# Patient Record
Sex: FEMALE | Race: WHITE | NOT HISPANIC OR LATINO | Employment: OTHER | URBAN - METROPOLITAN AREA
[De-identification: names, ages, dates, MRNs, and addresses within clinical notes are randomized per-mention and may not be internally consistent; named-entity substitution may affect disease eponyms.]

---

## 2017-07-25 ENCOUNTER — HOSPITAL ENCOUNTER (OUTPATIENT)
Dept: RADIOLOGY | Facility: MEDICAL CENTER | Age: 67
End: 2017-07-25
Attending: FAMILY MEDICINE
Payer: COMMERCIAL

## 2017-07-25 DIAGNOSIS — Z12.31 VISIT FOR SCREENING MAMMOGRAM: ICD-10-CM

## 2017-07-25 PROCEDURE — 77063 BREAST TOMOSYNTHESIS BI: CPT

## 2017-07-26 ENCOUNTER — HOSPITAL ENCOUNTER (OUTPATIENT)
Dept: RADIOLOGY | Facility: MEDICAL CENTER | Age: 67
End: 2017-07-26

## 2017-11-27 ENCOUNTER — TELEPHONE (OUTPATIENT)
Dept: RADIOLOGY | Facility: MEDICAL CENTER | Age: 67
End: 2017-11-27

## 2017-11-28 ENCOUNTER — HOSPITAL ENCOUNTER (OUTPATIENT)
Dept: RADIOLOGY | Facility: MEDICAL CENTER | Age: 67
End: 2017-11-28
Attending: PHYSICIAN ASSISTANT
Payer: COMMERCIAL

## 2017-11-28 ENCOUNTER — HOSPITAL ENCOUNTER (OUTPATIENT)
Dept: RADIOLOGY | Facility: MEDICAL CENTER | Age: 67
End: 2017-11-28

## 2017-11-28 DIAGNOSIS — N63.0 LUMP OR MASS IN BREAST: ICD-10-CM

## 2017-11-28 PROCEDURE — G0206 DX MAMMO INCL CAD UNI: HCPCS | Mod: LT

## 2017-11-28 PROCEDURE — 76642 ULTRASOUND BREAST LIMITED: CPT | Mod: LT

## 2018-02-08 ENCOUNTER — HOSPITAL ENCOUNTER (OUTPATIENT)
Dept: RADIOLOGY | Facility: MEDICAL CENTER | Age: 68
End: 2018-02-08
Attending: FAMILY MEDICINE
Payer: COMMERCIAL

## 2018-02-08 DIAGNOSIS — R05.9 COUGH: ICD-10-CM

## 2018-02-08 DIAGNOSIS — D50.9 NORMOCYTIC HYPOCHROMIC ANEMIA: ICD-10-CM

## 2018-02-08 PROCEDURE — 71046 X-RAY EXAM CHEST 2 VIEWS: CPT

## 2018-09-28 ENCOUNTER — HOSPITAL ENCOUNTER (OUTPATIENT)
Dept: RADIOLOGY | Facility: MEDICAL CENTER | Age: 68
End: 2018-09-28
Attending: PHYSICIAN ASSISTANT
Payer: COMMERCIAL

## 2018-09-28 DIAGNOSIS — Z12.31 VISIT FOR SCREENING MAMMOGRAM: ICD-10-CM

## 2018-09-28 PROCEDURE — 77067 SCR MAMMO BI INCL CAD: CPT

## 2019-01-18 ENCOUNTER — OFFICE VISIT (OUTPATIENT)
Dept: URGENT CARE | Facility: PHYSICIAN GROUP | Age: 69
End: 2019-01-18
Payer: MEDICARE

## 2019-01-18 VITALS
SYSTOLIC BLOOD PRESSURE: 124 MMHG | RESPIRATION RATE: 14 BRPM | BODY MASS INDEX: 30.82 KG/M2 | TEMPERATURE: 98.6 F | WEIGHT: 185 LBS | HEIGHT: 65 IN | OXYGEN SATURATION: 95 % | DIASTOLIC BLOOD PRESSURE: 80 MMHG | HEART RATE: 91 BPM

## 2019-01-18 DIAGNOSIS — H00.014 HORDEOLUM EXTERNUM OF LEFT UPPER EYELID: Primary | ICD-10-CM

## 2019-01-18 DIAGNOSIS — R59.0 PREAURICULAR LYMPHADENOPATHY: ICD-10-CM

## 2019-01-18 PROCEDURE — 99204 OFFICE O/P NEW MOD 45 MIN: CPT | Performed by: PHYSICIAN ASSISTANT

## 2019-01-18 RX ORDER — OMEPRAZOLE 20 MG/1
20 CAPSULE, DELAYED RELEASE ORAL DAILY
COMMUNITY
End: 2021-04-21

## 2019-01-18 RX ORDER — AMOXICILLIN 500 MG/1
500 CAPSULE ORAL 2 TIMES DAILY
Qty: 14 CAP | Refills: 0 | Status: SHIPPED | OUTPATIENT
Start: 2019-01-18 | End: 2019-01-25

## 2019-01-18 RX ORDER — LEVOTHYROXINE SODIUM 88 UG/1
88 TABLET ORAL
COMMUNITY
End: 2020-03-10

## 2019-01-18 RX ORDER — MOXIFLOXACIN 5 MG/ML
1 SOLUTION/ DROPS OPHTHALMIC 3 TIMES DAILY
Qty: 1 BOTTLE | Refills: 0 | Status: SHIPPED | OUTPATIENT
Start: 2019-01-18 | End: 2019-01-23

## 2019-01-18 ASSESSMENT — ENCOUNTER SYMPTOMS
SHORTNESS OF BREATH: 0
DIARRHEA: 0
COUGH: 0
PHOTOPHOBIA: 0
DOUBLE VISION: 0
EYE REDNESS: 1
VOMITING: 0
CONSTIPATION: 0
EYE DISCHARGE: 1
CHILLS: 0
FEVER: 0
NAUSEA: 0
EYE ITCHING: 0
FOREIGN BODY SENSATION: 0
ABDOMINAL PAIN: 0
EYE PAIN: 0
BLURRED VISION: 0

## 2019-01-18 NOTE — PROGRESS NOTES
Subjective:   Prudence Chayito Gregory is a 68 y.o. female who presents for Eye Drainage (poss stye on eye swelling on left cheek by ear )        Eye Problem    The left eye is affected. This is a new problem. Episode onset: 2 days. The problem occurs constantly. The problem has been gradually worsening. There is no known exposure to pink eye. Associated symptoms include an eye discharge and eye redness. Pertinent negatives include no blurred vision, double vision, fever, foreign body sensation, itching, nausea, photophobia, recent URI or vomiting. Treatments tried: ibuprofen and warm compress. The treatment provided mild relief.      She has noticed some tenderness and swelling on L side preauricular area. No otalgia, discharge or dental pain. No sore throat, fever or chills.     Review of Systems   Constitutional: Negative for chills, fever and malaise/fatigue.   Eyes: Positive for discharge and redness. Negative for blurred vision, double vision, photophobia, pain and itching.   Respiratory: Negative for cough and shortness of breath.    Gastrointestinal: Negative for abdominal pain, constipation, diarrhea, nausea and vomiting.   All other systems reviewed and are negative.      PMH:  has no past medical history on file.  MEDS:   Current Outpatient Prescriptions:   •  omeprazole (PRILOSEC) 20 MG delayed-release capsule, Take 20 mg by mouth every day., Disp: , Rfl:   •  levothyroxine (SYNTHROID) 88 MCG Tab, Take 88 mcg by mouth Every morning on an empty stomach., Disp: , Rfl:   •  Mometasone Furo-Formoterol Fum (DULERA) 100-5 MCG/ACT Aerosol, Inhale  by mouth., Disp: , Rfl:   •  moxifloxacin (VIGAMOX) 0.5 % Solution, Place 1 Drop in left eye 3 times a day for 5 days., Disp: 1 Bottle, Rfl: 0  •  amoxicillin (AMOXIL) 500 MG Cap, Take 1 Cap by mouth 2 times a day for 7 days., Disp: 14 Cap, Rfl: 0  •  nystatin/triamcinolone (MYCOLOG) 245660-2.1 UNIT/GM-% Cream, APPLY TO AFFECTED AREA 2 TIMES DAILY FOR 10 DAYS, Disp: ,  "Rfl: 0  ALLERGIES:   Allergies   Allergen Reactions   • Codeine      SURGHX: No past surgical history on file.  SOCHX:  reports that she has never smoked. She has never used smokeless tobacco. She reports that she drinks alcohol. She reports that she does not use drugs.  No family history on file.     Objective:   /80   Pulse 91   Temp 37 °C (98.6 °F) (Temporal)   Resp 14   Ht 1.651 m (5' 5\")   Wt 83.9 kg (185 lb)   SpO2 95%   BMI 30.79 kg/m²     Physical Exam   Constitutional: She is oriented to person, place, and time. She appears well-developed and well-nourished. No distress.   HENT:   Head: Normocephalic and atraumatic.       Right Ear: Hearing, tympanic membrane and ear canal normal.   Left Ear: Hearing, tympanic membrane and ear canal normal.   Nose: No mucosal edema or rhinorrhea.   Mouth/Throat: Uvula is midline. No oral lesions. No dental abscesses. No oropharyngeal exudate, posterior oropharyngeal edema or posterior oropharyngeal erythema.   Eyes: Pupils are equal, round, and reactive to light. Conjunctivae and EOM are normal.       Neck: Normal range of motion. Neck supple.   Cardiovascular: Normal rate and regular rhythm.    Pulmonary/Chest: Effort normal and breath sounds normal.   Lymphadenopathy:     She has no cervical adenopathy.   Neurological: She is alert and oriented to person, place, and time.   Skin: Skin is warm and dry.   Psychiatric: She has a normal mood and affect. Her behavior is normal.   Vitals reviewed.        Assessment/Plan:     1. Hordeolum externum of left upper eyelid  moxifloxacin (VIGAMOX) 0.5 % Solution   2. Preauricular lymphadenopathy  amoxicillin (AMOXIL) 500 MG Cap     Patient directed to start topical abx. If sx are not significantly improved by 24- 48 hours  patient directed to return to clinic for reevaluation. Continue warm/cold compress. Stye educational handout given to patient. Pt directed to monitor small area of swelling near the L preauricular area, " possibly due to LAD from large stye. Patient was given a contingent antibiotic prescription to fill and use as directed if swelling persist/progressed. If swelling is persistent after abx usage return to clinic for US of area.     Follow-up with primary care provider within 1 week. Red flags and emergency room precautions discussed.  Patient verbalizes understanding of information.

## 2019-02-04 ENCOUNTER — HOSPITAL ENCOUNTER (OUTPATIENT)
Dept: LAB | Facility: MEDICAL CENTER | Age: 69
End: 2019-02-04
Attending: FAMILY MEDICINE
Payer: MEDICARE

## 2019-02-04 LAB — TSH SERPL DL<=0.005 MIU/L-ACNC: 1.58 UIU/ML (ref 0.38–5.33)

## 2019-02-04 PROCEDURE — 84443 ASSAY THYROID STIM HORMONE: CPT

## 2019-02-04 PROCEDURE — 36415 COLL VENOUS BLD VENIPUNCTURE: CPT

## 2019-08-05 ENCOUNTER — HOSPITAL ENCOUNTER (OUTPATIENT)
Dept: LAB | Facility: MEDICAL CENTER | Age: 69
End: 2019-08-05
Attending: FAMILY MEDICINE
Payer: MEDICARE

## 2019-08-05 LAB — TSH SERPL DL<=0.005 MIU/L-ACNC: 0.5 UIU/ML (ref 0.38–5.33)

## 2019-08-05 PROCEDURE — 36415 COLL VENOUS BLD VENIPUNCTURE: CPT

## 2019-08-05 PROCEDURE — 84443 ASSAY THYROID STIM HORMONE: CPT

## 2019-11-06 ENCOUNTER — HOSPITAL ENCOUNTER (OUTPATIENT)
Dept: RADIOLOGY | Facility: MEDICAL CENTER | Age: 69
End: 2019-11-06
Attending: FAMILY MEDICINE
Payer: MEDICARE

## 2019-11-06 DIAGNOSIS — Z12.31 VISIT FOR SCREENING MAMMOGRAM: ICD-10-CM

## 2019-11-06 PROCEDURE — 77063 BREAST TOMOSYNTHESIS BI: CPT

## 2019-11-07 ENCOUNTER — IMMUNIZATION (OUTPATIENT)
Dept: SOCIAL WORK | Facility: CLINIC | Age: 69
End: 2019-11-07
Payer: MEDICARE

## 2019-11-07 DIAGNOSIS — Z23 NEED FOR VACCINATION: ICD-10-CM

## 2019-11-07 PROCEDURE — 90662 IIV NO PRSV INCREASED AG IM: CPT | Performed by: REGISTERED NURSE

## 2019-11-07 PROCEDURE — G0008 ADMIN INFLUENZA VIRUS VAC: HCPCS | Performed by: REGISTERED NURSE

## 2020-02-19 ENCOUNTER — TELEPHONE (OUTPATIENT)
Dept: SCHEDULING | Facility: IMAGING CENTER | Age: 70
End: 2020-02-19

## 2020-03-05 ENCOUNTER — OFFICE VISIT (OUTPATIENT)
Dept: MEDICAL GROUP | Facility: MEDICAL CENTER | Age: 70
End: 2020-03-05
Payer: MEDICARE

## 2020-03-05 VITALS
SYSTOLIC BLOOD PRESSURE: 128 MMHG | DIASTOLIC BLOOD PRESSURE: 62 MMHG | OXYGEN SATURATION: 94 % | RESPIRATION RATE: 16 BRPM | HEART RATE: 73 BPM | WEIGHT: 166.67 LBS | HEIGHT: 65 IN | TEMPERATURE: 97.9 F | BODY MASS INDEX: 27.77 KG/M2

## 2020-03-05 DIAGNOSIS — Z13.6 ENCOUNTER FOR LIPID SCREENING FOR CARDIOVASCULAR DISEASE: ICD-10-CM

## 2020-03-05 DIAGNOSIS — Z13.220 ENCOUNTER FOR LIPID SCREENING FOR CARDIOVASCULAR DISEASE: ICD-10-CM

## 2020-03-05 DIAGNOSIS — Z78.0 MENOPAUSE: ICD-10-CM

## 2020-03-05 DIAGNOSIS — L98.9 SKIN LESION: ICD-10-CM

## 2020-03-05 DIAGNOSIS — Z23 NEED FOR 23-POLYVALENT PNEUMOCOCCAL POLYSACCHARIDE VACCINE: ICD-10-CM

## 2020-03-05 DIAGNOSIS — E55.9 VITAMIN D DEFICIENCY: ICD-10-CM

## 2020-03-05 DIAGNOSIS — Z11.59 ENCOUNTER FOR HEPATITIS C SCREENING TEST FOR LOW RISK PATIENT: ICD-10-CM

## 2020-03-05 DIAGNOSIS — E03.4 HYPOTHYROIDISM DUE TO ACQUIRED ATROPHY OF THYROID: ICD-10-CM

## 2020-03-05 DIAGNOSIS — J45.40 MODERATE PERSISTENT ASTHMA WITHOUT COMPLICATION: ICD-10-CM

## 2020-03-05 PROCEDURE — G0009 ADMIN PNEUMOCOCCAL VACCINE: HCPCS | Performed by: INTERNAL MEDICINE

## 2020-03-05 PROCEDURE — 99204 OFFICE O/P NEW MOD 45 MIN: CPT | Mod: 25 | Performed by: INTERNAL MEDICINE

## 2020-03-05 PROCEDURE — 90732 PPSV23 VACC 2 YRS+ SUBQ/IM: CPT | Performed by: INTERNAL MEDICINE

## 2020-03-05 RX ORDER — ALBUTEROL SULFATE 90 UG/1
2 AEROSOL, METERED RESPIRATORY (INHALATION) EVERY 4 HOURS PRN
Qty: 1 INHALER | Refills: 3 | Status: SHIPPED | OUTPATIENT
Start: 2020-03-05 | End: 2021-03-31

## 2020-03-05 RX ORDER — IBUPROFEN 800 MG/1
TABLET ORAL
COMMUNITY
Start: 2020-01-22 | End: 2020-04-20

## 2020-03-05 RX ORDER — CLOBETASOL PROPIONATE 0.5 MG/G
OINTMENT TOPICAL
COMMUNITY
Start: 2020-02-28 | End: 2021-09-15

## 2020-03-05 RX ORDER — AMOXICILLIN 500 MG/1
CAPSULE ORAL
COMMUNITY
Start: 2020-01-22 | End: 2020-03-05

## 2020-03-05 RX ORDER — CHLORHEXIDINE GLUCONATE ORAL RINSE 1.2 MG/ML
SOLUTION DENTAL
COMMUNITY
Start: 2020-01-23 | End: 2020-03-05

## 2020-03-05 ASSESSMENT — PATIENT HEALTH QUESTIONNAIRE - PHQ9: CLINICAL INTERPRETATION OF PHQ2 SCORE: 0

## 2020-03-05 NOTE — LETTER
[facility]  Shawna Recio D.O.  [address]  [place] [address]  Fax: [phone]   Authorization for Release/Disclosure of   Protected Health Information   Name: AMIRAHLISSYNCMARCO MARIO ALBERTO DALE : 1950 SSN: [id number]   Address: [address] [place] [address]   [place] [address] Phone:    [phone] (home)    I authorize the entity listed below to release/disclose the PHI below to:   [facility]/Shawna Recio D.O. and Shawna Recio D.O.   Provider or Entity Name:  Michelle Vaughan    Address   City, State, Zip   Phone:      Fax:     Reason for request: continuity of care   Information to be released:    [  ] LAST COLONOSCOPY,  including any PATH REPORT and follow-up  [  ] LAST FIT/COLOGUARD RESULT [  ] LAST DEXA  [  ] LAST MAMMOGRAM  [  ] LAST PAP  [  ] LAST LABS [  ] RETINA EXAM REPORT  [  ] IMMUNIZATION RECORDS  [x] Release all info      [x] Check here and initial the line next to each item to release ALL health information INCLUDING  _____ Care and treatment for drug and / or alcohol abuse  _____ HIV testing, infection status, or AIDS  _____ Genetic Testing    DATES OF SERVICE OR TIME PERIOD TO BE DISCLOSED: _____________  I understand and acknowledge that:  * This Authorization may be revoked at any time by you in writing, except if your health information has already been used or disclosed.  * Your health information that will be used or disclosed as a result of you signing this authorization could be re-disclosed by the recipient. If this occurs, your re-disclosed health information may no longer be protected by State or Federal laws.  * You may refuse to sign this Authorization. Your refusal will not affect your ability to obtain treatment.  * This Authorization becomes effective upon signing and will  on (date) __________.      If no date is indicated, this Authorization will  one (1) year from the signature date.    Name: Nancy Mario Alberto Dale    Signature:   Date:          3/5/2020       PLEASE FAX REQUESTED RECORDS BACK TO: [phone]

## 2020-03-05 NOTE — LETTER
[facility]  Shawna Recio D.O.  [address]  [place] [address]  Fax: [phone]   Authorization for Release/Disclosure of   Protected Health Information   Name: NANCY DALE : 1950 SSN: [id number]   Address: [address] [place] [address]   [place] [address] Phone:    [phone] (home)    I authorize the entity listed below to release/disclose the PHI below to:   [facility]/Shawna Recio D.O. and Shawna Recio D.O.   Provider or Entity Name:  [facility]   Address   City, State, [facility]   Phone:      Fax:     Reason for request: continuity of care   Information to be released:    [x] LAST COLONOSCOPY,  including any PATH REPORT and follow-up  [  ] LAST FIT/COLOGUARD RESULT [  ] LAST DEXA  [  ] LAST MAMMOGRAM  [  ] LAST PAP  [  ] LAST LABS [  ] RETINA EXAM REPORT  [  ] IMMUNIZATION RECORDS  [x] Release all info      [x] Check here and initial the line next to each item to release ALL health information INCLUDING  _____ Care and treatment for drug and / or alcohol abuse  _____ HIV testing, infection status, or AIDS  _____ Genetic Testing    DATES OF SERVICE OR TIME PERIOD TO BE DISCLOSED: _____________  I understand and acknowledge that:  * This Authorization may be revoked at any time by you in writing, except if your health information has already been used or disclosed.  * Your health information that will be used or disclosed as a result of you signing this authorization could be re-disclosed by the recipient. If this occurs, your re-disclosed health information may no longer be protected by State or Federal laws.  * You may refuse to sign this Authorization. Your refusal will not affect your ability to obtain treatment.  * This Authorization becomes effective upon signing and will  on (date) __________.      If no date is indicated, this Authorization will  one (1) year from the signature date.    Name: Nancy Stratton  [place]    Signature:   Date:     3/5/2020       PLEASE FAX REQUESTED RECORDS BACK TO: [phone]

## 2020-03-05 NOTE — PROGRESS NOTES
Subjective:     CC:  Diagnoses of Menopause, Need for 23-polyvalent pneumococcal polysaccharide vaccine, Encounter for hepatitis C screening test for low risk patient, Moderate persistent asthma without complication, Vitamin D deficiency, Encounter for lipid screening for cardiovascular disease, Hypothyroidism due to acquired atrophy of thyroid, and Skin lesion were pertinent to this visit.    HISTORY OF THE PRESENT ILLNESS: Patient is a 69 y.o. female. This pleasant patient is here today to establish care and discuss the below stated chronic medical conditions.  She is unaccompanied for today's visit.    Problem   Moderate Persistent Asthma Without Complication    She was diagnosed with asthma in 1994.  She reports previous hospitalizations for her asthma but no prior intubation/ventilation episodes.  She has been on several medications over the years.  She started on Flovent but this did not work for her.  She was then transition onto Advair with Singulair, she stopped the Singulair but then noted that the Advair really was not working anymore.  Most recently she has been put on Dulera with as needed albuterol.  Dulera as prescribed as 2 puffs twice daily but she tries to maintain on 1 puff twice daily.  She uses her Ventolin occasionally can usually make one inhaler last 8 to 9 months.  She saw Dr. Choudhary with pulmonary medicine in 2012 and at that time a PFT testing she was noted to be moderate stage II obstruction with FEV1 64% and FEV1/FVC ratio 55%.  She denies any nocturnal symptoms at this time.     Vitamin D Deficiency    She has prior history of vitamin D deficiency.  No recent lab values for me to review.  Unknown if she has osteopenia or osteoporosis.  No known history of chronic kidney disease or hyperparathyroidism.     Hypothyroidism Due to Acquired Atrophy of Thyroid       Ref. Range 2/4/2019 13:48 8/5/2019 15:23   TSH Latest Ref Range: 0.380 - 5.330 uIU/mL 1.580 0.500       Reports history of  hypothyroidism diagnosed by abnormal labs.  She denies any signs or symptoms of overtreatment or under treatment at this time.  He has remained asymptomatic to her thyroid disease.     Skin Lesion    She reports a concerning lesion on the inner thigh. She has never met with a dermatologist and would like a full body skin check due to sun exposure in the past without wearing sun screen.     Menopause    She is s/p surgical menopause at age 44 after KVNG-BSO. She recalls prior bone density which she thinks was normal. No prior fractures.          Allergies: Codeine    Current Outpatient Medications Ordered in Epic   Medication Sig Dispense Refill   • ibuprofen (MOTRIN) 800 MG Tab      • clobetasol (TEMOVATE) 0.05 % Ointment TOPICALLY APPLY SPARINGLY TO AFFECTED AREA 3X\/WEEK     • Mometasone Furo-Formoterol Fum 100-5 MCG/ACT Aerosol Inhale 2 Puffs by mouth 2 Times a Day.     • albuterol 108 (90 Base) MCG/ACT Aero Soln inhalation aerosol Inhale 2 Puffs by mouth every four hours as needed for Shortness of Breath. 1 Inhaler 3   • omeprazole (PRILOSEC) 20 MG delayed-release capsule Take 20 mg by mouth every day.     • levothyroxine (SYNTHROID) 88 MCG Tab Take 88 mcg by mouth Every morning on an empty stomach.       No current [facility]-ordered facility-administered medications on file.        Past Medical History:   Diagnosis Date   • Thyroid disease        Past Surgical History:   Procedure Laterality Date   • ABDOMINAL HYSTERECTOMY TOTAL     • CHOLECYSTECTOMY         Social History     Tobacco Use   • Smoking status: Never Smoker   • Smokeless tobacco: Never Used   • Tobacco comment: continued abstinence   Substance Use Topics   • Alcohol use: Yes     Comment: occ   • Drug use: No       Social History     Social History Narrative    She worked at [employer] at the [employer], recently retired. She is  to Ryan for the past 20 years, they met at a [employer]. She has a son ([place] [address], [place]) and 2  "grandchildren.        Family History   Problem Relation Age of Onset   • Cancer Father         lung       Health Maintenance: Completed    ROS:   As above in the HPI All Others Reviewed and Negative  Objective:     Exam: /62 (BP Location: Left arm, Patient Position: Sitting, BP Cuff Size: Adult)   Pulse 73   Temp 36.6 °C (97.9 °F) (Temporal)   Resp 16   Ht 1.651 m (5' 5\")   Wt 75.6 kg (166 lb 10.7 oz)   SpO2 94%  Body mass index is 27.73 kg/m².    General: Normal appearing. No distress. Appears stated age.  EENT: Eyes conjunctiva clear lids without ptosis, extraocular movements intact, ears normal shape and contour, canals are clear bilaterally, tympanic membranes are benign, oropharynx is without erythema, edema or exudates. Fair dentition.   Neck: Supple without JVD. Thyroid is not enlarged. No carotid bruits.  Pulmonary: Clear to ausculation.  Normal effort. No rales, ronchi, or wheezing. No cough.  Cardiovascular: Regular rate and rhythm without murmur. No lower extremity edema bilaterally.  Abdomen: Soft, nontender, nondistended. Normal bowel sounds.   Neurologic: No resting tremor, no increased tone or rigidity.  Lymph: No cervical or supraclavicular lymph nodes are palpable  Skin: Warm and dry.  No obvious lesions. She reports an irritating lesion on the inner thigh.  Musculoskeletal: Normal gait. No extremity cyanosis, clubbing, or edema. Patient ambulates independently and without a gait aid.  Psych: Normal mood and affect. Alert and oriented x3. Judgment and insight is normal.    Assessment & Plan:   69 y.o. female with the following -    Problem List Items Addressed This Visit     Moderate persistent asthma without complication     Previous problem that requires follow-up.  We will update her pulmonary function testing to see if we can adjust her corticosteroids/LABA therapy.  Appropriate to continue KASSANDRA at this time.         Relevant Medications    Mometasone Furo-Formoterol Fum 100-5 " MCG/ACT Aerosol    albuterol 108 (90 Base) MCG/ACT Aero Soln inhalation aerosol    Other Relevant Orders    PULMONARY FUNCTION TESTS -Test requested: Complete Pulmonary Function Test; Include MIPS/MEPS? No    Comp Metabolic Panel    Vitamin D deficiency     Previous problem that requires follow-up.  Will obtain vitamin D level to ascertain whether she requires additional replacement.         Relevant Orders    Comp Metabolic Panel    VITAMIN D,25 HYDROXY    Hypothyroidism due to acquired atrophy of thyroid     Chronic and stable problem.  We will recheck thyroid studies to ensure ongoing stability and adjust medication as indicated.         Relevant Orders    CBC WITH DIFFERENTIAL    Comp Metabolic Panel    Skin lesion     She reports a lesion on the inner thigh that she would like evaluated by dermatology in addition to a full skin check to assess for worrisome lesions.         Relevant Orders    REFERRAL TO DERMATOLOGY    Menopause     Previous problem that requires follow up. Will obtain bone density to screen for osteoporosis due to age.         Relevant Orders    DS-BONE DENSITY STUDY (DEXA)      Other Visit Diagnoses     Need for 23-polyvalent pneumococcal polysaccharide vaccine        Relevant Orders    PneumoVax PPV23 =>1yo (Completed)    Encounter for hepatitis C screening test for low risk patient        Relevant Orders    HEP C VIRUS ANTIBODY    Encounter for lipid screening for cardiovascular disease        Relevant Orders    Comp Metabolic Panel    Lipid Profile           Return in about 1 year (around 3/5/2021).    Please note that this dictation was created using voice recognition software. I have made every reasonable attempt to correct obvious errors, but I expect that there are errors of grammar and possibly content that I did not discover before finalizing the note.

## 2020-03-06 NOTE — ASSESSMENT & PLAN NOTE
Chronic and stable problem.  We will recheck thyroid studies to ensure ongoing stability and adjust medication as indicated.

## 2020-03-06 NOTE — ASSESSMENT & PLAN NOTE
Previous problem that requires follow-up.  We will update her pulmonary function testing to see if we can adjust her corticosteroids/LABA therapy.  Appropriate to continue KASSANDRA at this time.

## 2020-03-06 NOTE — ASSESSMENT & PLAN NOTE
Previous problem that requires follow-up.  Will obtain vitamin D level to ascertain whether she requires additional replacement.

## 2020-03-06 NOTE — ASSESSMENT & PLAN NOTE
Previous problem that requires follow up. Will obtain bone density to screen for osteoporosis due to age.

## 2020-03-06 NOTE — ASSESSMENT & PLAN NOTE
She reports a lesion on the inner thigh that she would like evaluated by dermatology in addition to a full skin check to assess for worrisome lesions.

## 2020-03-11 ENCOUNTER — HOSPITAL ENCOUNTER (OUTPATIENT)
Dept: LAB | Facility: MEDICAL CENTER | Age: 70
End: 2020-03-11
Attending: INTERNAL MEDICINE
Payer: MEDICARE

## 2020-03-11 DIAGNOSIS — J45.40 MODERATE PERSISTENT ASTHMA WITHOUT COMPLICATION: ICD-10-CM

## 2020-03-11 DIAGNOSIS — E55.9 VITAMIN D DEFICIENCY: ICD-10-CM

## 2020-03-11 DIAGNOSIS — E03.4 HYPOTHYROIDISM DUE TO ACQUIRED ATROPHY OF THYROID: ICD-10-CM

## 2020-03-11 DIAGNOSIS — Z13.6 ENCOUNTER FOR LIPID SCREENING FOR CARDIOVASCULAR DISEASE: ICD-10-CM

## 2020-03-11 DIAGNOSIS — Z13.220 ENCOUNTER FOR LIPID SCREENING FOR CARDIOVASCULAR DISEASE: ICD-10-CM

## 2020-03-11 DIAGNOSIS — Z11.59 ENCOUNTER FOR HEPATITIS C SCREENING TEST FOR LOW RISK PATIENT: ICD-10-CM

## 2020-03-11 LAB
BASOPHILS # BLD AUTO: 0.8 % (ref 0–1.8)
BASOPHILS # BLD: 0.05 K/UL (ref 0–0.12)
EOSINOPHIL # BLD AUTO: 0.17 K/UL (ref 0–0.51)
EOSINOPHIL NFR BLD: 2.8 % (ref 0–6.9)
ERYTHROCYTE [DISTWIDTH] IN BLOOD BY AUTOMATED COUNT: 42.6 FL (ref 35.9–50)
HCT VFR BLD AUTO: 44.2 % (ref 37–47)
HGB BLD-MCNC: 14.3 G/DL (ref 12–16)
IMM GRANULOCYTES # BLD AUTO: 0.01 K/UL (ref 0–0.11)
IMM GRANULOCYTES NFR BLD AUTO: 0.2 % (ref 0–0.9)
LYMPHOCYTES # BLD AUTO: 2.27 K/UL (ref 1–4.8)
LYMPHOCYTES NFR BLD: 37.4 % (ref 22–41)
MCH RBC QN AUTO: 30.5 PG (ref 27–33)
MCHC RBC AUTO-ENTMCNC: 32.4 G/DL (ref 33.6–35)
MCV RBC AUTO: 94.2 FL (ref 81.4–97.8)
MONOCYTES # BLD AUTO: 0.45 K/UL (ref 0–0.85)
MONOCYTES NFR BLD AUTO: 7.4 % (ref 0–13.4)
NEUTROPHILS # BLD AUTO: 3.12 K/UL (ref 2–7.15)
NEUTROPHILS NFR BLD: 51.4 % (ref 44–72)
NRBC # BLD AUTO: 0 K/UL
NRBC BLD-RTO: 0 /100 WBC
PLATELET # BLD AUTO: 272 K/UL (ref 164–446)
PMV BLD AUTO: 10.9 FL (ref 9–12.9)
RBC # BLD AUTO: 4.69 M/UL (ref 4.2–5.4)
WBC # BLD AUTO: 6.1 K/UL (ref 4.8–10.8)

## 2020-03-11 PROCEDURE — 80053 COMPREHEN METABOLIC PANEL: CPT

## 2020-03-11 PROCEDURE — 85025 COMPLETE CBC W/AUTO DIFF WBC: CPT

## 2020-03-11 PROCEDURE — 80061 LIPID PANEL: CPT

## 2020-03-11 PROCEDURE — 82306 VITAMIN D 25 HYDROXY: CPT

## 2020-03-11 PROCEDURE — 86803 HEPATITIS C AB TEST: CPT

## 2020-03-11 PROCEDURE — 36415 COLL VENOUS BLD VENIPUNCTURE: CPT

## 2020-03-12 LAB
25(OH)D3 SERPL-MCNC: 30 NG/ML (ref 30–100)
ALBUMIN SERPL BCP-MCNC: 4.2 G/DL (ref 3.2–4.9)
ALBUMIN/GLOB SERPL: 1.4 G/DL
ALP SERPL-CCNC: 52 U/L (ref 30–99)
ALT SERPL-CCNC: 13 U/L (ref 2–50)
ANION GAP SERPL CALC-SCNC: 9 MMOL/L (ref 7–16)
AST SERPL-CCNC: 17 U/L (ref 12–45)
BILIRUB SERPL-MCNC: 0.6 MG/DL (ref 0.1–1.5)
BUN SERPL-MCNC: 17 MG/DL (ref 8–22)
CALCIUM SERPL-MCNC: 9.8 MG/DL (ref 8.5–10.5)
CHLORIDE SERPL-SCNC: 102 MMOL/L (ref 96–112)
CHOLEST SERPL-MCNC: 214 MG/DL (ref 100–199)
CO2 SERPL-SCNC: 28 MMOL/L (ref 20–33)
CREAT SERPL-MCNC: 0.73 MG/DL (ref 0.5–1.4)
FASTING STATUS PATIENT QL REPORTED: NORMAL
GLOBULIN SER CALC-MCNC: 2.9 G/DL (ref 1.9–3.5)
GLUCOSE SERPL-MCNC: 87 MG/DL (ref 65–99)
HCV AB SER QL: NORMAL
HDLC SERPL-MCNC: 76 MG/DL
LDLC SERPL CALC-MCNC: 127 MG/DL
POTASSIUM SERPL-SCNC: 4.3 MMOL/L (ref 3.6–5.5)
PROT SERPL-MCNC: 7.1 G/DL (ref 6–8.2)
SODIUM SERPL-SCNC: 139 MMOL/L (ref 135–145)
TRIGL SERPL-MCNC: 55 MG/DL (ref 0–149)

## 2020-03-26 PROBLEM — E78.2 MIXED HYPERLIPIDEMIA: Status: ACTIVE | Noted: 2020-03-26

## 2020-03-26 NOTE — RESULT ENCOUNTER NOTE
"Hi Pru,    Labs look good! Normal electrolytes, kidney function, and liver function. Vitamin D is normal. Hepatitis C is nonreactive. Blood counts are stable. Cholesterol is mildly elevated but your \"good\" HDL cholesterol is also high which is very protective against heart blockages. You technically qualify for a medicine like Lipitor, but just barely. I think we are okay to try lifestyle changes through diet and exercise before starting a medicine. Let me know what you think.    Thanks,  Dr. Recio    "

## 2020-04-07 NOTE — TELEPHONE ENCOUNTER
FINAL PRIOR AUTHORIZATION STATUS:    1.  Name of Medication & Dose: Dulera 100-5mcg     2. Prior Auth Status: Approved through 4/6/2021     3. Action Taken: Pharmacy Notified: yes Patient Notified: no

## 2020-04-19 ENCOUNTER — TELEMEDICINE (OUTPATIENT)
Dept: TELEHEALTH | Facility: TELEMEDICINE | Age: 70
End: 2020-04-19
Payer: MEDICARE

## 2020-04-19 VITALS — HEIGHT: 65 IN | BODY MASS INDEX: 26.66 KG/M2 | WEIGHT: 160 LBS

## 2020-04-19 DIAGNOSIS — N30.00 ACUTE CYSTITIS WITHOUT HEMATURIA: ICD-10-CM

## 2020-04-19 PROCEDURE — 99213 OFFICE O/P EST LOW 20 MIN: CPT | Mod: 95,CR | Performed by: NURSE PRACTITIONER

## 2020-04-19 RX ORDER — CEFDINIR 300 MG/1
300 CAPSULE ORAL EVERY 12 HOURS
Qty: 10 CAP | Refills: 0 | Status: SHIPPED | OUTPATIENT
Start: 2020-04-19 | End: 2020-04-24

## 2020-04-19 RX ORDER — PHENAZOPYRIDINE HYDROCHLORIDE 200 MG/1
200 TABLET, FILM COATED ORAL 3 TIMES DAILY
Qty: 6 TAB | Refills: 0 | Status: SHIPPED | OUTPATIENT
Start: 2020-04-19 | End: 2020-04-21

## 2020-04-19 ASSESSMENT — FIBROSIS 4 INDEX: FIB4 SCORE: 1.196072298110650294

## 2020-04-19 ASSESSMENT — ENCOUNTER SYMPTOMS
DIZZINESS: 0
MYALGIAS: 0
ABDOMINAL PAIN: 1
FLANK PAIN: 1
BACK PAIN: 0
DIARRHEA: 0
VOMITING: 0
WEAKNESS: 0
FEVER: 0
CHILLS: 0
NAUSEA: 0
CONSTIPATION: 0
HEADACHES: 0

## 2020-04-19 NOTE — PROGRESS NOTES
"Subjective:      Nina Gregory is a 69 y.o. female who presents with No chief complaint on file.            HPI  C/o dysuria. C/o low pelvic pain, lower back pain, no fever or n/v. Denies hematuria. H/o \"Lichen sclerosis\" and has been treated by her Gyne with clobetasol. States gets frequent UTIs.       Review of Systems   Constitutional: Negative for chills, fever and malaise/fatigue.   Gastrointestinal: Positive for abdominal pain. Negative for constipation, diarrhea, nausea and vomiting.   Genitourinary: Positive for dysuria and flank pain. Negative for frequency, hematuria and urgency.   Musculoskeletal: Negative for back pain and myalgias.   Skin: Negative for itching and rash.   Neurological: Negative for dizziness, weakness and headaches.   All other systems reviewed and are negative.         Objective:     There were no vitals taken for this visit.     Physical Exam  Constitutional:       General: She is not in acute distress.     Appearance: Normal appearance. She is not ill-appearing, toxic-appearing or diaphoretic.   Neurological:      Mental Status: She is alert.   Psychiatric:         Behavior: Behavior is cooperative.          Telemedicine Visit: New Patient         Subjective:     Emilienclynn Gregory is a 69 y.o. female presenting to establish care and to discuss the evaluation and management of            Current medicines (including changes today)  Current Outpatient Medications   Medication Sig Dispense Refill   • cefdinir (OMNICEF) 300 MG Cap Take 1 Cap by mouth every 12 hours for 5 days. 10 Cap 0   • phenazopyridine (PYRIDIUM) 200 MG Tab Take 1 Tab by mouth 3 times a day for 2 days. 6 Tab 0   • Mometasone Furo-Formoterol Fum 100-5 MCG/ACT Aerosol Inhale  by mouth.     • Mometasone Furo-Formoterol Fum 100-5 MCG/ACT Aerosol Inhale 2 Puffs by mouth 2 Times a Day. 3 Inhaler 3   • levothyroxine (SYNTHROID) 88 MCG Tab TAKE 1 TABLET BY MOUTH EVERY DAY 90 Tab 3   • ibuprofen (MOTRIN) 800 MG Tab      • " clobetasol (TEMOVATE) 0.05 % Ointment TOPICALLY APPLY SPARINGLY TO AFFECTED AREA 3X\/WEEK     • albuterol 108 (90 Base) MCG/ACT Aero Soln inhalation aerosol Inhale 2 Puffs by mouth every four hours as needed for Shortness of Breath. 1 Inhaler 3   • omeprazole (PRILOSEC) 20 MG delayed-release capsule Take 20 mg by mouth every day.       No current facility-administered medications for this visit.        She  has a past medical history of Thyroid disease. She also has no past medical history of Encounter for long-term (current) use of other medications.  She  has a past surgical history that includes abdominal hysterectomy total and cholecystectomy.      Family History   Problem Relation Age of Onset   • Cancer Father         lung     Family Status   Relation Name Status   • Fa         Patient Active Problem List    Diagnosis Date Noted   • Mixed hyperlipidemia 2020   • Moderate persistent asthma without complication 2020   • Vitamin D deficiency 2020   • Hypothyroidism due to acquired atrophy of thyroid 2020   • Skin lesion 2020   • Menopause 2020          Objective:   Vitals obtained by patient:  This encounter was conducted via Zoom .   Verbal consent was obtained. Patient's identity was verified.    Assessment and Plan:   The following treatment plan was discussed:     1. Acute cystitis without hematuria  - cefdinir (OMNICEF) 300 MG Cap; Take 1 Cap by mouth every 12 hours for 5 days.  Dispense: 10 Cap; Refill: 0  - phenazopyridine (PYRIDIUM) 200 MG Tab; Take 1 Tab by mouth 3 times a day for 2 days.  Dispense: 6 Tab; Refill: 0    Increase water intake  Urinate more frequently and empty bladder completely  Practice good toileting hygiene after bowel movements and sexual intercourse, refrain from sexual intercourse until infection cleared  Monitor for back/flank pain, difficulty urinating, blood in urine- need re-evaluation  If no improvement, must be seen in clinic for  urinalysis, patient agrees to this      Follow-up: No follow-ups on file.                   Assessment/Plan:       There are no diagnoses linked to this encounter.

## 2020-04-20 ENCOUNTER — HOSPITAL ENCOUNTER (OUTPATIENT)
Facility: MEDICAL CENTER | Age: 70
End: 2020-04-20
Attending: INTERNAL MEDICINE
Payer: MEDICARE

## 2020-04-20 ENCOUNTER — OFFICE VISIT (OUTPATIENT)
Dept: MEDICAL GROUP | Facility: MEDICAL CENTER | Age: 70
End: 2020-04-20
Payer: MEDICARE

## 2020-04-20 VITALS
OXYGEN SATURATION: 93 % | BODY MASS INDEX: 27.25 KG/M2 | HEART RATE: 84 BPM | TEMPERATURE: 97.7 F | SYSTOLIC BLOOD PRESSURE: 102 MMHG | HEIGHT: 65 IN | WEIGHT: 163.58 LBS | DIASTOLIC BLOOD PRESSURE: 60 MMHG

## 2020-04-20 DIAGNOSIS — E78.2 MIXED HYPERLIPIDEMIA: ICD-10-CM

## 2020-04-20 DIAGNOSIS — N30.01 ACUTE CYSTITIS WITH HEMATURIA: ICD-10-CM

## 2020-04-20 DIAGNOSIS — K06.9 GUM DISEASE: ICD-10-CM

## 2020-04-20 PROCEDURE — 81001 URINALYSIS AUTO W/SCOPE: CPT

## 2020-04-20 PROCEDURE — 99214 OFFICE O/P EST MOD 30 MIN: CPT | Performed by: INTERNAL MEDICINE

## 2020-04-20 PROCEDURE — 87086 URINE CULTURE/COLONY COUNT: CPT

## 2020-04-20 ASSESSMENT — FIBROSIS 4 INDEX: FIB4 SCORE: 1.196072298110650294

## 2020-04-20 NOTE — ASSESSMENT & PLAN NOTE
Recently decompensated problem.  Will obtain urinalysis with culture as well as white count and kidney function to assess for pyelonephritis.  Okay to continue cefdinir 300 mg twice daily in the meantime.  We will follow-up with her once I have these results to adjust treatment if indicated.

## 2020-04-20 NOTE — ASSESSMENT & PLAN NOTE
Chronic and stable problem.  Okay to observe at this time due to borderline ASCVD risk score.  Encouraged lifestyle changes and we could consider red yeast rice extract in the future if needed.  She is agreeable.

## 2020-04-20 NOTE — ASSESSMENT & PLAN NOTE
New and decompensated problem.  I have advised the patient that she should notify the periodontal dentist who performed the procedure to make sure that it is not a reaction and that she would not recommend a specific therapy.  I also advised her to try to eat soft foods in the meantime and treat for potential salivary gland blockage with hard candy, warm compress, and massage. She will send me an update later this week. Mouth does not appear acutely infected but appears more inflammed. May consider addition of anti-inflammatory if no better in a few days.

## 2020-04-20 NOTE — PROGRESS NOTES
"Subjective:   Chief Complaint/History of Present Illness:  Prudence Chayito Gregory is a 69 y.o. female established patient who presents today to discuss medical problems as listed below. She is unaccompanied for today's visit.    Problem   Gum Disease    She reports prior lanap (laser treatment for peridontal disease) procedure in late January 2020.  This was done on her right upper gums.  2 weeks following the procedure she had a full liquid diet/soft food diet and this was advanced on Uma[place]'s Day dinner, February 14, 2020.  On the following Monday, February 17, 2020 she notes development of \"bloodshot \"gums causing concern and when she called the person who performed the laser treatment she was advised to go to her regular dentist.  She followed up with her dentist in February who performed pictures and gave her an over-the-counter mouth rinse, unclear what the active ingredient was.  She followed up 2 weeks later and felt only mild improvement.  Her dentist suspected she irritated her gums with sharp food on the night of Umaña's Day or possibly some kind of allergic reaction.  She was due to follow-up again on March 27 however that appointment with her dentist was canceled due to COVID-19.  She describes the symptoms as erythema on her gums and feels that the gums themselves are thick with a hive-like sensation they are also sensitive.  Then starting on Wednesday, April 15, 2020 she noted a lump on the left side submandibular that is nontender.  On recollection she thinks she may have had a salivary gland blockage in the past that she treated by sucking on hard candy.  On an unrelated note, she was started on antibiotics yesterday through a telemedicine visit for suspected urinary tract infection.     Acute Cystitis With Hematuria    Urine dipstick performed in clinic demonstrated trace glucose, 1+ bili, 1+ ketones, specific gravity 1.015, trace intact blood, pH 5.5, 2+ protein, 2.0 urobilinogen, positive " nitrite, 3+ leukocyte esterase.  She performed a telemedicine visit yesterday and was placed on cephalosporin for presumptive urinary tract infection.  She was also given Pyridium due to discomfort with dysuria.  She thinks she had subjective fevers as well as burning pain which prompted the appointment.  Following her appointment she develops flank pain which was remniscent of prior pyelonephritis and made her concerned of infection more than a simple UTI. She has been started on Omnicef 300 mg twice daily for 5 days, this will likely need to be extended.     Mixed Hyperlipidemia       Ref. Range 3/11/2020 10:51   Cholesterol,Tot Latest Ref Range: 100 - 199 mg/dL 214 (H)   Triglycerides Latest Ref Range: 0 - 149 mg/dL 55   HDL Latest Ref Range: >=40 mg/dL 76   LDL Latest Ref Range: <100 mg/dL 127 (H)       Her 10-year ASCVD score is 8%, this reduces to 6% and 5% with standard and high potency statin, respectively.  We discussed the protective nature of her high HDL and that we can try lifestyle modifications before initiating medication.          Additional History:   Allergies:    Codeine     Current Medications:     Current Outpatient Medications   Medication Sig Dispense Refill   • cefdinir (OMNICEF) 300 MG Cap Take 1 Cap by mouth every 12 hours for 5 days. 10 Cap 0   • phenazopyridine (PYRIDIUM) 200 MG Tab Take 1 Tab by mouth 3 times a day for 2 days. 6 Tab 0   • Mometasone Furo-Formoterol Fum 100-5 MCG/ACT Aerosol Inhale 2 Puffs by mouth 2 Times a Day. 3 Inhaler 3   • levothyroxine (SYNTHROID) 88 MCG Tab TAKE 1 TABLET BY MOUTH EVERY DAY 90 Tab 3   • clobetasol (TEMOVATE) 0.05 % Ointment TOPICALLY APPLY SPARINGLY TO AFFECTED AREA 3X\/WEEK     • albuterol 108 (90 Base) MCG/ACT Aero Soln inhalation aerosol Inhale 2 Puffs by mouth every four hours as needed for Shortness of Breath. 1 Inhaler 3   • omeprazole (PRILOSEC) 20 MG delayed-release capsule Take 20 mg by mouth every day.       No current  "facility-administered medications for this visit.         Social History:     Social History     Tobacco Use   • Smoking status: Never Smoker   • Smokeless tobacco: Never Used   • Tobacco comment: continued abstinence   Substance Use Topics   • Alcohol use: Yes     Comment: occ   • Drug use: No       ROS: As above in the HPI      Objective:   Physical Exam:    Vitals: /60 (BP Location: Left arm, Patient Position: Sitting, BP Cuff Size: Adult)   Pulse 84   Temp 36.5 °C (97.7 °F) (Temporal)   Ht 1.651 m (5' 5\")   Wt 74.2 kg (163 lb 9.3 oz)   SpO2 93%    BMI: Body mass index is 27.22 kg/m².   General/Constitutional: Vitals as above, Well nourished, well developed female in no acute distress   Head/Eyes: Head is grossly normal & atraumatic, bilateral conjunctivae clear and not injected, bilateral EOMI, bilateral PERRLA.   ENT: Bilateral external ears grossly normal in appearance, Hearing grossly intact, External nares normal in appearance and without discharge/bleeding. Moist oral mucosa. Teeth indentation on lateral aspect of tongue, mild erythema to upper and lower gums, no ulcers or thrush seen. Small nontender, mobile nodule in left submandibular region.    Respiratory: No respiratory distress, bilateral lungs are clear to ausculation in all lung fields, no wheezing/rhonchi/rales   Cardiovascular: Regular rate and rhythm without murmur/rubs, distal pulses are intact and equal bilaterally (radial)   MSK: Gait grossly normal & not antalgic   Integumentary: No apparent rashes on skin exposed areas   Psych: Judgment grossly appropriate, no apparent depression/anxiety    Health Maintenance:     - Completed    Assessment and Plan:     Problem List Items Addressed This Visit     Mixed hyperlipidemia     Chronic and stable problem.  Okay to observe at this time due to borderline ASCVD risk score.  Encouraged lifestyle changes and we could consider red yeast rice extract in the future if needed.  She is " agreeable.         Gum disease     New and decompensated problem.  I have advised the patient that she should notify the periodontal dentist who performed the procedure to make sure that it is not a reaction and that she would not recommend a specific therapy.  I also advised her to try to eat soft foods in the meantime and treat for potential salivary gland blockage with hard candy, warm compress, and massage. She will send me an update later this week. Mouth does not appear acutely infected but appears more inflammed. May consider addition of anti-inflammatory if no better in a few days.         Acute cystitis with hematuria     Recently decompensated problem.  Will obtain urinalysis with culture as well as white count and kidney function to assess for pyelonephritis.  Okay to continue cefdinir 300 mg twice daily in the meantime.  We will follow-up with her once I have these results to adjust treatment if indicated.         Relevant Orders    URINALYSIS,CULTURE IF INDICATED    CBC WITH DIFFERENTIAL    Comp Metabolic Panel         RTC: as needed.    PLEASE NOTE: This dictation was created using voice recognition software. I have made every reasonable attempt to correct obvious errors, but I expect that there are errors of grammar and possibly content that I did not discover before finalizing the note.

## 2020-04-21 ENCOUNTER — HOSPITAL ENCOUNTER (OUTPATIENT)
Dept: LAB | Facility: MEDICAL CENTER | Age: 70
End: 2020-04-21
Attending: INTERNAL MEDICINE
Payer: MEDICARE

## 2020-04-21 DIAGNOSIS — N30.01 ACUTE CYSTITIS WITH HEMATURIA: ICD-10-CM

## 2020-04-21 LAB
ALBUMIN SERPL BCP-MCNC: 4.4 G/DL (ref 3.2–4.9)
ALBUMIN/GLOB SERPL: 1.6 G/DL
ALP SERPL-CCNC: 69 U/L (ref 30–99)
ALT SERPL-CCNC: 13 U/L (ref 2–50)
ANION GAP SERPL CALC-SCNC: 11 MMOL/L (ref 7–16)
APPEARANCE UR: CLEAR
AST SERPL-CCNC: 16 U/L (ref 12–45)
BACTERIA #/AREA URNS HPF: NEGATIVE /HPF
BASOPHILS # BLD AUTO: 0.7 % (ref 0–1.8)
BASOPHILS # BLD: 0.07 K/UL (ref 0–0.12)
BILIRUB SERPL-MCNC: 0.2 MG/DL (ref 0.1–1.5)
BILIRUB UR QL STRIP.AUTO: ABNORMAL
BUN SERPL-MCNC: 19 MG/DL (ref 8–22)
CALCIUM SERPL-MCNC: 9.4 MG/DL (ref 8.5–10.5)
CHLORIDE SERPL-SCNC: 102 MMOL/L (ref 96–112)
CO2 SERPL-SCNC: 27 MMOL/L (ref 20–33)
COLOR UR: ABNORMAL
CREAT SERPL-MCNC: 0.69 MG/DL (ref 0.5–1.4)
EOSINOPHIL # BLD AUTO: 0.13 K/UL (ref 0–0.51)
EOSINOPHIL NFR BLD: 1.3 % (ref 0–6.9)
EPI CELLS #/AREA URNS HPF: NEGATIVE /HPF
ERYTHROCYTE [DISTWIDTH] IN BLOOD BY AUTOMATED COUNT: 44.3 FL (ref 35.9–50)
GLOBULIN SER CALC-MCNC: 2.7 G/DL (ref 1.9–3.5)
GLUCOSE SERPL-MCNC: 90 MG/DL (ref 65–99)
GLUCOSE UR STRIP.AUTO-MCNC: NEGATIVE MG/DL
HCT VFR BLD AUTO: 43.3 % (ref 37–47)
HGB BLD-MCNC: 13.6 G/DL (ref 12–16)
HYALINE CASTS #/AREA URNS LPF: ABNORMAL /LPF
IMM GRANULOCYTES # BLD AUTO: 0.02 K/UL (ref 0–0.11)
IMM GRANULOCYTES NFR BLD AUTO: 0.2 % (ref 0–0.9)
KETONES UR STRIP.AUTO-MCNC: NEGATIVE MG/DL
LEUKOCYTE ESTERASE UR QL STRIP.AUTO: ABNORMAL
LYMPHOCYTES # BLD AUTO: 2.92 K/UL (ref 1–4.8)
LYMPHOCYTES NFR BLD: 29.6 % (ref 22–41)
MCH RBC QN AUTO: 30.4 PG (ref 27–33)
MCHC RBC AUTO-ENTMCNC: 31.4 G/DL (ref 33.6–35)
MCV RBC AUTO: 96.9 FL (ref 81.4–97.8)
MICRO URNS: ABNORMAL
MONOCYTES # BLD AUTO: 0.8 K/UL (ref 0–0.85)
MONOCYTES NFR BLD AUTO: 8.1 % (ref 0–13.4)
MUCOUS THREADS #/AREA URNS HPF: ABNORMAL /HPF
NEUTROPHILS # BLD AUTO: 5.94 K/UL (ref 2–7.15)
NEUTROPHILS NFR BLD: 60.1 % (ref 44–72)
NITRITE UR QL STRIP.AUTO: POSITIVE
NRBC # BLD AUTO: 0 K/UL
NRBC BLD-RTO: 0 /100 WBC
PH UR STRIP.AUTO: 6 [PH] (ref 5–8)
PLATELET # BLD AUTO: 250 K/UL (ref 164–446)
PMV BLD AUTO: 10.7 FL (ref 9–12.9)
POTASSIUM SERPL-SCNC: 3.6 MMOL/L (ref 3.6–5.5)
PROT SERPL-MCNC: 7.1 G/DL (ref 6–8.2)
PROT UR QL STRIP: NEGATIVE MG/DL
RBC # BLD AUTO: 4.47 M/UL (ref 4.2–5.4)
RBC # URNS HPF: ABNORMAL /HPF
RBC UR QL AUTO: ABNORMAL
SODIUM SERPL-SCNC: 140 MMOL/L (ref 135–145)
SP GR UR STRIP.AUTO: 1.02
UROBILINOGEN UR STRIP.AUTO-MCNC: 1 MG/DL
WBC # BLD AUTO: 9.9 K/UL (ref 4.8–10.8)
WBC #/AREA URNS HPF: ABNORMAL /HPF
YEAST BUDDING URNS QL: PRESENT /HPF

## 2020-04-21 PROCEDURE — 36415 COLL VENOUS BLD VENIPUNCTURE: CPT

## 2020-04-21 PROCEDURE — 80053 COMPREHEN METABOLIC PANEL: CPT

## 2020-04-21 PROCEDURE — 85025 COMPLETE CBC W/AUTO DIFF WBC: CPT

## 2020-04-24 LAB
BACTERIA UR CULT: NORMAL
SIGNIFICANT IND 70042: NORMAL
SITE SITE: NORMAL
SOURCE SOURCE: NORMAL

## 2020-06-24 ENCOUNTER — PATIENT MESSAGE (OUTPATIENT)
Dept: HEALTH INFORMATION MANAGEMENT | Facility: OTHER | Age: 70
End: 2020-06-24

## 2020-06-24 ENCOUNTER — PATIENT OUTREACH (OUTPATIENT)
Dept: HEALTH INFORMATION MANAGEMENT | Facility: OTHER | Age: 70
End: 2020-06-24

## 2020-06-24 NOTE — PROGRESS NOTES
Called patient to introduce myself as their  SCP  and welcome them to [facility]. Patient had no questions at this time, direct phone number given for future contact.

## 2020-07-22 ENCOUNTER — NURSE TRIAGE (OUTPATIENT)
Dept: HEALTH INFORMATION MANAGEMENT | Facility: OTHER | Age: 70
End: 2020-07-22

## 2020-07-22 NOTE — TELEPHONE ENCOUNTER
Regarding: ABDOMINAL/PELVIC PAIN  ----- Message from Brit Meneses sent at 7/22/2020 11:07 AM PDT -----  PATIENT HAS LOWER ABDOMINAL PAIN/PELVIC PAIN/4 DAYS/TRANSFERRED TO NURSE ADVISE TEAM

## 2020-07-22 NOTE — TELEPHONE ENCOUNTER
"  Reason for Disposition  • Abdominal pain is a chronic symptom (recurrent or ongoing AND lasting > 4 weeks)    Additional Information  • Negative: Passed out (i.e., fainted, collapsed and was not responding)  • Negative: Shock suspected (e.g., cold/pale/clammy skin, too weak to stand, low BP, rapid pulse)  • Negative: Sounds like a life-threatening emergency to the triager  • Negative: SEVERE abdominal pain (e.g., excruciating)  • Negative: Vomiting red blood or black (coffee ground) material  • Negative: Bloody, black, or tarry bowel movements  • Negative: Constant abdominal pain lasting > 2 hours  • Negative: Vomiting bile (green color)  • Negative: Patient sounds very sick or weak to the triager  • Negative: Vomiting and abdomen looks much more swollen than usual  • Negative: White of the eyes have turned yellow (i.e., jaundice)  • Negative: Blood in urine (red, pink, or tea-colored)  • Negative: Fever > 103 F (39.4 C)  • Negative: Fever > 101 F (38.3 C) and over 60 years of age  • Negative: Fever > 100.0 F (37.8 C) and has diabetes mellitus or a weak immune system (e.g., HIV positive, cancer chemotherapy, organ transplant, splenectomy, chronic steroids)  • Negative: Fever > 100.0 F (37.8 C) and bedridden (e.g., nursing home patient, stroke, chronic illness, recovering from surgery)  • Negative: Pregnant or could be pregnant (i.e., missed last menstrual period)    Answer Assessment - Initial Assessment Questions  1. LOCATION: \"Where does it hurt?\"       LLQ  2. RADIATION: \"Does the pain shoot anywhere else?\" (e.g., chest, back)      no  3. ONSET: \"When did the pain begin?\" (e.g., minutes, hours or days ago)       4 days  4. SUDDEN: \"Gradual or sudden onset?\"      sudden  5. PATTERN \"Does the pain come and go, or is it constant?\"     - If constant: \"Is it getting better, staying the same, or worsening?\"       (Note: Constant means the pain never goes away completely; most serious pain is constant and it " "progresses)      - If intermittent: \"How long does it last?\" \"Do you have pain now?\"      (Note: Intermittent means the pain goes away completely between bouts)      Comes and goes  6. SEVERITY: \"How bad is the pain?\"  (e.g., Scale 1-10; mild, moderate, or severe)    - MILD (1-3): doesn't interfere with normal activities, abdomen soft and not tender to touch     - MODERATE (4-7): interferes with normal activities or awakens from sleep, tender to touch     - SEVERE (8-10): excruciating pain, doubled over, unable to do any normal activities       mild  7. RECURRENT SYMPTOM: \"Have you ever had this type of abdominal pain before?\" If so, ask: \"When was the last time?\" and \"What happened that time?\"       cramping  8. CAUSE: \"What do you think is causing the abdominal pain?\"      Eating, possibly  9. RELIEVING/AGGRAVATING FACTORS: \"What makes it better or worse?\" (e.g., movement, antacids, bowel movement)      motrin  10. OTHER SYMPTOMS: \"Has there been any vomiting, diarrhea, constipation, or urine problems?\"        Low back pain  11. PREGNANCY: \"Is there any chance you are pregnant?\" \"When was your last menstrual period?\"        n/a    Protocols used: ABDOMINAL PAIN - FEMALE-A-OH    "

## 2020-07-23 ENCOUNTER — OFFICE VISIT (OUTPATIENT)
Dept: MEDICAL GROUP | Facility: MEDICAL CENTER | Age: 70
End: 2020-07-23
Payer: MEDICARE

## 2020-07-23 VITALS
WEIGHT: 168.43 LBS | BODY MASS INDEX: 28.06 KG/M2 | SYSTOLIC BLOOD PRESSURE: 102 MMHG | TEMPERATURE: 97.7 F | HEIGHT: 65 IN | OXYGEN SATURATION: 97 % | HEART RATE: 78 BPM | DIASTOLIC BLOOD PRESSURE: 70 MMHG | RESPIRATION RATE: 16 BRPM

## 2020-07-23 DIAGNOSIS — L90.0 LICHEN SCLEROSUS: ICD-10-CM

## 2020-07-23 DIAGNOSIS — R10.30 LOWER ABDOMINAL PAIN: ICD-10-CM

## 2020-07-23 DIAGNOSIS — K57.92 DIVERTICULITIS: ICD-10-CM

## 2020-07-23 PROBLEM — N30.01 ACUTE CYSTITIS WITH HEMATURIA: Status: RESOLVED | Noted: 2020-04-20 | Resolved: 2020-07-23

## 2020-07-23 LAB
APPEARANCE UR: CLEAR
BILIRUB UR STRIP-MCNC: NORMAL MG/DL
COLOR UR AUTO: YELLOW
GLUCOSE UR STRIP.AUTO-MCNC: NORMAL MG/DL
KETONES UR STRIP.AUTO-MCNC: NORMAL MG/DL
LEUKOCYTE ESTERASE UR QL STRIP.AUTO: NORMAL
NITRITE UR QL STRIP.AUTO: NORMAL
PH UR STRIP.AUTO: 7 [PH] (ref 5–8)
PROT UR QL STRIP: NORMAL MG/DL
RBC UR QL AUTO: NORMAL
SP GR UR STRIP.AUTO: 1.01
UROBILINOGEN UR STRIP-MCNC: 0.2 MG/DL

## 2020-07-23 PROCEDURE — 81002 URINALYSIS NONAUTO W/O SCOPE: CPT | Performed by: INTERNAL MEDICINE

## 2020-07-23 PROCEDURE — 99214 OFFICE O/P EST MOD 30 MIN: CPT | Performed by: INTERNAL MEDICINE

## 2020-07-23 RX ORDER — METRONIDAZOLE 500 MG/1
500 TABLET ORAL 3 TIMES DAILY
Qty: 21 TAB | Refills: 0 | Status: SHIPPED | OUTPATIENT
Start: 2020-07-23 | End: 2020-10-12

## 2020-07-23 RX ORDER — CIPROFLOXACIN 500 MG/1
500 TABLET, FILM COATED ORAL 2 TIMES DAILY
Qty: 14 TAB | Refills: 0 | Status: SHIPPED | OUTPATIENT
Start: 2020-07-23 | End: 2020-10-12

## 2020-07-23 ASSESSMENT — FIBROSIS 4 INDEX: FIB4 SCORE: 1.22

## 2020-07-23 NOTE — ASSESSMENT & PLAN NOTE
Chronic problem that requires follow-up.  I encouraged her to go back [facility] [place] to complete the biopsy of her previously diagnosed lichen sclerosis.  I discussed with her the potential that this can evolve into a type of skin cancer and thus we like to keep a very close eye on it.  There also could be some risk with ongoing high potency steroid use if she continues to use it daily moving forward.  She agreed and will call that clinic to make a follow-up appointment.

## 2020-07-23 NOTE — ASSESSMENT & PLAN NOTE
New problem.  Will prescribe ciprofloxacin 500 mg twice daily and metronidazole 500 mg 3 times daily for her to have on hand in case her abdominal pain would return to treat her for presumptive diverticulitis.  I gave her direct instructions to present to the ER if she would have severe recurrence of pain or any other signs of infection such as fevers, anorexia, vomiting, or diarrhea.  She voiced understanding will send me an update next week on whether she had UC antibiotics.  Also discussed that she cannot use any alcohol while on metronidazole and she agrees.

## 2020-07-23 NOTE — PROGRESS NOTES
Subjective:   Chief Complaint/History of Present Illness:  Prudence Chayito Gregory is a 69 y.o. female established patient who presents today to discuss medical problems as listed below. She is unaccompanied for today's visit.    Problem   Lower Abdominal Pain    Lab Results   Component Value Date/Time    POCCOLOR YELLOW 07/23/2020 07:48 AM    POCAPPEAR CLEAR 07/23/2020 07:48 AM    POCLEUKEST NEG 07/23/2020 07:48 AM    POCNITRITE NEG 07/23/2020 07:48 AM    POCUROBILIGE 0.2 07/23/2020 07:48 AM    POCPROTEIN NEG 07/23/2020 07:48 AM    POCURPH 7.0 07/23/2020 07:48 AM    POCBLOOD NEG 07/23/2020 07:48 AM    POCSPGRV 1.015 07/23/2020 07:48 AM    POCKETONES NEG 07/23/2020 07:48 AM    POCBILIRUBIN NEG 07/23/2020 07:48 AM    POCGLUCUA NEG 07/23/2020 07:48 AM        She describes lower abdominal pain starting on Sunday, July 19, 2020.  The pain was gradual in onset and located in the lower abdomen/pelvis region.  There was no radiation of the pain.  She had associated dull/achy lower back pain but this is chronic and she did not feel like they were directly related.  She describes it as a tenderness initially which then developed into cramping from Sunday night through Wednesday.  She started taking ibuprofen which was helpful for the pain.  She did not have any overt urinary symptoms such as dysuria, frequency, urgency.  She did notice some increase in the soreness of the lower abdomen when she would have bowel movements.  She had one prior occurrence which was also quite severe causing her to have to cancel a trip but she could hardly walk.  Not have any imaging but it sounds like she was given a presumptive diagnosis of diverticulitis and recalls being told that she had diverticulosis on a screening colonoscopy in the past.  She has had a total hysterectomy, appendectomy, and cholecystectomy.  No prior issues with pain related to abdominal adhesions from her surgeries.  She has not had any systemic symptoms of infection such  as fever, chills, nausea, vomiting, or anorexia.  He has a past history of urinary tract infections as well as one episode of pyelonephritis.    This morning on presentation she reports that the pain is 90% improved.  She tolerates the abdominal exam without issue and her vital signs are all in the normal range.     Lichen Sclerosus    She reports a recent diagnosis of lichen sclerosis approximately 1 year ago in the late summer 2019.  She establish with a nurse practitioner at the [facility].  They trialed her on several topical steroids which were ineffective however around October November they placed her on clobetasol which she is continued to use daily and has been helpful to keep the lesions at bay.  She notes that it initially started on the labia but then also she notes some bleeding/friability more in the perianal region more so when she would exercise.  She was recommended to come back in for a biopsy however she canceled the appointment due to COVID-19 pandemic and has not yet rescheduled.  She had some concerns about postprocedure pain and how well she would heal up.     Diverticulitis- mild, July 2020    I am giving her a presumptive diagnosis of diverticulitis based on her clinical history and exam.  Please see problem label lower abdominal pain for additional information.  Fortunately, by the time she came into clinic she was 90% improved.       Acute Cystitis With Hematuria (Resolved)    Urine dipstick performed in clinic demonstrated trace glucose, 1+ bili, 1+ ketones, specific gravity 1.015, trace intact blood, pH 5.5, 2+ protein, 2.0 urobilinogen, positive nitrite, 3+ leukocyte esterase.  She performed a telemedicine visit yesterday and was placed on cephalosporin for presumptive urinary tract infection.  She was also given Pyridium due to discomfort with dysuria.  She thinks she had subjective fevers as well as burning pain which prompted the appointment.  Following her appointment she  "develops flank pain which was remniscent of prior pyelonephritis and made her concerned of infection more than a simple UTI. She has been started on Omnicef 300 mg twice daily for 5 days, this will likely need to be extended.          Additional History:   Allergies:    Codeine     Current Medications:     Current Outpatient Medications   Medication Sig Dispense Refill   • ciprofloxacin (CIPRO) 500 MG Tab Take 1 Tab by mouth 2 times a day. 14 Tab 0   • metroNIDAZOLE (FLAGYL) 500 MG Tab Take 1 Tab by mouth 3 times a day. Do not take with alcohol. 21 Tab 0   • Mometasone Furo-Formoterol Fum 100-5 MCG/ACT Aerosol Inhale 2 Puffs by mouth 2 Times a Day. 3 Inhaler 3   • levothyroxine (SYNTHROID) 88 MCG Tab TAKE 1 TABLET BY MOUTH EVERY DAY 90 Tab 3   • clobetasol (TEMOVATE) 0.05 % Ointment TOPICALLY APPLY SPARINGLY TO AFFECTED AREA 3X\/WEEK     • albuterol 108 (90 Base) MCG/ACT Aero Soln inhalation aerosol Inhale 2 Puffs by mouth every four hours as needed for Shortness of Breath. 1 Inhaler 3   • omeprazole (PRILOSEC) 20 MG delayed-release capsule Take 20 mg by mouth every day.       No current facility-administered medications for this visit.         Social History:     Social History     Tobacco Use   • Smoking status: Never Smoker   • Smokeless tobacco: Never Used   • Tobacco comment: continued abstinence   Substance Use Topics   • Alcohol use: Yes     Comment: occ   • Drug use: No       ROS: As above in the HPI      Objective:   Physical Exam:    Vitals: /70 (BP Location: Left arm, Patient Position: Sitting, BP Cuff Size: Adult)   Pulse 78   Temp 36.5 °C (97.7 °F) (Temporal)   Resp 16   Ht 1.651 m (5' 5\")   Wt 76.4 kg (168 lb 6.9 oz)   SpO2 97%    BMI: Body mass index is 28.03 kg/m².   General/Constitutional: Vitals as above, Well nourished, well developed female in no acute distress   Head/Eyes: Head is grossly normal & atraumatic, bilateral conjunctivae clear and not injected, bilateral EOMI   ENT: " Bilateral external ears grossly normal in appearance, Hearing grossly intact, External nares normal in appearance and without discharge/bleeding   Respiratory: No respiratory distress, no cough, no wheezing/rhonchi/rales   Cardiovascular: Regular rate and rhythm without murmur/rubs, no bilateral lower extremity edema   Abdomen: Soft, nontender, nondistended, no guarding/rebound/rigidity.  Several well-healed scars overlying the abdomen from prior surgeries.   MSK: Gait grossly normal & not antalgic   Integumentary: No apparent rashes on skin exposed areas   Psych: Judgment grossly appropriate, no apparent depression/anxiety    Health Maintenance:     - Completed      Assessment and Plan:     Problem List Items Addressed This Visit     Lower abdominal pain     New problem, improving.  Fortunately, she has had some spontaneous resolution before our clinic evaluation.  Her description is most fitting with either diverticulitis that is self resolving or acute cystitis.  We completed a urinalysis in clinic which demonstrated bland urine and was negative for infection.  Will make sure she has ciprofloxacin 500 mg twice daily and metronidazole 500 mg 3 times daily for 7 days on hand as we are approaching the weekend in case the symptoms would return to treat her as presumptive diverticulitis.  Also discussed symptoms that would require urgent evaluation such as fever, chills, nausea/vomiting, diarrhea, or recurrence of severe abdominal pain.         Relevant Medications    ciprofloxacin (CIPRO) 500 MG Tab    metroNIDAZOLE (FLAGYL) 500 MG Tab    Other Relevant Orders    POCT Urinalysis (Completed)    Lichen sclerosus     Chronic problem that requires follow-up.  I encouraged her to go back to the [facility] [place] to complete the biopsy of her previously diagnosed lichen sclerosis.  I discussed with her the potential that this can evolve into a type of skin cancer and thus we like to keep a very close eye on  it.  There also could be some risk with ongoing high potency steroid use if she continues to use it daily moving forward.  She agreed and will call that clinic to make a follow-up appointment.         Diverticulitis- mild, July 2020     New problem.  Will prescribe ciprofloxacin 500 mg twice daily and metronidazole 500 mg 3 times daily for her to have on hand in case her abdominal pain would return to treat her for presumptive diverticulitis.  I gave her direct instructions to present to the ER if she would have severe recurrence of pain or any other signs of infection such as fevers, anorexia, vomiting, or diarrhea.  She voiced understanding will send me an update next week on whether she had UC antibiotics.  Also discussed that she cannot use any alcohol while on metronidazole and she agrees.         Relevant Medications    ciprofloxacin (CIPRO) 500 MG Tab    metroNIDAZOLE (FLAGYL) 500 MG Tab             RTC: as needed.    PLEASE NOTE: This dictation was created using voice recognition software. I have made every reasonable attempt to correct obvious errors, but I expect that there are errors of grammar and possibly content that I did not discover before finalizing the note.

## 2020-07-23 NOTE — ASSESSMENT & PLAN NOTE
New problem, improving.  Fortunately, she has had some spontaneous resolution before our clinic evaluation.  Her description is most fitting with either diverticulitis that is self resolving or acute cystitis.  We completed a urinalysis in clinic which demonstrated bland urine and was negative for infection.  Will make sure she has ciprofloxacin 500 mg twice daily and metronidazole 500 mg 3 times daily for 7 days on hand as we are approaching the weekend in case the symptoms would return to treat her as presumptive diverticulitis.  Also discussed symptoms that would require urgent evaluation such as fever, chills, nausea/vomiting, diarrhea, or recurrence of severe abdominal pain.

## 2020-08-07 ENCOUNTER — OFFICE VISIT (OUTPATIENT)
Dept: DERMATOLOGY | Facility: IMAGING CENTER | Age: 70
End: 2020-08-07
Payer: MEDICARE

## 2020-08-07 VITALS — BODY MASS INDEX: 27.32 KG/M2 | TEMPERATURE: 98.2 F | WEIGHT: 164 LBS | HEIGHT: 65 IN

## 2020-08-07 DIAGNOSIS — L53.8 PERIANAL ERYTHEMA: ICD-10-CM

## 2020-08-07 DIAGNOSIS — D22.9 NEVUS: ICD-10-CM

## 2020-08-07 DIAGNOSIS — Z12.83 SKIN CANCER SCREENING: ICD-10-CM

## 2020-08-07 DIAGNOSIS — D23.9 BLUE NEVUS: ICD-10-CM

## 2020-08-07 DIAGNOSIS — L91.8 SKIN TAG: ICD-10-CM

## 2020-08-07 DIAGNOSIS — L90.0 LICHEN SCLEROSUS: ICD-10-CM

## 2020-08-07 DIAGNOSIS — L90.8 SKIN AGING: ICD-10-CM

## 2020-08-07 DIAGNOSIS — L82.1 SEBORRHEIC KERATOSIS: ICD-10-CM

## 2020-08-07 DIAGNOSIS — L81.4 LENTIGO: ICD-10-CM

## 2020-08-07 PROCEDURE — 99203 OFFICE O/P NEW LOW 30 MIN: CPT | Performed by: DERMATOLOGY

## 2020-08-07 ASSESSMENT — FIBROSIS 4 INDEX: FIB4 SCORE: 1.22
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
## 2020-10-12 ENCOUNTER — OFFICE VISIT (OUTPATIENT)
Dept: MEDICAL GROUP | Facility: MEDICAL CENTER | Age: 70
End: 2020-10-12
Payer: MEDICARE

## 2020-10-12 VITALS
TEMPERATURE: 97.5 F | WEIGHT: 170.64 LBS | HEART RATE: 70 BPM | HEIGHT: 65 IN | BODY MASS INDEX: 28.43 KG/M2 | DIASTOLIC BLOOD PRESSURE: 60 MMHG | OXYGEN SATURATION: 95 % | SYSTOLIC BLOOD PRESSURE: 120 MMHG

## 2020-10-12 DIAGNOSIS — R53.83 FATIGUE, UNSPECIFIED TYPE: ICD-10-CM

## 2020-10-12 DIAGNOSIS — E03.4 HYPOTHYROIDISM DUE TO ACQUIRED ATROPHY OF THYROID: ICD-10-CM

## 2020-10-12 DIAGNOSIS — L98.9 SKIN LESION: ICD-10-CM

## 2020-10-12 DIAGNOSIS — Z23 NEED FOR INFLUENZA VACCINATION: ICD-10-CM

## 2020-10-12 DIAGNOSIS — F51.01 PRIMARY INSOMNIA: ICD-10-CM

## 2020-10-12 PROCEDURE — 99214 OFFICE O/P EST MOD 30 MIN: CPT | Mod: 25 | Performed by: INTERNAL MEDICINE

## 2020-10-12 PROCEDURE — 90662 IIV NO PRSV INCREASED AG IM: CPT | Performed by: INTERNAL MEDICINE

## 2020-10-12 PROCEDURE — G0008 ADMIN INFLUENZA VIRUS VAC: HCPCS | Performed by: INTERNAL MEDICINE

## 2020-10-12 ASSESSMENT — FIBROSIS 4 INDEX: FIB4 SCORE: 1.22

## 2020-10-12 NOTE — ASSESSMENT & PLAN NOTE
New and decompensated problem.  We will start with overnight sleep oximetry.  We will also have her start on melatonin 3 mg with up titration up to 9 mg over the next few weeks.  She will update me over my chart.  We will make sure her thyroid levels are normal and also screen for B12 deficiency.  Discussed that she may benefit from meeting with Mayela Curtis who is a PhD that helps with behavioral therapy for sleep disturbance.  May also need a formal sleep study.

## 2020-10-12 NOTE — ASSESSMENT & PLAN NOTE
Previous problem that requires follow-up.  We will update thyroid levels and adjust her levothyroxine as indicated.

## 2020-10-12 NOTE — PROGRESS NOTES
Subjective:   Chief Complaint/History of Present Illness:  Prudence Chayito Gregory is a 69 y.o. female established patient who presents today to discuss medical problems as listed below.  She is unaccompanied for today's visit.    Problem   Primary Insomnia    She has trouble both falling asleep and staying asleep. The problem started gradually around Fall 2019. Some nights she will not sleep at all. Other times she will wake up a few times per night with some anxiety.  Her  wakes up to go to work around 4:30 in the morning so she does not want to be bothersome to him and will often go to a different room to try to fall asleep.  No REM sleep disturbance noted by patient or her .  No prior evaluations of sleep.  She feels like she does not get restorative sleep.  Denies nightmares or night terrors.  No prior use of sleep aids other than one time trying Benadryl which caused the opposite effect.     Fatigue    She reports significant fatigue developing over the past several weeks to months.  She has not been sleeping well for the better part of the year.  She is never had an overnight sleep study.  No recent evaluation in thyroid, she had her dose decreased a few years ago she appeared overtreated and wonders if now it has been decreased too much.  No prior evaluations on vitamin B12.  She denies overt depression or anxiety but wonders if there could be anxiety related COVID-19 pandemic with her self quarantine at home and significant change in lifestyle.     Hypothyroidism Due to Acquired Atrophy of Thyroid       Ref. Range 2/4/2019 13:48 8/5/2019 15:23   TSH Latest Ref Range: 0.380 - 5.330 uIU/mL 1.580 0.500       Reports history of hypothyroidism diagnosed by abnormal labs.  She denies any signs or symptoms of overtreatment or under treatment at this time.  She has remained asymptomatic to her thyroid disease except for fatigue. Previously on a higher dose in the mid 100's that was reduced around  "2510-7089.    She reports worsening fatigue in October 2020, similar to prior symptoms of underactive thyroid.     Skin Lesion    She reports a concerning lesion on the inner thigh. She has never met with a dermatologist and would like a full body skin check due to sun exposure in the past without wearing sun screen.    She saw Dr. Baker in August 2020 but would like a second opinion as she would like to have a full skin exam completed.          Additional History:   Allergies:    Codeine     Current Medications:     Current Outpatient Medications   Medication Sig Dispense Refill   • Mometasone Furo-Formoterol Fum 100-5 MCG/ACT Aerosol Inhale 2 Puffs by mouth 2 Times a Day. 3 Inhaler 3   • levothyroxine (SYNTHROID) 88 MCG Tab TAKE 1 TABLET BY MOUTH EVERY DAY 90 Tab 3   • clobetasol (TEMOVATE) 0.05 % Ointment TOPICALLY APPLY SPARINGLY TO AFFECTED AREA 3X\/WEEK     • albuterol 108 (90 Base) MCG/ACT Aero Soln inhalation aerosol Inhale 2 Puffs by mouth every four hours as needed for Shortness of Breath. 1 Inhaler 3   • omeprazole (PRILOSEC) 20 MG delayed-release capsule Take 20 mg by mouth every day.       No current facility-administered medications for this visit.         Social History:     Social History     Tobacco Use   • Smoking status: Never Smoker   • Smokeless tobacco: Never Used   • Tobacco comment: continued abstinence   Substance Use Topics   • Alcohol use: Yes     Comment: occ   • Drug use: No       ROS: As above in the HPI      Objective:   Physical Exam:    Vitals: /60 (BP Location: Left arm, Patient Position: Sitting, BP Cuff Size: Adult)   Pulse 70   Temp 36.4 °C (97.5 °F) (Temporal)   Ht 1.651 m (5' 5\")   Wt 77.4 kg (170 lb 10.2 oz)   SpO2 95%    BMI: Body mass index is 28.4 kg/m².   General/Constitutional: Vitals as above, Well nourished, well developed female in no acute distress   Head/Eyes: Head is grossly normal & atraumatic, bilateral conjunctivae clear and not injected, bilateral " EOMI, bilateral PERRLA   ENT: Bilateral external ears grossly normal in appearance, Hearing grossly intact, External nares normal in appearance and without discharge/bleeding, very small nontender mobile nodule felt inferior to mandible beneath chin, likely lymph node or salivary gland.   Respiratory: No respiratory distress, bilateral lungs are clear to ausculation in all lung fields, no wheezing/rhonchi/rales   Cardiovascular: Regular rate and rhythm without murmur/rubs, distal pulses are intact and equal bilaterally (radial), no bilateral lower extremity edema   MSK: Gait grossly normal & not antalgic   Integumentary: No apparent rashes. Scattered moles.   Psych: Judgment grossly appropriate, no apparent depression/anxiety    Health Maintenance:     - Completed      Assessment and Plan:     Problem List Items Addressed This Visit     Hypothyroidism due to acquired atrophy of thyroid     Previous problem that requires follow-up.  We will update thyroid levels and adjust her levothyroxine as indicated.         Relevant Orders    TSH WITH REFLEX TO FT4    Skin lesion     Previous problem, requires follow up. Will refer to dermatology [facility] on [address] as she did not feel satisfied with her initial evaluation by [facility].          Relevant Orders    REFERRAL TO DERMATOLOGY    Primary insomnia     New and decompensated problem.  We will start with overnight sleep oximetry.  We will also have her start on melatonin 3 mg with up titration up to 9 mg over the next few weeks.  She will update me over my chart.  We will make sure her thyroid levels are normal and also screen for B12 deficiency.  Discussed that she may benefit from meeting with Mayela Curtis who is a PhD that helps with behavioral therapy for sleep disturbance.  May also need a formal sleep study.         Fatigue     New and decompensated problem.  We will start with overnight pulse oximetry.  Will check vitamin B12 level and thyroid level and  adjust supplementation as indicated.  May also need to perform formal sleep study and discuss referral to sleep psychology.         Relevant Orders    VITAMIN B12      Other Visit Diagnoses     Need for influenza vaccination        Relevant Orders    INFLUENZA VACCINE, HIGH DOSE (65+ ONLY) (Completed)             RTC: 6 months.    PLEASE NOTE: This dictation was created using voice recognition software. I have made every reasonable attempt to correct obvious errors, but I expect that there are errors of grammar and possibly content that I did not discover before finalizing the note.

## 2020-10-12 NOTE — ASSESSMENT & PLAN NOTE
New and decompensated problem.  We will start with overnight pulse oximetry.  Will check vitamin B12 level and thyroid level and adjust supplementation as indicated.  May also need to perform formal sleep study and discuss referral to sleep psychology.

## 2020-10-12 NOTE — ASSESSMENT & PLAN NOTE
Previous problem, requires follow up. Will refer to dermatology [facility] [address] as she did not feel satisfied with her initial evaluation by [facility].

## 2020-10-12 NOTE — PATIENT INSTRUCTIONS
Okay to try melatonin over the counter, can start at 3 mg nightly and can increase by 3 mg every 3-5 days with a maximum around 10 mg daily.    Mayela Curtis is a PhD who helps with sleep disorders if we need to try having you meet with her in the future.    Will order an overnight pulse oximetry test to evaluate for low oxygen levels at night    Will also screen B12 and thyroid labs to ensure these are still in the normal range.    Send me an update in the next couple of weeks with how the sleep is going.

## 2020-10-12 NOTE — LETTER
[facility]  Shawna Recio D.O.  [address]  [place] [address]  Fax: [phone]   Authorization for Release/Disclosure of   Protected Health Information   Name: NANCY DALE : 1950 SSN: [id number]   Address: [address] [place] [address]   [place] [address] Phone:    [phone] (home)    I authorize the entity listed below to release/disclose the PHI below to:   [facility]/Shawna Recio D.O. and Shawna Recio D.O.   Provider or Entity Name:  [facility]   Address   City, State, [facility]   Phone:      Fax:     Reason for request: continuity of care   Information to be released:    [ x ] LAST COLONOSCOPY,  including any PATH REPORT and follow-up  [  ] LAST FIT/COLOGUARD RESULT [  ] LAST DEXA  [  ] LAST MAMMOGRAM  [  ] LAST PAP  [  ] LAST LABS [  ] RETINA EXAM REPORT  [  ] IMMUNIZATION RECORDS  [ x ] Release all info      [ x ] Check here and initial the line next to each item to release ALL health information INCLUDING  _____ Care and treatment for drug and / or alcohol abuse  _____ HIV testing, infection status, or AIDS  _____ Genetic Testing    DATES OF SERVICE OR TIME PERIOD TO BE DISCLOSED: __-___________  I understand and acknowledge that:  * This Authorization may be revoked at any time by you in writing, except if your health information has already been used or disclosed.  * Your health information that will be used or disclosed as a result of you signing this authorization could be re-disclosed by the recipient. If this occurs, your re-disclosed health information may no longer be protected by State or Federal laws.  * You may refuse to sign this Authorization. Your refusal will not affect your ability to obtain treatment.  * This Authorization becomes effective upon signing and will  on (date) __________.      If no date is indicated, this Authorization will  one (1) year from the signature date.    Name: Nancy Stratton  [place]    Signature:   Date:     10/12/2020       PLEASE FAX REQUESTED RECORDS BACK TO: [phone]

## 2020-10-21 ENCOUNTER — HOSPITAL ENCOUNTER (OUTPATIENT)
Dept: LAB | Facility: MEDICAL CENTER | Age: 70
End: 2020-10-21
Attending: INTERNAL MEDICINE
Payer: MEDICARE

## 2020-10-21 DIAGNOSIS — E03.4 HYPOTHYROIDISM DUE TO ACQUIRED ATROPHY OF THYROID: ICD-10-CM

## 2020-10-21 DIAGNOSIS — R53.83 FATIGUE, UNSPECIFIED TYPE: ICD-10-CM

## 2020-10-21 PROBLEM — K63.5 HYPERPLASTIC POLYP OF SIGMOID COLON: Status: ACTIVE | Noted: 2020-10-21

## 2020-10-21 LAB
TSH SERPL DL<=0.005 MIU/L-ACNC: 1.9 UIU/ML (ref 0.38–5.33)
VIT B12 SERPL-MCNC: 398 PG/ML (ref 211–911)

## 2020-10-21 PROCEDURE — 84443 ASSAY THYROID STIM HORMONE: CPT

## 2020-10-21 PROCEDURE — 82607 VITAMIN B-12: CPT

## 2020-10-21 PROCEDURE — 36415 COLL VENOUS BLD VENIPUNCTURE: CPT

## 2020-10-27 NOTE — RESULT ENCOUNTER NOTE
Hi Pru,    Thyroid studies are normal.  B12 is also normal but at the low end, so if you are not taking vitamin B12 I would add 500-1000 mcg daily in a pill form over the counter.    Have you receive the overnight oxygen test to complete yet? Any luck with using melatonin for sleep?    Thanks,  Dr. Recio

## 2020-12-09 ENCOUNTER — HOSPITAL ENCOUNTER (OUTPATIENT)
Dept: RADIOLOGY | Facility: MEDICAL CENTER | Age: 70
End: 2020-12-09
Attending: INTERNAL MEDICINE
Payer: MEDICARE

## 2020-12-09 DIAGNOSIS — Z12.31 VISIT FOR SCREENING MAMMOGRAM: ICD-10-CM

## 2020-12-09 PROCEDURE — 77067 SCR MAMMO BI INCL CAD: CPT

## 2020-12-11 NOTE — RESULT ENCOUNTER NOTE
Hi Pru,    You mammogram results appear stable and the radiologist recommends checking again in 1 year or sooner if you develop any new or concerning breast symptoms. Let me know if you have any follow up questions for me.    Best,  Dr. Recio

## 2020-12-18 ENCOUNTER — TELEPHONE (OUTPATIENT)
Dept: MEDICAL GROUP | Facility: MEDICAL CENTER | Age: 70
End: 2020-12-18

## 2020-12-18 DIAGNOSIS — G47.34 NOCTURNAL HYPOXIA: ICD-10-CM

## 2020-12-18 DIAGNOSIS — Z91.89 AT RISK FOR SLEEP APNEA: ICD-10-CM

## 2020-12-21 ENCOUNTER — OFFICE VISIT (OUTPATIENT)
Dept: MEDICAL GROUP | Facility: MEDICAL CENTER | Age: 70
End: 2020-12-21
Payer: MEDICARE

## 2020-12-21 VITALS
OXYGEN SATURATION: 92 % | TEMPERATURE: 98.6 F | WEIGHT: 175.71 LBS | SYSTOLIC BLOOD PRESSURE: 122 MMHG | RESPIRATION RATE: 18 BRPM | BODY MASS INDEX: 29.27 KG/M2 | HEART RATE: 83 BPM | HEIGHT: 65 IN | DIASTOLIC BLOOD PRESSURE: 78 MMHG

## 2020-12-21 DIAGNOSIS — F51.01 PRIMARY INSOMNIA: ICD-10-CM

## 2020-12-21 DIAGNOSIS — G47.34 NOCTURNAL HYPOXIA: ICD-10-CM

## 2020-12-21 PROCEDURE — 99214 OFFICE O/P EST MOD 30 MIN: CPT | Performed by: INTERNAL MEDICINE

## 2020-12-21 ASSESSMENT — FIBROSIS 4 INDEX: FIB4 SCORE: 1.22

## 2020-12-21 NOTE — ASSESSMENT & PLAN NOTE
>>ASSESSMENT AND PLAN FOR NOCTURNAL HYPOXIA WRITTEN ON 12/21/2020  3:57 PM BY SAWYER BONILLA M.D.    Daytime fatigue, snore   Body mass index is 29.24 kg/m².    12/2/2020 Overnight Pulse oximetry testing  Time less than and equal to 88% is 78.3 min  Time consecutive less than and equal to 88% is 17.2 min  Low SPO2 77%  High SPO2 97%  She qualifies for nocturnal Oxygen per Medicare guidelines; please inquire with respiratory company for coverage guidelines for [employer].    She will also schedule an appointment with sleep medicine for sleep study

## 2020-12-21 NOTE — PROGRESS NOTES
Established Patient    Normalynn Gregory is a 69 y.o. female who presents today with the following:    CC:   Chief Complaint   Patient presents with   • Follow-Up     overnight pulse ox       HPI:     Nocturnal hypoxia  Daytime fatigue, snore   Body mass index is 29.24 kg/m².  Neck circum 34.5 cm    12/2/2020 Overnight Pulse oximetry testing  Time less than and equal to 88% is 78.3 min  Time consecutive less than and equal to 88% is 17.2 min  Low SPO2 77%  High SPO2 97%  She qualifies for nocturnal Oxygen per Medicare guidelines; please inquire with respiratory company for coverage guidelines for Group I.    She will also schedule an appointment with sleep medicine for sleep study        Primary insomnia  She has trouble both falling asleep and staying asleep. The problem started gradually around Fall 2019. Some nights she will not sleep at all. Other times she will wake up a few times per night with some anxiety.  Her  wakes up to go to work around 4:30 in the morning so she does not want to be bothersome to him and will often go to a different room to try to fall asleep.  No REM sleep disturbance noted by patient or her .  No prior evaluations of sleep.  She feels like she does not get restorative sleep.  Denies nightmares or night terrors.  No prior use of sleep aids other than one time trying Benadryl which caused the opposite effect.    Discussed about sleep hygiene  Melatonin prn  Cognitive behavioral therapy      Current Outpatient Medications   Medication Sig Dispense Refill   • Mometasone Furo-Formoterol Fum 100-5 MCG/ACT Aerosol Inhale 2 Puffs by mouth 2 Times a Day. 3 Inhaler 3   • levothyroxine (SYNTHROID) 88 MCG Tab TAKE 1 TABLET BY MOUTH EVERY DAY 90 Tab 3   • clobetasol (TEMOVATE) 0.05 % Ointment TOPICALLY APPLY SPARINGLY TO AFFECTED AREA 3X\/WEEK     • albuterol 108 (90 Base) MCG/ACT Aero Soln inhalation aerosol Inhale 2 Puffs by mouth every four hours as needed for Shortness of  "Breath. 1 Inhaler 3   • omeprazole (PRILOSEC) 20 MG delayed-release capsule Take 20 mg by mouth every day.       No current facility-administered medications for this visit.        Allergies, past medical history, past surgical history, medications, family history, social history reviewed and updated.    ROS   Constitutional: Denies fevers or chills  Eyes: Denies changes in vision  Ears/Nose/Throat/Mouth: Denies nasal congestion or sore throat   Cardiovascular: Denies chest pain or palpitations   Respiratory: Denies shortness of breath , Denies cough  Gastrointestinal/Hepatic: Denies abd pain, nausea, vomiting   Genitourinary: Denies dysuria or frequency  Musculoskeletal/Rheum: Denies joint pain and swelling   Neurological: Denies headache  Psychiatric: Denies mood disorder   Endocrine: hypothyroidism Denies hx of diabetes  Heme/Oncology/Lymph Nodes: Denies weight changes or enlarged LNs.    Physical Exam  Vitals: /78 (BP Location: Left arm, Patient Position: Sitting, BP Cuff Size: Adult)   Pulse 83   Temp 37 °C (98.6 °F) (Temporal)   Resp 18   Ht 1.651 m (5' 5\")   Wt 79.7 kg (175 lb 11.3 oz)   SpO2 92%   BMI 29.24 kg/m²   General: Alert, pleasant, NAD  HEENT: Normocephalic.  EOMI, no icterus or pallor.  Conjunctivae and lids normal. External ears normal. Oropharynx non-erythematous, mucous membranes moist.  Neck supple.  No thyromegaly or masses palpated.   Lymph: No cervical or supraclavicular lymphadenopathy.  Cardiovascular: Regular rate and rhythm.  S1 and S2 normal.  No murmurs appreciated.  Respiratory: Normal respiratory effort.  Clear to auscultation bilaterally.  Abdomen: Non-distended, soft  Skin: Warm, dry, no rashes.  Musculoskeletal: Gait is normal.  Moves all extremities well.  Extremities: No leg edema.   Psych:  Affect/mood is normal, judgement is good, memory is at baseline, grooming is appropriate.        Assessment and Plan    1. Nocturnal hypoxia  12/2/2020 Overnight Pulse oximetry " "testing  Time less than and equal to 88% is 78.3 min  Time consecutive less than and equal to 88% is 17.2 min  Low SPO2 77%  High SPO2 97%  She qualifies for nocturnal Oxygen per Medicare guidelines; please inquire with respiratory company for coverage guidelines for Group I.    She will also schedule an appointment with sleep medicine for sleep study  2L NC nocturnal oxygen    2. Primary insomnia  - REFERRAL TO PSYCHOLOGY for cognitive behavioral therapy for insomnia  Melatonin prn    Good sleep hygiene:  --Keep a regular sleep schedule. Going to bed at same time and waking up at same time (even on weekends).   --Go to bed only when sleepy. Sleep only as much as you need to feel rested and then get out of bed  --Bed for sleep and sex only.  Do not read or watch TV in bed.  --Avoid TV, computer/tablet screens for 2 hours prior to bedtime or wear blue light blocking sunglasses. Avoid prolonged use of light-emitting screens before bedtime.  --Dark, cool, quiet bedroom. \"White noise\" (ie from a fan) may be helpful.  --No caffeinated beverages after lunch; do not use alcohol for sleep. Avoid alcohol near bedtime.  --Do not go to bed hungry  -- Do not take a nap during the day.  --Exercise regularly, preferably at least 4 to 5 hours before bedtime  --Make the bedroom environment conducive to sleep  --melatonin, which are both over the counter.  -- Avoid smoking, especially in the evening  --Deal with your worries before bedtime  -- Get out of bed if unable to fall asleep within 20 minutes and go to another room. Return to bed only when sleepy. Repeat this step as many times as necessary throughout the night.  Stimulus control therapy rule    Relaxation -- Relaxation therapy involves progressively relaxing your muscles from your head down to your feet. Here is a sample of a relaxation program: Beginning with the muscles in your face, squeeze (contract) your muscles gently for one to two seconds and then relax. Repeat " several times. Use the same technique for other muscle groups, usually in the following sequence: jaw and neck, shoulders, upper arms, lower arms, fingers, chest, abdomen, buttocks, thighs, calves, and feet. Repeat this cycle for 45 minutes, if necessary. This relaxation program can promote restfulness and sleep.        Follow-up:Return if symptoms worsen or fail to improve.    This note was created using voice recognition software. There may be unintended errors in spelling, grammar or content.

## 2020-12-21 NOTE — ASSESSMENT & PLAN NOTE
She has trouble both falling asleep and staying asleep. The problem started gradually around Fall 2019. Some nights she will not sleep at all. Other times she will wake up a few times per night with some anxiety.  Her  wakes up to go to work around 4:30 in the morning so she does not want to be bothersome to him and will often go to a different room to try to fall asleep.  No REM sleep disturbance noted by patient or her .  No prior evaluations of sleep.  She feels like she does not get restorative sleep.  Denies nightmares or night terrors.  No prior use of sleep aids other than one time trying Benadryl which caused the opposite effect.    Discussed about sleep hygiene  Melatonin prn  Cognitive behavioral therapy

## 2020-12-21 NOTE — PATIENT INSTRUCTIONS
"    Good sleep hygiene:  --Keep a regular sleep schedule. Going to bed at same time and waking up at same time (even on weekends).   --Go to bed only when sleepy. Sleep only as much as you need to feel rested and then get out of bed  --Bed for sleep and sex only.  Do not read or watch TV in bed.  --Avoid TV, computer/tablet screens for 2 hours prior to bedtime or wear blue light blocking sunglasses. Avoid prolonged use of light-emitting screens before bedtime.  --Dark, cool, quiet bedroom. \"White noise\" (ie from a fan) may be helpful.  --No caffeinated beverages after lunch; do not use alcohol for sleep. Avoid alcohol near bedtime.  --Do not go to bed hungry  -- Do not take a nap during the day.  --Exercise regularly, preferably at least 4 to 5 hours before bedtime  --Make the bedroom environment conducive to sleep  --melatonin, which are both over the counter.  -- Avoid smoking, especially in the evening  --Deal with your worries before bedtime  -- Get out of bed if unable to fall asleep within 20 minutes and go to another room. Return to bed only when sleepy. Repeat this step as many times as necessary throughout the night.  Stimulus control therapy rule    Relaxation -- Relaxation therapy involves progressively relaxing your muscles from your head down to your feet. Here is a sample of a relaxation program: Beginning with the muscles in your face, squeeze (contract) your muscles gently for one to two seconds and then relax. Repeat several times. Use the same technique for other muscle groups, usually in the following sequence: jaw and neck, shoulders, upper arms, lower arms, fingers, chest, abdomen, buttocks, thighs, calves, and feet. Repeat this cycle for 45 minutes, if necessary. This relaxation program can promote restfulness and sleep.      "

## 2020-12-21 NOTE — ASSESSMENT & PLAN NOTE
Daytime fatigue, snore   Body mass index is 29.24 kg/m².    12/2/2020 Overnight Pulse oximetry testing  Time less than and equal to 88% is 78.3 min  Time consecutive less than and equal to 88% is 17.2 min  Low SPO2 77%  High SPO2 97%  She qualifies for nocturnal Oxygen per Medicare guidelines; please inquire with respiratory company for coverage guidelines for Group I.    She will also schedule an appointment with sleep medicine for sleep study

## 2020-12-30 RX ORDER — MOMETASONE FUROATE AND FORMOTEROL FUMARATE DIHYDRATE 100; 5 UG/1; UG/1
AEROSOL RESPIRATORY (INHALATION)
Qty: 1 EACH | Refills: 0 | Status: SHIPPED | OUTPATIENT
Start: 2020-12-30 | End: 2021-04-21

## 2020-12-30 NOTE — TELEPHONE ENCOUNTER
Received request via: Pharmacy    Was the patient seen in the last year in this department? Yes    Does the patient have an active prescription (recently filled or refills available) for medication(s) requested? No     Last OV 12/21/20.

## 2021-01-07 ENCOUNTER — SLEEP CENTER VISIT (OUTPATIENT)
Dept: SLEEP MEDICINE | Facility: MEDICAL CENTER | Age: 71
End: 2021-01-07
Payer: MEDICARE

## 2021-01-07 VITALS
DIASTOLIC BLOOD PRESSURE: 68 MMHG | HEIGHT: 65 IN | HEART RATE: 74 BPM | OXYGEN SATURATION: 95 % | BODY MASS INDEX: 28.32 KG/M2 | WEIGHT: 170 LBS | RESPIRATION RATE: 16 BRPM | SYSTOLIC BLOOD PRESSURE: 118 MMHG

## 2021-01-07 DIAGNOSIS — G47.34 NOCTURNAL HYPOXIA: ICD-10-CM

## 2021-01-07 DIAGNOSIS — Z92.29 UP TO DATE WITH INFLUENZA VACCINATION: ICD-10-CM

## 2021-01-07 DIAGNOSIS — E78.2 MIXED HYPERLIPIDEMIA: ICD-10-CM

## 2021-01-07 DIAGNOSIS — J45.40 MODERATE PERSISTENT ASTHMA WITHOUT COMPLICATION: ICD-10-CM

## 2021-01-07 DIAGNOSIS — Z87.891 FORMER SMOKER: ICD-10-CM

## 2021-01-07 DIAGNOSIS — F51.01 PRIMARY INSOMNIA: ICD-10-CM

## 2021-01-07 DIAGNOSIS — G47.33 OSA (OBSTRUCTIVE SLEEP APNEA): ICD-10-CM

## 2021-01-07 PROCEDURE — 99204 OFFICE O/P NEW MOD 45 MIN: CPT | Performed by: INTERNAL MEDICINE

## 2021-01-07 RX ORDER — ZOLPIDEM TARTRATE 5 MG/1
5 TABLET ORAL NIGHTLY PRN
Qty: 2 TAB | Refills: 0 | Status: SHIPPED | OUTPATIENT
Start: 2021-01-07 | End: 2021-02-07

## 2021-01-07 ASSESSMENT — FIBROSIS 4 INDEX: FIB4 SCORE: 1.24

## 2021-01-07 NOTE — PROGRESS NOTES
CC: evaluate for ward    HPI:  Prudence Chayito Gregory is a 70 y.o. female kindly referred by Dr. Brionna Scott M.D. for evaluation of nocturnal hypoxemia.  A December 8, 2020 overnight oximetry demonstrated saturations less than 90% for 145.2 minutes with a kevin saturation of 77%.  Patient experienced 116 oxygen desaturation events yielding an oxygen desaturation index of 15.  The oximetric pattern was clustered and sawtoothed consistent with WARD.    She was prescribed oxygen but just got yesterday [facility].    The patient states that nonrestorative sleep and insomnia are her reasons for the visit today.  Her symptoms began about 2017 and she rates the severity as 7 out of 10.  She has tried using Benadryl but had alerting rather than sedating.  She generally goes to bed at 10 PM and wakes up between 5-8 AM.  She does have a regular bed partner.  She estimates that her sleep latency may be 30 to 40 minutes.  She may watch TV just prior to turning out the lights and attempting to go to sleep.  She reports 3-4 nocturnal awakenings and one episode of nocturia.    Symptoms include waking up too early in the morning and being unable to return to sleep, sometimes difficulty awakening and getting out of bed after sleeping, napping or returning to bed after arising, occasional sleepiness during the day, and too little sleep at night.    She complains of restless legs approximately once a week.  She denies legs or arms jerking or twitching during sleep.  She snores once in a while but her snoring is soft.  She wakes feeling anxious proximately 3-4 times a week.  Her  uses CPAP and also takes Ritalin during the day to stay awake.    Significant comorbidities and modifying factors include up-to-date with influenza vaccination, hypothyroidism due to acquired atrophy of thyroid, mixed hyperlipidemia, moderate persistent asthma, insomnia, and former smoker.      Patient Active Problem List     Diagnosis Date Noted   • Former smoker 01/07/2021   • Up to date with influenza vaccination 01/07/2021   • Nocturnal hypoxia 12/21/2020   • Hyperplastic polyp of sigmoid colon 10/21/2020   • Primary insomnia 10/12/2020   • Fatigue 10/12/2020   • Lower abdominal pain 07/23/2020   • Lichen sclerosus 07/23/2020   • Diverticulitis- mild, July 2020 07/23/2020   • Gum disease 04/20/2020   • Mixed hyperlipidemia 03/26/2020   • Moderate persistent asthma without complication 03/05/2020   • Vitamin D deficiency 03/05/2020   • Hypothyroidism due to acquired atrophy of thyroid 03/05/2020   • Skin lesion 03/05/2020   • Menopause 03/05/2020       Past Medical History:   Diagnosis Date   • Acute cystitis with hematuria 4/20/2020    Urine dipstick performed in clinic demonstrated trace glucose, 1+ bili, 1+ ketones, specific gravity 1.015, trace intact blood, pH 5.5, 2+ protein, 2.0 urobilinogen, positive nitrite, 3+ leukocyte esterase.  She performed a telemedicine visit yesterday and was placed on cephalosporin for presumptive urinary tract infection.  She was also given Pyridium due to discomfort with dysuria.  She thinks s   • Asthma    • Back pain    • Chickenpox    • GERD (gastroesophageal reflux disease)    • Heartburn    • Hypothyroidism    • Insomnia     Trouble going to and staying asleep   • Mumps    • Painful joint    • Thyroid disease    • Tonsillitis    • Wears glasses         Past Surgical History:   Procedure Laterality Date   • ABDOMINAL HYSTERECTOMY TOTAL     • CHOLECYSTECTOMY     • HYSTERECTOMY LAPAROSCOPY     • TONSILLECTOMY         Family History   Problem Relation Age of Onset   • Cancer Father         lung   • Breast Cancer Maternal Aunt    • Hypertension Maternal Grandmother    • Hypertension Maternal Grandfather    • Kidney Disease Mother        Social History     Socioeconomic History   • Marital status:      Spouse name: Not on file   • Number of children: Not on file   • Years of education: Not on  file   • Highest education level: Not on file   Occupational History   • Not on file   Social Needs   • Financial resource strain: Not on file   • Food insecurity     Worry: Not on file     Inability: Not on file   • Transportation needs     Medical: Not on file     Non-medical: Not on file   Tobacco Use   • Smoking status: Former Smoker     Packs/day: 1.00     Years: 15.00     Pack years: 15.00     Types: Cigarettes     Quit date: 1993     Years since quittin.7   • Smokeless tobacco: Never Used   • Tobacco comment: continued abstinence   Substance and Sexual Activity   • Alcohol use: Yes     Comment: occ   • Drug use: No   • Sexual activity: Yes     Partners: Male     Birth control/protection: Post-Menopausal   Lifestyle   • Physical activity     Days per week: Not on file     Minutes per session: Not on file   • Stress: Not on file   Relationships   • Social connections     Talks on phone: Not on file     Gets together: Not on file     Attends [employer] service: Not on file     Active member of club or organization: Not on file     Attends meetings of clubs or organizations: Not on file     Relationship status: Not on file   • Intimate partner violence     Fear of current or ex partner: Not on file     Emotionally abused: Not on file     Physically abused: Not on file     Forced sexual activity: Not on file   Other Topics Concern   • Not on file   Social History Narrative    She worked at [employer] at the [employer], recently retired. She is  to Ryan for the past 20 years, they met at a [facility]. She has a son ([address]) and 2 grandchildren.        Current Outpatient Medications   Medication Sig Dispense Refill   • Misc. Devices Misc Please provide nocturnal oxgen 2 LPM Nasal Cannula, concentrator 99 Each 99   • DULERA 100-5 MCG/ACT Aerosol INHALE 2 PUFFS BY MOUTH TWICE A DAY 1 Each 0   • levothyroxine (SYNTHROID) 88 MCG Tab TAKE 1 TABLET BY MOUTH EVERY DAY 90 Tab 3   •  "albuterol 108 (90 Base) MCG/ACT Aero Soln inhalation aerosol Inhale 2 Puffs by mouth every four hours as needed for Shortness of Breath. 1 Inhaler 3   • omeprazole (PRILOSEC) 20 MG delayed-release capsule Take 20 mg by mouth every day.     • clobetasol (TEMOVATE) 0.05 % Ointment TOPICALLY APPLY SPARINGLY TO AFFECTED AREA 3X\/WEEK       No current facility-administered medications for this visit.     \"CURRENT RX\"    ALLERGIES: Codeine    ROS    Constitutional: Denies fever, chills, sweats,  weight loss, fatigue.  Eyes: Denies vision loss, pain, drainage, double vision, glasses.  Ears/Nose/Mouth/Throat: Denies earache, difficulty hearing, rhinitis/nasal congestion, injury, recurrent sore throat, persistent hoarseness, decayed teeth/toothaches, ringing or buzzing in the ears.  Cardiovascular: Denies chest pain, tightness, palpitations, swelling in legs/feet, fainting, difficulty breathing when lying down but gets better when sitting up.   Respiratory: Denies shortness of breath, cough, sputum, wheezing, painful breathing, coughing up blood.   Sleep: per HPI  Gastrointestinal: Denies  difficulty swallowing, nausea, abdominal pain, diarrhea, constipation, heartburn.  Genitourinary: Denies  blood in urine, discharge, frequent urination.   Musculoskeletal: Denies painful joints, sore muscles, back pain.   Integumentary: Denies rashes, lumps, color changes.   Neurological: Denies frequent headaches,weakness, dizziness.    PHYSICAL EXAM  Overweight    /68 (BP Location: Left arm, Patient Position: Sitting, BP Cuff Size: Adult)   Pulse 74   Resp 16   Ht 1.651 m (5' 5\")   Wt 77.1 kg (170 lb)   SpO2 95%   BMI 28.29 kg/m²   Appearance: Well-nourished, well-developed, no acute distress  Eyes:  PERRLA, EOMI; eyes  ENMT: masked  Neck: Supple, trachea midline, no masses  Respiratory effort:  No intercostal retractions or use of accessory muscles  Lung auscultation:  No wheezes rhonchi rubs or rales  Cardiac: No murmurs, " rubs, or gallops; regular rhythm, normal rate; no edema  Abdomen:  No tenderness, no organomegaly  Musculoskeletal:  Grossly normal; gait and station normal; digits and nails normal  Skin:  No rashes, petechiae, cyanosis  Neurologic: without focal signs; oriented to person, time, place, and purpose; judgement intact  Psychiatric:  No depression, anxiety, agitation        Medical Decision Making:  The medical record was reviewed in its entirety including the referral notes, records from primary care, consultants notes, hospital records, labs, imaging, microbiology, immunology, and immunizations.  Care gaps identified and reviewed with the patient.    Diagnostic and titration nocturnal polysomnograms, home sleep apnea tests, continuous nocturnal oximetry results, multiple sleep latency tests, and compliance reports reviewed.        1. WARD (obstructive sleep apnea)    2. Nocturnal hypoxia    3. Primary insomnia    4. Moderate persistent asthma without complication    5. Mixed hyperlipidemia    6. Former smoker    7. Up to date with influenza vaccination      PLAN:   The patient has signs and symptoms consistent with obstructive sleep apnea hypopnea syndrome. Will schedule a nocturnal polysomnogram or a home sleep apnea test depending on signs and symptoms as well as insurance restrictions.    The risks of untreated sleep apnea were discussed with the patient at length. Patients with WARD are at increased risk of cardiovascular disease including coronary artery disease, systemic arterial hypertension, pulmonary arterial hypertension, cardiac arrythmias, and stroke. WARD patients have an increased risk of motor vehicle accidents, type 2 diabetes, chronic kidney disease, and non-alcoholic liver disease. The patient was advised to avoid driving a motor vehicle when drowsy.    Positive airway pressure will favorably impact many of the adverse conditions and effects provoked by WARD.    Have advised the patient to follow up  with the appropriate healthcare practitioners for all other medical problems and issues.    Mask wearing, handwashing, and social distancing protocols reviewed and encouraged.    Return for after sleep study.        Answers for HPI/ROS submitted by the patient on 1/7/2021   Year of your last physical exam: 2019  Results of exam: Medicare yearly exam  Occupation : Retired  Height: 5’5”  Current weight: 170  6 months ago: 162  At age 20: 130  What is the reason for your visit today?: Nonrestorative sleep, insomnia  Name of person referring you to the Sleep Center: Dr. Shawna Recio  Have you ever been hospitalized?: Yes  Reason, year, and hospital in which you were hospitalized:: 1995 asthma, [facility].  Have you ever had problems with anesthesia?: No  Have you experienced post-operative delirium?: No  Any complications with surgery?: No  What year did you receive your last Flu shot?: 2020  What year did you receive you last Pneumonia shot?: 2018  Have you had a TB skin test? If so, please list the year and result:: Yes 2018  Have you had Allergy skin testing? If so, please list the year and result:: No  Please briefly describe your sleep problem and how old you were when it began.: Began anout 2017, insomnia, nonrestorative sleep  How does this affect your daily life and activities? Please also rate how serious of a problem this is (1 = Not at all, 10 = Very Serious).: 7  Have you had any previous evaluations, examinations, or treatment for this sleep problem or any other problems with your sleep? If so, please describe the evaluation, treatment, and results.: Tried benadryl with opposite effect.  Have you used any medications (prescribed or otherwise) to help your sleep problem? If yes, include name, amount, frequency, and the prescribing physician.: No  If employed, what time do you usually start and end work?: Retired  Do you ever change work shifts? If yes, describe how often  (never, infrequently, regularly).: No  What time do you usually go to bed and wake up on: Weekdays? Weekends?: Bedtime 10:00 pm, riding varies between 5:30 and 8:30 am  Do you have a regular bed partner?: Yes  How many minutes does it usually take to fall asleep at night after turning off the lights?: Varies, sometimes after 30-40 minutes i get up  What do you ordinarily do just prior to turning out the lights and attempting to go to sleep (e.g., reading, TV, baths, etc.)?: TV  On average, how many times do you wake up during the night?: 3-4  On average, how many times do you wake up to use the bathroom?: 1  Do you often wake up too early in the morning and are unable to return to sleep?: Yes  On average, how many hours of sleep do you get per night?: 4-6  How do you usually awaken?  Alarm, spontaneously, or other?: My  gets up at 4:00 to ho to work  Is it difficult for you to awaken and get out of bed after sleeping? (Not at all, Sometimes, Very): Sometimes  Do you nap or return to bed after arising?: Sometimes go back to bed  Are you bothered by sleepiness during the day?: Sometimes  Do you feel that you get too much sleep at night?: No  Do you feel that you get too little sleep at night?: Yes  Do you usually feel tired during the day? If so, what do you attribute this to?: No  Do you find yourself falling asleep when you don't mean to? : No  Do you feel rested or refreshed after the sleep episode?: No  Have you ever suddenly fallen?: No  Have you ever experienced sudden body weakness?: No  Have you ever experienced weakness or paralysis upon going to sleep?: No  Have you ever experienced weakness or paralysis upon awakening from sleep?: No  Have you ever experienced seeing things or hearing voices/noises: That weren't real? On going to sleep? During the night? On awakening from sleep? During the day?: No  Do you have difficulty breathing at night? If yes, briefly describe.: No  Have you been told you snore  "while asleep? If so, does it disturb a bed partner (or someone in the same room), or someone in the next room?: Once in a while but only softly  Have you ever experienced doing something without being aware of the action? If yes, please describe.: No  Have you ever experienced upon lying in bed before sleep or on awakening from sleep: Restlessness of legs, \"nervous legs\", \"creeping crawling\" sensation of legs, or twitching of legs?: Yes  How many times per week does this occur, and how many minutes does the sensation last?: Once  Does anything relieve the sensations (e.g., getting out of bed, medication, massage)?: Yes  At what age did this first occur, and how many years has this occurred?: Around age 67  Have you ever been told that your arms or legs jerk or twitch while you are asleep? If yes, how many times per night does this occur?: No  Do you know, or have you ever been told that you do any of the following while sleeping: talk, walk, grit teeth, wet the bed, wake up screaming or seemingly afraid, have disturbing dreams, have unusual movements, wake up with headaches, (males) have erections? If yes to any of these, please indicate how many times per week, age started, last occurrence, treatment received.: Awake feeling anxious probably 3-4 times a week  Has anyone in your family been known to have any sleep problems? If yes, please list the type of problem (e.g., trouble getting to sleep, too sleepy, bed wetting, etc.), the relationship of this person to you, and the treatment received.:  uses cpap and also takes ritalin to stay awake during the day    "

## 2021-01-12 ENCOUNTER — SLEEP STUDY (OUTPATIENT)
Dept: SLEEP MEDICINE | Facility: MEDICAL CENTER | Age: 71
End: 2021-01-12
Payer: MEDICARE

## 2021-01-12 DIAGNOSIS — G47.33 OSA (OBSTRUCTIVE SLEEP APNEA): ICD-10-CM

## 2021-01-15 ENCOUNTER — OFFICE VISIT (OUTPATIENT)
Dept: SLEEP MEDICINE | Facility: MEDICAL CENTER | Age: 71
End: 2021-01-15
Payer: MEDICARE

## 2021-01-15 VITALS
DIASTOLIC BLOOD PRESSURE: 62 MMHG | BODY MASS INDEX: 29.66 KG/M2 | OXYGEN SATURATION: 94 % | WEIGHT: 178 LBS | SYSTOLIC BLOOD PRESSURE: 118 MMHG | HEART RATE: 89 BPM | RESPIRATION RATE: 14 BRPM | HEIGHT: 65 IN

## 2021-01-15 DIAGNOSIS — G47.34 NOCTURNAL HYPOXIA: ICD-10-CM

## 2021-01-15 DIAGNOSIS — F51.01 PRIMARY INSOMNIA: ICD-10-CM

## 2021-01-15 DIAGNOSIS — G47.33 OSA (OBSTRUCTIVE SLEEP APNEA): ICD-10-CM

## 2021-01-15 DIAGNOSIS — Z23 NEED FOR VACCINATION: ICD-10-CM

## 2021-01-15 DIAGNOSIS — J45.40 MODERATE PERSISTENT ASTHMA WITHOUT COMPLICATION: ICD-10-CM

## 2021-01-15 PROCEDURE — 95810 POLYSOM 6/> YRS 4/> PARAM: CPT | Performed by: INTERNAL MEDICINE

## 2021-01-15 PROCEDURE — 99213 OFFICE O/P EST LOW 20 MIN: CPT | Performed by: NURSE PRACTITIONER

## 2021-01-15 ASSESSMENT — FIBROSIS 4 INDEX: FIB4 SCORE: 1.24

## 2021-01-15 NOTE — PROCEDURES
Comments:  The patient underwent a diagnostic polysomnogram using the standard montage for measurement of parameters of sleep, respiratory events, movement abnormalities, and heart rate and rhythm.   A microphone was used to monitor snoring.  Interpretation:  Study start time was 08:54:13 PM. Diagnostic recording time was 9h 14.0m with a total sleep time of 7h 16.5m resulting in a sleep efficiency of 78.79%%.   Sleep latency from the start of the study was 23 minutes and the latency from sleep to REM was 111 minutes.  In total,56 arousals were scored for an arousal index of 7.7.  Respiratory:  There were a total of 11 apneas consisting of 8 obstructive apneas, 0 mixed apneas, and 3 central apneas. A total of 28 hypopneas were scored.  The apnea index was 1.51 per hour and the hypopnea index was 3.85 per hour resulting in an overall AHI of 5.36.  AHI during rem was 9.5 and AHI while supine was 6.52.  Oximetry:  There was a mean oxygen saturation of 92.0% with a minimum oxygen saturation of 63.0%. Time spent with oxygen saturations below 89% was 60.8 minutes.  Cardiac:  The highest heart rate seen while awake was 104 BPM while the highest heart rate during sleep was 98 BPM with an average sleeping heart rate of 66 BPM.  Limb Movements:  There were a total of 121 PLMs during sleep, of which 10 were PLMS arousals. This resulted in a PLMS index of 16.6 and a PLMS arousal index of 1.4.    RECORDING TECHNIQUE:       After the scalp was prepared, gold plated electrodes were applied to the scalp according to the International 10-20 System. EEG (electroencephalogram) was continuously monitored from the O1-M2, O2-M1, C3-M2, C4-M1, F3-M2, and F4-M1.   EOGs (electrooculograms) were monitored by electrodes placed at the left and right outer canthi.  Chin EMG (electromyogram) was monitored by electrodes placed on the mentalis and sub-mentalis muscles.  Nasal and oral airflow were monitored using a triple port thermocouple as well  as oronasal pressure transducer.  Respiratory effort was measured by inductive plethysmography technology employing abdominal and thoracic belts.  Blood oxygen saturation and pulse were monitored by pulse oximetry.  Heart rhythm was monitored by surface electrocardiogram.  Leg EMG was studied using surface electrodes placed on left and right anterior tibialis.  A microphone was used to monitor tracheal sounds and snoring.  Body position was monitored and documented by technician observation      SUMMARY:    This was an overnight diagnostic polysomnogram with a possible subsequent positive airway pressure titration.  However, the patient did not meet the split-night protocol.    The total recording time was 554 minutes, the sleep period time was 517 minutes, and the total sleep time was 436 minutes.  The patient's sleep efficiency was 78.79 which is reduced.  The patient experienced a normal 5 REM period(s).    The sleep stage durations revealed 81 minutes of wake after sleep onset (WASO), 15.5 minutes of N1 sleep, 253.5 minutes of N2 sleep, 85.0 minutes of N3 sleep, and 82.5 minutes of REM.    The latency to sleep was 23 minutes which is normal.  The latency to REM was 1 hour 51 minutes which is prolonged.  Minimal sleep fragmentation occurred.  The arousal index was 7.7.  The Limb Movement with Arousal Index was 1.9, the Total Limb Movement (isolated) Index was 2.5, and the PLM Series Index was 16.1.    The patient experienced 3 central and mid obstructive apneas, 0 central and 28 obstructive hypopneas, 39 apneas and hypopneas, and 0 RERAS.  The apnea hypopnea index was 5.4, the RDI was 5.4, the mean event duration was 20.0 seconds, and the longest event lasted 85.9 seconds.  The REM index was 1.8 and the supine index was 6.5.  The apnea hypopnea index represents mild obstructive sleep apnea hypopnea syndrome with a slight positional component.    The kevin saturation during sleep was 63% and the patient spent  27.4% of the recording with saturations less than or equal to 90%.    During sleep, the minimum heart rate was 57 bpm, the mean heart rate was 66 bpm, and the maximum heart rate was 98 bpm.      ASSESSMENT:    Mild obstructive sleep apnea hypopnea - AHI 5.4  Persistent nocturnal desaturation - kevin saturation 63% - saturations <90% for 27.4% of the recording  Did not meet the split-night protocol secondary to an insufficient number of qualifying events before 2 AM      RECOMMENDATION:    If significant signs, symptoms, and or comorbidities warrant, recommend a positive airway pressure titration. Alternative treatments for OSAH include behavioral modification, use of a dental appliance, and nasopharyngeal reconstructive surgery. Behavior modification includes weight loss, eliminating alcohol and sedatives, and avoiding the supine position. If alternative treatments are used, a follow-up diagnostic study is warranted to ensure efficacy.

## 2021-01-15 NOTE — PATIENT INSTRUCTIONS
Sleep Apnea  Sleep apnea affects breathing during sleep. It causes breathing to stop for a short time or to become shallow. It can also increase the risk of:  · Heart attack.  · Stroke.  · Being very overweight (obese).  · Diabetes.  · Heart failure.  · Irregular heartbeat.  The goal of treatment is to help you breathe normally again.  What are the causes?  There are three kinds of sleep apnea:  · Obstructive sleep apnea. This is caused by a blocked or collapsed airway.  · Central sleep apnea. This happens when the brain does not send the right signals to the muscles that control breathing.  · Mixed sleep apnea. This is a combination of obstructive and central sleep apnea.  The most common cause of this condition is a collapsed or blocked airway. This can happen if:  · Your throat muscles are too relaxed.  · Your tongue and tonsils are too large.  · You are overweight.  · Your airway is too small.  What increases the risk?  · Being overweight.  · Smoking.  · Having a small airway.  · Being older.  · Being male.  · Drinking alcohol.  · Taking medicines to calm yourself (sedatives or tranquilizers).  · Having family members with the condition.  What are the signs or symptoms?  · Trouble staying asleep.  · Being sleepy or tired during the day.  · Getting angry a lot.  · Loud snoring.  · Headaches in the morning.  · Not being able to focus your mind (concentrate).  · Forgetting things.  · Less interest in sex.  · Mood swings.  · Personality changes.  · Feelings of sadness (depression).  · Waking up a lot during the night to pee (urinate).  · Dry mouth.  · Sore throat.  How is this diagnosed?  · Your medical history.  · A physical exam.  · A test that is done when you are sleeping (sleep study). The test is most often done in a sleep lab but may also be done at home.  How is this treated?    · Sleeping on your side.  · Using a medicine to get rid of mucus in your nose (decongestant).  · Avoiding the use of alcohol,  medicines to help you relax, or certain pain medicines (narcotics).  · Losing weight, if needed.  · Changing your diet.  · Not smoking.  · Using a machine to open your airway while you sleep, such as:  ? An oral appliance. This is a mouthpiece that shifts your lower jaw forward.  ? A CPAP device. This device blows air through a mask when you breathe out (exhale).  ? An EPAP device. This has valves that you put in each nostril.  ? A BPAP device. This device blows air through a mask when you breathe in (inhale) and breathe out.  · Having surgery if other treatments do not work.  It is important to get treatment for sleep apnea. Without treatment, it can lead to:  · High blood pressure.  · Coronary artery disease.  · In men, not being able to have an erection (impotence).  · Reduced thinking ability.  Follow these instructions at home:  Lifestyle  · Make changes that your doctor recommends.  · Eat a healthy diet.  · Lose weight if needed.  · Avoid alcohol, medicines to help you relax, and some pain medicines.  · Do not use any products that contain nicotine or tobacco, such as cigarettes, e-cigarettes, and chewing tobacco. If you need help quitting, ask your doctor.  General instructions  · Take over-the-counter and prescription medicines only as told by your doctor.  · If you were given a machine to use while you sleep, use it only as told by your doctor.  · If you are having surgery, make sure to tell your doctor you have sleep apnea. You may need to bring your device with you.  · Keep all follow-up visits as told by your doctor. This is important.  Contact a doctor if:  · The machine that you were given to use during sleep bothers you or does not seem to be working.  · You do not get better.  · You get worse.  Get help right away if:  · Your chest hurts.  · You have trouble breathing in enough air.  · You have an uncomfortable feeling in your back, arms, or stomach.  · You have trouble talking.  · One side of your  body feels weak.  · A part of your face is hanging down.  These symptoms may be an emergency. Do not wait to see if the symptoms will go away. Get medical help right away. Call your local emergency services (911 in the [place] Do not drive yourself to the hospital.  Summary  · This condition affects breathing during sleep.  · The most common cause is a collapsed or blocked airway.  · The goal of treatment is to help you breathe normally while you sleep.  This information is not intended to replace advice given to you by your health care provider. Make sure you discuss any questions you have with your health care provider.  Document Released: 09/26/2009 Document Revised: 10/04/2019 Document Reviewed: 08/13/2019  [employer] Patient Education © 2020 [employer]    Insomnia  Insomnia is a sleep disorder that makes it difficult to fall asleep or stay asleep. Insomnia can cause fatigue, low energy, difficulty concentrating, mood swings, and poor performance at work or school.  There are three different ways to classify insomnia:  · Difficulty falling asleep.  · Difficulty staying asleep.  · Waking up too early in the morning.  Any type of insomnia can be long-term (chronic) or short-term (acute). Both are common. Short-term insomnia usually lasts for three months or less. Chronic insomnia occurs at least three times a week for longer than three months.  What are the causes?  Insomnia may be caused by another condition, situation, or substance, such as:  · Anxiety.  · Certain medicines.  · Gastroesophageal reflux disease (GERD) or other gastrointestinal conditions.  · Asthma or other breathing conditions.  · Restless legs syndrome, sleep apnea, or other sleep disorders.  · Chronic pain.  · Menopause.  · Stroke.  · Abuse of alcohol, tobacco, or illegal drugs.  · Mental health conditions, such as depression.  · Caffeine.  · Neurological disorders, such as Alzheimer's disease.  · An overactive thyroid  (hyperthyroidism).  Sometimes, the cause of insomnia may not be known.  What increases the risk?  Risk factors for insomnia include:  · Gender. Women are affected more often than men.  · Age. Insomnia is more common as you get older.  · Stress.  · Lack of exercise.  · Irregular work schedule or working night shifts.  · Traveling between different time zones.  · Certain medical and mental health conditions.  What are the signs or symptoms?  If you have insomnia, the main symptom is having trouble falling asleep or having trouble staying asleep. This may lead to other symptoms, such as:  · Feeling fatigued or having low energy.  · Feeling nervous about going to sleep.  · Not feeling rested in the morning.  · Having trouble concentrating.  · Feeling irritable, anxious, or depressed.  How is this diagnosed?  This condition may be diagnosed based on:  · Your symptoms and medical history. Your health care provider may ask about:  ? Your sleep habits.  ? Any medical conditions you have.  ? Your mental health.  · A physical exam.  How is this treated?  Treatment for insomnia depends on the cause. Treatment may focus on treating an underlying condition that is causing insomnia. Treatment may also include:  · Medicines to help you sleep.  · Counseling or therapy.  · Lifestyle adjustments to help you sleep better.  Follow these instructions at home:  Eating and drinking    · Limit or avoid alcohol, caffeinated beverages, and cigarettes, especially close to bedtime. These can disrupt your sleep.  · Do not eat a large meal or eat spicy foods right before bedtime. This can lead to digestive discomfort that can make it hard for you to sleep.  Sleep habits    · Keep a sleep diary to help you and your health care provider figure out what could be causing your insomnia. Write down:  ? When you sleep.  ? When you wake up during the night.  ? How well you sleep.  ? How rested you feel the next day.  ? Any side effects of medicines you  are taking.  ? What you eat and drink.  · Make your bedroom a dark, comfortable place where it is easy to fall asleep.  ? Put up shades or blackout curtains to block light from outside.  ? Use a white noise machine to block noise.  ? Keep the temperature cool.  · Limit screen use before bedtime. This includes:  ? Watching TV.  ? Using your smartphone, tablet, or computer.  · Stick to a routine that includes going to bed and waking up at the same times every day and night. This can help you fall asleep faster. Consider making a quiet activity, such as reading, part of your nighttime routine.  · Try to avoid taking naps during the day so that you sleep better at night.  · Get out of bed if you are still awake after 15 minutes of trying to sleep. Keep the lights down, but try reading or doing a quiet activity. When you feel sleepy, go back to bed.  General instructions  · Take over-the-counter and prescription medicines only as told by your health care provider.  · Exercise regularly, as told by your health care provider. Avoid exercise starting several hours before bedtime.  · Use relaxation techniques to manage stress. Ask your health care provider to suggest some techniques that may work well for you. These may include:  ? Breathing exercises.  ? Routines to release muscle tension.  ? Visualizing peaceful scenes.  · Make sure that you drive carefully. Avoid driving if you feel very sleepy.  · Keep all follow-up visits as told by your health care provider. This is important.  Contact a health care provider if:  · You are tired throughout the day.  · You have trouble in your daily routine due to sleepiness.  · You continue to have sleep problems, or your sleep problems get worse.  Get help right away if:  · You have serious thoughts about hurting yourself or someone else.  If you ever feel like you may hurt yourself or others, or have thoughts about taking your own life, get help right away. You can go to your nearest  emergency department or call:  · Your local emergency services (911 in the [place]  · A suicide crisis helpline, such as the National Suicide Prevention Lifeline at 1-963.446.3867. This is open 24 hours a day.  Summary  · Insomnia is a sleep disorder that makes it difficult to fall asleep or stay asleep.  · Insomnia can be long-term (chronic) or short-term (acute).  · Treatment for insomnia depends on the cause. Treatment may focus on treating an underlying condition that is causing insomnia.  · Keep a sleep diary to help you and your health care provider figure out what could be causing your insomnia.  This information is not intended to replace advice given to you by your health care provider. Make sure you discuss any questions you have with your health care provider.  Document Released: 12/15/2001 Document Revised: 11/30/2018 Document Reviewed: 09/27/2018  [employer] Patient Education © 2020 [employer]

## 2021-01-15 NOTE — PROGRESS NOTES
Chief Complaint   Patient presents with   • Follow-Up     PSG results       HPI:      Mrs. Gregory is a 71 y/o female patient who is in today for WARD f/u and sleep study results. PMH includes asthma, hypothyroidism, menopause, diverticulitis, insomnia, fatigue, nocturnal hypoxia, former smoker, tonsillectomy.     PSG study from 1/12/21 indicated mild obstructive sleep apnea hypopnea - AHI 5.4. Persistent nocturnal desaturation - kevin saturation 63% - saturations <90% for 27.4% of the recording. Did not meet the split-night protocol secondary to an insufficient number of qualifying events before 2 AM.     She goes to bed at 10 pm but sometimes will not fall asleep till 1 AM and wakes up between 4 and 830 am.  There are some nights that she does not sleep at all.  She denies morning headache, but has told by her  that she does snore.  She attributes her insomnia to anxiety but does not elaborate.  She is pending a consult with Dr. Curtis in February.  She was placed on nocturnal O2 at 2 L by her PCP after an OPO on RA CKD nocturnal hypoxia.  She does wake up feeling tired but does not feel fatigued throughout the day.  She does not take naps nor does she ever fall asleep while watching TV, reading a book or during quiet times.  She has asthma for which she takes Dulera and albuterol very sparingly.  This is managed by her PCP.  At this time she would like to take some time to review her treatment options prior to making a decision.  She will call the office when she decides.  For now she will continue using her nighttime oxygen.  She denies any new health problems or medications.      ROS:    Constitutional: Denies fevers, Denies weight changes  Eyes: Denies changes in vision, no eye pain  Ears/Nose/Throat/Mouth: Denies nasal congestion or sore throat   Cardiovascular: Denies chest pain or palpitations   Respiratory: Denies shortness of breath , Denies cough  Gastrointestinal/Hepatic: Denies abdominal pain,  nausea, vomiting,   Skin/Breast: Denies rash,   Neurological: Denies headache, confusion,   Psychiatric: denies mood disorder   Sleep: + snoring       Past Medical History:   Diagnosis Date   • Acute cystitis with hematuria 2020    Urine dipstick performed in clinic demonstrated trace glucose, 1+ bili, 1+ ketones, specific gravity 1.015, trace intact blood, pH 5.5, 2+ protein, 2.0 urobilinogen, positive nitrite, 3+ leukocyte esterase.  She performed a telemedicine visit yesterday and was placed on cephalosporin for presumptive urinary tract infection.  She was also given Pyridium due to discomfort with dysuria.  She thinks s   • Asthma    • Back pain    • Chickenpox    • GERD (gastroesophageal reflux disease)    • Heartburn    • Hypothyroidism    • Insomnia     Trouble going to and staying asleep   • Mumps    • Painful joint    • Thyroid disease    • Tonsillitis    • Wears glasses        Past Surgical History:   Procedure Laterality Date   • ABDOMINAL HYSTERECTOMY TOTAL     • CHOLECYSTECTOMY     • HYSTERECTOMY LAPAROSCOPY     • TONSILLECTOMY         Family History   Problem Relation Age of Onset   • Cancer Father         lung   • Breast Cancer Maternal Aunt    • Hypertension Maternal Grandmother    • Hypertension Maternal Grandfather    • Kidney Disease Mother        Social History     Socioeconomic History   • Marital status:      Spouse name: Not on file   • Number of children: Not on file   • Years of education: Not on file   • Highest education level: Not on file   Occupational History   • Not on file   Social Needs   • Financial resource strain: Not on file   • Food insecurity     Worry: Not on file     Inability: Not on file   • Transportation needs     Medical: Not on file     Non-medical: Not on file   Tobacco Use   • Smoking status: Former Smoker     Packs/day: 1.00     Years: 15.00     Pack years: 15.00     Types: Cigarettes     Quit date: 1993     Years since quittin.8   • Smokeless  tobacco: Never Used   • Tobacco comment: continued abstinence   Substance and Sexual Activity   • Alcohol use: Yes     Comment: occ   • Drug use: No   • Sexual activity: Yes     Partners: Male     Birth control/protection: Post-Menopausal   Lifestyle   • Physical activity     Days per week: Not on file     Minutes per session: Not on file   • Stress: Not on file   Relationships   • Social connections     Talks on phone: Not on file     Gets together: Not on file     Attends [employer] service: Not on file     Active member of club or organization: Not on file     Attends meetings of clubs or organizations: Not on file     Relationship status: Not on file   • Intimate partner violence     Fear of current or ex partner: Not on file     Emotionally abused: Not on file     Physically abused: Not on file     Forced sexual activity: Not on file   Other Topics Concern   • Not on file   Social History Narrative    She worked at [employer] at the [employer], recently retired. She is  to Ryan for the past 20 years, they met at a [employer] She has a son ([address]) and 2 grandchildren.        Allergies as of 01/15/2021 - Reviewed 01/15/2021   Allergen Reaction Noted   • Codeine  01/18/2019        Vitals:  Vitals:    01/15/21 1319   BP: 118/62   Pulse: 89   Resp: 14   SpO2: 94%       Current medications as of today   Current Outpatient Medications   Medication Sig Dispense Refill   • Misc. Devices Misc Please provide nocturnal oxgen 2 LPM Nasal Cannula, concentrator 99 Each 99   • DULERA 100-5 MCG/ACT Aerosol INHALE 2 PUFFS BY MOUTH TWICE A DAY 1 Each 0   • levothyroxine (SYNTHROID) 88 MCG Tab TAKE 1 TABLET BY MOUTH EVERY DAY 90 Tab 3   • clobetasol (TEMOVATE) 0.05 % Ointment TOPICALLY APPLY SPARINGLY TO AFFECTED AREA 3X\/WEEK     • albuterol 108 (90 Base) MCG/ACT Aero Soln inhalation aerosol Inhale 2 Puffs by mouth every four hours as needed for Shortness of Breath. 1 Inhaler 3   • omeprazole  (PRILOSEC) 20 MG delayed-release capsule Take 20 mg by mouth every day.     • zolpidem (AMBIEN) 5 MG Tab Take 1 Tab by mouth at bedtime as needed for Sleep (1 to 2 po qhs prn insomnia/sleep study. Bring to sleep study.) for up to 2 doses. (Patient not taking: Reported on 1/15/2021) 2 Tab 0     No current facility-administered medications for this visit.          Physical Exam: Limited by COVID-19 precautions.  Appearance: Well developed, well nourished, no acute distress  Eyes: PERRL, EOM intact, sclera white, conjunctiva moist  Ears: no lesions or deformities  Hearing: grossly intact  Nose: no lesions or deformities  Respiratory effort: no intercostal retractions or use of accessory muscles  Extremities: no cyanosis or edema  Abdomen: soft   Gait and Station: normal  Digits and nails: no clubbing, cyanosis, petechiae or nodes.  Cranial nerves: grossly intact  Skin: no visible rashes, lesions or ulcers noted  Orientation: Oriented to time, person and place  Mood and affect: mood and affect appropriate, normal interaction with examiner  Judgement: Intact    Assessment:  1. WARD (obstructive sleep apnea)  Overnight Oximetry   2. Nocturnal hypoxia  Overnight Oximetry   3. Moderate persistent asthma without complication     4. Primary insomnia     5. BMI 29.0-29.9,adult           Plan  Discussed the cardiovascular and neuropsychiatric risks of untreated WARD; including but not limited to: HTN, DM, MI, ASCVD, CVA, CHF, traffic accidents.     1. PSG study from 1/12/21 indicated mild obstructive sleep apnea hypopnea - AHI 5.4. Persistent nocturnal desaturation - kevin saturation 63% - saturations <90% for 27.4% of the recording. Did not meet the split-night protocol secondary to an insufficient number of qualifying events before 2 AM.   Recommendation:  If significant signs, symptoms, and or comorbidities warrant, recommend a positive airway pressure titration. Alternative treatments for OSAH include behavioral modification,  use of a dental appliance, and nasopharyngeal reconstructive surgery. Behavior modification includes weight loss, eliminating alcohol and sedatives, and avoiding the supine position.     2.  Patient would like to take time to further review her treatment options and will call if she is ready to start on CPAP therapy or obtain a dental appliance.  Patient was advised she needs to call within 1 year of her sleep study to qualify for CPAP therapy.  3. Order OPO on 2L O2 to rule out nocturnal hypoxia.  Patient is currently using 2 L O2 at night due to a prior OPO on RA ordered by PCP indicating nocturnal hypoxia.   4. Continue to stay active.    5. Follow up with the appropriate healthcare practitioners for all other medical problems and issues.  6. Sleep hygiene discussed. Recommend keeping a set sleep/wake schedule. Logging enough hours of sleep. Limiting/Avoiding naps. No caffeine after noon and no heavy meals in the evening.   Insomnia: Patient is pending a consult with Dr. Curtis in February.   7. Continue to f/u with PCP for asthma management.   8. F/u PRN       BERT Reveles.      This dictation was created using voice recognition software. The accuracy of the dictation is limited to the abilities of the software. I expect there may be some errors of grammar and possibly content.

## 2021-01-22 ENCOUNTER — OFFICE VISIT (OUTPATIENT)
Dept: MEDICAL GROUP | Facility: MEDICAL CENTER | Age: 71
End: 2021-01-22
Payer: MEDICARE

## 2021-01-22 ENCOUNTER — IMMUNIZATION (OUTPATIENT)
Dept: FAMILY PLANNING/WOMEN'S HEALTH CLINIC | Facility: IMMUNIZATION CENTER | Age: 71
End: 2021-01-22
Attending: INTERNAL MEDICINE
Payer: MEDICARE

## 2021-01-22 ENCOUNTER — HOSPITAL ENCOUNTER (OUTPATIENT)
Dept: LAB | Facility: MEDICAL CENTER | Age: 71
End: 2021-01-22
Attending: INTERNAL MEDICINE
Payer: MEDICARE

## 2021-01-22 VITALS
TEMPERATURE: 98.1 F | RESPIRATION RATE: 20 BRPM | OXYGEN SATURATION: 95 % | BODY MASS INDEX: 28.65 KG/M2 | SYSTOLIC BLOOD PRESSURE: 112 MMHG | WEIGHT: 171.96 LBS | DIASTOLIC BLOOD PRESSURE: 60 MMHG | HEIGHT: 65 IN | HEART RATE: 95 BPM

## 2021-01-22 DIAGNOSIS — Z23 ENCOUNTER FOR VACCINATION: Primary | ICD-10-CM

## 2021-01-22 DIAGNOSIS — Z23 NEED FOR VACCINATION: ICD-10-CM

## 2021-01-22 DIAGNOSIS — R10.30 LOWER ABDOMINAL PAIN: ICD-10-CM

## 2021-01-22 LAB
ALBUMIN SERPL BCP-MCNC: 4.4 G/DL (ref 3.2–4.9)
ALBUMIN/GLOB SERPL: 1.7 G/DL
ALP SERPL-CCNC: 69 U/L (ref 30–99)
ALT SERPL-CCNC: 12 U/L (ref 2–50)
ANION GAP SERPL CALC-SCNC: 9 MMOL/L (ref 7–16)
APPEARANCE UR: CLEAR
APPEARANCE UR: NORMAL
AST SERPL-CCNC: 15 U/L (ref 12–45)
BASOPHILS # BLD AUTO: 0.5 % (ref 0–1.8)
BASOPHILS # BLD: 0.04 K/UL (ref 0–0.12)
BILIRUB SERPL-MCNC: 0.6 MG/DL (ref 0.1–1.5)
BILIRUB UR QL STRIP.AUTO: NEGATIVE
BILIRUB UR STRIP-MCNC: NORMAL MG/DL
BUN SERPL-MCNC: 8 MG/DL (ref 8–22)
CALCIUM SERPL-MCNC: 9.6 MG/DL (ref 8.5–10.5)
CHLORIDE SERPL-SCNC: 100 MMOL/L (ref 96–112)
CO2 SERPL-SCNC: 27 MMOL/L (ref 20–33)
COLOR UR AUTO: NORMAL
COLOR UR: YELLOW
CREAT SERPL-MCNC: 0.59 MG/DL (ref 0.5–1.4)
EOSINOPHIL # BLD AUTO: 0.06 K/UL (ref 0–0.51)
EOSINOPHIL NFR BLD: 0.8 % (ref 0–6.9)
ERYTHROCYTE [DISTWIDTH] IN BLOOD BY AUTOMATED COUNT: 40.9 FL (ref 35.9–50)
GLOBULIN SER CALC-MCNC: 2.6 G/DL (ref 1.9–3.5)
GLUCOSE SERPL-MCNC: 95 MG/DL (ref 65–99)
GLUCOSE UR STRIP.AUTO-MCNC: NEGATIVE MG/DL
GLUCOSE UR STRIP.AUTO-MCNC: NEGATIVE MG/DL
HCT VFR BLD AUTO: 44.2 % (ref 37–47)
HGB BLD-MCNC: 14.2 G/DL (ref 12–16)
IMM GRANULOCYTES # BLD AUTO: 0.01 K/UL (ref 0–0.11)
IMM GRANULOCYTES NFR BLD AUTO: 0.1 % (ref 0–0.9)
KETONES UR STRIP.AUTO-MCNC: ABNORMAL MG/DL
KETONES UR STRIP.AUTO-MCNC: NORMAL MG/DL
LEUKOCYTE ESTERASE UR QL STRIP.AUTO: NEGATIVE
LEUKOCYTE ESTERASE UR QL STRIP.AUTO: NEGATIVE
LIPASE SERPL-CCNC: 14 U/L (ref 11–82)
LYMPHOCYTES # BLD AUTO: 1.73 K/UL (ref 1–4.8)
LYMPHOCYTES NFR BLD: 22.4 % (ref 22–41)
MCH RBC QN AUTO: 30.7 PG (ref 27–33)
MCHC RBC AUTO-ENTMCNC: 32.1 G/DL (ref 33.6–35)
MCV RBC AUTO: 95.5 FL (ref 81.4–97.8)
MICRO URNS: ABNORMAL
MONOCYTES # BLD AUTO: 0.65 K/UL (ref 0–0.85)
MONOCYTES NFR BLD AUTO: 8.4 % (ref 0–13.4)
NEUTROPHILS # BLD AUTO: 5.22 K/UL (ref 2–7.15)
NEUTROPHILS NFR BLD: 67.8 % (ref 44–72)
NITRITE UR QL STRIP.AUTO: NEGATIVE
NITRITE UR QL STRIP.AUTO: NEGATIVE
NRBC # BLD AUTO: 0 K/UL
NRBC BLD-RTO: 0 /100 WBC
PH UR STRIP.AUTO: 7 [PH] (ref 5–8)
PH UR STRIP.AUTO: 7 [PH] (ref 5–8)
PLATELET # BLD AUTO: 257 K/UL (ref 164–446)
PMV BLD AUTO: 11 FL (ref 9–12.9)
POTASSIUM SERPL-SCNC: 4 MMOL/L (ref 3.6–5.5)
PROT SERPL-MCNC: 7 G/DL (ref 6–8.2)
PROT UR QL STRIP: NEGATIVE MG/DL
PROT UR QL STRIP: NORMAL MG/DL
RBC # BLD AUTO: 4.63 M/UL (ref 4.2–5.4)
RBC UR QL AUTO: NEGATIVE
RBC UR QL AUTO: NORMAL
SODIUM SERPL-SCNC: 136 MMOL/L (ref 135–145)
SP GR UR STRIP.AUTO: 1.01
SP GR UR STRIP.AUTO: 1.02
UROBILINOGEN UR STRIP-MCNC: NORMAL MG/DL
UROBILINOGEN UR STRIP.AUTO-MCNC: 0.2 MG/DL
WBC # BLD AUTO: 7.7 K/UL (ref 4.8–10.8)

## 2021-01-22 PROCEDURE — 80053 COMPREHEN METABOLIC PANEL: CPT

## 2021-01-22 PROCEDURE — 81003 URINALYSIS AUTO W/O SCOPE: CPT

## 2021-01-22 PROCEDURE — 36415 COLL VENOUS BLD VENIPUNCTURE: CPT

## 2021-01-22 PROCEDURE — 83690 ASSAY OF LIPASE: CPT

## 2021-01-22 PROCEDURE — 91300 PFIZER SARS-COV-2 VACCINE: CPT

## 2021-01-22 PROCEDURE — 0001A PFIZER SARS-COV-2 VACCINE: CPT

## 2021-01-22 PROCEDURE — 81002 URINALYSIS NONAUTO W/O SCOPE: CPT | Performed by: INTERNAL MEDICINE

## 2021-01-22 PROCEDURE — 85025 COMPLETE CBC W/AUTO DIFF WBC: CPT

## 2021-01-22 PROCEDURE — 99214 OFFICE O/P EST MOD 30 MIN: CPT | Performed by: INTERNAL MEDICINE

## 2021-01-22 ASSESSMENT — FIBROSIS 4 INDEX: FIB4 SCORE: 1.24

## 2021-01-22 ASSESSMENT — PATIENT HEALTH QUESTIONNAIRE - PHQ9: CLINICAL INTERPRETATION OF PHQ2 SCORE: 0

## 2021-01-22 NOTE — PROGRESS NOTES
Established Patient    Nancy Chayito Gregory is a 70 y.o. female who presents today with the following:    CC:   Chief Complaint   Patient presents with   • Abdominal Pain     x4 days         HPI:     Lower abdominal pain for 4 days.  Intermittent low abdominal pain, worse before bowel movement.    She denies fever, chills, diarrhea, constipation, dysphagia, hematochezia, melena, Constipation,  Dysuria, urinary frequency, flank pain, foul-smelling of the urine, pelvic pain, vaginal pain/discharge.    History of diverticulosis, probable diverticulitis in July 2020.    History of cholecystectomy, appendectomy.        Current Outpatient Medications   Medication Sig Dispense Refill   • zolpidem (AMBIEN) 5 MG Tab Take 1 Tab by mouth at bedtime as needed for Sleep (1 to 2 po qhs prn insomnia/sleep study. Bring to sleep study.) for up to 2 doses. (Patient not taking: Reported on 1/15/2021) 2 Tab 0   • Misc. Devices Misc Please provide nocturnal oxgen 2 LPM Nasal Cannula, concentrator 99 Each 99   • DULERA 100-5 MCG/ACT Aerosol INHALE 2 PUFFS BY MOUTH TWICE A DAY 1 Each 0   • levothyroxine (SYNTHROID) 88 MCG Tab TAKE 1 TABLET BY MOUTH EVERY DAY 90 Tab 3   • clobetasol (TEMOVATE) 0.05 % Ointment TOPICALLY APPLY SPARINGLY TO AFFECTED AREA 3X\/WEEK     • albuterol 108 (90 Base) MCG/ACT Aero Soln inhalation aerosol Inhale 2 Puffs by mouth every four hours as needed for Shortness of Breath. 1 Inhaler 3   • omeprazole (PRILOSEC) 20 MG delayed-release capsule Take 20 mg by mouth every day.       No current facility-administered medications for this visit.        Allergies, past medical history, past surgical history, medications, family history, social history reviewed and updated.    ROS   Constitutional: Denies fevers or chills  Eyes: Denies changes in vision  Ears/Nose/Throat/Mouth: Denies nasal congestion or sore throat   Cardiovascular: Denies chest pain or palpitations   Respiratory: Denies shortness of breath , Denies  "cough  Gastrointestinal/Hepatic: per HPI  Genitourinary: Denies dysuria or frequency  Musculoskeletal/Rheum: Denies joint pain and swelling   Neurological: Denies headache  Psychiatric: Denies mood disorder   Endocrine: Denies hx of diabetes or thyroid dysfunction  Heme/Oncology/Lymph Nodes: Denies weight changes or enlarged LNs.    Physical Exam  Vitals: /60 (BP Location: Left arm, Patient Position: Sitting, BP Cuff Size: Adult)   Pulse 95   Temp 36.7 °C (98.1 °F)   Resp 20   Ht 1.651 m (5' 5\")   Wt 78 kg (171 lb 15.3 oz)   SpO2 95%   BMI 28.62 kg/m²   General: Alert, pleasant, NAD  HEENT: Normocephalic.  EOMI, no icterus or pallor.  Conjunctivae and lids normal. External ears normal. Oropharynx non-erythematous, mucous membranes moist.  Neck supple.  No thyromegaly or masses palpated.   Lymph: No cervical or supraclavicular lymphadenopathy.  Cardiovascular: Regular rate and rhythm.  S1 and S2 normal.  No murmurs appreciated.  Respiratory: Normal respiratory effort.  Clear to auscultation bilaterally.  Abdomen: Non-distended, soft, diffuse lower abdominal tenderness, no rebound, Boss negative.  Skin: Warm, dry  Musculoskeletal: Gait is normal.  Moves all extremities well.  Extremities: No leg edema.    Psych:  Affect/mood is normal, judgement is good, memory is intact, grooming is appropriate.    Results for TOBY DALE (MRN [id number]) as of 1/22/2021 10:38   Ref. Range 1/22/2021 09:42   POC Color Latest Ref Range: Negative  other   POC Appearance Latest Ref Range: Negative  other   POC Specific Gravity Latest Ref Range: <1.005 - >1.030  1.020   POC Urine PH Latest Ref Range: 5.0 - 8.0  7.0   POC Glucose Latest Ref Range: Negative mg/dL Negative   POC Ketones Latest Ref Range: Negative mg/dL 40 mg/dL   POC Protein Latest Ref Range: Negative mg/dL 30 mg/dL   POC Nitrites Latest Ref Range: Negative  Negative   POC Leukocyte Esterase Latest Ref Range: Negative  Negative   POC Blood Latest Ref Range: " Negative  small   POC Bilirubin Latest Ref Range: Negative mg/dL small   POC Urobiligen Latest Ref Range: Negative (0.2) mg/dL 0.2 E.U./dL     Assessment and Plan    Lower abdominal pain  She will do the lab and imaging as soon as possible  - POCT Urinalysis: occult blood small, negative nitrite, negative LE, Pro 30.   - CBC WITH DIFFERENTIAL; Future  - Comp Metabolic Panel; Future  - LIPASE; Future  - CT-ABDOMEN-PELVIS WITH; Future  - will get URINALYSIS,CULTURE IF INDICATED; Future due to small occult blood  Supportive, will follow the result and treat accordingly  If her symptoms worsen such as fever, chills, nausea, vomiting, worsening abdominal pian, diarrhea, dysuria, she will seek medical attention immediately      Follow-up:Return if symptoms worsen or fail to improve.    This note was created using voice recognition software. There may be unintended errors in spelling, grammar or content.

## 2021-01-25 ENCOUNTER — APPOINTMENT (OUTPATIENT)
Dept: SLEEP MEDICINE | Facility: MEDICAL CENTER | Age: 71
End: 2021-01-25
Payer: MEDICARE

## 2021-01-26 ENCOUNTER — HOSPITAL ENCOUNTER (OUTPATIENT)
Dept: RADIOLOGY | Facility: MEDICAL CENTER | Age: 71
End: 2021-01-26
Attending: INTERNAL MEDICINE
Payer: MEDICARE

## 2021-01-26 ENCOUNTER — TELEPHONE (OUTPATIENT)
Dept: MEDICAL GROUP | Facility: MEDICAL CENTER | Age: 71
End: 2021-01-26

## 2021-01-26 DIAGNOSIS — R10.30 LOWER ABDOMINAL PAIN: ICD-10-CM

## 2021-01-26 PROCEDURE — 74177 CT ABD & PELVIS W/CONTRAST: CPT

## 2021-01-26 PROCEDURE — 700117 HCHG RX CONTRAST REV CODE 255: Performed by: INTERNAL MEDICINE

## 2021-01-26 RX ADMIN — IOHEXOL 25 ML: 240 INJECTION, SOLUTION INTRATHECAL; INTRAVASCULAR; INTRAVENOUS; ORAL at 11:45

## 2021-01-26 RX ADMIN — IOHEXOL 100 ML: 350 INJECTION, SOLUTION INTRAVENOUS at 11:45

## 2021-01-27 NOTE — TELEPHONE ENCOUNTER
Called and discussed about the lab results and imaging test   She has diverticulitis  Advance diet as tolerated  She is initiating the antibiotics  She already has PO Ciprofloxacin and metronidazole for 7 days.   Seek medical attention immediately if symptoms worsen   Ref. Range 1/22/2021 10:25   WBC Latest Ref Range: 4.8 - 10.8 K/uL 7.7   RBC Latest Ref Range: 4.20 - 5.40 M/uL 4.63   Hemoglobin Latest Ref Range: 12.0 - 16.0 g/dL 14.2   Hematocrit Latest Ref Range: 37.0 - 47.0 % 44.2   MCV Latest Ref Range: 81.4 - 97.8 fL 95.5   MCH Latest Ref Range: 27.0 - 33.0 pg 30.7   MCHC Latest Ref Range: 33.6 - 35.0 g/dL 32.1 (L)   RDW Latest Ref Range: 35.9 - 50.0 fL 40.9   Platelet Count Latest Ref Range: 164 - 446 K/uL 257   MPV Latest Ref Range: 9.0 - 12.9 fL 11.0   Neutrophils-Polys Latest Ref Range: 44.00 - 72.00 % 67.80   Neutrophils (Absolute) Latest Ref Range: 2.00 - 7.15 K/uL 5.22   Lymphocytes Latest Ref Range: 22.00 - 41.00 % 22.40   Lymphs (Absolute) Latest Ref Range: 1.00 - 4.80 K/uL 1.73   Monocytes Latest Ref Range: 0.00 - 13.40 % 8.40   Monos (Absolute) Latest Ref Range: 0.00 - 0.85 K/uL 0.65   Eosinophils Latest Ref Range: 0.00 - 6.90 % 0.80   Eos (Absolute) Latest Ref Range: 0.00 - 0.51 K/uL 0.06   Basophils Latest Ref Range: 0.00 - 1.80 % 0.50   Baso (Absolute) Latest Ref Range: 0.00 - 0.12 K/uL 0.04   Immature Granulocytes Latest Ref Range: 0.00 - 0.90 % 0.10   Immature Granulocytes (abs) Latest Ref Range: 0.00 - 0.11 K/uL 0.01   Nucleated RBC Latest Units: /100 WBC 0.00   NRBC (Absolute) Latest Units: K/uL 0.00   Sodium Latest Ref Range: 135 - 145 mmol/L 136   Potassium Latest Ref Range: 3.6 - 5.5 mmol/L 4.0   Chloride Latest Ref Range: 96 - 112 mmol/L 100   Co2 Latest Ref Range: 20 - 33 mmol/L 27   Anion Gap Latest Ref Range: 7.0 - 16.0  9.0   Glucose Latest Ref Range: 65 - 99 mg/dL 95   Bun Latest Ref Range: 8 - 22 mg/dL 8   Creatinine Latest Ref Range: 0.50 - 1.40 mg/dL 0.59   GFR If African American  Latest Ref Range: >60 mL/min/1.73 m 2 >60   GFR If Non  Latest Ref Range: >60 mL/min/1.73 m 2 >60   Calcium Latest Ref Range: 8.5 - 10.5 mg/dL 9.6   AST(SGOT) Latest Ref Range: 12 - 45 U/L 15   ALT(SGPT) Latest Ref Range: 2 - 50 U/L 12   Alkaline Phosphatase Latest Ref Range: 30 - 99 U/L 69   Total Bilirubin Latest Ref Range: 0.1 - 1.5 mg/dL 0.6   Albumin Latest Ref Range: 3.2 - 4.9 g/dL 4.4   Total Protein Latest Ref Range: 6.0 - 8.2 g/dL 7.0   Globulin Latest Ref Range: 1.9 - 3.5 g/dL 2.6   A-G Ratio Latest Units: g/dL 1.7   Lipase Latest Ref Range: 11 - 82 U/L 14   Urobilinogen, Urine Latest Ref Range: Negative  0.2   Color Unknown Yellow   Character Unknown Clear   Specific Gravity Latest Ref Range: <1.035  1.009   Ph Latest Ref Range: 5.0 - 8.0  7.0   Glucose Latest Ref Range: Negative mg/dL Negative   Ketones Latest Ref Range: Negative mg/dL Trace (A)   Bilirubin Latest Ref Range: Negative  Negative   Occult Blood Latest Ref Range: Negative  Negative   Protein Latest Ref Range: Negative mg/dL Negative   Nitrite Latest Ref Range: Negative  Negative   Leukocyte Esterase Latest Ref Range: Negative  Negative   Micro Urine Req Unknown see below      1/26/2021 11:23 AM     HISTORY/REASON FOR EXAM:  Abd pain, unspecified; lower abdominal pain, hx of diverticulosis        TECHNIQUE/EXAM DESCRIPTION:  CT scan of the abdomen and pelvis with contrast.     Contrast-enhanced helical scanning was obtained from the diaphragmatic domes through the pubic symphysis following the bolus administration of 100 mL of Omnipaque 350. Nonionic contrast without complication.     Low dose optimization technique was utilized for this CT exam including automated exposure control and adjustment of the mA and/or kV according to patient size.     COMPARISON: No prior studies available.     FINDINGS:  The visualized lung bases are clear. There is a small hiatal hernia.        CT Abdomen:  A right hepatic lobe cyst is  identified. The spleen is unremarkable.  The pancreas is unremarkable.     The gallbladder is surgically absent.     The adrenal glands are not enlarged and the kidneys enhance symmetrically.     There is no lymphadenopathy. There is calcified plaque in the aorta without aneurysm.     The bowel is nondilated. There is diverticulosis. There is bowel wall thickening and inflammatory change involving the sigmoid colon consistent with diverticulitis. No abscess or free air identified.     CT Pelvis:  There is no lymphadenopathy.     The appendix is nonvisualized.     IMPRESSION:     Findings consistent with sigmoid diverticulitis without abscess or free air.     Aortic atherosclerosis without aneurysm.     Hepatic cyst.

## 2021-02-01 DIAGNOSIS — R10.30 LOWER ABDOMINAL PAIN: ICD-10-CM

## 2021-02-01 DIAGNOSIS — K57.92 DIVERTICULITIS: ICD-10-CM

## 2021-02-01 RX ORDER — CIPROFLOXACIN 500 MG/1
500 TABLET, FILM COATED ORAL 2 TIMES DAILY
Qty: 6 TAB | Refills: 0 | Status: SHIPPED | OUTPATIENT
Start: 2021-02-01 | End: 2021-02-04

## 2021-02-01 RX ORDER — METRONIDAZOLE 500 MG/1
500 TABLET ORAL 3 TIMES DAILY
Qty: 9 TAB | Refills: 0 | Status: SHIPPED | OUTPATIENT
Start: 2021-02-01 | End: 2021-02-04

## 2021-02-01 NOTE — PROGRESS NOTES
She is completing 7 days of antibiotics for diverticulitis. Will do three more days of antibiotics. She will let us know how she feels.

## 2021-02-04 ENCOUNTER — HOME STUDY (OUTPATIENT)
Dept: SLEEP MEDICINE | Facility: MEDICAL CENTER | Age: 71
End: 2021-02-04
Attending: NURSE PRACTITIONER
Payer: MEDICARE

## 2021-02-04 DIAGNOSIS — G47.33 OSA (OBSTRUCTIVE SLEEP APNEA): ICD-10-CM

## 2021-02-04 DIAGNOSIS — G47.34 NOCTURNAL HYPOXIA: ICD-10-CM

## 2021-02-08 PROCEDURE — 94762 N-INVAS EAR/PLS OXIMTRY CONT: CPT | Performed by: FAMILY MEDICINE

## 2021-02-09 ENCOUNTER — OFFICE VISIT (OUTPATIENT)
Dept: URGENT CARE | Facility: PHYSICIAN GROUP | Age: 71
End: 2021-02-09
Payer: MEDICARE

## 2021-02-09 VITALS
DIASTOLIC BLOOD PRESSURE: 80 MMHG | HEART RATE: 83 BPM | RESPIRATION RATE: 16 BRPM | HEIGHT: 65 IN | OXYGEN SATURATION: 97 % | WEIGHT: 171 LBS | SYSTOLIC BLOOD PRESSURE: 136 MMHG | TEMPERATURE: 98.6 F | BODY MASS INDEX: 28.49 KG/M2

## 2021-02-09 DIAGNOSIS — R10.30 LOWER ABDOMINAL PAIN: ICD-10-CM

## 2021-02-09 DIAGNOSIS — K57.92 DIVERTICULITIS: ICD-10-CM

## 2021-02-09 PROCEDURE — 99213 OFFICE O/P EST LOW 20 MIN: CPT | Performed by: PHYSICIAN ASSISTANT

## 2021-02-09 RX ORDER — AMOXICILLIN AND CLAVULANATE POTASSIUM 875; 125 MG/1; MG/1
1 TABLET, FILM COATED ORAL 2 TIMES DAILY
Qty: 14 TAB | Refills: 0 | Status: SHIPPED | OUTPATIENT
Start: 2021-02-09 | End: 2021-02-09

## 2021-02-09 RX ORDER — AMOXICILLIN AND CLAVULANATE POTASSIUM 875; 125 MG/1; MG/1
1 TABLET, FILM COATED ORAL 2 TIMES DAILY
Qty: 14 TAB | Refills: 0 | Status: SHIPPED | OUTPATIENT
Start: 2021-02-09 | End: 2021-02-16

## 2021-02-09 RX ORDER — CIPROFLOXACIN 500 MG/1
500 TABLET, FILM COATED ORAL 2 TIMES DAILY
Qty: 14 TAB | Refills: 0 | Status: SHIPPED | OUTPATIENT
Start: 2021-02-09 | End: 2021-02-09

## 2021-02-09 RX ORDER — METRONIDAZOLE 500 MG/1
500 TABLET ORAL 3 TIMES DAILY
Qty: 21 TAB | Refills: 0 | Status: SHIPPED | OUTPATIENT
Start: 2021-02-09 | End: 2021-02-09

## 2021-02-09 ASSESSMENT — FIBROSIS 4 INDEX: FIB4 SCORE: 1.18

## 2021-02-09 ASSESSMENT — ENCOUNTER SYMPTOMS
HEADACHES: 0
DIAPHORESIS: 0
CONSTIPATION: 0
WEIGHT LOSS: 0
COUGH: 0
PALPITATIONS: 0
SHORTNESS OF BREATH: 0
HEARTBURN: 0
FLATUS: 1
ABDOMINAL PAIN: 1
DIARRHEA: 0
DIZZINESS: 0
BLOOD IN STOOL: 0
VOMITING: 0
NAUSEA: 0
CHILLS: 0
FEVER: 0
WEAKNESS: 0

## 2021-02-09 NOTE — PROCEDURES
Over Night Pulse Oximetry     Indication:To assess the efficacy of the current pressure.       Impression:   The study was done on  supplemental o2 at 2LPM. The total analyzed time was 7 hrs 11 min. O2 Sat. kevin was 81% and mean O2 sat was 90 % and baseline O2 at 96%. O2 sat was below 88% for 0.7 min of the flow evaluation time. Oxygen Desaturation (>=3%) Index was 0.3/hr.      Recommendation:  unremarkable oximetry, continue the current therapy. Clinical correlation recommended.

## 2021-02-09 NOTE — PROGRESS NOTES
Subjective:   Prudence Chayito Greogry is a 70 y.o. female who presents for Diverticulitis (lower abdominal pain, ct showed diverticulitis, was given medication, pain is back, lower back pain, x1 day )      Abdominal Pain  This is a recurrent problem. The current episode started today. The onset quality is sudden. The problem has been resolved. The pain is located in the LLQ. Associated symptoms include flatus. Pertinent negatives include no constipation, diarrhea, fever, headaches, melena, nausea, vomiting or weight loss. The pain is relieved by passing flatus. Prior diagnostic workup includes CT scan. diverticultis       Review of Systems   Constitutional: Negative for chills, diaphoresis, fever, malaise/fatigue and weight loss.   Respiratory: Negative for cough and shortness of breath.    Cardiovascular: Negative for chest pain and palpitations.   Gastrointestinal: Positive for abdominal pain and flatus. Negative for blood in stool, constipation, diarrhea, heartburn, melena, nausea and vomiting.   Skin: Negative for itching and rash.   Neurological: Negative for dizziness, weakness and headaches.   All other systems reviewed and are negative.      Medications:    • albuterol Aers  • amoxicillin-clavulanate Tabs  • ciprofloxacin Tabs  • clobetasol Oint  • Dulera Aero  • levothyroxine Tabs  • metroNIDAZOLE Tabs  • Misc. Devices Misc  • omeprazole    Allergies: Codeine    Problem List: Prudence Chayito Gregory has Moderate persistent asthma without complication; Vitamin D deficiency; Hypothyroidism due to acquired atrophy of thyroid; Skin lesion; Menopause; Mixed hyperlipidemia; Gum disease; Lower abdominal pain; Lichen sclerosus; Diverticulitis- mild, July 2020; Primary insomnia; Fatigue; Hyperplastic polyp of sigmoid colon; Nocturnal hypoxia; Former smoker; and Up to date with influenza vaccination on their problem list.    Surgical History:  Past Surgical History:   Procedure Laterality Date   • ABDOMINAL HYSTERECTOMY  "TOTAL     • CHOLECYSTECTOMY     • HYSTERECTOMY LAPAROSCOPY     • TONSILLECTOMY         Past Social Hx: Prudence Chayito Gregory  reports that she quit smoking about 27 years ago. Her smoking use included cigarettes. She has a 15.00 pack-year smoking history. She has never used smokeless tobacco. She reports current alcohol use. She reports that she does not use drugs.     Past Family Hx:  Prudence Chayito Gregory family history includes Breast Cancer in her maternal aunt; Cancer in her father; Hypertension in her maternal grandfather and maternal grandmother; Kidney Disease in her mother.     Problem list, medications, and allergies reviewed by myself today in Epic.     Objective:     Blood Pressure 136/80 (BP Location: Right arm, Patient Position: Sitting, BP Cuff Size: Adult)   Pulse 83   Temperature 37 °C (98.6 °F) (Temporal)   Respiration 16   Height 1.651 m (5' 5\")   Weight 77.6 kg (171 lb)   Oxygen Saturation 97%   Body Mass Index 28.46 kg/m²     Physical Exam  Vitals signs reviewed.   Constitutional:       Appearance: She is well-developed.   HENT:      Head: Normocephalic and atraumatic.      Right Ear: External ear normal.      Left Ear: External ear normal.      Nose: Nose normal.   Neck:      Musculoskeletal: Normal range of motion and neck supple.   Cardiovascular:      Rate and Rhythm: Normal rate and regular rhythm.      Heart sounds: Normal heart sounds.   Pulmonary:      Effort: Pulmonary effort is normal.      Breath sounds: Normal breath sounds.   Abdominal:      General: Bowel sounds are normal. There is no distension.      Palpations: Abdomen is soft. There is no mass.      Tenderness: There is no abdominal tenderness. There is no right CVA tenderness, left CVA tenderness, guarding or rebound.      Hernia: No hernia is present.   Skin:     General: Skin is warm and dry.   Neurological:      Mental Status: She is alert and oriented to person, place, and time.   Psychiatric:         Behavior: " Behavior normal.         Thought Content: Thought content normal.         Judgment: Judgment normal.         Assessment/Plan:     Medical Decision Making/Comments     Pt is a 70 yr femal3 who presents for evaluation of abdominal pain.  Pt states the 15 days ago she was diagnosed with uncomplicated diverticulitis confirmed with CT.  She took 10 days of cipro and flagyl.  She has been symptom free since pain woke her up this morning.  She took tylenol and passed stool which improved symptoms.  Currently she is asymptomatic.  Pain is not worsened from eating.  Last bowel movement was today and was normal.  Pt denies red flags symptoms of intractable vomiting, bloody stools, severe 10/10 pain, or pregnancy.  No  DM2, or EtOH abuse. Vital signs within normal limits.  Pt appears well and in no acute distress.  On exam there is minimal pain to palpation of the suprapubic region with no peritoneal signs or guarding.  No signs of retroperitoneal hemorrhage or erythematic skin.  Discussed diagnosis differential with patient.  Discussed complications of diverticulitis.  This appears unlikely but ED precautions discussed.  Contingent antibiotic prescription given to patient to fill upon meeting criteria of guidelines discussed.            Diagnosis and associated orders     1. Diverticulitis  amoxicillin-clavulanate (AUGMENTIN) 875-125 MG Tab              Differential diagnosis, natural history, supportive care, and indications for immediate follow-up discussed.    Advised the patient to follow-up with the primary care physician for recheck, reevaluation, and consideration of further management.    Please note that this dictation was created using voice recognition software. I have made a reasonable attempt to correct obvious errors, but I expect that there are errors of grammar and possibly content that I did not discover before finalizing the note.

## 2021-02-09 NOTE — PROGRESS NOTES
Recommend patient to seek medical attention as soon as possible in the clinic or go to urgent care  Lower abdominal pain, crampy, 4/10 restarted recently  Denies fever, chills, nausea, vomiting, diarrhea, constipation  Hx of recent diverticulitis did 10 days of antibiotics.   Empiric antibiotic prescribed just in case she is not able to see anyone today. Ok to switch to other regimen per other provider's evaluation.   If the symptoms severely worsen, go to ER.  Advance diet as tolerated  We will also place referral to GI for her hx of diverticulitis.

## 2021-02-12 ENCOUNTER — IMMUNIZATION (OUTPATIENT)
Dept: FAMILY PLANNING/WOMEN'S HEALTH CLINIC | Facility: IMMUNIZATION CENTER | Age: 71
End: 2021-02-12
Attending: INTERNAL MEDICINE
Payer: MEDICARE

## 2021-02-12 DIAGNOSIS — Z23 ENCOUNTER FOR VACCINATION: Primary | ICD-10-CM

## 2021-02-12 PROCEDURE — 0002A PFIZER SARS-COV-2 VACCINE: CPT

## 2021-02-12 PROCEDURE — 91300 PFIZER SARS-COV-2 VACCINE: CPT

## 2021-02-16 ENCOUNTER — TELEPHONE (OUTPATIENT)
Dept: SLEEP MEDICINE | Facility: MEDICAL CENTER | Age: 71
End: 2021-02-16

## 2021-02-16 NOTE — TELEPHONE ENCOUNTER
Spoke with patient today and reviewed sleep study and treatment options over the phone with her. She feels 75% open to initiating CPAP therapy, but would still like to take time to think about it. She will continue using the oxygen at night and may look into a portable oxygen tank when she travels. She will message through [employer] when she makes a decision on therapy.     BERT Reveles.

## 2021-02-23 ENCOUNTER — APPOINTMENT (RX ONLY)
Dept: URBAN - METROPOLITAN AREA CLINIC 22 | Facility: CLINIC | Age: 71
Setting detail: DERMATOLOGY
End: 2021-02-23

## 2021-02-23 DIAGNOSIS — L60.3 NAIL DYSTROPHY: ICD-10-CM

## 2021-02-23 DIAGNOSIS — L85.1 ACQUIRED KERATOSIS [KERATODERMA] PALMARIS ET PLANTARIS: ICD-10-CM

## 2021-02-23 DIAGNOSIS — L30.8 OTHER SPECIFIED DERMATITIS: ICD-10-CM | Status: INADEQUATELY CONTROLLED

## 2021-02-23 DIAGNOSIS — D22 MELANOCYTIC NEVI: ICD-10-CM

## 2021-02-23 DIAGNOSIS — L82.1 OTHER SEBORRHEIC KERATOSIS: ICD-10-CM

## 2021-02-23 DIAGNOSIS — L81.4 OTHER MELANIN HYPERPIGMENTATION: ICD-10-CM

## 2021-02-23 DIAGNOSIS — Z71.89 OTHER SPECIFIED COUNSELING: ICD-10-CM

## 2021-02-23 PROBLEM — D22.5 MELANOCYTIC NEVI OF TRUNK: Status: ACTIVE | Noted: 2021-02-23

## 2021-02-23 PROBLEM — D22.4 MELANOCYTIC NEVI OF SCALP AND NECK: Status: ACTIVE | Noted: 2021-02-23

## 2021-02-23 PROBLEM — L60.9 NAIL DISORDER, UNSPECIFIED: Status: ACTIVE | Noted: 2021-02-23

## 2021-02-23 PROCEDURE — 99204 OFFICE O/P NEW MOD 45 MIN: CPT

## 2021-02-23 PROCEDURE — ? SUNSCREEN TREATMENT REGIMEN

## 2021-02-23 PROCEDURE — ? OBSERVATION

## 2021-02-23 PROCEDURE — ? NAIL CLIPPING FOR PATHOLOGY

## 2021-02-23 PROCEDURE — ? COUNSELING

## 2021-02-23 PROCEDURE — ? PRESCRIPTION

## 2021-02-23 RX ORDER — UREA 40 %
CREAM (GRAM) TOPICAL
Qty: 1 | Refills: 8 | Status: ERX | COMMUNITY
Start: 2021-02-23

## 2021-02-23 RX ORDER — BETAMETHASONE DIPROPIONATE 0.5 MG/G
OINTMENT, AUGMENTED TOPICAL BID
Qty: 1 | Refills: 1 | Status: ERX | COMMUNITY
Start: 2021-02-23

## 2021-02-23 RX ADMIN — BETAMETHASONE DIPROPIONATE 1: 0.5 OINTMENT, AUGMENTED TOPICAL at 00:00

## 2021-02-23 RX ADMIN — Medication 1: at 00:00

## 2021-02-23 ASSESSMENT — LOCATION ZONE DERM
LOCATION ZONE: HAND
LOCATION ZONE: FACE
LOCATION ZONE: ARM
LOCATION ZONE: FEET
LOCATION ZONE: TRUNK
LOCATION ZONE: SCALP
LOCATION ZONE: TOENAIL
LOCATION ZONE: FINGER

## 2021-02-23 ASSESSMENT — LOCATION DETAILED DESCRIPTION DERM
LOCATION DETAILED: SUPERIOR THORACIC SPINE
LOCATION DETAILED: LEFT SUPERIOR POSTAURICULAR SKIN
LOCATION DETAILED: INFERIOR THORACIC SPINE
LOCATION DETAILED: INFERIOR MID FOREHEAD
LOCATION DETAILED: LEFT VENTRAL PROXIMAL FOREARM
LOCATION DETAILED: LEFT DISTAL RADIAL THUMB
LOCATION DETAILED: RIGHT DISTAL VENTRAL THUMB
LOCATION DETAILED: LOWER STERNUM
LOCATION DETAILED: LEFT GREAT TOENAIL
LOCATION DETAILED: RIGHT MEDIAL PLANTAR HEEL
LOCATION DETAILED: LEFT MEDIAL PLANTAR HEEL
LOCATION DETAILED: RIGHT DORSAL MIDDLE FINGER METACARPOPHALANGEAL JOINT
LOCATION DETAILED: RIGHT VENTRAL PROXIMAL FOREARM
LOCATION DETAILED: LEFT DORSAL MIDDLE FINGER METACARPOPHALANGEAL JOINT

## 2021-02-23 ASSESSMENT — LOCATION SIMPLE DESCRIPTION DERM
LOCATION SIMPLE: INFERIOR FOREHEAD
LOCATION SIMPLE: RIGHT PLANTAR SURFACE
LOCATION SIMPLE: SCALP
LOCATION SIMPLE: RIGHT FOREARM
LOCATION SIMPLE: CHEST
LOCATION SIMPLE: LEFT THUMB
LOCATION SIMPLE: LEFT GREAT TOE
LOCATION SIMPLE: LEFT FOREARM
LOCATION SIMPLE: LEFT PLANTAR SURFACE
LOCATION SIMPLE: RIGHT THUMB
LOCATION SIMPLE: RIGHT HAND
LOCATION SIMPLE: LEFT HAND
LOCATION SIMPLE: UPPER BACK

## 2021-02-23 ASSESSMENT — SEVERITY ASSESSMENT: SEVERITY: MODERATE

## 2021-02-23 NOTE — PROCEDURE: NAIL CLIPPING FOR PATHOLOGY
Detail Level: Detailed
Billing Type: Third-Party Bill
Add 09900 To Bill?: No
Lab: [id number]
Lab Facility:

## 2021-02-23 NOTE — HPI: DRY SKIN
How Severe Is Your Dry Skin?: moderate
How Many Showers Or Baths Do You Take In One Day?: 1
Additional History: Comes and goes, uses CeraVe lotion, doesn’t seem to improve.

## 2021-03-03 ENCOUNTER — RX ONLY (OUTPATIENT)
Age: 71
Setting detail: RX ONLY
End: 2021-03-03

## 2021-03-03 RX ORDER — EFINACONAZOLE 100 MG/ML
SOLUTION TOPICAL
Qty: 1 | Refills: 9 | Status: ERX | COMMUNITY
Start: 2021-03-02

## 2021-03-05 ENCOUNTER — RX ONLY (OUTPATIENT)
Age: 71
Setting detail: RX ONLY
End: 2021-03-05

## 2021-03-05 RX ORDER — EFINACONAZOLE 100 MG/ML
SOLUTION TOPICAL
Qty: 1 | Refills: 9 | Status: ERX

## 2021-03-19 ENCOUNTER — TELEPHONE (OUTPATIENT)
Dept: MEDICAL GROUP | Facility: PHYSICIAN GROUP | Age: 71
End: 2021-03-19

## 2021-03-19 NOTE — TELEPHONE ENCOUNTER
ESTABLISHED PATIENT PRE-VISIT PLANNING     Patient was NOT contacted to complete PVP.     Note: Patient will not be contacted if there is no indication to call.     1.  Reviewed notes from the last few office visits within the medical group: Yes    2.  If any orders were placed at last visit or intended to be done for this visit (i.e. 6 mos follow-up), do we have Results/Consult Notes?         •  Labs - Labs were not ordered at last office visit.  Note: If patient appointment is for lab review and patient did not complete labs, check with provider if OK to reschedule patient until labs completed.       •  Imaging - Imaging was not ordered at last office visit.       •  Referrals - Referral ordered, patient has NOT been seen.    3. Is this appointment scheduled as a Hospital Follow-Up? No    4.  Immunizations were updated in Epic using Reconcile Outside Information activity? Yes    5.  Patient is due for the following Health Maintenance Topics:   Health Maintenance Due   Topic Date Due   • Annual Wellness Visit  Never done   • IMM ZOSTER VACCINES (1 of 2) Never done   • BONE DENSITY  Never done       - Patient declines Annual Wellness Visit (AWV), Dexa Scan and Immunizations: SHINGRIX (Shingles).    6.  AHA (Pulse8) form printed for Provider? Yes

## 2021-03-22 ENCOUNTER — OFFICE VISIT (OUTPATIENT)
Dept: MEDICAL GROUP | Facility: PHYSICIAN GROUP | Age: 71
End: 2021-03-22
Payer: MEDICARE

## 2021-03-22 VITALS
BODY MASS INDEX: 28.27 KG/M2 | TEMPERATURE: 98.1 F | HEART RATE: 79 BPM | WEIGHT: 169.7 LBS | SYSTOLIC BLOOD PRESSURE: 100 MMHG | HEIGHT: 65 IN | DIASTOLIC BLOOD PRESSURE: 62 MMHG | OXYGEN SATURATION: 95 %

## 2021-03-22 DIAGNOSIS — G47.34 NOCTURNAL HYPOXIA: ICD-10-CM

## 2021-03-22 DIAGNOSIS — N95.1 MENOPAUSAL STATE: ICD-10-CM

## 2021-03-22 DIAGNOSIS — E78.2 MIXED HYPERLIPIDEMIA: ICD-10-CM

## 2021-03-22 DIAGNOSIS — G89.29 CHRONIC LEFT-SIDED LOW BACK PAIN WITH LEFT-SIDED SCIATICA: ICD-10-CM

## 2021-03-22 DIAGNOSIS — K57.92 DIVERTICULITIS: ICD-10-CM

## 2021-03-22 DIAGNOSIS — I70.0 AORTIC ATHEROSCLEROSIS (HCC): ICD-10-CM

## 2021-03-22 DIAGNOSIS — M54.42 CHRONIC LEFT-SIDED LOW BACK PAIN WITH LEFT-SIDED SCIATICA: ICD-10-CM

## 2021-03-22 PROCEDURE — 99215 OFFICE O/P EST HI 40 MIN: CPT | Performed by: INTERNAL MEDICINE

## 2021-03-22 RX ORDER — ROSUVASTATIN CALCIUM 5 MG/1
5 TABLET, COATED ORAL EVERY EVENING
Qty: 30 TABLET | Refills: 1 | Status: SHIPPED | OUTPATIENT
Start: 2021-03-22 | End: 2021-03-30

## 2021-03-22 RX ORDER — ASPIRIN 81 MG/1
81 TABLET, CHEWABLE ORAL DAILY
Qty: 100 TABLET | Refills: 3 | Status: SHIPPED | OUTPATIENT
Start: 2021-03-22 | End: 2022-09-20

## 2021-03-22 RX ORDER — UREA 40 %
CREAM (GRAM) TOPICAL
COMMUNITY
Start: 2021-02-24 | End: 2022-05-16

## 2021-03-22 RX ORDER — BETAMETHASONE DIPROPIONATE 0.5 MG/G
OINTMENT, AUGMENTED TOPICAL
COMMUNITY
Start: 2021-02-23 | End: 2021-09-15

## 2021-03-22 RX ORDER — METRONIDAZOLE 500 MG/1
TABLET ORAL
COMMUNITY
Start: 2021-02-09 | End: 2021-03-22

## 2021-03-22 RX ORDER — CIPROFLOXACIN 500 MG/1
500 TABLET, FILM COATED ORAL
COMMUNITY
Start: 2021-02-09 | End: 2021-03-22

## 2021-03-22 ASSESSMENT — FIBROSIS 4 INDEX: FIB4 SCORE: 1.18

## 2021-03-22 NOTE — ASSESSMENT & PLAN NOTE
New problem. Discussed that atherosclerosis of the aorta could indicate atherosclerosis in her other arteries as well. Offered that we could perform CT coronary calcium score but this would have an out-of-pocket cost of approximately $110. Recommend in the meantime we'll start her on Crestor 5 mg daily, discussed potential side effects including myalgias and recommendation to use coenzyme Q 10. We'll also start low-dose aspirin and advised her to monitor for gastritis or melena. May need to consider stress testing if she defers coronary calcium score to establish a baseline. No angina or angina equivalents at this time.

## 2021-03-22 NOTE — ASSESSMENT & PLAN NOTE
Chronic and ongoing issue. Advised her to discuss with Dr. Moser about moving of her next scheduled colonoscopy which is currently planned for January 2022. Discussed with her the natural trajectory of diverticulosis and the reasoning for additional evaluation.

## 2021-03-22 NOTE — ASSESSMENT & PLAN NOTE
>>ASSESSMENT AND PLAN FOR NOCTURNAL HYPOXIA WRITTEN ON 3/22/2021  3:33 PM BY ROSA GONZALEZ D.O.    Chronic and ongoing problem. Discussed that addition of positive pressure therapy may eliminate need for use of oxygen. Encouraged her to follow up with sleep medicine to try this treatment.

## 2021-03-22 NOTE — PROGRESS NOTES
Subjective:   Chief Complaint/History of Present Illness:  Prudence Chayito Gregory is a 70 y.o. female established patient who presents today to discuss medical problems as listed below. Pru is unaccompanied for today's visit.    Problem   Aortic Atherosclerosis (Hcc)    CT abdomen/pelvis (1/2021):  Aortic atherosclerosis without aneurysm.    New incidental finding during CT imaging for diverticulitis. She is not currently on statin therapy or antiplatelet medicine but would be open to initiating these.    Current regimen: Crestor 5 mg daily and aspirin 81 mg daily     Chronic Left-Sided Low Back Pain With Left-Sided Sciatica    She reports remote history of disc bulging on MRI lumbar spine in the past. She does feel like the symptoms have increased slightly and she has seen orthopedic surgery in the past but is not interested in operative repair. She wonders if she should see a chiropractor. It does not appear she is ever met with a physiatrist. She would be interested in the least intervention necessary to help with pain especially when it flares up. Currently its rather stable.     Nocturnal Hypoxia    Daytime fatigue, snore   Body mass index is 29.24 kg/m².    12/2/2020 Overnight Pulse oximetry testing  Time less than and equal to 88% is 78.3 min  Time consecutive less than and equal to 88% is 17.2 min  Low SPO2 77%  High SPO2 97%  She qualifies for nocturnal Oxygen per Medicare guidelines; please inquire with respiratory company for coverage guidelines for [employer].    She has not noticed a meaningful benefit since adding the nocturnal oxygen. She has established with sleep medicine and is considering initiation of positive pressure therapy.     Diverticulitis x2- mild descending/sigmoid diverticulosis in 2012    She has had two episodes of diverticulitis the first and July 2020 and then again around January 2021. She self resolved with the first episode but did require antibiotic treatment for the second episode.  She underwent metronidazole and ciprofloxacin with her infection and then was also prescribed Augmentin on follow-up in urgent care. CT imaging did demonstrate sigmoid diverticulitis without associated abscess. She has [facility] for [facility], last completed in 2012. She has increased her oral hydration as well as her fiber intake.     Mixed Hyperlipidemia       Ref. Range 3/11/2020 10:51   Cholesterol,Tot Latest Ref Range: 100 - 199 mg/dL 214 (H)   Triglycerides Latest Ref Range: 0 - 149 mg/dL 55   HDL Latest Ref Range: >=40 mg/dL 76   LDL Latest Ref Range: <100 mg/dL 127 (H)       Her 10-year ASCVD score is 8%, this reduces to 6% and 5% with standard and high potency statin, respectively.  We discussed the protective nature of her high HDL and that we can try lifestyle modifications before initiating medication.    Incidental imaging with CT in January 2021 showed aortic atherosclerosis.     Moderate Persistent Asthma Without Complication    She was diagnosed with asthma in 1994.  She reports previous hospitalizations for her asthma but no prior intubation/ventilation episodes.  She has been on several medications over the years.  She started on Flovent but this did not work for her.  She was then transition onto Advair with Singulair, she stopped the Singulair but then noted that the Advair really was not working anymore.  Most recently she has been put on Dulera with as needed albuterol.  Dulera as prescribed as 2 puffs twice daily but she tries to maintain on 1 puff twice daily.  She uses her Ventolin occasionally can usually make one inhaler last 8 to 9 months.  She saw Dr. Choudhary with pulmonary medicine in 2012 and at that time a PFT testing she was noted to be moderate stage II obstruction with FEV1 64% and FEV1/FVC ratio 55%.  She denies any nocturnal symptoms at this time.          Current Medications:  Current Outpatient Medications Ordered in Epic   Medication  "Sig Dispense Refill   • rosuvastatin (CRESTOR) 5 MG Tab Take 1 tablet by mouth every evening. 30 tablet 1   • aspirin (ASA) 81 MG Chew Tab chewable tablet Chew 1 tablet every day. 100 tablet 3   • Misc. Devices Misc Please provide nocturnal oxgen 2 LPM Nasal Cannula, concentrator 99 Each 99   • DULERA 100-5 MCG/ACT Aerosol INHALE 2 PUFFS BY MOUTH TWICE A DAY 1 Each 0   • levothyroxine (SYNTHROID) 88 MCG Tab TAKE 1 TABLET BY MOUTH EVERY DAY 90 Tab 3   • clobetasol (TEMOVATE) 0.05 % Ointment TOPICALLY APPLY SPARINGLY TO AFFECTED AREA 3X\/WEEK     • albuterol 108 (90 Base) MCG/ACT Aero Soln inhalation aerosol Inhale 2 Puffs by mouth every four hours as needed for Shortness of Breath. 1 Inhaler 3   • omeprazole (PRILOSEC) 20 MG delayed-release capsule Take 20 mg by mouth every day. Every other day     • urea 40 % Cream APPLY TOPICALLY TO FEET TWICE DAILY AND THEN APPLY VASELINE WITH SOCKS     • augmented betamethasone dipropionate (DIPROLENE-AF) 0.05 % ointment APPLY TO HANDS TWICE A DAY FOR 2WKS. AVOID FACE/GROIN/UNDERARS. APPLY VASALINE AND USE GLOVES       No current Epic-ordered facility-administered medications on file.          Objective:   Physical Exam:    Vitals: /62 (BP Location: Right arm, Patient Position: Sitting, BP Cuff Size: Adult)   Pulse 79   Temp 36.7 °C (98.1 °F) (Temporal)   Ht 1.651 m (5' 5\")   Wt 77 kg (169 lb 11.2 oz)   SpO2 95%    BMI: Body mass index is 28.24 kg/m².  Physical Exam   Constitutional: She is well-developed, well-nourished, and in no distress.   Eyes: Conjunctivae are normal.   Pulmonary/Chest: Effort normal. No respiratory distress.   Neurological: She is alert.   Skin: Skin is warm and dry. No rash noted.   Psychiatric: Mood, memory, affect and judgment normal.        Assessment and Plan:   Nancy is a 70 y.o. female with the following:  Problem List Items Addressed This Visit     Mixed hyperlipidemia     Previous problem requires additional evaluation. Will start " rosuvastatin 5 mg daily and aspirin 81 mg daily due to aortic atherosclerosis noted on CT imaging. We'll recheck her lipid panel 6 to 8 weeks after starting medicine to ensure stability. Advised her to also add coenzyme Q 10 100 mg twice daily to help negate risk of myalgias on rosuvastatin.         Relevant Medications    rosuvastatin (CRESTOR) 5 MG Tab    Other Relevant Orders    Lipid Profile    Diverticulitis x2- mild descending/sigmoid diverticulosis in 2012     Chronic and ongoing issue. Advised her to discuss with Dr. Moser about moving of her next scheduled colonoscopy which is currently planned for January 2022. Discussed with her the natural trajectory of diverticulosis and the reasoning for additional evaluation.         Nocturnal hypoxia     Chronic and ongoing problem. Discussed that addition of positive pressure therapy may eliminate need for use of oxygen. Encouraged her to follow up with sleep medicine to try this treatment.         Aortic atherosclerosis (HCC)     New problem. Discussed that atherosclerosis of the aorta could indicate atherosclerosis in her other arteries as well. Offered that we could perform CT coronary calcium score but this would have an out-of-pocket cost of approximately $110. Recommend in the meantime we'll start her on Crestor 5 mg daily, discussed potential side effects including myalgias and recommendation to use coenzyme Q 10. We'll also start low-dose aspirin and advised her to monitor for gastritis or melena. May need to consider stress testing if she defers coronary calcium score to establish a baseline. No angina or angina equivalents at this time.         Relevant Medications    rosuvastatin (CRESTOR) 5 MG Tab    aspirin (ASA) 81 MG Chew Tab chewable tablet    Other Relevant Orders    Lipid Profile    Chronic left-sided low back pain with left-sided sciatica     New problem by my assessment but longstanding per the patient. She will find her previous MRI lumbar  report at home and send a copy to me for review. I imagine we will need update her lumbar spine MRI. Will refer her to physiatry to establish care as she would likely benefit from an epidural injection due to the radiculopathy along the left buttocks and lateral thigh. May also be a good candidate for physical therapy.         Relevant Medications    aspirin (ASA) 81 MG Chew Tab chewable tablet    Other Relevant Orders    REFERRAL TO PHYSIATRY (PMR)      Other Visit Diagnoses     Menopausal state        Relevant Orders    DS-BONE DENSITY STUDY (DEXA)             Annual Health Assessment Questions:    1.  Are you currently engaging in any exercise or physical activity? Yes  Walk  Stationary bike  But not long  Hips hurt  2.  How would you describe your mood or emotional well-being today? fair    3.  Have you had any falls in the last year? No    4.  Have you noticed any problems with your balance or had difficulty walking? No    5.  In the last six months have you experienced any leakage of urine? Yes    6. DPA/Advanced Directive: Patient has Advanced Directive, but it is not on file. Instructed to bring in a copy to scan into their chart.       RTC: Return in about 3 months (around 6/22/2021).    I spent a total of 40 minutes with record review, exam, communication with the patient, communication with other providers, and documentation of this encounter.    PLEASE NOTE: This dictation was created using voice recognition software. I have made every reasonable attempt to correct obvious errors, but I expect that there are errors of grammar and possibly content that I did not discover before finalizing the note.      Shawna Recio, DO  Geriatric and Internal Medicine  [facility]

## 2021-03-22 NOTE — ASSESSMENT & PLAN NOTE
New problem by my assessment but longstanding per the patient. She will find her previous MRI lumbar report at home and send a copy to me for review. I imagine we will need update her lumbar spine MRI. Will refer her to physiatry to establish care as she would likely benefit from an epidural injection due to the radiculopathy along the left buttocks and lateral thigh. May also be a good candidate for physical therapy.

## 2021-03-22 NOTE — ASSESSMENT & PLAN NOTE
Previous problem requires additional evaluation. Will start rosuvastatin 5 mg daily and aspirin 81 mg daily due to aortic atherosclerosis noted on CT imaging. We'll recheck her lipid panel 6 to 8 weeks after starting medicine to ensure stability. Advised her to also add coenzyme Q 10 100 mg twice daily to help negate risk of myalgias on rosuvastatin.

## 2021-03-22 NOTE — ASSESSMENT & PLAN NOTE
Chronic and ongoing problem. Discussed that addition of positive pressure therapy may eliminate need for use of oxygen. Encouraged her to follow up with sleep medicine to try this treatment.

## 2021-03-24 ENCOUNTER — HOSPITAL ENCOUNTER (OUTPATIENT)
Dept: RADIOLOGY | Facility: MEDICAL CENTER | Age: 71
End: 2021-03-24
Attending: INTERNAL MEDICINE
Payer: MEDICARE

## 2021-03-24 PROCEDURE — 77080 DXA BONE DENSITY AXIAL: CPT

## 2021-03-25 DIAGNOSIS — Z91.89 OTHER SPECIFIED PERSONAL RISK FACTORS, NOT ELSEWHERE CLASSIFIED: ICD-10-CM

## 2021-03-29 ENCOUNTER — HOSPITAL ENCOUNTER (OUTPATIENT)
Dept: RADIOLOGY | Facility: MEDICAL CENTER | Age: 71
End: 2021-03-29
Attending: INTERNAL MEDICINE
Payer: COMMERCIAL

## 2021-03-29 DIAGNOSIS — Z91.89 OTHER SPECIFIED PERSONAL RISK FACTORS, NOT ELSEWHERE CLASSIFIED: ICD-10-CM

## 2021-03-29 PROCEDURE — 4410556 CT-CARDIAC SCORING (SELF PAY ONLY)

## 2021-03-30 DIAGNOSIS — R93.1 ABNORMAL FINDINGS ON DIAGNOSTIC IMAGING OF HEART AND CORONARY CIRCULATION: ICD-10-CM

## 2021-03-30 DIAGNOSIS — Z91.89 OTHER SPECIFIED PERSONAL RISK FACTORS, NOT ELSEWHERE CLASSIFIED: ICD-10-CM

## 2021-03-30 DIAGNOSIS — I70.0 AORTIC ATHEROSCLEROSIS (HCC): ICD-10-CM

## 2021-03-30 DIAGNOSIS — R93.1 ELEVATED CORONARY ARTERY CALCIUM SCORE: ICD-10-CM

## 2021-03-30 DIAGNOSIS — E78.2 MIXED HYPERLIPIDEMIA: ICD-10-CM

## 2021-03-30 RX ORDER — ROSUVASTATIN CALCIUM 5 MG/1
10 TABLET, COATED ORAL EVERY EVENING
Qty: 30 TABLET | Refills: 1 | Status: SHIPPED | OUTPATIENT
Start: 2021-03-30 | End: 2021-04-14

## 2021-03-31 DIAGNOSIS — J45.40 MODERATE PERSISTENT ASTHMA WITHOUT COMPLICATION: ICD-10-CM

## 2021-03-31 RX ORDER — ALBUTEROL SULFATE 90 UG/1
2 AEROSOL, METERED RESPIRATORY (INHALATION) EVERY 4 HOURS PRN
Qty: 1 EACH | Refills: 3 | Status: SHIPPED | OUTPATIENT
Start: 2021-03-31 | End: 2022-04-14

## 2021-04-06 ENCOUNTER — RX ONLY (OUTPATIENT)
Age: 71
Setting detail: RX ONLY
End: 2021-04-06

## 2021-04-06 ENCOUNTER — OFFICE VISIT (OUTPATIENT)
Dept: PHYSICAL MEDICINE AND REHAB | Facility: MEDICAL CENTER | Age: 71
End: 2021-04-06
Payer: MEDICARE

## 2021-04-06 VITALS
HEART RATE: 73 BPM | OXYGEN SATURATION: 96 % | TEMPERATURE: 97.5 F | BODY MASS INDEX: 28.25 KG/M2 | HEIGHT: 65 IN | SYSTOLIC BLOOD PRESSURE: 116 MMHG | WEIGHT: 169.53 LBS | DIASTOLIC BLOOD PRESSURE: 72 MMHG

## 2021-04-06 DIAGNOSIS — F40.240 CLAUSTROPHOBIA: ICD-10-CM

## 2021-04-06 DIAGNOSIS — M54.16 LUMBAR RADICULOPATHY: ICD-10-CM

## 2021-04-06 DIAGNOSIS — M51.26 LUMBAR DISC HERNIATION: ICD-10-CM

## 2021-04-06 DIAGNOSIS — M47.816 LUMBAR SPONDYLOSIS: ICD-10-CM

## 2021-04-06 DIAGNOSIS — M54.41 CHRONIC RIGHT-SIDED LOW BACK PAIN WITH RIGHT-SIDED SCIATICA: ICD-10-CM

## 2021-04-06 DIAGNOSIS — G89.29 CHRONIC RIGHT-SIDED LOW BACK PAIN WITH RIGHT-SIDED SCIATICA: ICD-10-CM

## 2021-04-06 PROCEDURE — 99205 OFFICE O/P NEW HI 60 MIN: CPT | Performed by: PHYSICAL MEDICINE & REHABILITATION

## 2021-04-06 RX ORDER — CICLOPIROX 80 MG/ML
SOLUTION TOPICAL
Qty: 1 | Refills: 5 | Status: ERX | COMMUNITY
Start: 2021-04-06

## 2021-04-06 RX ORDER — GABAPENTIN 100 MG/1
100-300 CAPSULE ORAL NIGHTLY PRN
Qty: 90 CAPSULE | Refills: 5 | Status: SHIPPED | OUTPATIENT
Start: 2021-04-06 | End: 2021-09-15

## 2021-04-06 RX ORDER — FAMOTIDINE 20 MG/1
20 TABLET, FILM COATED ORAL 2 TIMES DAILY
COMMUNITY
End: 2021-05-26

## 2021-04-06 RX ORDER — ALPRAZOLAM 0.5 MG/1
0.5 TABLET ORAL PRN
Qty: 2 TABLET | Refills: 0 | Status: SHIPPED | OUTPATIENT
Start: 2021-04-06 | End: 2021-04-07

## 2021-04-06 ASSESSMENT — PATIENT HEALTH QUESTIONNAIRE - PHQ9: CLINICAL INTERPRETATION OF PHQ2 SCORE: 0

## 2021-04-06 ASSESSMENT — PAIN SCALES - GENERAL: PAINLEVEL: 6=MODERATE PAIN

## 2021-04-06 ASSESSMENT — FIBROSIS 4 INDEX: FIB4 SCORE: 1.18

## 2021-04-06 NOTE — PROGRESS NOTES
New patient note    Physiatry (physical medicine and  Rehabilitation), interventional spine and sports medicine    Date of service: See epic    Chief complaint:   Chief Complaint   Patient presents with   • New Patient     Back pain        Referring provider: Shawna Recio D    HISTORY    HPI: Prudence Chayito Alvaradoun 70 y.o.  who presents today with Diagnoses of Chronic 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, Lumbar disc herniation, Lumbar radiculopathy, Lumbar spondylosis, and Claustrophobia were pertinent to this visit.    HPI    Chronic right-sided low back pain radiating to the right hip worse with bending forward worse with lifting.  She has been to physical therapy in the past.  She has a home exercise program.  She has been working at the gym with a  in the past as well.  Intermittent right-sided low back pain can be severe in intensity at times.  Typically aching in quality.  5 out of 10 in intensity.  She is also tried working with a chiropractor with no significant improvement.    Medications tried Include NSAIDs, Tylenol.  Avoid muscle relaxers given age.  Wants to avoid opioids if possible.       Medical records review:  I reviewed notes from the patient's PCP Dr. Shawna Recio including from 3/22/2021 where the patient was seen for mixed hyperlipidemia on Crestor with lipid profile ordered, history of diverticulitis with diverticulosis with no acute findings.  Nocturnal hypoxia recommending the patient follow-up with sleep medicine.  Chronic low back pain with left-sided leg pain reportedly the patient had an MRI lumbar spine done previously referred to physiatry.  DEXA scan was done.    ROS:   Red Flags ROS:   Fever, Chills, Sweats: Denies  Involuntary Weight Loss: Denies  Bladder Incontinence: Denies  Bowel Incontinence: denies  Saddle Anesthesia: Denies    All other systems reviewed and negative.       PMHx:   Past Medical History:   Diagnosis Date   • Acute cystitis with  hematuria 4/20/2020    Urine dipstick performed in clinic demonstrated trace glucose, 1+ bili, 1+ ketones, specific gravity 1.015, trace intact blood, pH 5.5, 2+ protein, 2.0 urobilinogen, positive nitrite, 3+ leukocyte esterase.  She performed a telemedicine visit yesterday and was placed on cephalosporin for presumptive urinary tract infection.  She was also given Pyridium due to discomfort with dysuria.  She thinks s   • Asthma    • Back pain    • Chickenpox    • GERD (gastroesophageal reflux disease)    • Heartburn    • Hypothyroidism    • Insomnia     Trouble going to and staying asleep   • Mumps    • Painful joint    • Thyroid disease    • Tonsillitis    • Wears glasses          Current Outpatient Medications on File Prior to Visit   Medication Sig Dispense Refill   • coenzyme Q-10 30 MG capsule Take 60 mg by mouth every day.     • Cyanocobalamin (B-12 PO) Take  by mouth.     • famotidine (PEPCID) 20 MG Tab Take 20 mg by mouth 2 times a day.     • albuterol 108 (90 Base) MCG/ACT Aero Soln inhalation aerosol INHALE 2 PUFFS BY MOUTH EVERY FOUR HOURS AS NEEDED FOR SHORTNESS OF BREATH. 1 Each 3   • rosuvastatin (CRESTOR) 5 MG Tab Take 2 Tablets by mouth every evening. 30 tablet 1   • levothyroxine (SYNTHROID) 88 MCG Tab TAKE 1 TABLET BY MOUTH EVERY  tablet 3   • urea 40 % Cream APPLY TOPICALLY TO FEET TWICE DAILY AND THEN APPLY VASELINE WITH SOCKS     • augmented betamethasone dipropionate (DIPROLENE-AF) 0.05 % ointment APPLY TO HANDS TWICE A DAY FOR 2WKS. AVOID FACE/GROIN/UNDERARS. APPLY VASALINE AND USE GLOVES     • aspirin (ASA) 81 MG Chew Tab chewable tablet Chew 1 tablet every day. 100 tablet 3   • Misc. Devices Misc Please provide nocturnal oxgen 2 LPM Nasal Cannula, concentrator 99 Each 99   • DULERA 100-5 MCG/ACT Aerosol INHALE 2 PUFFS BY MOUTH TWICE A DAY 1 Each 0   • clobetasol (TEMOVATE) 0.05 % Ointment TOPICALLY APPLY SPARINGLY TO AFFECTED AREA 3X\/WEEK     • omeprazole (PRILOSEC) 20 MG  delayed-release capsule Take 20 mg by mouth every day. Every other day       No current facility-administered medications on file prior to visit.        PSHx:   Past Surgical History:   Procedure Laterality Date   • ABDOMINAL HYSTERECTOMY TOTAL     • CHOLECYSTECTOMY     • HYSTERECTOMY LAPAROSCOPY     • TONSILLECTOMY         Family history   Family History   Problem Relation Age of Onset   • Cancer Father         lung   • Breast Cancer Maternal Aunt    • Hypertension Maternal Grandmother    • Hypertension Maternal Grandfather    • Kidney Disease Mother          Medications: reviewed on epic.   Outpatient Medications Marked as Taking for the 4/6/21 encounter (Office Visit) with Carlos Eduardo Echols M.D.   Medication Sig Dispense Refill   • coenzyme Q-10 30 MG capsule Take 60 mg by mouth every day.     • Cyanocobalamin (B-12 PO) Take  by mouth.     • famotidine (PEPCID) 20 MG Tab Take 20 mg by mouth 2 times a day.     • gabapentin (NEURONTIN) 100 MG Cap Take 1-3 Capsules by mouth at bedtime as needed (pain). 90 capsule 5   • ALPRAZolam (XANAX) 0.5 MG Tab Take 1 tablet by mouth as needed for Anxiety (take one tab 1 hour prior to MRI. OK to repeat x 1. do not drive on this med) for up to 1 day. 2 tablet 0   • albuterol 108 (90 Base) MCG/ACT Aero Soln inhalation aerosol INHALE 2 PUFFS BY MOUTH EVERY FOUR HOURS AS NEEDED FOR SHORTNESS OF BREATH. 1 Each 3   • rosuvastatin (CRESTOR) 5 MG Tab Take 2 Tablets by mouth every evening. 30 tablet 1   • levothyroxine (SYNTHROID) 88 MCG Tab TAKE 1 TABLET BY MOUTH EVERY  tablet 3   • urea 40 % Cream APPLY TOPICALLY TO FEET TWICE DAILY AND THEN APPLY VASELINE WITH SOCKS     • augmented betamethasone dipropionate (DIPROLENE-AF) 0.05 % ointment APPLY TO HANDS TWICE A DAY FOR 2WKS. AVOID FACE/GROIN/UNDERARS. APPLY VASALINE AND USE GLOVES     • aspirin (ASA) 81 MG Chew Tab chewable tablet Chew 1 tablet every day. 100 tablet 3   • Misc. Devices Misc Please provide nocturnal oxgen 2 LPM  Nasal Cannula, concentrator 99 Each 99   • DULERA 100-5 MCG/ACT Aerosol INHALE 2 PUFFS BY MOUTH TWICE A DAY 1 Each 0   • clobetasol (TEMOVATE) 0.05 % Ointment TOPICALLY APPLY SPARINGLY TO AFFECTED AREA 3X\/WEEK          Allergies:   Allergies   Allergen Reactions   • Codeine        Social Hx:   Social History     Socioeconomic History   • Marital status:      Spouse name: Not on file   • Number of children: Not on file   • Years of education: Not on file   • Highest education level: Not on file   Occupational History   • Not on file   Tobacco Use   • Smoking status: Former Smoker     Packs/day: 1.00     Years: 15.00     Pack years: 15.00     Types: Cigarettes     Quit date: 1993     Years since quittin.0   • Smokeless tobacco: Never Used   • Tobacco comment: continued abstinence   Substance and Sexual Activity   • Alcohol use: Yes     Comment: occ   • Drug use: No   • Sexual activity: Yes     Partners: Male     Birth control/protection: Post-Menopausal   Other Topics Concern   •  Service No   • Blood Transfusions No   • Caffeine Concern No   • Occupational Exposure No   • Hobby Hazards No   • Sleep Concern Yes   • Stress Concern Yes   • Weight Concern No   • Special Diet No   • Back Care No   • Exercise Yes   • Bike Helmet No   • Seat Belt Yes   • Self-Exams Yes   Social History Narrative    She worked at [employer] at the [employer], recently retired. She is  to Ryan for the past 20 years, they met at a [place]. She has a son ([address]) and 2 grandchildren.      Social Determinants of Health     Financial Resource Strain:    • Difficulty of Paying Living Expenses:    Food Insecurity:    • Worried About Running Out of Food in the Last Year:    • Ran Out of Food in the Last Year:    Transportation Needs:    • Lack of Transportation (Medical):    • Lack of Transportation (Non-Medical):    Physical Activity:    • Days of Exercise per Week:    • Minutes of  "Exercise per Session:    Stress:    • Feeling of Stress :    Social Connections:    • Frequency of Communication with Friends and Family:    • Frequency of Social Gatherings with Friends and Family:    • Attends [employer] Services:    • Active Member of Clubs or Organizations:    • Attends Club or Organization Meetings:    • Marital Status:    Intimate Partner Violence:    • Fear of Current or Ex-Partner:    • Emotionally Abused:    • Physically Abused:    • Sexually Abused:          EXAMINATION     Physical Exam:   Vitals: /72 (BP Location: Left arm, Patient Position: Sitting, BP Cuff Size: Adult)   Pulse 73   Temp 36.4 °C (97.5 °F) (Temporal)   Ht 1.651 m (5' 5\")   Wt 76.9 kg (169 lb 8.5 oz)   SpO2 96%     Constitutional:   Body Habitus: Body mass index is 28.21 kg/m².  Cooperation: Fully cooperates with exam  Appearance: Well-groomed, well-nourished, not disheveled     Eyes: No scleral icterus to suggest severe liver disease, no proptosis to suggest severe hyperthyroid    ENT -no obvious auditory deficits, no obvious tongue lesions, tongue midline, no facial droop     Skin -no rashes or lesions noted     Respiratory-  breathing comfortable on room air, no audible wheezing    Cardiovascular- capillary refills less than 2 seconds. No lower extremity edema is noted.     Gastrointestinal - no obvious abdominal masses, No tenderness to palpation in the abdomen    Psychiatric- alert and oriented ×3. Normal affect.     Gait - normal gait, no use of ambulatory device, nonantalgic. balance is appropriate..     Musculoskeletal and Neuro -       Thoracic/Lumbar Spine/Sacral Spine/Hips   Inspection: No evidence of atrophy in bilateral lower extremities throughout     ROM: full  active range of motion with flexion, lateral flexion, and rotation bilaterally.   There is full  active range of motion with lumbar extension with pain.    There is pain with facet loading maneuver (extension rotation) with axial low back " pain on the RIGHT side(s)    Palpation:   No tenderness to palpation in midline at T1-T12 levels. No tenderness to palpation in the left and right of the midline T1-L5, NEGATIVE for tenderness to palpation to the para-midline region in the lower lumbar levels.  palpation over SI joint: negative bilaterally    palpation in hip or over the gluteus medius tendon insertion: negative bilaterally      Lumbar spine Special tests  Neuro tension  Straight leg test positive right, negative left    Slump test positive right, negative left      HIP  FAIR test negative bilaterally    Range of motion in the RIGHT hip is full  in flexion, extension, abduction, internal rotation, external rotation.  Range of motion in the LEFT hip is full  in flexion, extension, abduction, internal rotation, external rotation.      SI joint tests  Observation patient sits on one buttocks: Negative  SI joint compression negative bilaterally    SI joint distraction negative bilaterally    Thigh thrust test negative bilaterally    CASA test negative bilaterally                 Key points for the international standards for neurological classification of spinal cord injury (ISNCSCI) to light touch.     Dermatome R L                                      L2 2 2   L3 2 2   L4 2 2   L5 2 2   S1 2 2   S2 2 2       Motor Exam Lower Extremities    ? Myotome R L   Hip flexion L2 5 5   Knee extension L3 5 5   Ankle dorsiflexion L4 5 5   Toe extension L5 5 5   Ankle plantarflexion S1 5 5         Reflexes  ?  R L                Patella  2+ 2+   Achilles   2+ 2+       Babinski sign negative bilaterally   Clonus of the ankle negative bilaterally       MEDICAL DECISION MAKING    Medical records review: see under HPI section.     DATA    Labs:   Lab Results   Component Value Date/Time    SODIUM 136 01/22/2021 10:25 AM    POTASSIUM 4.0 01/22/2021 10:25 AM    CHLORIDE 100 01/22/2021 10:25 AM    CO2 27 01/22/2021 10:25 AM    ANION 9.0 01/22/2021 10:25 AM    GLUCOSE 95  01/22/2021 10:25 AM    BUN 8 01/22/2021 10:25 AM    CREATININE 0.59 01/22/2021 10:25 AM    CALCIUM 9.6 01/22/2021 10:25 AM    ASTSGOT 15 01/22/2021 10:25 AM    ALTSGPT 12 01/22/2021 10:25 AM    TBILIRUBIN 0.6 01/22/2021 10:25 AM    ALBUMIN 4.4 01/22/2021 10:25 AM    TOTPROTEIN 7.0 01/22/2021 10:25 AM    GLOBULIN 2.6 01/22/2021 10:25 AM    AGRATIO 1.7 01/22/2021 10:25 AM   ]    No results found for: PROTHROMBTM, INR     Lab Results   Component Value Date/Time    WBC 7.7 01/22/2021 10:25 AM    RBC 4.63 01/22/2021 10:25 AM    HEMOGLOBIN 14.2 01/22/2021 10:25 AM    HEMATOCRIT 44.2 01/22/2021 10:25 AM    MCV 95.5 01/22/2021 10:25 AM    MCH 30.7 01/22/2021 10:25 AM    MCHC 32.1 (L) 01/22/2021 10:25 AM    MPV 11.0 01/22/2021 10:25 AM    NEUTSPOLYS 67.80 01/22/2021 10:25 AM    LYMPHOCYTES 22.40 01/22/2021 10:25 AM    MONOCYTES 8.40 01/22/2021 10:25 AM    EOSINOPHILS 0.80 01/22/2021 10:25 AM    BASOPHILS 0.50 01/22/2021 10:25 AM        No results found for: HBA1C     Imaging:   I personally reviewed following images, these are my reads  CT abdomen pelvis 1/26/2021.  I reviewed the study specifically over the patient's spine.  Please see radiologist dictation for remainder of the report.  Degenerative disc disease worst at the L4-5 level.  Also present L3-4.  Facet arthropathy right worse than left at L5-S1 and left worse than right at L4-5.  Difficult to evaluate neuroforaminal and central canal stenosis but there appears to be central canal stenosis at L4-5 and left-sided neuroforaminal stenosis at L4-5.  This would better be evaluated with an MRI lumbar spine.        IMAGING radiology reads. I reviewed the following radiology reads                                        Results for orders placed during the hospital encounter of 02/08/18   DX-CHEST-2 VIEWS    Impression Negative two views of the chest.                                                        Results for orders placed during the hospital encounter of 01/26/21    CT-ABDOMEN-PELVIS WITH    Impression Findings consistent with sigmoid diverticulitis without abscess or free air.    Aortic atherosclerosis without aneurysm.    Hepatic cyst.     MRI lumbar spine 3/27/2007  At L3-4 and L4-5 there is mild bilateral neuroforaminal stenosis and facet arthropathy.  Impression  Degenerative changes causing bilateral neuroforaminal stenosis at L3-4 and L4-5.  Mild.  Radiologist Dr. LEEANNE Chapman                                                                          Diagnosis   Visit Diagnoses     ICD-10-CM   1. Chronic 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  M54.41    G89.29   2. Lumbar disc herniation  M51.26   3. Lumbar radiculopathy  M54.16   4. Lumbar spondylosis  M47.816   5. Claustrophobia  F40.240           ASSESSMENT AND PLAN:  Prudence Chayito Forthun 70 y.o. female      Prudence was seen today for new patient.    Diagnoses and all orders for this visit:    Chronic 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  -     gabapentin (NEURONTIN) 100 MG Cap; Take 1-3 Capsules by mouth at bedtime as needed (pain).  -     REFERRAL TO PHYSICAL THERAPY  -     MR-LUMBAR SPINE-W/O; Future    Lumbar disc herniation  -     gabapentin (NEURONTIN) 100 MG Cap; Take 1-3 Capsules by mouth at bedtime as needed (pain).  -     REFERRAL TO PHYSICAL THERAPY  -     MR-LUMBAR SPINE-W/O; Future    Lumbar radiculopathy  -     gabapentin (NEURONTIN) 100 MG Cap; Take 1-3 Capsules by mouth at bedtime as needed (pain).  -     REFERRAL TO PHYSICAL THERAPY  -     MR-LUMBAR SPINE-W/O; Future    Lumbar spondylosis  -     gabapentin (NEURONTIN) 100 MG Cap; Take 1-3 Capsules by mouth at bedtime as needed (pain).  -     REFERRAL TO PHYSICAL THERAPY  -     MR-LUMBAR SPINE-W/O; Future    Claustrophobia  -     ALPRAZolam (XANAX) 0.5 MG Tab; Take 1 tablet by mouth as needed for Anxiety (take one tab 1 hour prior to MRI. OK to repeat x 1. do not drive on this med) for up to 1 day.        I believe the  majority the patient's pain is coming from a combination of a right lower lumbar radiculopathy and facet mediated pain.  She has failed multiple different conservative treatments described above      Physical therapy: I ordered physical therapy to focus on strengthening and stretching.     home exercise program: I provided the patient with a strengthening and stretching with a home exercise program     Diagnostic workup: As above    Medications:   As above     Interventional program: I would consider the patient for an epidural steroid injection depending on the results of the above       Follow-up: After the above diagnostic studies       Total time spent on the day of the encounter: 60 minutes.  This includes counseling, care coordination, medical records review.        Please note that this dictation was created using voice recognition software. I have made every reasonable attempt to correct obvious errors but there may be errors of grammar and content that I may have overlooked prior to finalization of this note.      Carlos Eduardo Echols MD  Physical Medicine and Rehabilitation  Interventional Spine and Sports Physiatry  [facility]           CC Shawna Recio D

## 2021-04-06 NOTE — Clinical Note
Dear Shawna Recio D.O. ,     Thank you for the referral of Normalynn Gregory.  I think she likely has a right lower lumbar radiculopathy with facet mediated pain.  I ordered a new MRI lumbar spine.  I plan to follow-up with her after this.    Please see my note for more details       Should you have any questions or concerns please do not hesitate to contact me.    Carlos Eduardo Echols M.D.

## 2021-04-08 ENCOUNTER — OFFICE VISIT (OUTPATIENT)
Dept: HEALTH INFORMATION MANAGEMENT | Facility: MEDICAL CENTER | Age: 71
End: 2021-04-08
Payer: MEDICARE

## 2021-04-08 VITALS — HEIGHT: 65 IN | BODY MASS INDEX: 27.66 KG/M2 | WEIGHT: 166 LBS

## 2021-04-08 DIAGNOSIS — E78.2 MIXED HYPERLIPIDEMIA: ICD-10-CM

## 2021-04-08 DIAGNOSIS — I70.0 AORTIC ATHEROSCLEROSIS (HCC): ICD-10-CM

## 2021-04-08 DIAGNOSIS — R93.1 ELEVATED CORONARY ARTERY CALCIUM SCORE: ICD-10-CM

## 2021-04-08 PROCEDURE — 97802 MEDICAL NUTRITION INDIV IN: CPT | Performed by: DIETITIAN, REGISTERED

## 2021-04-08 ASSESSMENT — FIBROSIS 4 INDEX: FIB4 SCORE: 1.18

## 2021-04-08 NOTE — PROGRESS NOTES
"4/8/2021    Shawna Recio D.O.  70 y.o.   Time in/out: 8:22-9:27am    Anthropometrics/Objective  Vitals:    04/08/21 1046   Weight: 75.3 kg (166 lb)   Height: 1.651 m (5' 5\")       Body mass index is 27.62 kg/m².      Stated Goal Weight: 154 lbs  Estimated Caloric needs 1274 Kcal (MSJ)   See comprehensive patient history form for further information     Subjective:  · Pt seeking education on general healthful eating to manage weight and especially cardiovascular health  · Tries to walk for 30 min 2 days/wk - overall PA decreased since pandemic   · Used to work with  - planing to join gym again since vaccinations   · Eats 3 meals/day, sometimes snacks in between  · Chooses lean protein and lowfat dairy products  · Often includes NSV's at meals   · Avoids sweetened beverages    Nutrition Diagnosis (PES Statement)    Altered nutrition-related lab values related to elevated lipids as evidenced by total cholesterol = 214 and LDL = 127 (3/11/20).    Client history:  Condition(s) associated with a diagnosis or treatment (specify) vit D deficiency, hypothyroidism, mixed hyperlipidemia, menopause, fatigue, former smoker, aortic atherosclerosis, etc.     Biochemical data, medical test and procedures  No results found for: HBA1C@  No results found for: POCGLUCOSE  Lab Results   Component Value Date/Time    CHOLSTRLTOT 214 (H) 03/11/2020 10:51 AM     (H) 03/11/2020 10:51 AM    HDL 76 03/11/2020 10:51 AM    TRIGLYCERIDE 55 03/11/2020 10:51 AM       Nutrition Intervention    Meal and Snack  Recommend a general/healthful diet  Reduce intake of saturated fats and avoid trans fats   Follow plate method at meals (1/2 plate non-starchy vegetables, 3-4 ounces lean protein, 1-2 heart healthy fats, and <1 cup of complex carbohydrates)    Comprehensive Nutrition education Instruction or training leading to in-depth nutrition related knowledge about:  Benefits to following meal plan, Combine carb, protein and " fat at each meal, Eating out, Fast food, Menu Planning, Physical activity/exercise, Portion control, Sweets and alcohol in moderation, Heart-healthy guidelines, Decreasing PO intake and Handouts provided regarding topics discussed    Handouts provided: My Plate planner, AHA Fats, Meal ideas, healthy snack ideas    Monitoring & Evaluation Plan    Behavioral-Environmental:  Behavior: Resume tracking in [employer] elena and/or follow plate method and Physical activity: Increase as safe/tolerated.    Food / Nutrient Intake:  See Above    Physical Signs / Symptoms:  Lipid profiles trend to WNL   Weight loss of 1-2 lbs per week to goal    Assessment Notes: Pt appears to make healthy food choices and is mostly avoiding excess saturated fat intake. Today, we discussed  dietary fat sources, including unsaturated vs saturated fats. Encouraged pt to opt for heart-healthy/unsaturated fats majority of the time. Discussed caloric density of fats and importance of portioning for weight management. Discussed the plate method for healthy balancing and portioning of macronutrients at meals. Reinforced her increasing her overall physical activity and resuming strength training with a . Briefly reviewed her [employer] elena and encouraged her to aim for <2,300mg sodium goal and saturated fat/cholesterol goals set in elena. Reinforced her aiming for calorie goal set to 1280 calories/day. All pt's questions were answered today. Provided my contact info and encouraged her to contact me with any additional questions and/or to schedule a follow-up visit.     Follow-Up: aaliyah Johnston RD (Bristy), LD  Outpatient Dietary Educator  [facility]  [phone]  [email or web address];

## 2021-04-20 DIAGNOSIS — E78.2 MIXED HYPERLIPIDEMIA: ICD-10-CM

## 2021-04-20 RX ORDER — ROSUVASTATIN CALCIUM 10 MG/1
10 TABLET, COATED ORAL
Qty: 100 TABLET | Refills: 3 | Status: SHIPPED | OUTPATIENT
Start: 2021-04-20 | End: 2022-05-23

## 2021-04-21 ENCOUNTER — OFFICE VISIT (OUTPATIENT)
Dept: CARDIOLOGY | Facility: MEDICAL CENTER | Age: 71
End: 2021-04-21
Attending: INTERNAL MEDICINE
Payer: MEDICARE

## 2021-04-21 VITALS
BODY MASS INDEX: 28.32 KG/M2 | WEIGHT: 170 LBS | HEIGHT: 65 IN | SYSTOLIC BLOOD PRESSURE: 126 MMHG | OXYGEN SATURATION: 98 % | RESPIRATION RATE: 14 BRPM | DIASTOLIC BLOOD PRESSURE: 62 MMHG | HEART RATE: 69 BPM

## 2021-04-21 DIAGNOSIS — I70.0 AORTIC ATHEROSCLEROSIS (HCC): ICD-10-CM

## 2021-04-21 DIAGNOSIS — R93.1 ELEVATED CORONARY ARTERY CALCIUM SCORE: ICD-10-CM

## 2021-04-21 DIAGNOSIS — E78.2 MIXED HYPERLIPIDEMIA: ICD-10-CM

## 2021-04-21 LAB — EKG IMPRESSION: NORMAL

## 2021-04-21 PROCEDURE — 93000 ELECTROCARDIOGRAM COMPLETE: CPT | Performed by: INTERNAL MEDICINE

## 2021-04-21 PROCEDURE — 99204 OFFICE O/P NEW MOD 45 MIN: CPT | Performed by: INTERNAL MEDICINE

## 2021-04-21 ASSESSMENT — ENCOUNTER SYMPTOMS
INSOMNIA: 1
CONSTITUTIONAL NEGATIVE: 1
BACK PAIN: 1
MYALGIAS: 0
FEVER: 0
VOMITING: 0
HEADACHES: 0
CARDIOVASCULAR NEGATIVE: 1
SHORTNESS OF BREATH: 0
CLAUDICATION: 0
DEPRESSION: 0
BLURRED VISION: 0
DIZZINESS: 0
WEAKNESS: 0
CHILLS: 0
RESPIRATORY NEGATIVE: 1
DOUBLE VISION: 0
GASTROINTESTINAL NEGATIVE: 1
WEIGHT LOSS: 0
FOCAL WEAKNESS: 0
NAUSEA: 0
PALPITATIONS: 0
NERVOUS/ANXIOUS: 1
BRUISES/BLEEDS EASILY: 0
EYES NEGATIVE: 1
ABDOMINAL PAIN: 0
COUGH: 0
NEUROLOGICAL NEGATIVE: 1

## 2021-04-21 ASSESSMENT — FIBROSIS 4 INDEX: FIB4 SCORE: 1.18

## 2021-04-21 NOTE — PROGRESS NOTES
Chief Complaint   Patient presents with   • Other     Aortic atherosclerosis (HCC)       Subjective:   Pru Chayito Gregory is a 70 y.o. female who presents today as a consult from Shawna Strickland for coronary atherosclerosis.    Thank you for allowing me to evaluate Mrs. Gregory, who as you know is a 70 year old female with dyslipidemia. She underwent an CT coronary calcium study which was abnormal as described below. She is clinically doing well. She denies chest pain, shortness of breath, palpitations, nausea/vomiting or diaphoresis.    Past Medical History:   Diagnosis Date   • Acute cystitis with hematuria 4/20/2020    Urine dipstick performed in clinic demonstrated trace glucose, 1+ bili, 1+ ketones, specific gravity 1.015, trace intact blood, pH 5.5, 2+ protein, 2.0 urobilinogen, positive nitrite, 3+ leukocyte esterase.  She performed a telemedicine visit yesterday and was placed on cephalosporin for presumptive urinary tract infection.  She was also given Pyridium due to discomfort with dysuria.  She thinks s   • Asthma    • Back pain    • Chickenpox    • GERD (gastroesophageal reflux disease)    • Heartburn    • Hyperlipidemia    • Hypothyroidism    • Insomnia     Trouble going to and staying asleep   • Mumps    • Painful joint    • Thyroid disease    • Tonsillitis    • Wears glasses      Past Surgical History:   Procedure Laterality Date   • ABDOMINAL HYSTERECTOMY TOTAL     • CHOLECYSTECTOMY     • HYSTERECTOMY LAPAROSCOPY     • TONSILLECTOMY       Family History   Problem Relation Age of Onset   • Cancer Father         lung   • Breast Cancer Maternal Aunt    • Hypertension Maternal Grandmother    • Hypertension Maternal Grandfather    • Kidney Disease Mother      Social History     Socioeconomic History   • Marital status:      Spouse name: Not on file   • Number of children: Not on file   • Years of education: Not on file   • Highest education level: Not on file   Occupational History   • Not on  file   Tobacco Use   • Smoking status: Former Smoker     Packs/day: 1.00     Years: 15.00     Pack years: 15.00     Types: Cigarettes     Quit date: 1993     Years since quittin.0   • Smokeless tobacco: Never Used   • Tobacco comment: continued abstinence   Substance and Sexual Activity   • Alcohol use: Yes     Comment: occ   • Drug use: No   • Sexual activity: Yes     Partners: Male     Birth control/protection: Post-Menopausal   Other Topics Concern   •  Service No   • Blood Transfusions No   • Caffeine Concern No   • Occupational Exposure No   • Hobby Hazards No   • Sleep Concern Yes   • Stress Concern Yes   • Weight Concern No   • Special Diet No   • Back Care No   • Exercise Yes   • Bike Helmet No   • Seat Belt Yes   • Self-Exams Yes   Social History Narrative    She worked at [employer] at the [employer], recently retired. She is  to Ryan for the past 20 years, they met at a [employer]. She has a son ([place] [address], [place]) and 2 grandchildren.      Social Determinants of Health     Financial Resource Strain:    • Difficulty of Paying Living Expenses:    Food Insecurity:    • Worried About Running Out of Food in the Last Year:    • Ran Out of Food in the Last Year:    Transportation Needs:    • Lack of Transportation (Medical):    • Lack of Transportation (Non-Medical):    Physical Activity:    • Days of Exercise per Week:    • Minutes of Exercise per Session:    Stress:    • Feeling of Stress :    Social Connections:    • Frequency of Communication with Friends and Family:    • Frequency of Social Gatherings with Friends and Family:    • Attends Religion Services:    • Active Member of Clubs or Organizations:    • Attends Club or Organization Meetings:    • Marital Status:    Intimate Partner Violence:    • Fear of Current or Ex-Partner:    • Emotionally Abused:    • Physically Abused:    • Sexually Abused:      Allergies   Allergen Reactions   • Codeine      (Medications  reviewed.)  Outpatient Encounter Medications as of 4/21/2021   Medication Sig Dispense Refill   • rosuvastatin (CRESTOR) 10 MG Tab Take 1 tablet by mouth every day. N THE EVENING 100 tablet 3   • coenzyme Q-10 30 MG capsule Take 60 mg by mouth every day.     • Cyanocobalamin (B-12 PO) Take  by mouth.     • famotidine (PEPCID) 20 MG Tab Take 20 mg by mouth 2 times a day.     • gabapentin (NEURONTIN) 100 MG Cap Take 1-3 Capsules by mouth at bedtime as needed (pain). 90 capsule 5   • albuterol 108 (90 Base) MCG/ACT Aero Soln inhalation aerosol INHALE 2 PUFFS BY MOUTH EVERY FOUR HOURS AS NEEDED FOR SHORTNESS OF BREATH. 1 Each 3   • levothyroxine (SYNTHROID) 88 MCG Tab TAKE 1 TABLET BY MOUTH EVERY  tablet 3   • urea 40 % Cream APPLY TOPICALLY TO FEET TWICE DAILY AND THEN APPLY VASELINE WITH SOCKS     • augmented betamethasone dipropionate (DIPROLENE-AF) 0.05 % ointment APPLY TO HANDS TWICE A DAY FOR 2WKS. AVOID FACE/GROIN/UNDERARS. APPLY VASALINE AND USE GLOVES     • aspirin (ASA) 81 MG Chew Tab chewable tablet Chew 1 tablet every day. 100 tablet 3   • Misc. Devices Misc Please provide nocturnal oxgen 2 LPM Nasal Cannula, concentrator 99 Each 99   • DULERA 100-5 MCG/ACT Aerosol INHALE 2 PUFFS BY MOUTH TWICE A DAY 1 Each 0   • clobetasol (TEMOVATE) 0.05 % Ointment TOPICALLY APPLY SPARINGLY TO AFFECTED AREA 3X\/WEEK     • [DISCONTINUED] omeprazole (PRILOSEC) 20 MG delayed-release capsule Take 20 mg by mouth every day. Every other day       No facility-administered encounter medications on file as of 4/21/2021.     Review of Systems   Constitutional: Negative.  Negative for chills, fever, malaise/fatigue and weight loss.   HENT: Negative.  Negative for hearing loss.    Eyes: Negative.  Negative for blurred vision and double vision.   Respiratory: Negative.  Negative for cough and shortness of breath.    Cardiovascular: Negative.  Negative for chest pain, palpitations, claudication and leg swelling.  "  Gastrointestinal: Negative.  Negative for abdominal pain, nausea and vomiting.   Genitourinary: Negative.  Negative for dysuria and urgency.   Musculoskeletal: Positive for back pain and joint pain. Negative for myalgias.   Skin: Negative.  Negative for itching and rash.   Neurological: Negative.  Negative for dizziness, focal weakness, weakness and headaches.   Endo/Heme/Allergies: Positive for environmental allergies. Does not bruise/bleed easily.   Psychiatric/Behavioral: Negative for depression. The patient is nervous/anxious and has insomnia.         Objective:   /62 (BP Location: Left arm, Patient Position: Sitting, BP Cuff Size: Adult)   Pulse 69   Resp 14   Ht 1.651 m (5' 5\")   Wt 77.1 kg (170 lb)   SpO2 98%   BMI 28.29 kg/m²     Physical Exam   Constitutional: She is oriented to person, place, and time. She appears well-developed and well-nourished.   HENT:   Head: Normocephalic and atraumatic.   Eyes: EOM are normal.   Neck: No JVD present.   Cardiovascular: Normal rate, regular rhythm and normal heart sounds.   Pulmonary/Chest: Effort normal and breath sounds normal.   Abdominal: Soft. Bowel sounds are normal.   No hepatosplenomegaly.   Musculoskeletal:         General: Normal range of motion.   Lymphadenopathy:     She has no cervical adenopathy.   Neurological: She is alert and oriented to person, place, and time.   Skin: Skin is warm and dry.   Psychiatric: She has a normal mood and affect.     CARDIAC STUDIES/PROCEDURES:    CT CARDIAC CALCIUM SCORING (03/29/21)  Coronary calcification:  LMA - 0.0  LCX - 138  LAD - 108  RCA - 0.0  PDA - 0.0  Total Calcium Score: 246  Percentile: Calcium score is worse than the 75th percentile for the patient's age and sex.  (study result reviewed)    EKG was ordered for coronary atherosclerosis, performed on (04/21/21) was reviewed: EKG, personally interpreted shows sinus rhythm with anterior Q waves.  EKG performed on (04/21/21) was reviewed: EKG " personally interpreted shows sinus rhythm with anterior Q waves.    Laboratory results of (03/11/20) were reviewed. Cholesterol profile of 214/55/76/127 mg/dL noted.    Assessment:     1. Aortic atherosclerosis (HCC)  EKG   2. Elevated coronary artery calcium score     3. Mixed hyperlipidemia  EKG     Medical Decision Making:  Today's Assessment / Status / Plan:     1. Coronary artery disease with positive CT coronary calcification study. She is scheduled for myocardial perfusion imaging study tomorrow. We did discuss the options of performing echocardiogram, however, she does not wish to schedule this procedure at this time.  2. Hyperlipidemia: She is doing well on statin therapy without myalgia symptoms.     We will follow up after her tests.    Thank you for this consult.

## 2021-04-22 ENCOUNTER — HOSPITAL ENCOUNTER (OUTPATIENT)
Dept: RADIOLOGY | Facility: MEDICAL CENTER | Age: 71
End: 2021-04-22
Attending: INTERNAL MEDICINE
Payer: MEDICARE

## 2021-04-22 DIAGNOSIS — E78.2 MIXED HYPERLIPIDEMIA: ICD-10-CM

## 2021-04-22 DIAGNOSIS — Z91.89 OTHER SPECIFIED PERSONAL RISK FACTORS, NOT ELSEWHERE CLASSIFIED: ICD-10-CM

## 2021-04-22 DIAGNOSIS — R93.1 ABNORMAL FINDINGS ON DIAGNOSTIC IMAGING OF HEART AND CORONARY CIRCULATION: ICD-10-CM

## 2021-04-22 PROCEDURE — A9502 TC99M TETROFOSMIN: HCPCS

## 2021-04-30 ENCOUNTER — PHYSICAL THERAPY (OUTPATIENT)
Dept: PHYSICAL THERAPY | Facility: REHABILITATION | Age: 71
End: 2021-04-30
Attending: PHYSICAL MEDICINE & REHABILITATION
Payer: MEDICARE

## 2021-04-30 DIAGNOSIS — M54.16 LUMBAR RADICULOPATHY: ICD-10-CM

## 2021-04-30 DIAGNOSIS — M54.41 CHRONIC RIGHT-SIDED LOW BACK PAIN WITH RIGHT-SIDED SCIATICA: ICD-10-CM

## 2021-04-30 DIAGNOSIS — M51.26 LUMBAR DISC HERNIATION: ICD-10-CM

## 2021-04-30 DIAGNOSIS — M47.816 LUMBAR SPONDYLOSIS: ICD-10-CM

## 2021-04-30 DIAGNOSIS — G89.29 CHRONIC RIGHT-SIDED LOW BACK PAIN WITH RIGHT-SIDED SCIATICA: ICD-10-CM

## 2021-04-30 PROCEDURE — 97161 PT EVAL LOW COMPLEX 20 MIN: CPT

## 2021-04-30 PROCEDURE — 97110 THERAPEUTIC EXERCISES: CPT

## 2021-04-30 ASSESSMENT — ENCOUNTER SYMPTOMS
PAIN SCALE: 1
PAIN SCALE AT LOWEST: 1
PAIN SCALE AT HIGHEST: 6

## 2021-04-30 NOTE — OP THERAPY EVALUATION
Outpatient Physical Therapy  INITIAL EVALUATION    [facility] [place]  [address]  [place] [address]  Phone:  [phone]  Fax:  [phone]    Date of Evaluation: 2021    Patient: Nina Gregory  YOB: 1950  MRN: [id number]     Referring Provider: Carlos Eduardo Echols M.D.  [id number]  [place] [address]   Referring Diagnosis Chronic 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 [M54.41, G89.29];Lumbar disc herniation [M51.26];Lumbar radiculopathy [M54.16];Lumbar spondylosis [M47.816]     Time Calculation    Start time: 0900  Stop time: 1000 Time Calculation (min): 60 minutes         Chief Complaint: Back Problem    Visit Diagnoses     ICD-10-CM   1. Lumbar disc herniation  M51.26   2. Chronic 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  M54.41    G89.29   3. Lumbar radiculopathy  M54.16   4. Lumbar spondylosis  M47.816       No data found  Subjective:   History of Present Illness:     Date of onset:  2016  Prior level of function:  Ongoing back pain for years worse in the last 6 months  Pain:     Current pain ratin    At best pain ratin    At worst pain ratin  Diagnostic Tests:     X-ray: abnormal      MRI studies: abnormal    Activities of Daily Living:     Patient reported ADL status: Limited participation in fitness activities  Decreased ambulation tolerance  Avoidance of heavy in home tasks  Limited driving tolerance  Patient Goals:     Patient goals for therapy:  Decreased pain, increased motion and increased strength    Patient is a 70 y.o. female that presents to therapy with lumbar pain. States that symptoms were insidious in onset. Reports the pain quality to be sharp/dull, constant and are primarily in the lower back and some numbness down the R LE. Reports that symptoms now not changing. States that aggravating factors are lifting, sitting, walking long distances.States that easng factors are  meds, lying flat on tummy .  Denies red flags.   Past Medical History:   Diagnosis Date   • Acute cystitis with hematuria 2020    Urine dipstick performed in clinic demonstrated trace glucose, 1+ bili, 1+ ketones, specific gravity 1.015, trace intact blood, pH 5.5, 2+ protein, 2.0 urobilinogen, positive nitrite, 3+ leukocyte esterase.  She performed a telemedicine visit yesterday and was placed on cephalosporin for presumptive urinary tract infection.  She was also given Pyridium due to discomfort with dysuria.  She thinks s   • Asthma    • Back pain    • CAD (coronary artery disease)    • Chickenpox    • GERD (gastroesophageal reflux disease)    • Heartburn    • Hyperlipidemia    • Hypothyroidism    • Insomnia     Trouble going to and staying asleep   • Mumps    • Painful joint    • Thyroid disease    • Tonsillitis    • Wears glasses      Past Surgical History:   Procedure Laterality Date   • ABDOMINAL HYSTERECTOMY TOTAL     • CHOLECYSTECTOMY     • HYSTERECTOMY LAPAROSCOPY     • TONSILLECTOMY       Social History     Tobacco Use   • Smoking status: Former Smoker     Packs/day: 1.00     Years: 15.00     Pack years: 15.00     Types: Cigarettes     Quit date: 1993     Years since quittin.0   • Smokeless tobacco: Never Used   • Tobacco comment: continued abstinence   Substance Use Topics   • Alcohol use: Yes     Comment: occ     Family and Occupational History     Socioeconomic History   • Marital status:      Spouse name: Not on file   • Number of children: Not on file   • Years of education: Not on file   • Highest education level: Not on file   Occupational History   • Not on file       Objective     Postural Observations  Seated posture: poor  Standing posture: poor        Neurological Testing     Reflexes   Left   Patellar (L4): normal (2+)  Achilles (S1): normal (2+)  Ankle clonus reflex: negative  Babinski sign: negative    Right   Patellar (L4): normal (2+)  Achilles (S1): normal  (2+)  Ankle clonus reflex: negative  Babinski sign: negative    Myotome testing   Lumbar (left)   L1 (hip flexors): 5  L2 (hip flexors): 5  L3 (knee extensors): 5  L4 (ankle dorsiflexors): 5  L5 (great toe extension): 5  S1 (ankle plantar flexors): 5    Lumbar (right)   L1 (hip flexors): 5  L2 (hip flexors): 5  L3 (knee extensors): 5  L4 (ankle dorsiflexors): 5  L5 (great toe extension): 5  S1 (ankle plantar flexors): 5    Dermatome testing   Lumbar (left)   All left lumbar dermatomes intact    Lumbar (right)   All right lumbar dermatomes intact    Palpation   Left   Hypertonic in the lumbar paraspinals and quadratus lumborum.     Right   Hypertonic in the lumbar paraspinals and quadratus lumborum.     Active Range of Motion     Lumbar   Flexion: Lumbar active flexion: 74deg.  Extension: Lumbar active extension: 30deg.  Left lateral flexion: Left lateral lumbar spine flexion: 30deg.  Right lateral flexion: Right lateral lumbar spine flexion: 30deg.    Additional Active Range of Motion Details  Possible L shift    Strength:      Left Hip   Planes of Motion   Abduction: 4+  Adduction: 4    Right Hip   Planes of Motion   Abduction: 4+  Adduction: 4    Left Knee   Flexion: 5    Right Knee   Flexion: 5    Tests     Left Hip   SLR: Negative.     Right Hip   SLR: Negative.     Additional Tests Details  Traction (-)    GLENROY: decrease pain; increase stiffness  Flexion: increase pain; worse        Therapeutic Exercises (CPT 26481):     1. Basic tra edu, x5min    2. Trial GLENROY x30sec every 3-4 hours    3. Edu with lumbar roll, x5min      Time-based treatments/modalities:    Physical Therapy Timed Treatment Charges  Therapeutic exercise minutes (CPT 78232): 10 minutes      Assessment, Response and Plan:   Impairments: abnormal or restricted ROM, activity intolerance, impaired physical strength and pain with function    Assessment details:  Patient presents with signs and symptoms consistent with lumbar derangement syndrome vs  functional instability. Patient limitations include weakness, decreased ROM, and pain. Patient demonstrated a possible extension bias. Patient will benefit from skilled therapy to improve the aforementioned deficits and decrease further functional decline.   Prognosis: fair    Goals:   Short Term Goals:   1) Patient's lumbar flexion ROM will improve by 10deg to facilitate improved lifting from knee level.  2) Patient's symptoms will improve to facilitate sitting >10min.  Short term goal time span:  2-4 weeks      Long Term Goals:    1) Patient's symptoms will improve to allow for driving a car >3 hours at a time.  2) Patient's LBDI will improve by 10 to demonstrate functional improvement  Long term goal time span:  6-8 weeks    Plan:   Therapy options:  Physical therapy treatment to continue  Planned therapy interventions:  E Stim Unattended (CPT 22224), Manual Therapy (CPT 34061), Neuromuscular Re-education (CPT 74735), Therapeutic Exercise (CPT 85891) and Hot or Cold Pack Therapy (CPT 94687)  Frequency:  2x week  Duration in weeks:  8  Discussed with:  Patient      Functional Assessment Used  PT Functional Assessment Tool Used: LBDQ  PT Functional Assessment Score: 36%     Referring provider co-signature:  I have reviewed this plan of care and my co-signature certifies the need for services.    Certification Period: 04/30/2021 to  06/25/21    Physician Signature: ________________________________ Date: ______________

## 2021-05-04 ENCOUNTER — PHYSICAL THERAPY (OUTPATIENT)
Dept: PHYSICAL THERAPY | Facility: REHABILITATION | Age: 71
End: 2021-05-04
Attending: PHYSICAL MEDICINE & REHABILITATION
Payer: MEDICARE

## 2021-05-04 DIAGNOSIS — M51.26 LUMBAR DISC HERNIATION: ICD-10-CM

## 2021-05-04 DIAGNOSIS — M54.41 CHRONIC RIGHT-SIDED LOW BACK PAIN WITH RIGHT-SIDED SCIATICA: ICD-10-CM

## 2021-05-04 DIAGNOSIS — G89.29 CHRONIC RIGHT-SIDED LOW BACK PAIN WITH RIGHT-SIDED SCIATICA: ICD-10-CM

## 2021-05-04 PROCEDURE — 97110 THERAPEUTIC EXERCISES: CPT

## 2021-05-04 NOTE — OP THERAPY DAILY TREATMENT
Outpatient Physical Therapy  DAILY TREATMENT     [facility] [place]  [address]  [facility] [address]  Phone:  [phone]  Fax:  [phone]    Date: 05/04/2021    Patient: Nina Gregory  YOB: 1950  MRN: [id number]     Time Calculation    Start time: 1400  Stop time: 1430 Time Calculation (min): 30 minutes         Chief Complaint: Back Problem    Visit #: 2    SUBJECTIVE:  Patient reports increased stiffness and decreased leg symptoms. Notes she feels not comfortable when she sits upright.     OBJECTIVE:  Current objective measures:           Therapeutic Exercises (CPT 63574):     1. Standing ext, x10 trial every 3 hours      Therapeutic Exercise Summary: Access Code: [email or web address]      Exercises  Supine Transversus Abdominis Bracing - Hands on Stomach - 1 x daily - 7 x weekly - 10 reps - 2 sets  Tra - 1 x daily - 7 x weekly - 10 reps - 2 sets  Seated Multifidi Isometric - 1 x daily - 7 x weekly - 10 reps - 2 sets  Tandem Stance with Eyes Closed in Corner - 1 x daily - 7 x weekly - 2 sets - 10 reps  Seated Hip Abduction with Resistance - 1 x daily - 7 x weekly - 10 reps - 2 sets        Time-based treatments/modalities:    Physical Therapy Timed Treatment Charges  Therapeutic exercise minutes (CPT 57807): 30 minutes      Pain rating (1-10) before treatment:  1  Pain rating (1-10) after treatment:  1    ASSESSMENT:   Response to treatment: Patient responded to slight extension and notes the pain down her leg has subsided due to improved posture. Plan to continue to trial extension.     PLAN/RECOMMENDATIONS:   Plan for treatment: therapy treatment to continue next visit.  Planned interventions for next visit: continue with current treatment.

## 2021-05-12 ENCOUNTER — PHYSICAL THERAPY (OUTPATIENT)
Dept: PHYSICAL THERAPY | Facility: REHABILITATION | Age: 71
End: 2021-05-12
Attending: PHYSICAL MEDICINE & REHABILITATION
Payer: MEDICARE

## 2021-05-12 ENCOUNTER — OFFICE VISIT (OUTPATIENT)
Dept: CARDIOLOGY | Facility: MEDICAL CENTER | Age: 71
End: 2021-05-12
Payer: MEDICARE

## 2021-05-12 VITALS
DIASTOLIC BLOOD PRESSURE: 82 MMHG | WEIGHT: 171.3 LBS | HEIGHT: 65 IN | HEART RATE: 76 BPM | RESPIRATION RATE: 12 BRPM | OXYGEN SATURATION: 96 % | BODY MASS INDEX: 28.54 KG/M2 | SYSTOLIC BLOOD PRESSURE: 124 MMHG

## 2021-05-12 DIAGNOSIS — J45.40 MODERATE PERSISTENT ASTHMA WITHOUT COMPLICATION: ICD-10-CM

## 2021-05-12 DIAGNOSIS — R93.1 ELEVATED CORONARY ARTERY CALCIUM SCORE: ICD-10-CM

## 2021-05-12 DIAGNOSIS — M47.816 LUMBAR SPONDYLOSIS: ICD-10-CM

## 2021-05-12 DIAGNOSIS — M54.41 CHRONIC RIGHT-SIDED LOW BACK PAIN WITH RIGHT-SIDED SCIATICA: ICD-10-CM

## 2021-05-12 DIAGNOSIS — M54.16 LUMBAR RADICULOPATHY: ICD-10-CM

## 2021-05-12 DIAGNOSIS — I25.10 CORONARY ARTERY DISEASE INVOLVING NATIVE CORONARY ARTERY OF NATIVE HEART WITHOUT ANGINA PECTORIS: ICD-10-CM

## 2021-05-12 DIAGNOSIS — G89.29 CHRONIC RIGHT-SIDED LOW BACK PAIN WITH RIGHT-SIDED SCIATICA: ICD-10-CM

## 2021-05-12 DIAGNOSIS — M51.26 LUMBAR DISC HERNIATION: ICD-10-CM

## 2021-05-12 PROCEDURE — 97014 ELECTRIC STIMULATION THERAPY: CPT

## 2021-05-12 PROCEDURE — 99213 OFFICE O/P EST LOW 20 MIN: CPT | Performed by: INTERNAL MEDICINE

## 2021-05-12 PROCEDURE — 97110 THERAPEUTIC EXERCISES: CPT

## 2021-05-12 RX ORDER — CICLOPIROX 80 MG/ML
SOLUTION TOPICAL
COMMUNITY
Start: 2021-04-06 | End: 2022-05-16

## 2021-05-12 ASSESSMENT — ENCOUNTER SYMPTOMS
MUSCULOSKELETAL NEGATIVE: 1
BLURRED VISION: 0
NEUROLOGICAL NEGATIVE: 1
SHORTNESS OF BREATH: 0
BRUISES/BLEEDS EASILY: 0
FOCAL WEAKNESS: 0
HEADACHES: 0
DIZZINESS: 0
CARDIOVASCULAR NEGATIVE: 1
COUGH: 0
EYES NEGATIVE: 1
RESPIRATORY NEGATIVE: 1
DOUBLE VISION: 0
VOMITING: 0
DEPRESSION: 0
GASTROINTESTINAL NEGATIVE: 1
MYALGIAS: 0
PALPITATIONS: 0
ABDOMINAL PAIN: 0
WEAKNESS: 0
NAUSEA: 0
PSYCHIATRIC NEGATIVE: 1
WEIGHT LOSS: 0
CLAUDICATION: 0
FEVER: 0
CHILLS: 0
CONSTITUTIONAL NEGATIVE: 1
NERVOUS/ANXIOUS: 0

## 2021-05-12 ASSESSMENT — FIBROSIS 4 INDEX: FIB4 SCORE: 1.18

## 2021-05-12 NOTE — PROGRESS NOTES
Chief Complaint   Patient presents with   • Hyperlipidemia     F/V Dx: Mixed hyperlipidemia   • Aortic Atherosclerosis     F/V       Subjective:   Pru Chayito Gregory is a 70 y.o. female who presents today for follow up of coronary artery disease    Since the patient's last visit on 04/21/21, she has been doing well clinically. She denies chest pain, shortness of breath, palpitations, nausea/vomiting or diaphoresis. She underwent unremarkable cardiac study (myocardial perfusion imaging study) as described.    Past Medical History:   Diagnosis Date   • Acute cystitis with hematuria 4/20/2020    Urine dipstick performed in clinic demonstrated trace glucose, 1+ bili, 1+ ketones, specific gravity 1.015, trace intact blood, pH 5.5, 2+ protein, 2.0 urobilinogen, positive nitrite, 3+ leukocyte esterase.  She performed a telemedicine visit yesterday and was placed on cephalosporin for presumptive urinary tract infection.  She was also given Pyridium due to discomfort with dysuria.  She thinks s   • Asthma    • Back pain    • CAD (coronary artery disease)    • Chickenpox    • GERD (gastroesophageal reflux disease)    • Heartburn    • Hyperlipidemia    • Hypothyroidism    • Insomnia     Trouble going to and staying asleep   • Mumps    • Painful joint    • Thyroid disease    • Tonsillitis    • Wears glasses      Past Surgical History:   Procedure Laterality Date   • ABDOMINAL HYSTERECTOMY TOTAL     • CHOLECYSTECTOMY     • HYSTERECTOMY LAPAROSCOPY     • TONSILLECTOMY       Family History   Problem Relation Age of Onset   • Cancer Father         lung   • Breast Cancer Maternal Aunt    • Hypertension Maternal Grandmother    • Hypertension Maternal Grandfather    • Kidney Disease Mother    • Heart Disease Neg Hx      Social History     Socioeconomic History   • Marital status:      Spouse name: Not on file   • Number of children: Not on file   • Years of education: Not on file   • Highest education level: Not on file    Occupational History   • Not on file   Tobacco Use   • Smoking status: Former Smoker     Packs/day: 1.00     Years: 15.00     Pack years: 15.00     Types: Cigarettes     Quit date: 1993     Years since quittin.1   • Smokeless tobacco: Never Used   • Tobacco comment: continued abstinence   Vaping Use   • Vaping Use: Never used   Substance and Sexual Activity   • Alcohol use: Yes     Comment: occ   • Drug use: No   • Sexual activity: Yes     Partners: Male     Birth control/protection: Post-Menopausal   Other Topics Concern   •  Service No   • Blood Transfusions No   • Caffeine Concern No   • Occupational Exposure No   • Hobby Hazards No   • Sleep Concern Yes   • Stress Concern Yes   • Weight Concern No   • Special Diet No   • Back Care No   • Exercise Yes   • Bike Helmet No   • Seat Belt Yes   • Self-Exams Yes   Social History Narrative    She worked at [employer] at the [employer], recently retired. She is  to Yran for the past 20 years, they met at a [employer]. She has a son ([place] [phone], [place]) and 2 grandchildren.      Social Determinants of Health     Financial Resource Strain:    • Difficulty of Paying Living Expenses:    Food Insecurity:    • Worried About Running Out of Food in the Last Year:    • Ran Out of Food in the Last Year:    Transportation Needs:    • Lack of Transportation (Medical):    • Lack of Transportation (Non-Medical):    Physical Activity:    • Days of Exercise per Week:    • Minutes of Exercise per Session:    Stress:    • Feeling of Stress :    Social Connections:    • Frequency of Communication with Friends and Family:    • Frequency of Social Gatherings with Friends and Family:    • Attends Confucianism Services:    • Active Member of Clubs or Organizations:    • Attends Club or Organization Meetings:    • Marital Status:    Intimate Partner Violence:    • Fear of Current or Ex-Partner:    • Emotionally Abused:    • Physically Abused:    • Sexually  Abused:      Allergies   Allergen Reactions   • Codeine      (Medications reviewed.)  Outpatient Encounter Medications as of 5/12/2021   Medication Sig Dispense Refill   • ciclopirox (PENLAC) 8 % solution APPLY TO ADJACENT SKIN AND AFFECTED NAIL DAILY FOR 48 WEEKS. REMOVE LAQUER WITH ALCOHOL EVERY 7 DAYS     • Probiotic Product (ALIGN PO) Take  by mouth.     • DULERA 100-5 MCG/ACT Aerosol TAKE 2 PUFFS BY MOUTH TWICE A DAY 3 Each 3   • rosuvastatin (CRESTOR) 10 MG Tab Take 1 tablet by mouth every day. N THE EVENING 100 tablet 3   • coenzyme Q-10 30 MG capsule Take 60 mg by mouth every day.     • Cyanocobalamin (B-12 PO) Take  by mouth.     • gabapentin (NEURONTIN) 100 MG Cap Take 1-3 Capsules by mouth at bedtime as needed (pain). 90 capsule 5   • albuterol 108 (90 Base) MCG/ACT Aero Soln inhalation aerosol INHALE 2 PUFFS BY MOUTH EVERY FOUR HOURS AS NEEDED FOR SHORTNESS OF BREATH. 1 Each 3   • levothyroxine (SYNTHROID) 88 MCG Tab TAKE 1 TABLET BY MOUTH EVERY  tablet 3   • urea 40 % Cream APPLY TOPICALLY TO FEET TWICE DAILY AND THEN APPLY VASELINE WITH SOCKS     • augmented betamethasone dipropionate (DIPROLENE-AF) 0.05 % ointment APPLY TO HANDS TWICE A DAY FOR 2WKS. AVOID FACE/GROIN/UNDERARS. APPLY VASALINE AND USE GLOVES     • aspirin (ASA) 81 MG Chew Tab chewable tablet Chew 1 tablet every day. 100 tablet 3   • clobetasol (TEMOVATE) 0.05 % Ointment TOPICALLY APPLY SPARINGLY TO AFFECTED AREA 3X\/WEEK     • famotidine (PEPCID) 20 MG Tab Take 20 mg by mouth 2 times a day. (Patient not taking: Reported on 5/12/2021)     • Misc. Devices Misc Please provide nocturnal oxgen 2 LPM Nasal Cannula, concentrator 99 Each 99     No facility-administered encounter medications on file as of 5/12/2021.     Review of Systems   Constitutional: Negative.  Negative for chills, fever, malaise/fatigue and weight loss.   HENT: Negative.  Negative for hearing loss.    Eyes: Negative.  Negative for blurred vision and double vision.  "  Respiratory: Negative.  Negative for cough and shortness of breath.    Cardiovascular: Negative.  Negative for chest pain, palpitations, claudication and leg swelling.   Gastrointestinal: Negative.  Negative for abdominal pain, nausea and vomiting.   Genitourinary: Negative.  Negative for dysuria and urgency.   Musculoskeletal: Negative.  Negative for joint pain and myalgias.   Skin: Negative.  Negative for itching and rash.   Neurological: Negative.  Negative for dizziness, focal weakness, weakness and headaches.   Endo/Heme/Allergies: Negative.  Does not bruise/bleed easily.   Psychiatric/Behavioral: Negative.  Negative for depression. The patient is not nervous/anxious.         Objective:   /82 (BP Location: Left arm, Patient Position: Sitting, BP Cuff Size: Adult)   Pulse 76   Resp 12   Ht 1.651 m (5' 5\")   Wt 77.7 kg (171 lb 4.8 oz)   SpO2 96%   BMI 28.51 kg/m²     Physical Exam   Constitutional: She is oriented to person, place, and time. She appears well-developed.   HENT:   Head: Normocephalic and atraumatic.   Neck: No JVD present.   Cardiovascular: Normal rate, regular rhythm and normal heart sounds.   Pulmonary/Chest: Effort normal and breath sounds normal.   Abdominal: Soft. Bowel sounds are normal.   No hepatosplenomegaly.   Musculoskeletal:         General: Normal range of motion.   Lymphadenopathy:     She has no cervical adenopathy.   Neurological: She is alert and oriented to person, place, and time.   Skin: Skin is warm and dry.     CARDIAC STUDIES/PROCEDURES:     CT CARDIAC CALCIUM SCORING (03/29/21)  Coronary calcification:  LMA - 0.0  LCX - 138  LAD - 108  RCA - 0.0  PDA - 0.0  Total Calcium Score: 246  Percentile: Calcium score is worse than the 75th percentile for the patient's age and sex.  (study result reviewed)     EKG performed on (04/21/21) EKG shows sinus rhythm with anterior Q waves.  EKG performed on (04/21/21) EKG shows sinus rhythm with anterior Q waves.     Laboratory " results of (03/11/20) Cholesterol profile of 214/55/76/127 mg/dL noted.    MYOCARDIAL PERFUSION STUDY CONCLUSIONS (04/22/21)  NUCLEAR IMAGING INTERPRETATION  No evidence of significant jeopardized viable myocardium or definite prior myocardial infarction.  Normal left ventricular size, ejection fraction, and wall motion.  ECG INTERPRETATION  Negative stress ECG for ischemia.   (study result reviewed)    Assessment:     1. Elevated coronary artery calcium score     2. Coronary artery disease involving native coronary artery of native heart without angina pectoris     3. Moderate persistent asthma without complication         Medical Decision Making:  Today's Assessment / Status / Plan:     1. Coronary artery disease: She remains clinically doing well. I will continue with current medical care including rosuvastatin.  2. Hyperlipidemia: She is doing well on statin therapy without myalgia symptoms. (Managed by primary care physician)    We will follow up the patient in one year.    CC Shawna Strickland

## 2021-05-13 ENCOUNTER — HOSPITAL ENCOUNTER (OUTPATIENT)
Dept: RADIOLOGY | Facility: MEDICAL CENTER | Age: 71
End: 2021-05-13
Attending: PHYSICAL MEDICINE & REHABILITATION
Payer: MEDICARE

## 2021-05-13 DIAGNOSIS — M47.816 LUMBAR SPONDYLOSIS: ICD-10-CM

## 2021-05-13 DIAGNOSIS — M51.26 LUMBAR DISC HERNIATION: ICD-10-CM

## 2021-05-13 DIAGNOSIS — G89.29 CHRONIC RIGHT-SIDED LOW BACK PAIN WITH RIGHT-SIDED SCIATICA: ICD-10-CM

## 2021-05-13 DIAGNOSIS — M54.16 LUMBAR RADICULOPATHY: ICD-10-CM

## 2021-05-13 DIAGNOSIS — M54.41 CHRONIC RIGHT-SIDED LOW BACK PAIN WITH RIGHT-SIDED SCIATICA: ICD-10-CM

## 2021-05-13 NOTE — OP THERAPY DAILY TREATMENT
Outpatient Physical Therapy  DAILY TREATMENT     [facility]  [address], [address]  [place] [address]  Phone:  [phone]  Fax:  [phone]    Date: 05/12/2021    Patient: Nina Gregory  YOB: 1950  MRN: [id number]     Time Calculation    Start time: 1600  Stop time: 1630 Time Calculation (min): 30 minutes         Chief Complaint: Back Problem    Visit #: 3    SUBJECTIVE:  Patient reports that she is very sore. Notes that she was in the car for a prolonged period of the time.     OBJECTIVE:  Current objective measures:           Therapeutic Exercises (CPT 64799):     1. Standing ext, x10 trial every 3 hours    2. Seated on DD with LE lifts, x20    3. Nu step, x7min      Therapeutic Exercise Summary: Access Code: [email or web address]      Exercises  Supine Transversus Abdominis Bracing - Hands on Stomach - 1 x daily - 7 x weekly - 10 reps - 2 sets  Tra - 1 x daily - 7 x weekly - 10 reps - 2 sets  Seated Multifidi Isometric - 1 x daily - 7 x weekly - 10 reps - 2 sets  Tandem Stance with Eyes Closed in Corner - 1 x daily - 7 x weekly - 2 sets - 10 reps  Seated Hip Abduction with Resistance - 1 x daily - 7 x weekly - 10 reps - 2 sets      Therapeutic Treatments and Modalities:     1. E Stim Unattended (CPT 46864), IFC with HP x15min 80-150hz to LB    Time-based treatments/modalities:    Physical Therapy Timed Treatment Charges  Therapeutic exercise minutes (CPT 44808): 20 minutes      Pain rating (1-10) before treatment:  3  Pain rating (1-10) after treatment:  1    ASSESSMENT:   Response to treatment: Patient responded well to IFC with decreased overall pain. Patient's exercise program was decreased due to increased soreness.     PLAN/RECOMMENDATIONS:   Plan for treatment: therapy treatment to continue next visit.  Planned interventions for next visit: continue with current treatment.

## 2021-05-18 ENCOUNTER — PHYSICAL THERAPY (OUTPATIENT)
Dept: PHYSICAL THERAPY | Facility: REHABILITATION | Age: 71
End: 2021-05-18
Attending: PHYSICAL MEDICINE & REHABILITATION
Payer: MEDICARE

## 2021-05-18 DIAGNOSIS — M54.16 LUMBAR RADICULOPATHY: ICD-10-CM

## 2021-05-18 DIAGNOSIS — M54.41 CHRONIC RIGHT-SIDED LOW BACK PAIN WITH RIGHT-SIDED SCIATICA: ICD-10-CM

## 2021-05-18 DIAGNOSIS — M51.26 LUMBAR DISC HERNIATION: ICD-10-CM

## 2021-05-18 DIAGNOSIS — G89.29 CHRONIC RIGHT-SIDED LOW BACK PAIN WITH RIGHT-SIDED SCIATICA: ICD-10-CM

## 2021-05-18 DIAGNOSIS — M47.816 LUMBAR SPONDYLOSIS: ICD-10-CM

## 2021-05-18 PROCEDURE — 97110 THERAPEUTIC EXERCISES: CPT

## 2021-05-18 NOTE — OP THERAPY DAILY TREATMENT
Outpatient Physical Therapy  DAILY TREATMENT     [facility]  [address]  [facility] [place] [facility] [place] [facility] [address]  Phone:  [phone]  Fax:  [phone]    Date: 05/18/2021    Patient: Nina Gregory  YOB: 1950  MRN: [id number]     Time Calculation    Start time: 0900  Stop time: 0930 Time Calculation (min): 30 minutes         Chief Complaint: Back Problem    Visit #: 4    SUBJECTIVE:  Patient reports that she is doing better. Notes less pain with outdoor activity.     OBJECTIVE:  Current objective measures:           Therapeutic Exercises (CPT 46012):     1. Nu step , x10min    2. Shuttle L4 , x4min DL      Therapeutic Exercise Summary: Access Code: [email or web address]      Exercises  Supine Transversus Abdominis Bracing - Hands on Stomach - 1 x daily - 7 x weekly - 10 reps - 2 sets  Tra - 1 x daily - 7 x weekly - 10 reps - 2 sets  Seated Multifidi Isometric - 1 x daily - 7 x weekly - 10 reps - 2 sets  Tandem Stance with Eyes Closed in Corner - 1 x daily - 7 x weekly - 2 sets - 10 reps  Seated Hip Abduction with Resistance - 1 x daily - 7 x weekly - 10 reps - 2 sets  Supine Bridge - 1 x daily - 7 x weekly - 10 reps - 2 sets  Bridge with Heels on Swiss Ball - 1 x daily - 7 x weekly - 2 sets - 10 reps        Time-based treatments/modalities:    Physical Therapy Timed Treatment Charges  Therapeutic exercise minutes (CPT 26795): 30 minutes      Pain rating (1-10) before treatment:  1  Pain rating (1-10) after treatment:  1    ASSESSMENT:   Response to treatment: Patient does seem to be responding well to therapy with an overall improvement in function and decrease in pain with activity.     PLAN/RECOMMENDATIONS:   Plan for treatment: therapy treatment to continue next visit.  Planned interventions for next visit: continue with current treatment.

## 2021-05-20 ENCOUNTER — OFFICE VISIT (OUTPATIENT)
Dept: PHYSICAL MEDICINE AND REHAB | Facility: MEDICAL CENTER | Age: 71
End: 2021-05-20
Payer: MEDICARE

## 2021-05-20 ENCOUNTER — PHYSICAL THERAPY (OUTPATIENT)
Dept: PHYSICAL THERAPY | Facility: REHABILITATION | Age: 71
End: 2021-05-20
Attending: PHYSICAL MEDICINE & REHABILITATION
Payer: MEDICARE

## 2021-05-20 VITALS
WEIGHT: 173.72 LBS | HEIGHT: 65 IN | TEMPERATURE: 97.1 F | BODY MASS INDEX: 28.94 KG/M2 | SYSTOLIC BLOOD PRESSURE: 138 MMHG | DIASTOLIC BLOOD PRESSURE: 62 MMHG | OXYGEN SATURATION: 97 % | HEART RATE: 75 BPM

## 2021-05-20 DIAGNOSIS — M54.16 LUMBAR RADICULOPATHY: ICD-10-CM

## 2021-05-20 DIAGNOSIS — M51.26 LUMBAR DISC HERNIATION: ICD-10-CM

## 2021-05-20 DIAGNOSIS — M54.41 CHRONIC RIGHT-SIDED LOW BACK PAIN WITH RIGHT-SIDED SCIATICA: ICD-10-CM

## 2021-05-20 DIAGNOSIS — M47.816 LUMBAR SPONDYLOSIS: ICD-10-CM

## 2021-05-20 DIAGNOSIS — G89.29 CHRONIC RIGHT-SIDED LOW BACK PAIN WITH RIGHT-SIDED SCIATICA: ICD-10-CM

## 2021-05-20 PROCEDURE — 97014 ELECTRIC STIMULATION THERAPY: CPT

## 2021-05-20 PROCEDURE — 99214 OFFICE O/P EST MOD 30 MIN: CPT | Performed by: PHYSICAL MEDICINE & REHABILITATION

## 2021-05-20 PROCEDURE — 97110 THERAPEUTIC EXERCISES: CPT

## 2021-05-20 ASSESSMENT — PAIN SCALES - GENERAL: PAINLEVEL: 3=SLIGHT PAIN

## 2021-05-20 ASSESSMENT — FIBROSIS 4 INDEX: FIB4 SCORE: 1.18

## 2021-05-20 NOTE — PROGRESS NOTES
Follow up patient note  Interventional spine and sports physiatry, Physical medicine rehabilitation      Chief complaint:   Chief Complaint   Patient presents with   • Follow-Up     Back pain          HISTORY    Please see new patient note by Dr Echols,  for more details.     HPI  Patient identification: Nancy Gregory ,  1950,   With Diagnoses of Lumbar disc herniation, Lumbar radiculopathy, Lumbar spondylosis, and Chronic 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 were pertinent to this visit.     The patient has a home exercise program which I provided as well as with physical therapy since the previous visit.  She has had a significant improvement in her pain.  Now there is 6 out of 10 in intensity pain during the day when she is doing prolonged sitting or activities that exacerbate this pain.  There is a constant 3 out of 10 pain.  She has pain with bending and lifting which causes difficulty including gardening.        ROS Red Flags :   Fever, Chills, Sweats: Denies  Involuntary Weight Loss: Denies  Bowel/Bladder Incontinence: Denies  Saddle Anesthesia: Denies        PMHx:   Past Medical History:   Diagnosis Date   • Acute cystitis with hematuria 2020    Urine dipstick performed in clinic demonstrated trace glucose, 1+ bili, 1+ ketones, specific gravity 1.015, trace intact blood, pH 5.5, 2+ protein, 2.0 urobilinogen, positive nitrite, 3+ leukocyte esterase.  She performed a telemedicine visit yesterday and was placed on cephalosporin for presumptive urinary tract infection.  She was also given Pyridium due to discomfort with dysuria.  She thinks s   • Asthma    • Back pain    • CAD (coronary artery disease)    • Chickenpox    • GERD (gastroesophageal reflux disease)    • Heartburn    • Hyperlipidemia    • Hypothyroidism    • Insomnia     Trouble going to and staying asleep   • Mumps    • Painful joint    • Thyroid disease    • Tonsillitis    • Wears glasses        PSHx:   Past  Surgical History:   Procedure Laterality Date   • ABDOMINAL HYSTERECTOMY TOTAL     • CHOLECYSTECTOMY     • HYSTERECTOMY LAPAROSCOPY     • TONSILLECTOMY         Family history   Family History   Problem Relation Age of Onset   • Cancer Father         lung   • Breast Cancer Maternal Aunt    • Hypertension Maternal Grandmother    • Hypertension Maternal Grandfather    • Kidney Disease Mother    • Heart Disease Neg Hx          Medications:   Outpatient Medications Marked as Taking for the 5/20/21 encounter (Office Visit) with Carlos Eduardo Echols M.D.   Medication Sig Dispense Refill   • ciclopirox (PENLAC) 8 % solution APPLY TO ADJACENT SKIN AND AFFECTED NAIL DAILY FOR 48 WEEKS. REMOVE LAQUER WITH ALCOHOL EVERY 7 DAYS     • Probiotic Product (ALIGN PO) Take  by mouth.     • DULERA 100-5 MCG/ACT Aerosol TAKE 2 PUFFS BY MOUTH TWICE A DAY 3 Each 3   • rosuvastatin (CRESTOR) 10 MG Tab Take 1 tablet by mouth every day. N THE EVENING 100 tablet 3   • coenzyme Q-10 30 MG capsule Take 60 mg by mouth every day.     • Cyanocobalamin (B-12 PO) Take  by mouth.     • gabapentin (NEURONTIN) 100 MG Cap Take 1-3 Capsules by mouth at bedtime as needed (pain). 90 capsule 5   • albuterol 108 (90 Base) MCG/ACT Aero Soln inhalation aerosol INHALE 2 PUFFS BY MOUTH EVERY FOUR HOURS AS NEEDED FOR SHORTNESS OF BREATH. 1 Each 3   • levothyroxine (SYNTHROID) 88 MCG Tab TAKE 1 TABLET BY MOUTH EVERY  tablet 3   • urea 40 % Cream APPLY TOPICALLY TO FEET TWICE DAILY AND THEN APPLY VASELINE WITH SOCKS     • augmented betamethasone dipropionate (DIPROLENE-AF) 0.05 % ointment APPLY TO HANDS TWICE A DAY FOR 2WKS. AVOID FACE/GROIN/UNDERARS. APPLY VASALINE AND USE GLOVES     • aspirin (ASA) 81 MG Chew Tab chewable tablet Chew 1 tablet every day. 100 tablet 3   • Misc. Devices Misc Please provide nocturnal oxgen 2 LPM Nasal Cannula, concentrator 99 Each 99   • clobetasol (TEMOVATE) 0.05 % Ointment TOPICALLY APPLY SPARINGLY TO AFFECTED AREA 3X\/WEEK           Current Outpatient Medications on File Prior to Visit   Medication Sig Dispense Refill   • ciclopirox (PENLAC) 8 % solution APPLY TO ADJACENT SKIN AND AFFECTED NAIL DAILY FOR 48 WEEKS. REMOVE LAQUER WITH ALCOHOL EVERY 7 DAYS     • Probiotic Product (ALIGN PO) Take  by mouth.     • DULERA 100-5 MCG/ACT Aerosol TAKE 2 PUFFS BY MOUTH TWICE A DAY 3 Each 3   • rosuvastatin (CRESTOR) 10 MG Tab Take 1 tablet by mouth every day. N THE EVENING 100 tablet 3   • coenzyme Q-10 30 MG capsule Take 60 mg by mouth every day.     • Cyanocobalamin (B-12 PO) Take  by mouth.     • gabapentin (NEURONTIN) 100 MG Cap Take 1-3 Capsules by mouth at bedtime as needed (pain). 90 capsule 5   • albuterol 108 (90 Base) MCG/ACT Aero Soln inhalation aerosol INHALE 2 PUFFS BY MOUTH EVERY FOUR HOURS AS NEEDED FOR SHORTNESS OF BREATH. 1 Each 3   • levothyroxine (SYNTHROID) 88 MCG Tab TAKE 1 TABLET BY MOUTH EVERY  tablet 3   • urea 40 % Cream APPLY TOPICALLY TO FEET TWICE DAILY AND THEN APPLY VASELINE WITH SOCKS     • augmented betamethasone dipropionate (DIPROLENE-AF) 0.05 % ointment APPLY TO HANDS TWICE A DAY FOR 2WKS. AVOID FACE/GROIN/UNDERARS. APPLY VASALINE AND USE GLOVES     • aspirin (ASA) 81 MG Chew Tab chewable tablet Chew 1 tablet every day. 100 tablet 3   • Misc. Devices Misc Please provide nocturnal oxgen 2 LPM Nasal Cannula, concentrator 99 Each 99   • clobetasol (TEMOVATE) 0.05 % Ointment TOPICALLY APPLY SPARINGLY TO AFFECTED AREA 3X\/WEEK     • famotidine (PEPCID) 20 MG Tab Take 20 mg by mouth 2 times a day. (Patient not taking: Reported on 5/12/2021)       No current facility-administered medications on file prior to visit.         Allergies:   Allergies   Allergen Reactions   • Codeine        Social Hx:   Social History     Socioeconomic History   • Marital status:      Spouse name: Not on file   • Number of children: Not on file   • Years of education: Not on file   • Highest education level: Not on file    Occupational History   • Not on file   Tobacco Use   • Smoking status: Former Smoker     Packs/day: 1.00     Years: 15.00     Pack years: 15.00     Types: Cigarettes     Quit date: 1993     Years since quittin.1   • Smokeless tobacco: Never Used   • Tobacco comment: continued abstinence   Vaping Use   • Vaping Use: Never used   Substance and Sexual Activity   • Alcohol use: Yes     Comment: occ   • Drug use: No   • Sexual activity: Yes     Partners: Male     Birth control/protection: Post-Menopausal   Other Topics Concern   •  Service No   • Blood Transfusions No   • Caffeine Concern No   • Occupational Exposure No   • Hobby Hazards No   • Sleep Concern Yes   • Stress Concern Yes   • Weight Concern No   • Special Diet No   • Back Care No   • Exercise Yes   • Bike Helmet No   • Seat Belt Yes   • Self-Exams Yes   Social History Narrative    She worked at [employer] at the [employer], recently retired. She is  to Ryan for the past 20 years, they met at a [place]. She has a son ([place] [address], [place]) and 2 grandchildren.      Social Determinants of Health     Financial Resource Strain:    • Difficulty of Paying Living Expenses:    Food Insecurity:    • Worried About Running Out of Food in the Last Year:    • Ran Out of Food in the Last Year:    Transportation Needs:    • Lack of Transportation (Medical):    • Lack of Transportation (Non-Medical):    Physical Activity:    • Days of Exercise per Week:    • Minutes of Exercise per Session:    Stress:    • Feeling of Stress :    Social Connections:    • Frequency of Communication with Friends and Family:    • Frequency of Social Gatherings with Friends and Family:    • Attends Spiritism Services:    • Active Member of Clubs or Organizations:    • Attends Club or Organization Meetings:    • Marital Status:    Intimate Partner Violence:    • Fear of Current or Ex-Partner:    • Emotionally Abused:    • Physically Abused:    • Sexually  "Abused:          EXAMINATION     Physical Exam:   Vitals: /62 (BP Location: Right arm, Patient Position: Sitting, BP Cuff Size: Adult)   Pulse 75   Temp 36.2 °C (97.1 °F) (Temporal)   Ht 1.651 m (5' 5\")   Wt 78.8 kg (173 lb 11.6 oz)   SpO2 97%     Constitutional:   Body Habitus: Body mass index is 28.91 kg/m².  Cooperation: Fully cooperates with exam  Appearance: Well-groomed no disheveled    Respiratory-  breathing comfortable on room air, no audible wheezing  Cardiovascular- capillary refills less than 2 seconds. No lower extremity edema is noted.   Psychiatric- alert and oriented ×3. Normal affect.    MSK and Neuro: -    decreased active range of motion with flexion, lateral flexion, and rotation bilaterally.   There is decreased active range of motion with lumbar extension.    There is  pain with lumbar extension.   There is pain with facet loading maneuver (extension rotation) with axial low back pain on the RIGHT side(s)    Palpation:   No tenderness to palpation in midline at T1-T12 levels. No tenderness to palpation in the left and right of the midline T1-L5, NEGATIVE for tenderness to palpation to the para-midline region in the lower lumbar levels.  palpation over SI joint: negative bilaterally    palpation in hip or over the gluteus medius tendon insertion: negative bilaterally      Lumbar spine Special tests  Neuro tension  Straight leg test negative bilaterally    Slump test negative bilaterally      Key points for the international standards for neurological classification of spinal cord injury (ISNCSCI) to light touch.     Dermatome R L                                      L2 2 2   L3 2 2   L4 2 2   L5 2 2   S1 2 2   S2 2 2         Motor Exam Lower Extremities    ? Myotome R L   Hip flexion L2 5 5   Knee extension L3 5 5   Ankle dorsiflexion L4 5 5   Toe extension L5 5 5   Ankle plantarflexion S1 5 5                 MEDICAL DECISION MAKING    DATA    Labs:   Lab Results   Component Value " Date/Time    SODIUM 136 01/22/2021 10:25 AM    POTASSIUM 4.0 01/22/2021 10:25 AM    CHLORIDE 100 01/22/2021 10:25 AM    CO2 27 01/22/2021 10:25 AM    GLUCOSE 95 01/22/2021 10:25 AM    BUN 8 01/22/2021 10:25 AM    CREATININE 0.59 01/22/2021 10:25 AM        No results found for: PROTHROMBTM, INR     Lab Results   Component Value Date/Time    WBC 7.7 01/22/2021 10:25 AM    RBC 4.63 01/22/2021 10:25 AM    HEMOGLOBIN 14.2 01/22/2021 10:25 AM    HEMATOCRIT 44.2 01/22/2021 10:25 AM    MCV 95.5 01/22/2021 10:25 AM    MCH 30.7 01/22/2021 10:25 AM    MCHC 32.1 (L) 01/22/2021 10:25 AM    MPV 11.0 01/22/2021 10:25 AM    NEUTSPOLYS 67.80 01/22/2021 10:25 AM    LYMPHOCYTES 22.40 01/22/2021 10:25 AM    MONOCYTES 8.40 01/22/2021 10:25 AM    EOSINOPHILS 0.80 01/22/2021 10:25 AM    BASOPHILS 0.50 01/22/2021 10:25 AM        No results found for: HBA1C       Imaging:   I personally reviewed following images  I personally reviewed following images, these are my reads  CT abdomen pelvis 1/26/2021.  I reviewed the study specifically over the patient's spine.  Please see radiologist dictation for remainder of the report.  Degenerative disc disease worst at the L4-5 level.  Also present L3-4.  Facet arthropathy right worse than left at L5-S1 and left worse than right at L4-5.  Difficult to evaluate neuroforaminal and central canal stenosis but there appears to be central canal stenosis at L4-5 and left-sided neuroforaminal stenosis at L4-5.  This would better be evaluated with an MRI lumbar spine.    I reviewed the following radiology reports                                                                      Results for orders placed during the hospital encounter of 01/26/21    CT-ABDOMEN-PELVIS WITH    Impression  Findings consistent with sigmoid diverticulitis without abscess or free air.    Aortic atherosclerosis without aneurysm.    Hepatic cyst.                       Results for orders placed during the hospital encounter of  18    DX-CHEST-2 VIEWS    Impression  Negative two views of the chest.                                             DIAGNOSIS   Visit Diagnoses     ICD-10-CM   1. Lumbar disc herniation  M51.26   2. Lumbar radiculopathy  M54.16   3. Lumbar spondylosis  M47.816   4. Chronic 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  M54.41    G89.29         ASSESSMENT and PLAN:     Nancy Alvaradoun  1950 female      Nancy was seen today for follow-up.    Diagnoses and all orders for this visit:    Lumbar disc herniation    Lumbar radiculopathy    Lumbar spondylosis    Chronic 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      Medications: Continue gabapentin 100-300 mg nightly.    I believe the majority the patient's pain is coming from facet mediated pain in the right lower lumbar spine.  I previously ordered an MRI lumbar spine but the patient canceled this as she had anxiety and forgot to take her Xanax prior to the study.  She is getting improvement in her symptoms but she still has significant pain and functional deficits.  We discussed proceeding with the MRI lumbar spine if there is no improvement over the next month.  Continue with home exercise program and physical therapy.      I reviewed multiple notes from physical therapy including the most recent note from Kwame Elmore PT, DPT from 2021.    Follow up: After the diagnostic study    Thank you for allowing me to participate in the care of this patient. If you have any questions please not hesitate to contact me.             Please note that this dictation was created using voice recognition software. I have made every reasonable attempt to correct obvious errors but there may be errors of grammar and content that I may have overlooked prior to finalization of this note.      Carlos Eduardo Echols MD  Interventional Spine and Sports Physiatry  Physical Medicine and Rehabilitation  [facility]

## 2021-05-21 NOTE — OP THERAPY DAILY TREATMENT
Outpatient Physical Therapy  DAILY TREATMENT     [facility]  [address], [address]  [place] [address]  Phone:  [phone]  Fax:  [phone]    Date: 05/20/2021    Patient: Nina Gregory  YOB: 1950  MRN: [id number]     Time Calculation    Start time: 1530  Stop time: 1615 Time Calculation (min): 45 minutes         Chief Complaint: Back Problem    Visit #: 5    SUBJECTIVE:  Patient reports that overall she is about 50-70% better. Notes that she still gets pain with prolonged sitting.     OBJECTIVE:  Current objective measures:           Therapeutic Exercises (CPT 15406):     1. Nu step , x10min    2. Shuttle L4 , x4min DL      Therapeutic Exercise Summary: Access Code: [email or web address]      Exercises  Supine Transversus Abdominis Bracing - Hands on Stomach - 1 x daily - 7 x weekly - 10 reps - 2 sets  Tra - 1 x daily - 7 x weekly - 10 reps - 2 sets  Seated Multifidi Isometric - 1 x daily - 7 x weekly - 10 reps - 2 sets  Tandem Stance with Eyes Closed in Corner - 1 x daily - 7 x weekly - 2 sets - 10 reps  Seated Hip Abduction with Resistance - 1 x daily - 7 x weekly - 10 reps - 2 sets  Supine Bridge - 1 x daily - 7 x weekly - 10 reps - 2 sets  Bridge with Heels on Swiss Ball - 1 x daily - 7 x weekly - 2 sets - 10 reps      Therapeutic Treatments and Modalities:     1. E Stim Unattended (CPT 70151), IFC with HP to lumbar x15min 80-150hz    Time-based treatments/modalities:    Physical Therapy Timed Treatment Charges  Therapeutic exercise minutes (CPT 75304): 30 minutes      Pain rating (1-10) before treatment:  2  Pain rating (1-10) after treatment:  0    ASSESSMENT:   Response to treatment: Patient responded well to therapy with an overall decrease in symptoms and improvement in function. Patient to continue with HEP.     PLAN/RECOMMENDATIONS:   Plan for treatment: therapy treatment to continue next visit.  Planned interventions for next visit: continue with current  treatment.

## 2021-05-25 ENCOUNTER — TELEPHONE (OUTPATIENT)
Dept: MEDICAL GROUP | Facility: PHYSICIAN GROUP | Age: 71
End: 2021-05-25

## 2021-05-25 ENCOUNTER — PHYSICAL THERAPY (OUTPATIENT)
Dept: PHYSICAL THERAPY | Facility: REHABILITATION | Age: 71
End: 2021-05-25
Attending: PHYSICAL MEDICINE & REHABILITATION
Payer: MEDICARE

## 2021-05-25 DIAGNOSIS — G89.29 CHRONIC RIGHT-SIDED LOW BACK PAIN WITH RIGHT-SIDED SCIATICA: ICD-10-CM

## 2021-05-25 DIAGNOSIS — M47.816 LUMBAR SPONDYLOSIS: ICD-10-CM

## 2021-05-25 DIAGNOSIS — M51.26 LUMBAR DISC HERNIATION: ICD-10-CM

## 2021-05-25 DIAGNOSIS — M54.41 CHRONIC RIGHT-SIDED LOW BACK PAIN WITH RIGHT-SIDED SCIATICA: ICD-10-CM

## 2021-05-25 DIAGNOSIS — M54.16 LUMBAR RADICULOPATHY: ICD-10-CM

## 2021-05-25 PROCEDURE — 97110 THERAPEUTIC EXERCISES: CPT

## 2021-05-25 NOTE — OP THERAPY DAILY TREATMENT
Outpatient Physical Therapy  DAILY TREATMENT     [facility] [place]  [address], [address]  [place] [address]  Phone:  [phone]  Fax:  [phone]    Date: 05/25/2021    Patient: Nina Gregory  YOB: 1950  MRN: [id number]     Time Calculation    Start time: 1330  Stop time: 1353 Time Calculation (min): 23 minutes         Chief Complaint: Back Problem    Visit #: 6    SUBJECTIVE:  Patient reports that symptoms have improved. States that she has stabilized. Notes no better or worse than last time.     OBJECTIVE:  Current objective measures:   PT Functional Assessment Tool Used: LBDI  PT Functional Assessment Score: 30%       Therapeutic Exercises (CPT 20400):     1. Nu step , x10min    2. Press up trial, x10, NC:NE    3. Press up on elbow, x10, decrease; better      Therapeutic Exercise Summary: Access Code: [email or web address]      Exercises  Supine Transversus Abdominis Bracing - Hands on Stomach - 1 x daily - 7 x weekly - 10 reps - 2 sets  Tra - 1 x daily - 7 x weekly - 10 reps - 2 sets  Seated Multifidi Isometric - 1 x daily - 7 x weekly - 10 reps - 2 sets  Tandem Stance with Eyes Closed in Corner - 1 x daily - 7 x weekly - 2 sets - 10 reps  Seated Hip Abduction with Resistance - 1 x daily - 7 x weekly - 10 reps - 2 sets  Supine Bridge - 1 x daily - 7 x weekly - 10 reps - 2 sets  Bridge with Heels on Swiss Ball - 1 x daily - 7 x weekly - 2 sets - 10 reps        Time-based treatments/modalities:    Physical Therapy Timed Treatment Charges  Therapeutic exercise minutes (CPT 48907): 23 minutes      Pain rating (1-10) before treatment:  2  Pain rating (1-10) after treatment:  1    ASSESSMENT:   Response to treatment: Patient responded well to therapy with a slight decrease in pain in response to ext. Plan to have patient trial in the next 24 hours.     PLAN/RECOMMENDATIONS:   Plan for treatment: therapy treatment to continue next visit.  Planned interventions for next visit:  continue with current treatment.

## 2021-05-25 NOTE — TELEPHONE ENCOUNTER
ESTABLISHED PATIENT PRE-VISIT PLANNING     Patient was NOT contacted to complete PVP.     Note: Patient will not be contacted if there is no indication to call.     1.  Reviewed notes from the last few office visits within the medical group: Yes    2.  If any orders were placed at last visit or intended to be done for this visit (i.e. 6 mos follow-up), do we have Results/Consult Notes?         •  Labs - Labs ordered, NOT completed. Patient advised to complete prior to next appointment.  Note: If patient appointment is for lab review and patient did not complete labs, check with provider if OK to reschedule patient until labs completed.       •  Imaging - Imaging ordered, completed and results are in chart.       •  Referrals - Referral ordered, patient was seen and consult notes are in chart. Care Teams updated  YES.    3. Is this appointment scheduled as a Hospital Follow-Up? No    4.  Immunizations were updated in Epic using Reconcile Outside Information activity? Yes    5.  Patient is due for the following Health Maintenance Topics:   Health Maintenance Due   Topic Date Due   • Annual Wellness Visit  Never done   • IMM ZOSTER VACCINES (1 of 2) Never done     6.  AHA (Pulse8) form printed for Provider? No, already completed

## 2021-05-26 ENCOUNTER — OFFICE VISIT (OUTPATIENT)
Dept: MEDICAL GROUP | Facility: PHYSICIAN GROUP | Age: 71
End: 2021-05-26
Payer: MEDICARE

## 2021-05-26 VITALS
SYSTOLIC BLOOD PRESSURE: 100 MMHG | RESPIRATION RATE: 12 BRPM | TEMPERATURE: 97.6 F | HEIGHT: 64 IN | HEART RATE: 68 BPM | OXYGEN SATURATION: 97 % | BODY MASS INDEX: 29.08 KG/M2 | DIASTOLIC BLOOD PRESSURE: 60 MMHG | WEIGHT: 170.3 LBS

## 2021-05-26 DIAGNOSIS — R22.0 MASS OF LEFT SUBMANDIBULAR REGION: ICD-10-CM

## 2021-05-26 DIAGNOSIS — M85.852 OSTEOPENIA OF NECK OF LEFT FEMUR: ICD-10-CM

## 2021-05-26 DIAGNOSIS — R93.1 ELEVATED CORONARY ARTERY CALCIUM SCORE: ICD-10-CM

## 2021-05-26 PROCEDURE — 99214 OFFICE O/P EST MOD 30 MIN: CPT | Performed by: INTERNAL MEDICINE

## 2021-05-26 ASSESSMENT — FIBROSIS 4 INDEX: FIB4 SCORE: 1.18

## 2021-05-26 NOTE — ASSESSMENT & PLAN NOTE
Chronic and ongoing issue.  We will proceed with ultrasound of the soft tissue in the neck to further evaluate.  Could be submandibular swelling versus thyroid nodule versus lymph node.  Will have her use ibuprofen 400 mg 3 times daily for a few days to see if it helps with inflammation along with warm compresses and massage.  No signs or symptoms of infection.  She is agreeable with this plan and will complete the imaging when she returns from a vacation to [place] in June.

## 2021-05-26 NOTE — PROGRESS NOTES
Subjective:   Chief Complaint/History of Present Illness:  Prudence Chayito Gregory is a 70 y.o. female established patient who presents today to discuss medical problems as listed below. Pru is unaccompanied for today's visit.    Problem   Mass of Left Submandibular Region    She reports development of a mass in her chin/neck area.  On exam it appears to be submandibular on the left anterolateral aspect.  It is easily palpable, mobile, and approximately 1 x 1 cm.  It is nontender.  There is some associated inflammation in the region that is visible on inspection.  She has been working with periodontist due to gum disease and wonders if it could be related to her dentition or jaw.  The mass comes and goes and has been present for at least a year and she feels like with massage she can usually get it to go away.  She has a history of hypothyroidism on levothyroxine.     Osteopenia of Neck of Left Femur    Bone Density (2021):  T score of 0.0   T score of -1.1     IMPRESSION:  According to the World Health Organization classification, bone mineral density of this patient is normal for the lumbar spine and osteopenic for the left femur.    10-year Probability of Fracture:  Major Osteoporotic     10.7%  Hip     1.8%    She has very mild osteopenia noted on most recent bone density.      Elevated Coronary Artery Calcium Score    CT coronary calcium score (3/2021):  Coronary calcification:  LMA - 0.0  LCX - 138  LAD - 108  RCA - 0.0  PDA - 0.0     Total Calcium Score: 246     Percentile: Calcium score is worse than the 75th percentile for the patient's age and sex.    Current regimen: rosuvastatin 10 mg daily and aspirin 81 mg daily              Current Medications:  Current Outpatient Medications Ordered in Epic   Medication Sig Dispense Refill   • ciclopirox (PENLAC) 8 % solution APPLY TO ADJACENT SKIN AND AFFECTED NAIL DAILY FOR 48 WEEKS. REMOVE LAQUER WITH ALCOHOL EVERY 7 DAYS     • Probiotic Product (ALIGN PO) Take  by  "mouth.     • DULERA 100-5 MCG/ACT Aerosol TAKE 2 PUFFS BY MOUTH TWICE A DAY 3 Each 3   • rosuvastatin (CRESTOR) 10 MG Tab Take 1 tablet by mouth every day. N THE EVENING 100 tablet 3   • coenzyme Q-10 30 MG capsule Take 60 mg by mouth every day.     • Cyanocobalamin (B-12 PO) Take  by mouth.     • albuterol 108 (90 Base) MCG/ACT Aero Soln inhalation aerosol INHALE 2 PUFFS BY MOUTH EVERY FOUR HOURS AS NEEDED FOR SHORTNESS OF BREATH. 1 Each 3   • levothyroxine (SYNTHROID) 88 MCG Tab TAKE 1 TABLET BY MOUTH EVERY  tablet 3   • urea 40 % Cream APPLY TOPICALLY TO FEET TWICE DAILY AND THEN APPLY VASELINE WITH SOCKS     • augmented betamethasone dipropionate (DIPROLENE-AF) 0.05 % ointment APPLY TO HANDS TWICE A DAY FOR 2WKS. AVOID FACE/GROIN/UNDERARS. APPLY VASALINE AND USE GLOVES     • aspirin (ASA) 81 MG Chew Tab chewable tablet Chew 1 tablet every day. 100 tablet 3   • Misc. Devices Misc Please provide nocturnal oxgen 2 LPM Nasal Cannula, concentrator 99 Each 99   • clobetasol (TEMOVATE) 0.05 % Ointment TOPICALLY APPLY SPARINGLY TO AFFECTED AREA 3X\/WEEK     • gabapentin (NEURONTIN) 100 MG Cap Take 1-3 Capsules by mouth at bedtime as needed (pain). 90 capsule 5     No current [facility]-ordered facility-administered medications on file.          Objective:   Physical Exam:    Vitals: /60 (BP Location: Right arm, Patient Position: Sitting, BP Cuff Size: Adult)   Pulse 68   Temp 36.4 °C (97.6 °F) (Temporal)   Resp 12   Ht 1.613 m (5' 3.5\")   Wt 77.2 kg (170 lb 4.8 oz)   SpO2 97%    BMI: Body mass index is 29.69 kg/m².  Physical Exam  Constitutional:       General: She is not in acute distress.     Appearance: She is not ill-appearing.   HENT:      Mouth/Throat:      Comments: Inspection of her left lower gums does not show any active infection or obvious swelling or ulcers on the gumline or buccal membrane.  Eyes:      General: No scleral icterus.     Conjunctiva/sclera: Conjunctivae normal.   Neck:      " Comments: She has a palpable mass in the left anterolateral submandibular region.  It is mobile and nontender.  There is mild associated swelling in the surrounding tissues.  Neurological:      Mental Status: She is alert.          Assessment and Plan:   Emilienclynn is a 70 y.o. female with the following:  Problem List Items Addressed This Visit     Elevated coronary artery calcium score     New problem.  We started her on rosuvastatin 10 mg daily and aspirin 81 mg daily due to elevated coronary calcium score.  Stress testing did not demonstrate any reversible ischemia.  We will recheck her lipids and liver enzymes before our next visit in June.         Mass of left submandibular region     Chronic and ongoing issue.  We will proceed with ultrasound of the soft tissue in the neck to further evaluate.  Could be submandibular swelling versus thyroid nodule versus lymph node.  Will have her use ibuprofen 400 mg 3 times daily for a few days to see if it helps with inflammation along with warm compresses and massage.  No signs or symptoms of infection.  She is agreeable with this plan and will complete the imaging when she returns from a vacation to [place] in June.         Relevant Orders    US-SOFT TISSUES OF HEAD - NECK    Osteopenia of neck of left femur     New problem, mild severity. Recommend regular weight bearing exercise and follow up scan in 2 years.                RTC: Return in about 4 weeks (around 6/23/2021), or if symptoms worsen or fail to improve.    I spent a total of 31 minutes with record review, exam, communication with the patient, communication with other providers, and documentation of this encounter.    PLEASE NOTE: This dictation was created using voice recognition software. I have made every reasonable attempt to correct obvious errors, but I expect that there are errors of grammar and possibly content that I did not discover before finalizing the note.      Shawna Recio, DO  Geriatric and  Internal Medicine  [facility]

## 2021-05-26 NOTE — ASSESSMENT & PLAN NOTE
New problem.  We started her on rosuvastatin 10 mg daily and aspirin 81 mg daily due to elevated coronary calcium score.  Stress testing did not demonstrate any reversible ischemia.  We will recheck her lipids and liver enzymes before our next visit in June.

## 2021-05-26 NOTE — ASSESSMENT & PLAN NOTE
New problem, mild severity. Recommend regular weight bearing exercise and follow up scan in 2 years.

## 2021-06-08 ENCOUNTER — PHYSICAL THERAPY (OUTPATIENT)
Dept: PHYSICAL THERAPY | Facility: REHABILITATION | Age: 71
End: 2021-06-08
Attending: PHYSICAL MEDICINE & REHABILITATION
Payer: MEDICARE

## 2021-06-08 DIAGNOSIS — M51.26 LUMBAR DISC HERNIATION: ICD-10-CM

## 2021-06-08 PROCEDURE — 97110 THERAPEUTIC EXERCISES: CPT

## 2021-06-08 NOTE — OP THERAPY DAILY TREATMENT
Outpatient Physical Therapy  DAILY TREATMENT     [facility] [place]  [address], [address]  [place] [facility] [address]  Phone:  [phone]  Fax:  [phone]    Date: 06/08/2021    Patient: Nina Gregory  YOB: 1950  MRN: [id number]     Time Calculation    Start time: 1030  Stop time: 1100 Time Calculation (min): 30 minutes         Chief Complaint: Back Problem    Visit #: 7    SUBJECTIVE:  Patient reports that she is doing well. Notes decreased pain and improved pain.     OBJECTIVE:  Current objective measures:           Therapeutic Exercises (CPT 19517):     1. Nu step , x10min    3. Press up on elbow, x10, decrease; better    4. Postural edu, x5min    5. Walking progream, x5min    6. Standing ext when unable to press up, x10      Therapeutic Exercise Summary: Access Code: [email or web address]      Exercises  Supine Transversus Abdominis Bracing - Hands on Stomach - 1 x daily - 7 x weekly - 10 reps - 2 sets  Tra - 1 x daily - 7 x weekly - 10 reps - 2 sets  Seated Multifidi Isometric - 1 x daily - 7 x weekly - 10 reps - 2 sets  Tandem Stance with Eyes Closed in Corner - 1 x daily - 7 x weekly - 2 sets - 10 reps  Seated Hip Abduction with Resistance - 1 x daily - 7 x weekly - 10 reps - 2 sets  Supine Bridge - 1 x daily - 7 x weekly - 10 reps - 2 sets  Bridge with Heels on Swiss Ball - 1 x daily - 7 x weekly - 2 sets - 10 reps        Time-based treatments/modalities:    Physical Therapy Timed Treatment Charges  Therapeutic exercise minutes (CPT 71892): 25 minutes      Pain rating (1-10) before treatment:  0  Pain rating (1-10) after treatment:  0    ASSESSMENT:   Response to treatment: Patient responded well to therapy with decreased pain. Follow up in 2 weeks.     PLAN/RECOMMENDATIONS:   Plan for treatment: therapy treatment to continue next visit.  Planned interventions for next visit: continue with current treatment.

## 2021-06-10 ENCOUNTER — TELEPHONE (OUTPATIENT)
Dept: MEDICAL GROUP | Facility: PHYSICIAN GROUP | Age: 71
End: 2021-06-10

## 2021-06-10 NOTE — TELEPHONE ENCOUNTER
----- Message from Nancy Gregory sent at 6/10/2021  1:39 PM PDT -----  Regarding: RE: Non-Urgent Medical Question  Contact: [phone]  Hi  Dr. Yuliana Dawson, periodontist [phone]. I made a mistake regarding my appointment with her, it's July 23, but that's even further away than I thought. I saw her for this problem right after it started in February 2020, and I didn't notice any change after doing what she said, although it's a little worse now. I've also seen my regular dentist at [facility] with no help and also Dr. Recio. I need to find out what is causing this and fix it. Thanks for offering to try to get me in sooner, I appreciate anything you can do.   Pru Forthun   ----- Message -----  From: Nurse Jeri KING  Sent: 6/10/21, 1:23 PM  To: Nancy Gregory  Subject: RE: Non-Urgent Medical Question    Good Afternoon Pru,    Can you give me your dentist contact information. Maybe if I call them I can get you in earlier. I am going to ask the pharmacist what we could order to alleviate your symptoms in the meantime- I will get back to you when I hear something from pharmacy.    Jeri Mustafa, RN, BSN, PHN  [facility]  [address]  [place], [place] [address]  P [phone]  F [phone]      ----- Message -----       From:Nancy Gregory       Sent:6/9/2021  6:51 PM PDT         To:Physician Shawna Recio    Subject:Non-Urgent Medical Question    Hi Dr. Recio,   I wanted to let you know that the inflammation issue in my mouth, lower gum, inside my jaw, has not been relieved by talking the ibuprofen 400 mg 3 times daily. It feels as if my  Mouth is covered in cotton but tender as well. Most food is bothersome and burns the inside of my lips, especially anything hard or acidic. My bottom lip feels chapped and has shown mild swelling in the past few days. Toothpaste burns when I brush my teeth, and I've tried several different kinds. This is really miserable. My  appointment with the periodontist, Dr. Yuliana Dawson, is not until .  Do you have any other ideas of what this might be and how I could treat it.  I appreciate your time.    Nina Gregory  ( 1950)

## 2021-06-10 NOTE — TELEPHONE ENCOUNTER
Called periodontist (DR. Dawson) office and spoke with Mark. Gave Mark pt's symptoms and that pt is needing analgesic mouthwash because OTC NSAIDS are not controlling the pain.  Mark stated he will take info to Dr. Dawson and determine if they can get pt in sooner to evaluate condition. Mark took my name/direct extension to update me also on their plan. Mark mentioned they may be able to see pt as soon as early next week.     Called pt and relayed conversation with Mark. Suggested OTC mouthwash pharmacist recommended. Pt states she had purchased a  Medicated OTC mouthwash called Prevention, which she has been using with little relief. Pt doesn't really want to go buy another OTC mouthwash that may not be any more helpful.

## 2021-06-14 ENCOUNTER — TELEPHONE (OUTPATIENT)
Dept: MEDICAL GROUP | Facility: PHYSICIAN GROUP | Age: 71
End: 2021-06-14

## 2021-06-16 ENCOUNTER — HOSPITAL ENCOUNTER (OUTPATIENT)
Dept: LAB | Facility: MEDICAL CENTER | Age: 71
End: 2021-06-16
Attending: INTERNAL MEDICINE
Payer: MEDICARE

## 2021-06-16 ENCOUNTER — APPOINTMENT (RX ONLY)
Dept: URBAN - METROPOLITAN AREA CLINIC 22 | Facility: CLINIC | Age: 71
Setting detail: DERMATOLOGY
End: 2021-06-16

## 2021-06-16 DIAGNOSIS — I70.0 AORTIC ATHEROSCLEROSIS (HCC): ICD-10-CM

## 2021-06-16 DIAGNOSIS — L30.8 OTHER SPECIFIED DERMATITIS: ICD-10-CM | Status: IMPROVED

## 2021-06-16 DIAGNOSIS — L85.1 ACQUIRED KERATOSIS [KERATODERMA] PALMARIS ET PLANTARIS: ICD-10-CM | Status: IMPROVED

## 2021-06-16 DIAGNOSIS — B35.1 TINEA UNGUIUM: ICD-10-CM

## 2021-06-16 DIAGNOSIS — L72.0 EPIDERMAL CYST: ICD-10-CM

## 2021-06-16 DIAGNOSIS — E78.2 MIXED HYPERLIPIDEMIA: ICD-10-CM

## 2021-06-16 DIAGNOSIS — D22 MELANOCYTIC NEVI: ICD-10-CM

## 2021-06-16 PROBLEM — D22.4 MELANOCYTIC NEVI OF SCALP AND NECK: Status: ACTIVE | Noted: 2021-06-16

## 2021-06-16 LAB
ALBUMIN SERPL BCP-MCNC: 4.4 G/DL (ref 3.2–4.9)
ALBUMIN/GLOB SERPL: 1.5 G/DL
ALP SERPL-CCNC: 67 U/L (ref 30–99)
ALT SERPL-CCNC: 15 U/L (ref 2–50)
ANION GAP SERPL CALC-SCNC: 9 MMOL/L (ref 7–16)
AST SERPL-CCNC: 25 U/L (ref 12–45)
BILIRUB SERPL-MCNC: 0.4 MG/DL (ref 0.1–1.5)
BUN SERPL-MCNC: 11 MG/DL (ref 8–22)
CALCIUM SERPL-MCNC: 9.5 MG/DL (ref 8.5–10.5)
CHLORIDE SERPL-SCNC: 106 MMOL/L (ref 96–112)
CHOLEST SERPL-MCNC: 164 MG/DL (ref 100–199)
CO2 SERPL-SCNC: 27 MMOL/L (ref 20–33)
CREAT SERPL-MCNC: 0.64 MG/DL (ref 0.5–1.4)
FASTING STATUS PATIENT QL REPORTED: NORMAL
GLOBULIN SER CALC-MCNC: 2.9 G/DL (ref 1.9–3.5)
GLUCOSE SERPL-MCNC: 79 MG/DL (ref 65–99)
HDLC SERPL-MCNC: 89 MG/DL
LDLC SERPL CALC-MCNC: 65 MG/DL
POTASSIUM SERPL-SCNC: 4.5 MMOL/L (ref 3.6–5.5)
PROT SERPL-MCNC: 7.3 G/DL (ref 6–8.2)
SODIUM SERPL-SCNC: 142 MMOL/L (ref 135–145)
TRIGL SERPL-MCNC: 52 MG/DL (ref 0–149)

## 2021-06-16 PROCEDURE — ? TREATMENT REGIMEN

## 2021-06-16 PROCEDURE — 36415 COLL VENOUS BLD VENIPUNCTURE: CPT

## 2021-06-16 PROCEDURE — ? EXTRACTIONS

## 2021-06-16 PROCEDURE — ? OBSERVATION

## 2021-06-16 PROCEDURE — 99213 OFFICE O/P EST LOW 20 MIN: CPT

## 2021-06-16 PROCEDURE — 80061 LIPID PANEL: CPT

## 2021-06-16 PROCEDURE — ? ADDITIONAL NOTES

## 2021-06-16 PROCEDURE — 80053 COMPREHEN METABOLIC PANEL: CPT

## 2021-06-16 PROCEDURE — ? COUNSELING

## 2021-06-16 ASSESSMENT — LOCATION DETAILED DESCRIPTION DERM
LOCATION DETAILED: RIGHT DISTAL VENTRAL THUMB
LOCATION DETAILED: LEFT LOWER CUTANEOUS LIP
LOCATION DETAILED: RIGHT GREAT TOENAIL
LOCATION DETAILED: LEFT DORSAL MIDDLE FINGER METACARPOPHALANGEAL JOINT
LOCATION DETAILED: RIGHT MEDIAL PLANTAR HEEL
LOCATION DETAILED: LEFT SUPERIOR POSTAURICULAR SKIN
LOCATION DETAILED: LEFT GREAT TOENAIL
LOCATION DETAILED: LEFT MEDIAL PLANTAR HEEL
LOCATION DETAILED: LEFT DISTAL RADIAL THUMB
LOCATION DETAILED: RIGHT DORSAL MIDDLE FINGER METACARPOPHALANGEAL JOINT

## 2021-06-16 ASSESSMENT — LOCATION ZONE DERM
LOCATION ZONE: TOENAIL
LOCATION ZONE: SCALP
LOCATION ZONE: FINGER
LOCATION ZONE: FEET
LOCATION ZONE: LIP
LOCATION ZONE: HAND

## 2021-06-16 ASSESSMENT — LOCATION SIMPLE DESCRIPTION DERM
LOCATION SIMPLE: LEFT PLANTAR SURFACE
LOCATION SIMPLE: LEFT GREAT TOE
LOCATION SIMPLE: RIGHT PLANTAR SURFACE
LOCATION SIMPLE: LEFT HAND
LOCATION SIMPLE: RIGHT HAND
LOCATION SIMPLE: LEFT LIP
LOCATION SIMPLE: RIGHT THUMB
LOCATION SIMPLE: RIGHT GREAT TOE
LOCATION SIMPLE: SCALP
LOCATION SIMPLE: LEFT THUMB

## 2021-06-16 NOTE — PROCEDURE: ADDITIONAL NOTES
Additional Notes: Patient has nail polish, will restart topical treatment in the fall.
Detail Level: Detailed
Render Risk Assessment In Note?: no

## 2021-06-16 NOTE — PROCEDURE: EXTRACTIONS
Post-Care Instructions: I reviewed with the patient in detail post-care instructions. Patient is to keep the treatment areas dry overnight, and then apply bacitracin twice daily until healed. Patient may apply hydrogen peroxide soaks to remove any crusting.
Extraction Method: 11 blade and q-tip
Render Post-Care Instructions In Note?: no
Prep Text (Optional): Prior to removal the treatment areas were prepped in the usual fashion. No charge for extraction
Acne Type: Comedonal Lesions
Render Number Of Lesions Treated: yes
Detail Level: Detailed
Consent was obtained and risks were reviewed including but not limited to scarring, infection, bleeding, scabbing, incomplete removal, and allergy to anesthesia.

## 2021-06-16 NOTE — PROCEDURE: TREATMENT REGIMEN
Continue Regimen: Betamethasone bid for two weeks a month for flare ups
Detail Level: Simple
Continue Regimen: Urea cream once a day

## 2021-06-17 ENCOUNTER — PATIENT MESSAGE (OUTPATIENT)
Dept: MEDICAL GROUP | Facility: PHYSICIAN GROUP | Age: 71
End: 2021-06-17

## 2021-06-17 ENCOUNTER — OFFICE VISIT (OUTPATIENT)
Dept: MEDICAL GROUP | Facility: PHYSICIAN GROUP | Age: 71
End: 2021-06-17
Payer: MEDICARE

## 2021-06-17 ENCOUNTER — TELEPHONE (OUTPATIENT)
Dept: MEDICAL GROUP | Facility: PHYSICIAN GROUP | Age: 71
End: 2021-06-17

## 2021-06-17 VITALS
OXYGEN SATURATION: 98 % | BODY MASS INDEX: 29.31 KG/M2 | DIASTOLIC BLOOD PRESSURE: 80 MMHG | HEART RATE: 71 BPM | WEIGHT: 171.7 LBS | TEMPERATURE: 97.6 F | RESPIRATION RATE: 12 BRPM | HEIGHT: 64 IN | SYSTOLIC BLOOD PRESSURE: 120 MMHG

## 2021-06-17 DIAGNOSIS — K06.9 GUM DISEASE: Primary | ICD-10-CM

## 2021-06-17 DIAGNOSIS — E78.2 MIXED HYPERLIPIDEMIA: ICD-10-CM

## 2021-06-17 DIAGNOSIS — R22.0 MASS OF LEFT SUBMANDIBULAR REGION: ICD-10-CM

## 2021-06-17 DIAGNOSIS — K06.9 GUM DISEASE: ICD-10-CM

## 2021-06-17 DIAGNOSIS — F51.01 PRIMARY INSOMNIA: ICD-10-CM

## 2021-06-17 DIAGNOSIS — L90.0 LICHEN SCLEROSUS: ICD-10-CM

## 2021-06-17 PROCEDURE — 99215 OFFICE O/P EST HI 40 MIN: CPT | Performed by: INTERNAL MEDICINE

## 2021-06-17 ASSESSMENT — FIBROSIS 4 INDEX: FIB4 SCORE: 1.76

## 2021-06-17 ASSESSMENT — PAIN SCALES - GENERAL: PAINLEVEL: 8=MODERATE-SEVERE PAIN

## 2021-06-17 NOTE — ASSESSMENT & PLAN NOTE
Chronic and ongoing issue. US of neck scheduled in 12 days. She will see oral maxillofacial surgeon, Dr. Quarles, in late July. Will call his office to see if we can move up appointment.

## 2021-06-17 NOTE — TELEPHONE ENCOUNTER
Called Dr. Quarles's office in attempt to move up pt's appt with Dr. Quarles because new oral lesions were found at today's pc visit. Spoke with Romelia @ Dr. Quarles's office and explained pcp feels pt may need biopsy of new lesions. Romelia will discuss case w/Willam. Faxed over office visit note from today- scanned into .     Called pt, no answer, LVM with above info.

## 2021-06-17 NOTE — ASSESSMENT & PLAN NOTE
Chronic and ongoing problem. Will make sure she followed up with gynecology. May be related to recent pain/white lesions in mouth for the past 1 year.

## 2021-06-17 NOTE — ASSESSMENT & PLAN NOTE
Chronic and decompensated issue. This continues to have huge effects on her quality of life related to pain in the mouth with eating, drinking, and brushing her teeth. 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 She has tried over the counter mouth rinses and oral anti-inflammatories with no improvement. She has some white lesion under the tongue and on the left buccal membrane. She is very frustrated as she continues to be passed around. Will call Dr. Quarles's office to see if he can get her in sooner as she saw him in the past for a tooth extraction and will send in magic mouthwash to try in the meantime. Also advised she try oragel over the counter. Of note, she was diagnosed with lichen sclerosis in the vulva (labia/perianal) region in late Summer 2019 and was advised to follow up with Women's Health.

## 2021-06-17 NOTE — ASSESSMENT & PLAN NOTE
Chronic and improved problem, continue current medical regimen of Crestor and aspirin, labs much better.

## 2021-06-17 NOTE — ASSESSMENT & PLAN NOTE
Chronic and ongoing problem. She finds the supplemental oxygen to be cumbersome and uncomfortable. Her follow up OPO on oxygen therapy was improved. Suggested she discuss with sleep medicine if there are any other devices/treatments that she may tolerate better as she has not had any improvement in symptoms at this point.

## 2021-06-17 NOTE — PATIENT COMMUNICATION
Called [facility] after receiving notification that Rx for Magic Mouthwash didn't transmit electronically. Spoke with pharmacist, Rosalia LAWS

## 2021-06-17 NOTE — PROGRESS NOTES
Subjective:   Chief Complaint/History of Present Illness:  Prudence Chayito Gregory is a 70 y.o. female established patient who presents today to discuss medical problems as listed below. Pru is unaccompanied for today's visit.    Problem   Mass of Left Submandibular Region    She reports development of a mass in her chin/neck area.  On exam it appears to be submandibular on the left anterolateral aspect.  It is easily palpable, mobile, and approximately 1 x 1 cm.  It is nontender.  There is some associated inflammation in the region that is visible on inspection.  She has been working with periodontist due to gum disease and wonders if it could be related to her dentition or jaw.  The mass comes and goes and has been present for at least a year and she feels like with massage she can usually get it to go away.  She has a history of hypothyroidism on levothyroxine. No improvement with trial of anti-inflammatories. Tends to worsen with massage.     Primary Insomnia    She has trouble both falling asleep and staying asleep. The problem started gradually around Fall 2019. Some nights she will not sleep at all. Other times she will wake up a few times per night with some anxiety.  Her  wakes up to go to work around 4:30 in the morning so she does not want to be bothersome to him and will often go to a different room to try to fall asleep.  No REM sleep disturbance noted by patient or her .  No prior evaluations of sleep.  She feels like she does not get restorative sleep.  Denies nightmares or night terrors.  No prior use of sleep aids other than one time trying Benadryl which caused the opposite effect.    She was found to have nocturnal hypoxia on OPO and was started on supplemental oxygen and has established with sleep medicine, does not feel better on oxygen therapy.     Lichen Sclerosus    She reports a recent diagnosis of lichen sclerosis approximately 1 year ago in the late summer 2019.  She establish  [facility]  They trialed her on several topical steroids which were ineffective however around October November they placed her on clobetasol which she is continued to use daily and has been helpful to keep the lesions at bay.  She notes that it initially started on the labia but then also she notes some bleeding/friability more in the perianal region more so when she would exercise.  She was recommended to come back in for a biopsy however she canceled the appointment due to COVID-19 pandemic and has not yet rescheduled.  She had some concerns about postprocedure pain and how well she would heal up.     Gum Disease    She reports prior lanap (laser treatment for peridontal disease) procedure in late January 2020.  This was done on her right upper gums.  2 weeks following the procedure she had a full liquid diet/soft food diet and this was advanced on Uma[place]'s Day dinner, February 14, 2020.  On the following Monday, February 17, 2020 she notes development of \"bloodshot \"gums causing concern and when she called the person who performed (Dr. Yuliana Dawson) the laser treatment she was advised to go to her regular dentist (Dr. Flores).  She followed up with her dentist in February who performed pictures and gave her an over-the-counter mouth rinse, unclear what the active ingredient was.  She followed up 2 weeks later and felt only mild improvement.  Her dentist suspected she irritated her gums with sharp food on the night of Umaña's Day or possibly some kind of allergic reaction.  She was due to follow-up again on March 27 however that appointment with her dentist was canceled due to COVID-19.  She describes the symptoms as erythema on her gums and feels that the gums themselves are thick with a hive-like sensation they are also sensitive.  Then starting on Wednesday, April 15, 2020 she noted a lump on the left side submandibular that is nontender.  On recollection she " thinks she may have had a salivary gland blockage in the past that she treated by sucking on hard candy.      Symptoms have continued and she report seeing her dentist x3, periodontist x2, and myself x2 for this problem with no resolution and in face worsening. She was recommended to see an oral maxillofacial surgeon, Dr. Quarles, who treated her in the past and is scheduled for late July 2021.     Mixed Hyperlipidemia       Ref. Range 3/11/2020 10:51 6/16/2021 11:37   Cholesterol,Tot Latest Ref Range: 100 - 199 mg/dL 214 (H) 164   Triglycerides Latest Ref Range: 0 - 149 mg/dL 55 52   HDL Latest Ref Range: >=40 mg/dL 76 89   LDL Latest Ref Range: <100 mg/dL 127 (H) 65     Her 10-year ASCVD score is 8%, this reduces to 6% and 5% with standard and high potency statin, respectively.  We discussed the protective nature of her high HDL and that we can try lifestyle modifications before initiating medication. Due to atherosclerosis started on aspirin and rosuvastatin with improvement in labs.    Incidental imaging with CT in January 2021 showed aortic atherosclerosis.    Current regimen: aspirin 81 mg daily and rosuvastatin 10 mg daily            Current Medications:  Current Outpatient Medications Ordered in Epic   Medication Sig Dispense Refill   • ciclopirox (PENLAC) 8 % solution APPLY TO ADJACENT SKIN AND AFFECTED NAIL DAILY FOR 48 WEEKS. REMOVE LAQUER WITH ALCOHOL EVERY 7 DAYS     • Probiotic Product (ALIGN PO) Take  by mouth.     • DULERA 100-5 MCG/ACT Aerosol TAKE 2 PUFFS BY MOUTH TWICE A DAY 3 Each 3   • rosuvastatin (CRESTOR) 10 MG Tab Take 1 tablet by mouth every day. N THE EVENING 100 tablet 3   • coenzyme Q-10 30 MG capsule Take 60 mg by mouth every day.     • Cyanocobalamin (B-12 PO) Take  by mouth.     • gabapentin (NEURONTIN) 100 MG Cap Take 1-3 Capsules by mouth at bedtime as needed (pain). 90 capsule 5   • albuterol 108 (90 Base) MCG/ACT Aero Soln inhalation aerosol INHALE 2 PUFFS BY MOUTH EVERY FOUR HOURS  "AS NEEDED FOR SHORTNESS OF BREATH. 1 Each 3   • levothyroxine (SYNTHROID) 88 MCG Tab TAKE 1 TABLET BY MOUTH EVERY  tablet 3   • urea 40 % Cream APPLY TOPICALLY TO FEET TWICE DAILY AND THEN APPLY VASELINE WITH SOCKS     • augmented betamethasone dipropionate (DIPROLENE-AF) 0.05 % ointment APPLY TO HANDS TWICE A DAY FOR 2WKS. AVOID FACE/GROIN/UNDERARS. APPLY VASALINE AND USE GLOVES     • aspirin (ASA) 81 MG Chew Tab chewable tablet Chew 1 tablet every day. 100 tablet 3   • Misc. Devices Misc Please provide nocturnal oxgen 2 LPM Nasal Cannula, concentrator 99 Each 99   • clobetasol (TEMOVATE) 0.05 % Ointment TOPICALLY APPLY SPARINGLY TO AFFECTED AREA 3X\/WEEK       No current Epic-ordered facility-administered medications on file.          Objective:   Physical Exam:    Vitals: /80 (BP Location: Left arm, Patient Position: Sitting, BP Cuff Size: Adult)   Pulse 71   Temp 36.4 °C (97.6 °F) (Temporal)   Resp 12   Ht 1.613 m (5' 3.5\")   Wt 77.9 kg (171 lb 11.2 oz)   SpO2 98%    BMI: Body mass index is 29.94 kg/m².  Physical Exam  Constitutional:       General: She is not in acute distress.     Appearance: Normal appearance. She is not ill-appearing.   HENT:      Mouth/Throat:      Mouth: Mucous membranes are moist.      Pharynx: No oropharyngeal exudate.      Comments: White ulceration under tongue and on left buccal mucosa  Eyes:      General: No scleral icterus.     Extraocular Movements: Extraocular movements intact.      Conjunctiva/sclera: Conjunctivae normal.   Pulmonary:      Effort: Pulmonary effort is normal. No respiratory distress.   Skin:     General: Skin is warm and dry.      Findings: No rash.   Psychiatric:         Mood and Affect: Mood normal.         Behavior: Behavior normal.         Thought Content: Thought content normal.         Judgment: Judgment normal.      Comments: Frustrated by pain/discomfort in mouth and gums            Assessment and Plan:   Prudence is a 70 y.o. female " with the following:  Problem List Items Addressed This Visit     Mixed hyperlipidemia     Chronic and improved problem, continue current medical regimen of Crestor and aspirin, labs much better.         Gum disease     Chronic and decompensated issue. This continues to have huge effects on her quality of life related to pain in the mouth with eating, drinking, and brushing her teeth. 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 She has tried over the counter mouth rinses and oral anti-inflammatories with no improvement. She has some white lesion under the tongue and on the left buccal membrane. She is very frustrated as she continues to be passed around. Will call Dr. Quarles's office to see if he can get her in sooner as she saw him in the past for a tooth extraction and will send in magic mouthwash to try in the meantime. Also advised she try oragel over the counter. Of note, she was diagnosed with lichen sclerosis in the vulva (labia/perianal) region in late Summer 2019 and was advised to follow up with [facility].         Lichen sclerosus     Chronic and ongoing problem. Will make sure she followed up with gynecology. May be related to recent pain/white lesions in mouth for the past 1 year.         Primary insomnia     Chronic and ongoing problem. She finds the supplemental oxygen to be cumbersome and uncomfortable. Her follow up OPO on oxygen therapy was improved. Suggested she discuss with sleep medicine if there are any other devices/treatments that she may tolerate better as she has not had any improvement in symptoms at this point.         Mass of left submandibular region     Chronic and ongoing issue. US of neck scheduled in 12 days. She will see oral maxillofacial surgeon, Dr. Quarles, in late July. Will call his office to see if we can move up appointment.              RTC: Return in about 3 months (around 9/17/2021).    I spent a total of 45 minutes with record review,  exam, communication with the patient, communication with other providers, and documentation of this encounter.    PLEASE NOTE: This dictation was created using voice recognition software. I have made every reasonable attempt to correct obvious errors, but I expect that there are errors of grammar and possibly content that I did not discover before finalizing the note.      Shawna Recio, DO  Geriatric and Internal Medicine  [facility]

## 2021-06-21 ENCOUNTER — APPOINTMENT (OUTPATIENT)
Dept: MEDICAL GROUP | Facility: PHYSICIAN GROUP | Age: 71
End: 2021-06-21
Payer: MEDICARE

## 2021-06-23 ENCOUNTER — PHYSICAL THERAPY (OUTPATIENT)
Dept: PHYSICAL THERAPY | Facility: REHABILITATION | Age: 71
End: 2021-06-23
Attending: PHYSICAL MEDICINE & REHABILITATION
Payer: MEDICARE

## 2021-06-23 DIAGNOSIS — M51.26 LUMBAR DISC HERNIATION: ICD-10-CM

## 2021-06-23 DIAGNOSIS — G89.29 CHRONIC RIGHT-SIDED LOW BACK PAIN WITH RIGHT-SIDED SCIATICA: ICD-10-CM

## 2021-06-23 DIAGNOSIS — M54.16 LUMBAR RADICULOPATHY: ICD-10-CM

## 2021-06-23 DIAGNOSIS — M54.41 CHRONIC RIGHT-SIDED LOW BACK PAIN WITH RIGHT-SIDED SCIATICA: ICD-10-CM

## 2021-06-23 DIAGNOSIS — M47.816 LUMBAR SPONDYLOSIS: ICD-10-CM

## 2021-06-23 PROCEDURE — 97110 THERAPEUTIC EXERCISES: CPT

## 2021-06-23 NOTE — OP THERAPY DAILY TREATMENT
Outpatient Physical Therapy  DAILY TREATMENT     [facility]  [address], [address]  [place] [address]  Phone:  [phone]  Fax:  [phone]    Date: 06/23/2021    Patient: Nina Gregory  YOB: 1950  MRN: [id number]     Time Calculation    Start time: 0930  Stop time: 1000 Time Calculation (min): 30 minutes         Chief Complaint: Back Problem    Visit #: 8    SUBJECTIVE:  Patient reports that she is stable with her LBP. Notes minor twinges but states overall she is happy.     OBJECTIVE:  Current objective measures:   If patient remains in neutral spine she has no pain          Therapeutic Exercises (CPT 88009):     1. Nu step , x10min    3. Press up on elbow, x10, decrease; better    4. Postural edu, x5min    5. Walking progream, x5min    6. Standing ext when unable to press up, x10      Therapeutic Exercise Summary: Access Code: [email or web address]      Exercises  Supine Transversus Abdominis Bracing - Hands on Stomach - 1 x daily - 7 x weekly - 10 reps - 2 sets  Tra - 1 x daily - 7 x weekly - 10 reps - 2 sets  Seated Multifidi Isometric - 1 x daily - 7 x weekly - 10 reps - 2 sets  Tandem Stance with Eyes Closed in Corner - 1 x daily - 7 x weekly - 2 sets - 10 reps  Seated Hip Abduction with Resistance - 1 x daily - 7 x weekly - 10 reps - 2 sets  Supine Bridge - 1 x daily - 7 x weekly - 10 reps - 2 sets  Bridge with Heels on Swiss Ball - 1 x daily - 7 x weekly - 2 sets - 10 reps        Time-based treatments/modalities:    Physical Therapy Timed Treatment Charges  Therapeutic exercise minutes (CPT 29875): 30 minutes      Pain rating (1-10) before treatment:  0  Pain rating (1-10) after treatment:  0    ASSESSMENT:   Response to treatment: Patient will now continue with her HEP and discharge after 3 weeks if she does not feel she needs to follow up with PT. Discharge in 3 weeks.     PLAN/RECOMMENDATIONS:   Plan for treatment: discharge patient due to accomplished  goals.  Planned interventions for next visit: Hold for 3 weeks.

## 2021-06-28 ENCOUNTER — TELEPHONE (OUTPATIENT)
Dept: MEDICAL GROUP | Facility: PHYSICIAN GROUP | Age: 71
End: 2021-06-28

## 2021-06-28 ENCOUNTER — PATIENT MESSAGE (OUTPATIENT)
Dept: MEDICAL GROUP | Facility: PHYSICIAN GROUP | Age: 71
End: 2021-06-28

## 2021-06-28 DIAGNOSIS — K13.79 MOUTH PAIN: ICD-10-CM

## 2021-06-28 DIAGNOSIS — R22.0 MASS OF LEFT SUBMANDIBULAR REGION: ICD-10-CM

## 2021-06-28 NOTE — TELEPHONE ENCOUNTER
From: Nancy Gregory  To: Physician Shawna Recio  Sent: 6/28/2021 8:02 AM PDT  Subject: Non-Urgent Medical Question    Hi Dr. Recio.   I saw Dr. Pranav Quarles, oral surgeon, and he states my mouth problem is not a dental problem. However, he did start me on amoxicillin for 10 days and I see him again when that's done. If the problem is no better he said I would need to see ENT. I am 6 days into the amoxicillin and the mouth symptoms are not showing improvement. I would like to get the ENT referral started now so as not waste more time. I think you/your office needs to do the referraI per insurance requirements. I am having the neck ultrasound tomorrow.   Thank you for your time.   Nina Gregory

## 2021-06-28 NOTE — TELEPHONE ENCOUNTER
Regarding: FW: Non-Urgent Medical Question  Contact: [phone]      ----- Message -----  From: Og Saunders Med Ass't  Sent: 6/28/2021   8:11 AM PDT  To: Shawna Recio D.O.  Subject: Non-Urgent Medical Question                      ----- Message from Harlan Abdi Ass't sent at 6/28/2021  8:11 AM PDT -----       ----- Message from Nina Gregory to Shawna Recio D.O. sent at 6/28/2021  8:02 AM -----   Hi Dr. Recio.   I saw Dr. Pranav Quarles, oral surgeon, and he states my mouth problem is not a dental problem. However, he did start me on amoxicillin for 10 days and I see him again when that's done. If the problem is no better he said I would need to see ENT. I am 6 days into the amoxicillin and the mouth symptoms are not showing improvement. I would like to get the ENT referral started now so as not waste more time. I think you/your office needs to do the referraI per insurance requirements. I am having the neck ultrasound tomorrow.   Thank you for your time.   Nina Gregory

## 2021-06-29 ENCOUNTER — HOSPITAL ENCOUNTER (OUTPATIENT)
Dept: RADIOLOGY | Facility: MEDICAL CENTER | Age: 71
End: 2021-06-29
Attending: INTERNAL MEDICINE
Payer: MEDICARE

## 2021-06-29 DIAGNOSIS — R22.0 MASS OF LEFT SUBMANDIBULAR REGION: ICD-10-CM

## 2021-06-29 PROCEDURE — 76536 US EXAM OF HEAD AND NECK: CPT

## 2021-06-30 ENCOUNTER — PATIENT MESSAGE (OUTPATIENT)
Dept: MEDICAL GROUP | Facility: PHYSICIAN GROUP | Age: 71
End: 2021-06-30

## 2021-07-01 ENCOUNTER — OFFICE VISIT (OUTPATIENT)
Dept: PHYSICAL MEDICINE AND REHAB | Facility: MEDICAL CENTER | Age: 71
End: 2021-07-01
Payer: MEDICARE

## 2021-07-01 VITALS
SYSTOLIC BLOOD PRESSURE: 128 MMHG | BODY MASS INDEX: 28.5 KG/M2 | TEMPERATURE: 97.7 F | DIASTOLIC BLOOD PRESSURE: 70 MMHG | WEIGHT: 171.08 LBS | OXYGEN SATURATION: 94 % | HEART RATE: 81 BPM | HEIGHT: 65 IN

## 2021-07-01 DIAGNOSIS — M51.26 LUMBAR DISC HERNIATION: ICD-10-CM

## 2021-07-01 DIAGNOSIS — M47.816 LUMBAR SPONDYLOSIS: ICD-10-CM

## 2021-07-01 DIAGNOSIS — M54.16 LUMBAR RADICULOPATHY: ICD-10-CM

## 2021-07-01 PROCEDURE — 99214 OFFICE O/P EST MOD 30 MIN: CPT | Performed by: PHYSICAL MEDICINE & REHABILITATION

## 2021-07-01 ASSESSMENT — FIBROSIS 4 INDEX: FIB4 SCORE: 1.76

## 2021-07-01 ASSESSMENT — PATIENT HEALTH QUESTIONNAIRE - PHQ9: CLINICAL INTERPRETATION OF PHQ2 SCORE: 0

## 2021-07-01 ASSESSMENT — PAIN SCALES - GENERAL: PAINLEVEL: 2=MINIMAL-SLIGHT

## 2021-07-01 NOTE — Clinical Note
Nina Monaco is doing fantastic with her back pain. She can follow up with me as needed.     Carlos Eduardo

## 2021-07-01 NOTE — PROGRESS NOTES
Follow up patient note  Interventional spine and sports physiatry, Physical medicine rehabilitation      Chief complaint:   Chief Complaint   Patient presents with   • Follow-Up     Back pain          HISTORY    Please see new patient note by Dr Echols,  for more details.     HPI  Patient identification: Nancy Gregory ,  1950,   With Diagnoses of Lumbar disc herniation, Lumbar radiculopathy, and Lumbar spondylosis were pertinent to this visit.     The patient has been consistent with her home exercise program which is been provider and physical therapy driven.  She has been consistent with her physical therapy.  Her pain is now mild in intensity nonradiating right axial low back pain 2 out of 10 in intensity.  This is not causing any functional deficits.  She is able to sit stand walk and stretch with no significant functional deficits.         ROS Red Flags :   Fever, Chills, Sweats: Denies  Involuntary Weight Loss: Denies  Bowel/Bladder Incontinence: Denies  Saddle Anesthesia: Denies        PMHx:   Past Medical History:   Diagnosis Date   • Acute cystitis with hematuria 2020    Urine dipstick performed in clinic demonstrated trace glucose, 1+ bili, 1+ ketones, specific gravity 1.015, trace intact blood, pH 5.5, 2+ protein, 2.0 urobilinogen, positive nitrite, 3+ leukocyte esterase.  She performed a telemedicine visit yesterday and was placed on cephalosporin for presumptive urinary tract infection.  She was also given Pyridium due to discomfort with dysuria.  She thinks s   • Asthma    • Back pain    • CAD (coronary artery disease)    • Chickenpox    • GERD (gastroesophageal reflux disease)    • Heartburn    • Hyperlipidemia    • Hypothyroidism    • Insomnia     Trouble going to and staying asleep   • Mumps    • Painful joint    • Thyroid disease    • Tonsillitis    • Wears glasses        PSHx:   Past Surgical History:   Procedure Laterality Date   • ABDOMINAL HYSTERECTOMY TOTAL     •  CHOLECYSTECTOMY     • HYSTERECTOMY LAPAROSCOPY     • TONSILLECTOMY         Family history   Family History   Problem Relation Age of Onset   • Cancer Father         lung   • Breast Cancer Maternal Aunt    • Hypertension Maternal Grandmother    • Hypertension Maternal Grandfather    • Kidney Disease Mother    • Heart Disease Neg Hx          Medications:   Outpatient Medications Marked as Taking for the 7/1/21 encounter (Office Visit) with Carlos Eduardo Echols M.D.   Medication Sig Dispense Refill   • magic mouthwash Take 10 mL by mouth 4 times a day as needed. Shake well. Swish for 1-2 minutes, expectorate. Do not eat/drink for 30 mins afterwards. 60 mL 0   • ciclopirox (PENLAC) 8 % solution APPLY TO ADJACENT SKIN AND AFFECTED NAIL DAILY FOR 48 WEEKS. REMOVE LAQUER WITH ALCOHOL EVERY 7 DAYS     • Probiotic Product (ALIGN PO) Take  by mouth.     • DULERA 100-5 MCG/ACT Aerosol TAKE 2 PUFFS BY MOUTH TWICE A DAY 3 Each 3   • rosuvastatin (CRESTOR) 10 MG Tab Take 1 tablet by mouth every day. N THE EVENING 100 tablet 3   • coenzyme Q-10 30 MG capsule Take 60 mg by mouth every day.     • Cyanocobalamin (B-12 PO) Take  by mouth.     • gabapentin (NEURONTIN) 100 MG Cap Take 1-3 Capsules by mouth at bedtime as needed (pain). 90 capsule 5   • albuterol 108 (90 Base) MCG/ACT Aero Soln inhalation aerosol INHALE 2 PUFFS BY MOUTH EVERY FOUR HOURS AS NEEDED FOR SHORTNESS OF BREATH. 1 Each 3   • levothyroxine (SYNTHROID) 88 MCG Tab TAKE 1 TABLET BY MOUTH EVERY  tablet 3   • urea 40 % Cream APPLY TOPICALLY TO FEET TWICE DAILY AND THEN APPLY VASELINE WITH SOCKS     • augmented betamethasone dipropionate (DIPROLENE-AF) 0.05 % ointment APPLY TO HANDS TWICE A DAY FOR 2WKS. AVOID FACE/GROIN/UNDERARS. APPLY VASALINE AND USE GLOVES     • aspirin (ASA) 81 MG Chew Tab chewable tablet Chew 1 tablet every day. 100 tablet 3   • Misc. Devices Misc Please provide nocturnal oxgen 2 LPM Nasal Cannula, concentrator 99 Each 99   • clobetasol  (TEMOVATE) 0.05 % Ointment TOPICALLY APPLY SPARINGLY TO AFFECTED AREA 3X\/WEEK          Current Outpatient Medications on File Prior to Visit   Medication Sig Dispense Refill   • magic mouthwash Take 10 mL by mouth 4 times a day as needed. Shake well. Swish for 1-2 minutes, expectorate. Do not eat/drink for 30 mins afterwards. 60 mL 0   • ciclopirox (PENLAC) 8 % solution APPLY TO ADJACENT SKIN AND AFFECTED NAIL DAILY FOR 48 WEEKS. REMOVE LAQUER WITH ALCOHOL EVERY 7 DAYS     • Probiotic Product (ALIGN PO) Take  by mouth.     • DULERA 100-5 MCG/ACT Aerosol TAKE 2 PUFFS BY MOUTH TWICE A DAY 3 Each 3   • rosuvastatin (CRESTOR) 10 MG Tab Take 1 tablet by mouth every day. N THE EVENING 100 tablet 3   • coenzyme Q-10 30 MG capsule Take 60 mg by mouth every day.     • Cyanocobalamin (B-12 PO) Take  by mouth.     • gabapentin (NEURONTIN) 100 MG Cap Take 1-3 Capsules by mouth at bedtime as needed (pain). 90 capsule 5   • albuterol 108 (90 Base) MCG/ACT Aero Soln inhalation aerosol INHALE 2 PUFFS BY MOUTH EVERY FOUR HOURS AS NEEDED FOR SHORTNESS OF BREATH. 1 Each 3   • levothyroxine (SYNTHROID) 88 MCG Tab TAKE 1 TABLET BY MOUTH EVERY  tablet 3   • urea 40 % Cream APPLY TOPICALLY TO FEET TWICE DAILY AND THEN APPLY VASELINE WITH SOCKS     • augmented betamethasone dipropionate (DIPROLENE-AF) 0.05 % ointment APPLY TO HANDS TWICE A DAY FOR 2WKS. AVOID FACE/GROIN/UNDERARS. APPLY VASALINE AND USE GLOVES     • aspirin (ASA) 81 MG Chew Tab chewable tablet Chew 1 tablet every day. 100 tablet 3   • Misc. Devices Misc Please provide nocturnal oxgen 2 LPM Nasal Cannula, concentrator 99 Each 99   • clobetasol (TEMOVATE) 0.05 % Ointment TOPICALLY APPLY SPARINGLY TO AFFECTED AREA 3X\/WEEK       No current facility-administered medications on file prior to visit.         Allergies:   Allergies   Allergen Reactions   • Codeine        Social Hx:   Social History     Socioeconomic History   • Marital status:      Spouse name: Not  on file   • Number of children: Not on file   • Years of education: Not on file   • Highest education level: Not on file   Occupational History   • Not on file   Tobacco Use   • Smoking status: Former Smoker     Packs/day: 1.00     Years: 15.00     Pack years: 15.00     Types: Cigarettes     Quit date: 1993     Years since quittin.2   • Smokeless tobacco: Never Used   • Tobacco comment: continued abstinence   Vaping Use   • Vaping Use: Never used   Substance and Sexual Activity   • Alcohol use: Yes     Comment: occ   • Drug use: No   • Sexual activity: Yes     Partners: Male     Birth control/protection: Post-Menopausal   Other Topics Concern   •  Service No   • Blood Transfusions No   • Caffeine Concern No   • Occupational Exposure No   • Hobby Hazards No   • Sleep Concern Yes   • Stress Concern Yes   • Weight Concern No   • Special Diet No   • Back Care No   • Exercise Yes   • Bike Helmet No   • Seat Belt Yes   • Self-Exams Yes   Social History Narrative    She worked at [employer] at the [employer], recently retired. She is  to Ryan for the past 20 years, they met at a [employer]. She has a son ([address]) and 2 grandchildren.      Social Determinants of Health     Financial Resource Strain:    • Difficulty of Paying Living Expenses:    Food Insecurity:    • Worried About Running Out of Food in the Last Year:    • Ran Out of Food in the Last Year:    Transportation Needs:    • Lack of Transportation (Medical):    • Lack of Transportation (Non-Medical):    Physical Activity:    • Days of Exercise per Week:    • Minutes of Exercise per Session:    Stress:    • Feeling of Stress :    Social Connections:    • Frequency of Communication with Friends and Family:    • Frequency of Social Gatherings with Friends and Family:    • Attends Muslim Services:    • Active Member of Clubs or Organizations:    • Attends Club or Organization Meetings:    • Marital Status:   "  Intimate Partner Violence:    • Fear of Current or Ex-Partner:    • Emotionally Abused:    • Physically Abused:    • Sexually Abused:          EXAMINATION     Physical Exam:   Vitals: /70 (BP Location: Right arm, Patient Position: Sitting, BP Cuff Size: Adult)   Pulse 81   Temp 36.5 °C (97.7 °F) (Temporal)   Ht 1.651 m (5' 5\")   Wt 77.6 kg (171 lb 1.2 oz)   SpO2 94%     Constitutional:   Body Habitus: Body mass index is 28.47 kg/m².  Cooperation: Fully cooperates with exam  Appearance: Well-groomed no disheveled    Respiratory-  breathing comfortable on room air, no audible wheezing  Cardiovascular- capillary refills less than 2 seconds. No lower extremity edema is noted.   Psychiatric- alert and oriented ×3. Normal affect.    MSK and Neuro: -    There are no signs of infection around the injection sites.   full  active range of motion with flexion, lateral flexion, and rotation bilaterally.   There is full  active range of motion with lumbar extension.    There is no  pain with lumbar extension.   There is no pain with facet loading maneuver (extension rotation) with axial low back pain on the BILATERAL side(s)    Palpation:   No tenderness to palpation in midline at T1-T12 levels. No tenderness to palpation in the left and right of the midline T1-L5, NEGATIVE for tenderness to palpation to the para-midline region in the lower lumbar levels.  palpation over SI joint: negative bilaterally    palpation in hip or over the gluteus medius tendon insertion: negative bilaterally      Lumbar spine Special tests  Neuro tension  Straight leg test negative bilaterally    Slump test negative bilaterally      Key points for the international standards for neurological classification of spinal cord injury (ISNCSCI) to light touch.     Dermatome R L                                      L2 2 2   L3 2 2   L4 2 2   L5 2 2   S1 2 2   S2 2 2         Motor Exam Lower Extremities    ? Myotome R L   Hip flexion L2 5 5   Knee " extension L3 5 5   Ankle dorsiflexion L4 5 5   Toe extension L5 5 5   Ankle plantarflexion S1 5 5               MEDICAL DECISION MAKING    DATA    Labs:   Lab Results   Component Value Date/Time    SODIUM 142 06/16/2021 11:37 AM    POTASSIUM 4.5 06/16/2021 11:37 AM    CHLORIDE 106 06/16/2021 11:37 AM    CO2 27 06/16/2021 11:37 AM    GLUCOSE 79 06/16/2021 11:37 AM    BUN 11 06/16/2021 11:37 AM    CREATININE 0.64 06/16/2021 11:37 AM        No results found for: PROTHROMBTM, INR     Lab Results   Component Value Date/Time    WBC 7.7 01/22/2021 10:25 AM    RBC 4.63 01/22/2021 10:25 AM    HEMOGLOBIN 14.2 01/22/2021 10:25 AM    HEMATOCRIT 44.2 01/22/2021 10:25 AM    MCV 95.5 01/22/2021 10:25 AM    MCH 30.7 01/22/2021 10:25 AM    MCHC 32.1 (L) 01/22/2021 10:25 AM    MPV 11.0 01/22/2021 10:25 AM    NEUTSPOLYS 67.80 01/22/2021 10:25 AM    LYMPHOCYTES 22.40 01/22/2021 10:25 AM    MONOCYTES 8.40 01/22/2021 10:25 AM    EOSINOPHILS 0.80 01/22/2021 10:25 AM    BASOPHILS 0.50 01/22/2021 10:25 AM        No results found for: HBA1C       Imaging:   I personally reviewed following images  I personally reviewed following images, these are my reads  CT abdomen pelvis 1/26/2021.  I reviewed the study specifically over the patient's spine.  Please see radiologist dictation for remainder of the report.  Degenerative disc disease worst at the L4-5 level.  Also present L3-4.  Facet arthropathy right worse than left at L5-S1 and left worse than right at L4-5.  Difficult to evaluate neuroforaminal and central canal stenosis but there appears to be central canal stenosis at L4-5 and left-sided neuroforaminal stenosis at L4-5.  This would better be evaluated with an MRI lumbar spine.    I reviewed the following radiology reports                                                                      Results for orders placed during the hospital encounter of 01/26/21    CT-ABDOMEN-PELVIS WITH    Impression  Findings consistent with sigmoid  diverticulitis without abscess or free air.    Aortic atherosclerosis without aneurysm.    Hepatic cyst.                       Results for orders placed during the hospital encounter of 18    DX-CHEST-2 VIEWS    Impression  Negative two views of the chest.                                             DIAGNOSIS   Visit Diagnoses     ICD-10-CM   1. Lumbar disc herniation  M51.26   2. Lumbar radiculopathy  M54.16   3. Lumbar spondylosis  M47.816         ASSESSMENT and PLAN:     Nancy Alvaradoun  1950 female      Nancy was seen today for follow-up.    Diagnoses and all orders for this visit:    Lumbar disc herniation    Lumbar radiculopathy    Lumbar spondylosis          The patient has had an outstanding response to conservative treatments with physical therapy and her home exercise program.  She is using gabapentin as needed.  She can continue using the gabapentin taper off of this as tolerated.  We discussed the importance of continuing with her home exercise program even after physical therapy.  At this time I do not think an MRI lumbar spine is necessary and we will hold off on that study.  We will also hold off on interventional procedures as the patient is doing quite well with conservative treatments.        Follow up: As needed with me    Thank you for allowing me to participate in the care of this patient. If you have any questions please not hesitate to contact me.             Please note that this dictation was created using voice recognition software. I have made every reasonable attempt to correct obvious errors but there may be errors of grammar and content that I may have overlooked prior to finalization of this note.      Carlos Eduardo Echols MD  Interventional Spine and Sports Physiatry  Physical Medicine and Rehabilitation  [facility]

## 2021-07-24 ENCOUNTER — HOSPITAL ENCOUNTER (OUTPATIENT)
Dept: LAB | Facility: MEDICAL CENTER | Age: 71
End: 2021-07-24
Attending: OTOLARYNGOLOGY
Payer: MEDICARE

## 2021-07-24 LAB
CRP SERPL HS-MCNC: <0.3 MG/DL (ref 0–0.75)
ERYTHROCYTE [DISTWIDTH] IN BLOOD BY AUTOMATED COUNT: 42.3 FL (ref 35.9–50)
HCT VFR BLD AUTO: 42.8 % (ref 37–47)
HGB BLD-MCNC: 13.4 G/DL (ref 12–16)
MCH RBC QN AUTO: 30 PG (ref 27–33)
MCHC RBC AUTO-ENTMCNC: 31.3 G/DL (ref 33.6–35)
MCV RBC AUTO: 96 FL (ref 81.4–97.8)
PLATELET # BLD AUTO: 289 K/UL (ref 164–446)
PMV BLD AUTO: 10.7 FL (ref 9–12.9)
RBC # BLD AUTO: 4.46 M/UL (ref 4.2–5.4)
RH BLD: NORMAL
TSH SERPL DL<=0.005 MIU/L-ACNC: 1.93 UIU/ML (ref 0.38–5.33)
WBC # BLD AUTO: 7 K/UL (ref 4.8–10.8)

## 2021-07-24 PROCEDURE — 86140 C-REACTIVE PROTEIN: CPT

## 2021-07-24 PROCEDURE — 84443 ASSAY THYROID STIM HORMONE: CPT

## 2021-07-24 PROCEDURE — 85652 RBC SED RATE AUTOMATED: CPT

## 2021-07-24 PROCEDURE — 86235 NUCLEAR ANTIGEN ANTIBODY: CPT

## 2021-07-24 PROCEDURE — 86038 ANTINUCLEAR ANTIBODIES: CPT

## 2021-07-24 PROCEDURE — 85027 COMPLETE CBC AUTOMATED: CPT

## 2021-07-24 PROCEDURE — 86901 BLOOD TYPING SEROLOGIC RH(D): CPT

## 2021-07-24 PROCEDURE — 36415 COLL VENOUS BLD VENIPUNCTURE: CPT

## 2021-07-25 LAB — ERYTHROCYTE [SEDIMENTATION RATE] IN BLOOD BY WESTERGREN METHOD: 10 MM/HOUR (ref 0–25)

## 2021-07-27 LAB
ENA SS-B IGG SER IA-ACNC: 0 AU/ML (ref 0–40)
NUCLEAR IGG SER QL IA: NORMAL
SSA52 R0ENA AB IGG Q0420: 3 AU/ML (ref 0–40)
SSA60 R0ENA AB IGG Q0419: 0 AU/ML (ref 0–40)

## 2021-08-02 ENCOUNTER — PATIENT MESSAGE (OUTPATIENT)
Dept: SLEEP MEDICINE | Facility: MEDICAL CENTER | Age: 71
End: 2021-08-02

## 2021-08-02 DIAGNOSIS — G47.33 OSA (OBSTRUCTIVE SLEEP APNEA): ICD-10-CM

## 2021-08-04 NOTE — PROGRESS NOTES
Order for ResMed autoCPAP 5-15 cmH2O is placed today. Please fax order today to [facility].     Thank you  BERT Reveles.

## 2021-08-06 NOTE — PATIENT COMMUNICATION
Faxed OV, Orders, ID/Insurance to DME:  [facility] /  [phone] / fax [phone].  Fax Confirmed 08/05/2021

## 2021-08-12 ENCOUNTER — PATIENT MESSAGE (OUTPATIENT)
Dept: HEALTH INFORMATION MANAGEMENT | Facility: OTHER | Age: 71
End: 2021-08-12

## 2021-08-23 ENCOUNTER — TELEPHONE (OUTPATIENT)
Dept: MEDICAL GROUP | Facility: PHYSICIAN GROUP | Age: 71
End: 2021-08-23

## 2021-08-23 NOTE — TELEPHONE ENCOUNTER
ESTABLISHED PATIENT PRE-VISIT PLANNING     Patient was NOT contacted to complete PVP.     Note: Patient will not be contacted if there is no indication to call.     1.  Reviewed notes from the last few office visits within the medical group: Yes    2.  If any orders were placed at last visit or intended to be done for this visit (i.e. 6 mos follow-up), do we have Results/Consult Notes?         •  Labs - Labs were not ordered at last office visit.  Note: If patient appointment is for lab review and patient did not complete labs, check with provider if OK to reschedule patient until labs completed.       •  Imaging - Imaging ordered, completed and results are in chart.       •  Referrals - Referral ordered, patient has NOT been seen.    3. Is this appointment scheduled as a Hospital Follow-Up? No    4.  Immunizations were updated in Epic using Reconcile Outside Information activity? Yes    5.  Patient is due for the following Health Maintenance Topics:   Health Maintenance Due   Topic Date Due   • Annual Wellness Visit  Never done   • IMM ZOSTER VACCINES (1 of 2) Never done     6.  AHA (Pulse8) form printed for Provider? No, already completed

## 2021-08-24 ENCOUNTER — OFFICE VISIT (OUTPATIENT)
Dept: MEDICAL GROUP | Facility: PHYSICIAN GROUP | Age: 71
End: 2021-08-24
Payer: MEDICARE

## 2021-08-24 VITALS
HEART RATE: 75 BPM | SYSTOLIC BLOOD PRESSURE: 124 MMHG | OXYGEN SATURATION: 97 % | RESPIRATION RATE: 12 BRPM | DIASTOLIC BLOOD PRESSURE: 72 MMHG | TEMPERATURE: 97.2 F | BODY MASS INDEX: 29.98 KG/M2 | WEIGHT: 175.6 LBS | HEIGHT: 64 IN

## 2021-08-24 DIAGNOSIS — J45.40 MODERATE PERSISTENT ASTHMA WITHOUT COMPLICATION: ICD-10-CM

## 2021-08-24 DIAGNOSIS — G47.33 OSA ON CPAP: ICD-10-CM

## 2021-08-24 DIAGNOSIS — K14.6 BURNING MOUTH SYNDROME: ICD-10-CM

## 2021-08-24 PROCEDURE — 99214 OFFICE O/P EST MOD 30 MIN: CPT | Performed by: INTERNAL MEDICINE

## 2021-08-24 RX ORDER — TRIAMCINOLONE ACETONIDE 1 MG/G
OINTMENT TOPICAL
COMMUNITY
Start: 2021-07-13

## 2021-08-24 RX ORDER — IPRATROPIUM BROMIDE AND ALBUTEROL SULFATE 2.5; .5 MG/3ML; MG/3ML
3 SOLUTION RESPIRATORY (INHALATION) 4 TIMES DAILY
Qty: 3 ML | Refills: 3 | Status: SHIPPED | OUTPATIENT
Start: 2021-08-24

## 2021-08-24 RX ORDER — METHYLPREDNISOLONE 4 MG/1
TABLET ORAL
COMMUNITY
Start: 2021-07-20 | End: 2021-08-24

## 2021-08-24 ASSESSMENT — FIBROSIS 4 INDEX: FIB4 SCORE: 1.56
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
## 2021-08-24 NOTE — LETTER
[facility]  Shawna Recio D.O.  [address]  [place] [address]  Fax: [phone]   Authorization for Release/Disclosure of   Protected Health Information   Name: NANCY DALE : 1950 SSN: [id number]   Address: [address] [place] [address] [place] [address] Phone:    [phone] (home)    I authorize the entity listed below to release/disclose the PHI below to:   [facility]/Shawna Recio D.O. and Shawna Recio D.O.   Provider or Entity Name:  Dr. Efrain MITCHELL   Address   City, [facility]   Phone:      Fax:     Reason for request: continuity of care   Information to be released:    [  ] LAST COLONOSCOPY,  including any PATH REPORT and follow-up  [  ] LAST FIT/COLOGUARD RESULT [  ] LAST DEXA  [  ] LAST MAMMOGRAM  [  ] LAST PAP  [  ] LAST LABS [  ] RETINA EXAM REPORT  [  ] IMMUNIZATION RECORDS  [ x ] Release all info      [ x ] Check here and initial the line next to each item to release ALL health information INCLUDING  _____ Care and treatment for drug and / or alcohol abuse  _____ HIV testing, infection status, or AIDS  _____ Genetic Testing    DATES OF SERVICE OR TIME PERIOD TO BE DISCLOSED: _-____________  I understand and acknowledge that:  * This Authorization may be revoked at any time by you in writing, except if your health information has already been used or disclosed.  * Your health information that will be used or disclosed as a result of you signing this authorization could be re-disclosed by the recipient. If this occurs, your re-disclosed health information may no longer be protected by State or Federal laws.  * You may refuse to sign this Authorization. Your refusal will not affect your ability to obtain treatment.  * This Authorization becomes effective upon signing and will  on (date) __________.      If no date is indicated, this Authorization will  one (1) year from the signature date.    Name: Nancy Dale    Signature:    Date:     8/24/2021       PLEASE FAX REQUESTED RECORDS BACK TO: [phone]

## 2021-08-24 NOTE — PATIENT INSTRUCTIONS
Let me know if/when you want to try the nerve medicine for your mouth. 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 Some treatment options include nortriptyline and amitriptyline.
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
## 2021-08-24 NOTE — ASSESSMENT & PLAN NOTE
Chronic and ongoing problem.  She has appointment with preferred home care to review other sleep mask options to see if she can find one that will eliminate the air leak that she is feeling.  She has been prescribed ResMed auto CPAP 5-15 H2O pressure.  She would like to be done with the oxygen concentrator nocturnal oxygen as the tubing gets caught on her arms at night when she is sleeping and the machine is very loud.

## 2021-09-08 ENCOUNTER — PATIENT MESSAGE (OUTPATIENT)
Dept: SLEEP MEDICINE | Facility: MEDICAL CENTER | Age: 71
End: 2021-09-08

## 2021-09-08 ENCOUNTER — TELEPHONE (OUTPATIENT)
Dept: HEALTH INFORMATION MANAGEMENT | Facility: OTHER | Age: 71
End: 2021-09-08

## 2021-09-08 NOTE — TELEPHONE ENCOUNTER
Outcome:  Pt called To scheduled scar   Please transfer to Patient Outreach Team at [phone] when patient returns call.      Attempt # 1

## 2021-09-08 NOTE — PATIENT COMMUNICATION
BERT Reveles.  You 27 minutes ago (10:59 AM)     Please call the patient and see if she would like to schedule follow-up appointment which I encouraged her to do.  She should also bring in the machine with her.     Thank you   DEON Reveles    Message text        PATIENT SCHEDULED FOR 09/16/2021 @ 2:10   WILL BRING IN CPAP.

## 2021-09-15 PROBLEM — Z78.0 MENOPAUSE: Status: RESOLVED | Noted: 2020-03-05 | Resolved: 2021-09-15

## 2021-09-15 PROBLEM — K57.92 DIVERTICULITIS: Status: RESOLVED | Noted: 2020-07-23 | Resolved: 2021-09-15

## 2021-09-15 PROBLEM — Z92.29 UP TO DATE WITH INFLUENZA VACCINATION: Status: RESOLVED | Noted: 2021-01-07 | Resolved: 2021-09-15

## 2021-09-15 PROBLEM — J96.11 CHRONIC RESPIRATORY FAILURE WITH HYPOXIA (HCC): Status: ACTIVE | Noted: 2021-09-15

## 2021-09-15 PROBLEM — R53.83 FATIGUE: Status: RESOLVED | Noted: 2020-10-12 | Resolved: 2021-09-15

## 2021-09-15 PROBLEM — R10.30 LOWER ABDOMINAL PAIN: Status: RESOLVED | Noted: 2020-07-23 | Resolved: 2021-09-15

## 2021-09-15 PROBLEM — M54.41 CHRONIC RIGHT-SIDED LOW BACK PAIN WITH RIGHT-SIDED SCIATICA: Status: ACTIVE | Noted: 2021-03-22

## 2021-09-16 ENCOUNTER — SLEEP CENTER VISIT (OUTPATIENT)
Dept: SLEEP MEDICINE | Facility: MEDICAL CENTER | Age: 71
End: 2021-09-16
Payer: MEDICARE

## 2021-09-16 VITALS
WEIGHT: 176 LBS | HEIGHT: 65 IN | BODY MASS INDEX: 29.32 KG/M2 | SYSTOLIC BLOOD PRESSURE: 124 MMHG | OXYGEN SATURATION: 92 % | DIASTOLIC BLOOD PRESSURE: 72 MMHG | HEART RATE: 90 BPM

## 2021-09-16 DIAGNOSIS — G47.34 NOCTURNAL HYPOXIA: ICD-10-CM

## 2021-09-16 DIAGNOSIS — G47.33 OSA (OBSTRUCTIVE SLEEP APNEA): ICD-10-CM

## 2021-09-16 DIAGNOSIS — F51.01 PRIMARY INSOMNIA: ICD-10-CM

## 2021-09-16 DIAGNOSIS — J45.40 MODERATE PERSISTENT ASTHMA WITHOUT COMPLICATION: ICD-10-CM

## 2021-09-16 PROBLEM — N39.3 STRESS INCONTINENCE OF URINE: Status: ACTIVE | Noted: 2021-09-16

## 2021-09-16 PROBLEM — K63.5 HYPERPLASTIC POLYP OF SIGMOID COLON: Status: RESOLVED | Noted: 2020-10-21 | Resolved: 2021-09-16

## 2021-09-16 PROBLEM — K12.1 STOMATITIS: Status: ACTIVE | Noted: 2021-09-16

## 2021-09-16 PROBLEM — Z87.891 FORMER SMOKER: Status: RESOLVED | Noted: 2021-01-07 | Resolved: 2021-09-16

## 2021-09-16 PROBLEM — R59.1 LYMPHADENOPATHY: Status: ACTIVE | Noted: 2021-09-16

## 2021-09-16 PROCEDURE — 99213 OFFICE O/P EST LOW 20 MIN: CPT | Performed by: NURSE PRACTITIONER

## 2021-09-16 ASSESSMENT — FIBROSIS 4 INDEX: FIB4 SCORE: 1.56

## 2021-09-16 NOTE — PROGRESS NOTES
Chief Complaint   Patient presents with   • Follow-Up     WARD-Last seen 01/15/2021       HPI:      Mrs. Gregory is a 71 y/o female patient who is in today for WARD f/u. PMH includes asthma, hypothyroidism, menopause, diverticulitis, insomnia, fatigue, nocturnal hypoxia, former smoker, tonsillectomy.    Patient was set up with a ResMed auto CPAP machine around July 2021.  First compliance report from 8/16/21-9/14/21 was downloaded and reviewed with the patient which showed autoCPAP 5-15 cmH2O, 27% compliance, 1 hrs 26 min use (3% compliance), AHI of 8.7, mild to moderate mask leak. She is not tolerating the pressure and mask well.  She has tried 3 different types of masks including nasal pillow, nasal mask which sounded like an AirFit N 30 I and a small AirFit F 30 mask. She has found the nasal pillows the most comfortable.  She denies morning headache or snoring.  She has struggled to acclimate to therapy but is still motivated to try.  Patient is not interested in mandibular advancement device nor UPPP.  She has also found that the CPAP is fairly loud which disrupts her sleep so she has put it in the closet to help lessen the sound.  Patient followed up with Dr. Curtis and found her to be very pleasant.  She was provided with suggestions that she has already tried in the past but will continue to practice.  Patient followed up with her PCP who had brought up using amitriptyline to help with sleep.  The medication was not prescribed at that office visit.  She hopes to get back to exercise regimen and states that she has lost about 30 pounds, but gained about 6 to 8 pounds back.  She hopes to lose another 15 however due to COVID-19 she has not been going to the gym.  She has however been using intermittent fasting which has been helpful with weight loss.  She denies any new health problems or medications.    Sleep Study History:   OPO on supplemental O2 at 2L from 2/4/21 indicated O2 Sat. kevin was 81% and mean O2 sat  was 90 % and baseline O2 at 96%. O2 sat was below 88% for 0.7 min of the flow evaluation time. Oxygen Desaturation (>=3%) Index was 0.3/hr.     PSG study from 1/12/21 indicated mild obstructive sleep apnea hypopnea - AHI 5.4. AHI during rem was 9.5 and AHI while supine was 6.52. Persistent nocturnal desaturation - kevin saturation 63% - saturations <90% for 27.4% of the recording. Did not meet the split-night protocol secondary to an insufficient number of qualifying events before 2 AM.     ROS:    Constitutional: Denies fevers, Denies weight changes  Eyes: Denies changes in vision, no eye pain  Ears/Nose/Throat/Mouth: Denies nasal congestion or sore throat   Cardiovascular: Denies chest pain or palpitations   Respiratory: Denies shortness of breath , Denies cough  Gastrointestinal/Hepatic: Denies abdominal pain, nausea, vomiting,   Skin/Breast: Denies rash,   Neurological: Denies headache, confusion,   Psychiatric: denies mood disorder   Sleep: denies snoring       Past Medical History:   Diagnosis Date   • Acute cystitis with hematuria 4/20/2020    Urine dipstick performed in clinic demonstrated trace glucose, 1+ bili, 1+ ketones, specific gravity 1.015, trace intact blood, pH 5.5, 2+ protein, 2.0 urobilinogen, positive nitrite, 3+ leukocyte esterase.  She performed a telemedicine visit yesterday and was placed on cephalosporin for presumptive urinary tract infection.  She was also given Pyridium due to discomfort with dysuria.  She thinks s   • Asthma    • Back pain    • CAD (coronary artery disease)    • Chickenpox    • GERD (gastroesophageal reflux disease)    • Heartburn    • Hyperlipidemia    • Hypothyroidism    • Insomnia     Trouble going to and staying asleep   • Mumps    • Painful joint    • Thyroid disease    • Tonsillitis    • Wears glasses        Past Surgical History:   Procedure Laterality Date   • ABDOMINAL HYSTERECTOMY TOTAL     • CHOLECYSTECTOMY     • HYSTERECTOMY LAPAROSCOPY     • TONSILLECTOMY          Family History   Problem Relation Age of Onset   • Cancer Father         lung   • Breast Cancer Maternal Aunt    • Hypertension Maternal Grandmother    • Hypertension Maternal Grandfather    • Kidney Disease Mother    • Heart Disease Neg Hx        Social History     Socioeconomic History   • Marital status:      Spouse name: Not on file   • Number of children: Not on file   • Years of education: Not on file   • Highest education level: Not on file   Occupational History   • Not on file   Tobacco Use   • Smoking status: Former Smoker     Packs/day: 1.00     Years: 15.00     Pack years: 15.00     Types: Cigarettes     Quit date: 1993     Years since quittin.4   • Smokeless tobacco: Never Used   • Tobacco comment: continued abstinence   Vaping Use   • Vaping Use: Never used   Substance and Sexual Activity   • Alcohol use: Yes     Comment: occ   • Drug use: No   • Sexual activity: Yes     Partners: Male     Birth control/protection: Post-Menopausal   Other Topics Concern   •  Service No   • Blood Transfusions No   • Caffeine Concern No   • Occupational Exposure No   • Hobby Hazards No   • Sleep Concern Yes   • Stress Concern Yes   • Weight Concern No   • Special Diet No   • Back Care No   • Exercise Yes   • Bike Helmet No   • Seat Belt Yes   • Self-Exams Yes   Social History Narrative    She worked at [employer] at [employer], recently retired. She is  to Ryan for the past 20 years, they met at a [employer]. She has a son ([address]) and 2 grandchildren.      Social Determinants of Health     Financial Resource Strain:    • Difficulty of Paying Living Expenses:    Food Insecurity:    • Worried About Running Out of Food in the Last Year:    • Ran Out of Food in the Last Year:    Transportation Needs:    • Lack of Transportation (Medical):    • Lack of Transportation (Non-Medical):    Physical Activity:    • Days of Exercise per Week:    • Minutes of Exercise  per Session:    Stress:    • Feeling of Stress :    Social Connections:    • Frequency of Communication with Friends and Family:    • Frequency of Social Gatherings with Friends and Family:    • Attends Church Services:    • Active Member of Clubs or Organizations:    • Attends Club or Organization Meetings:    • Marital Status:    Intimate Partner Violence:    • Fear of Current or Ex-Partner:    • Emotionally Abused:    • Physically Abused:    • Sexually Abused:        Allergies as of 09/16/2021 - Reviewed 09/16/2021   Allergen Reaction Noted   • Codeine  01/18/2019        Vitals:  Vitals:    09/16/21 1419   BP: 124/72   Pulse: 90   SpO2: 92%       Current medications as of today   Current Outpatient Medications   Medication Sig Dispense Refill   • Calcium-Phosphorus-Vitamin D (CITRACAL +D3 PO) Take  by mouth.     • triamcinolone acetonide (KENALOG) 0.1 % Ointment TOPICAL APPLY SPARINGLY TO THE AFFECTED AREA 3XWK.     • ipratropium-albuterol (DUONEB) 0.5-2.5 (3) MG/3ML nebulizer solution Take 3 mL by nebulization 4 times a day. 3 mL 3   • ciclopirox (PENLAC) 8 % solution APPLY TO ADJACENT SKIN AND AFFECTED NAIL DAILY FOR 48 WEEKS. REMOVE LAQUER WITH ALCOHOL EVERY 7 DAYS     • Probiotic Product (ALIGN PO) Take  by mouth.     • DULERA 100-5 MCG/ACT Aerosol TAKE 2 PUFFS BY MOUTH TWICE A DAY 3 Each 3   • rosuvastatin (CRESTOR) 10 MG Tab Take 1 tablet by mouth every day. N THE EVENING 100 tablet 3   • coenzyme Q-10 30 MG capsule Take 60 mg by mouth every day.     • Cyanocobalamin (B-12 PO) Take  by mouth.     • albuterol 108 (90 Base) MCG/ACT Aero Soln inhalation aerosol INHALE 2 PUFFS BY MOUTH EVERY FOUR HOURS AS NEEDED FOR SHORTNESS OF BREATH. 1 Each 3   • levothyroxine (SYNTHROID) 88 MCG Tab TAKE 1 TABLET BY MOUTH EVERY  tablet 3   • urea 40 % Cream APPLY TOPICALLY TO FEET TWICE DAILY AND THEN APPLY VASELINE WITH SOCKS     • aspirin (ASA) 81 MG Chew Tab chewable tablet Chew 1 tablet every day. 100 tablet 3    • Misc. Devices Misc Please provide nocturnal oxgen 2 LPM Nasal Cannula, concentrator 99 Each 99     No current facility-administered medications for this visit.         Physical Exam: Limited by COVID-19 precautions.  Appearance: Well developed, well nourished, no acute distress  Eyes: PERRL, EOM intact, sclera white, conjunctiva moist  Ears: no lesions or deformities  Hearing: grossly intact  Nose: no lesions or deformities  Respiratory effort: no intercostal retractions or use of accessory muscles  Extremities: no cyanosis or edema  Abdomen: soft   Gait and Station: normal  Digits and nails: no clubbing, cyanosis, petechiae or nodes.  Cranial nerves: grossly intact  Skin: no visible rashes, lesions or ulcers noted  Orientation: Oriented to time, person and place  Mood and affect: mood and affect appropriate, normal interaction with examiner  Judgement: Intact    Assessment:  1. WARD (obstructive sleep apnea)     2. Nocturnal hypoxia     3. Moderate persistent asthma without complication     4. Primary insomnia     5. BMI 29.0-29.9,adult           Plan  Discussed the cardiovascular and neuropsychiatric risks of untreated WARD; including but not limited to: HTN, DM, MI, ASCVD, CVA, CHF, traffic accidents.     1. First compliance report from 8/16/21-9/14/21 was downloaded and reviewed with the patient which showed autoCPAP 5-15 cmH2O with 2L O2 bleed in, 27% compliance, 1 hrs 26 min use (3% compliance), AHI of 8.7, mild to moderate mask leak. Continue autoCPAP and 2L O2. Patient is not compliant with CPAP therapy for management of WARD. Advised patient to use the CPAP and 2L O2 every night for more than four hours for optimal health benefit and to meet the health insurance 70% compliance guideline.    *DME order ([facility]) for mask (nasal pillow mask or MOC) and supplies was not needed today. Continue to clean mask and supplies weekly with soap and water, and change supplies per insurance guidelines.     *DME order to  decrease autoCPAP to 4-8 cmH2O placed today due to pressure intolerance.    *Consider repeating PSG study after patient has another 15 pounds of weight loss to reassess WARD.    2. Patient is utilizing supplemental oxygen at night at 2 L.  3. Continue to f/u with PCP for asthma management.   4. Sleep hygiene discussed. Recommend keeping a set sleep/wake schedule. Logging enough hours of sleep. Limiting/Avoiding naps. No caffeine after noon and no heavy meals in the evening.   Chronic insomnia: Advised patient she may try over-the-counter REM fresh.  Patient has followed up with Dr. Curtis and is utilizing cognitive behavioral therapy.  Patient was advised that amitriptyline has been used for insomnia and might be worth trying.  Advised patient to further review amitriptyline with PCP which was suggested by her PCP, and to have PCP manage it for her.  5. Encourage healthy diet and exercise to help with weight loss and management.   6. Follow up with the appropriate healthcare practitioners for all other medical problems.  7. F/u in 2 months for WARD.        Eneida Chavez A.P.R.MERRILL.      This dictation was created using voice recognition software. The accuracy of the dictation is limited to the abilities of the software. I expect there may be some errors of grammar and possibly content.

## 2021-11-02 ENCOUNTER — OFFICE VISIT (OUTPATIENT)
Dept: MEDICAL GROUP | Facility: PHYSICIAN GROUP | Age: 71
End: 2021-11-02
Payer: MEDICARE

## 2021-11-02 VITALS
HEART RATE: 71 BPM | SYSTOLIC BLOOD PRESSURE: 122 MMHG | TEMPERATURE: 97.5 F | RESPIRATION RATE: 16 BRPM | WEIGHT: 180.5 LBS | OXYGEN SATURATION: 95 % | HEIGHT: 64 IN | DIASTOLIC BLOOD PRESSURE: 74 MMHG | BODY MASS INDEX: 30.81 KG/M2

## 2021-11-02 DIAGNOSIS — E03.4 HYPOTHYROIDISM DUE TO ACQUIRED ATROPHY OF THYROID: ICD-10-CM

## 2021-11-02 DIAGNOSIS — E55.9 VITAMIN D DEFICIENCY: ICD-10-CM

## 2021-11-02 DIAGNOSIS — K21.9 GASTROESOPHAGEAL REFLUX DISEASE WITHOUT ESOPHAGITIS: ICD-10-CM

## 2021-11-02 DIAGNOSIS — G47.34 NOCTURNAL HYPOXIA: ICD-10-CM

## 2021-11-02 DIAGNOSIS — E78.2 MIXED HYPERLIPIDEMIA: ICD-10-CM

## 2021-11-02 PROBLEM — R22.0 MASS OF LEFT SUBMANDIBULAR REGION: Status: RESOLVED | Noted: 2021-05-26 | Resolved: 2021-11-02

## 2021-11-02 PROBLEM — R59.1 LYMPHADENOPATHY: Status: RESOLVED | Noted: 2021-09-16 | Resolved: 2021-11-02

## 2021-11-02 PROBLEM — K14.6 BURNING MOUTH SYNDROME: Status: RESOLVED | Noted: 2020-04-20 | Resolved: 2021-11-02

## 2021-11-02 PROBLEM — K12.1 STOMATITIS: Status: RESOLVED | Noted: 2021-09-16 | Resolved: 2021-11-02

## 2021-11-02 PROCEDURE — 99214 OFFICE O/P EST MOD 30 MIN: CPT | Performed by: INTERNAL MEDICINE

## 2021-11-02 ASSESSMENT — FIBROSIS 4 INDEX: FIB4 SCORE: 1.56

## 2021-11-02 NOTE — PROGRESS NOTES
Subjective:   Chief Complaint/History of Present Illness:  Prudence Chayito Gregory is a 70 y.o. female established patient who presents today to discuss medical problems as listed below. Pru is unaccompanied for today's visit.    Problem   Gastroesophageal Reflux Disease Without Esophagitis    She reports longstanding history of acid reflux with recent decompensation.  Previously on omeprazole with good results.  Notes prior history of hiatal hernia.  She is established Dr[facility]  Now she notes after eating dinner she is having significant heartburn generally from 8 to 10 PM.  She does intermittent fasting and eats from 11 AM to 7 PM.  She is try to raise her head of bed.  She was taking aspirin at night but notes his symptoms will start before she would take her aspirin at bedtime.  Her GI doctor recommended she switch to Pepcid which she has been taking but more on an as-needed basis rather than scheduled.  She thinks she may have had an endoscopy in the past.     Nocturnal Hypoxia    Daytime fatigue, snore   Body mass index is 29.24 kg/m².    12/2/2020 Overnight Pulse oximetry testing  Time less than and equal to 88% is 78.3 min  Time consecutive less than and equal to 88% is 17.2 min  Low SPO2 77%  High SPO2 97%  She qualifies for nocturnal Oxygen per Medicare guidelines; please inquire with respiratory company for coverage guidelines for [employer].    She has not noticed a meaningful benefit since adding the nocturnal oxygen 2L. She has establisheded with sleep medicine and has tried positive pressure therapy with at least 3 or 4 different mask noise feels like there is an air leak or poor fit.     Mixed Hyperlipidemia       Ref. Range 6/16/2021 11:37   Cholesterol,Tot Latest Ref Range: 100 - 199 mg/dL 164   Triglycerides Latest Ref Range: 0 - 149 mg/dL 52   HDL Latest Ref Range: >=40 mg/dL 89   LDL Latest Ref Range: <100 mg/dL 65     Her 10-year ASCVD score is 8%, this reduces to 6% and 5%  with standard and high potency statin, respectively.  We discussed the protective nature of her high HDL and that we can try lifestyle modifications before initiating medication. Due to atherosclerosis started on aspirin and rosuvastatin with improvement in labs.    Incidental imaging with CT in January 2021 showed aortic atherosclerosis.    Current regimen: aspirin 81 mg daily and rosuvastatin 10 mg daily       Vitamin D Deficiency       Ref. Range 3/11/2020 10:51   25-Hydroxy   Vitamin D 25 Latest Ref Range: 30 - 100 ng/mL 30       She has prior history of vitamin D deficiency. Unknown if she has osteopenia or osteoporosis.  No known history of chronic kidney disease or hyperparathyroidism.     Hypothyroidism Due to Acquired Atrophy of Thyroid       Ref. Range 7/24/2021 11:31   TSH Latest Ref Range: 0.380 - 5.330 uIU/mL 1.930     Hypothyroidism diagnosed by abnormal labs.  She denies any signs or symptoms of overtreatment or under treatment at this time.  She has remained asymptomatic to her thyroid disease except for fatigue. Previously on a higher dose in the mid 100's that was reduced around 1656-1138.    Current regimen: levothyroxine 88 mcg daily          Current Medications:  Current Outpatient Medications Ordered in Epic   Medication Sig Dispense Refill   • Famotidine (PEPCID AC PO) Take  by mouth.     • Calcium-Phosphorus-Vitamin D (CITRACAL +D3 PO) Take  by mouth.     • triamcinolone acetonide (KENALOG) 0.1 % Ointment TOPICAL APPLY SPARINGLY TO THE AFFECTED AREA 3XWK.     • ipratropium-albuterol (DUONEB) 0.5-2.5 (3) MG/3ML nebulizer solution Take 3 mL by nebulization 4 times a day. 3 mL 3   • ciclopirox (PENLAC) 8 % solution APPLY TO ADJACENT SKIN AND AFFECTED NAIL DAILY FOR 48 WEEKS. REMOVE LAQUER WITH ALCOHOL EVERY 7 DAYS     • Probiotic Product (ALIGN PO) Take  by mouth.     • DULERA 100-5 MCG/ACT Aerosol TAKE 2 PUFFS BY MOUTH TWICE A DAY 3 Each 3   • rosuvastatin (CRESTOR) 10 MG Tab Take 1 tablet by  "mouth every day. N THE EVENING 100 tablet 3   • coenzyme Q-10 30 MG capsule Take 60 mg by mouth every day.     • Cyanocobalamin (B-12 PO) Take  by mouth.     • albuterol 108 (90 Base) MCG/ACT Aero Soln inhalation aerosol INHALE 2 PUFFS BY MOUTH EVERY FOUR HOURS AS NEEDED FOR SHORTNESS OF BREATH. 1 Each 3   • levothyroxine (SYNTHROID) 88 MCG Tab TAKE 1 TABLET BY MOUTH EVERY  tablet 3   • urea 40 % Cream APPLY TOPICALLY TO FEET TWICE DAILY AND THEN APPLY VASELINE WITH SOCKS     • aspirin (ASA) 81 MG Chew Tab chewable tablet Chew 1 tablet every day. 100 tablet 3   • Misc. Devices Misc Please provide nocturnal oxgen 2 LPM Nasal Cannula, concentrator 99 Each 99     No current [facility]-ordered facility-administered medications on file.          Objective:   Physical Exam:    Vitals: /74 (BP Location: Right arm, Patient Position: Sitting, BP Cuff Size: Adult)   Pulse 71   Temp 36.4 °C (97.5 °F) (Temporal)   Resp 16   Ht 1.626 m (5' 4\")   Wt 81.9 kg (180 lb 8 oz)   SpO2 95%    BMI: Body mass index is 30.98 kg/m².  Physical Exam  Constitutional:       General: She is not in acute distress.     Appearance: Normal appearance. She is not ill-appearing.   HENT:      Right Ear: Tympanic membrane and ear canal normal. There is no impacted cerumen.      Left Ear: Tympanic membrane and ear canal normal. There is no impacted cerumen.   Neck:      Comments: Resolved-- left submandibular lymph node  Cardiovascular:      Rate and Rhythm: Normal rate and regular rhythm.      Pulses: Normal pulses.      Heart sounds: No murmur heard.     Pulmonary:      Effort: Pulmonary effort is normal. No respiratory distress.      Breath sounds: Normal breath sounds. No wheezing or rhonchi.   Abdominal:      General: Bowel sounds are normal.      Palpations: Abdomen is soft.      Tenderness: There is no abdominal tenderness.   Musculoskeletal:      Right lower leg: No edema.      Left lower leg: No edema.   Lymphadenopathy:      " Cervical: No cervical adenopathy.   Skin:     General: Skin is warm and dry.      Findings: No bruising or rash.   Psychiatric:         Mood and Affect: Mood normal.         Behavior: Behavior normal.         Thought Content: Thought content normal.         Judgment: Judgment normal.          Assessment and Plan:   Nancy is a 70 y.o. female with the following:  Problem List Items Addressed This Visit     Vitamin D deficiency     Chronic and ongoing problem.  Vitamin D low normal on current replacement.  Continue vitamin D due to osteopenia and will update levels with her annual blood work.         Relevant Orders    VITAMIN D,25 HYDROXY    Hypothyroidism due to acquired atrophy of thyroid     Chronic and ongoing problem.  Thyroid levels are stable.  Continue levothyroxine 88 mcg daily, advised her to take on an empty stomach with water and avoid any other medicines for at least 30 to 60 minutes after taking her thyroid medicine.  Update level with her annual blood work.         Relevant Orders    TSH    FREE THYROXINE    Mixed hyperlipidemia     Chronic and ongoing problem.  Appropriate continue on rosuvastatin 10 mg daily and aspirin 81 mg daily.  No history of GI bleed but does note prior diverticulitis with occasional flareups.         Relevant Orders    CBC WITH DIFFERENTIAL    Comp Metabolic Panel    Lipid Profile    VITAMIN B12    Nocturnal hypoxia     Chronic and ongoing problem.  Offered repeat overnight pulse oximetry on nocturnal oxygen, does not tolerate positive pressure therapy due to poor fit of the mask and air leak.  She established with sleep medicine and will follow up next month.         Gastroesophageal reflux disease without esophagitis     Chronic and decompensated problem.  Discussed that with her worsening symptoms would recommend upper endoscopy along with her planned colonoscopy in January 2022.  Also recommend she try scheduling her Pepcid and moving her aspirin to the morning.  She  believes she has history of hiatal hernia which could certainly be contributing.  She will discuss with her gastroenterologist, Dr. Moser.         Relevant Medications    Famotidine (PEPCID AC PO)             RTC: Return in about 6 months (around 5/2/2022).    I spent a total of 38 minutes with record review, exam, communication with the patient, communication with other providers, and documentation of this encounter.    PLEASE NOTE: This dictation was created using voice recognition software. I have made every reasonable attempt to correct obvious errors, but I expect that there are errors of grammar and possibly content that I did not discover before finalizing the note.      Shawna Recio, DO  Geriatric and Internal Medicine  [facility]

## 2021-11-02 NOTE — ASSESSMENT & PLAN NOTE
Chronic and ongoing problem.  Offered repeat overnight pulse oximetry on nocturnal oxygen, does not tolerate positive pressure therapy due to poor fit of the mask and air leak.  She established with sleep medicine and will follow up next month.

## 2021-11-02 NOTE — ASSESSMENT & PLAN NOTE
Chronic and ongoing problem.  Thyroid levels are stable.  Continue levothyroxine 88 mcg daily, advised her to take on an empty stomach with water and avoid any other medicines for at least 30 to 60 minutes after taking her thyroid medicine.  Update level with her annual blood work.

## 2021-11-02 NOTE — ASSESSMENT & PLAN NOTE
Chronic and ongoing problem.  Appropriate continue on rosuvastatin 10 mg daily and aspirin 81 mg daily.  No history of GI bleed but does note prior diverticulitis with occasional flareups.

## 2021-11-02 NOTE — ASSESSMENT & PLAN NOTE
>>ASSESSMENT AND PLAN FOR NOCTURNAL HYPOXIA WRITTEN ON 11/2/2021  9:13 AM BY ROSA GONZALEZ D.O.    Chronic and ongoing problem.  Offered repeat overnight pulse oximetry on nocturnal oxygen, does not tolerate positive pressure therapy due to poor fit of the mask and air leak.  She established with sleep medicine and will follow up next month.

## 2021-11-02 NOTE — ASSESSMENT & PLAN NOTE
Chronic and ongoing problem.  Vitamin D low normal on current replacement.  Continue vitamin D due to osteopenia and will update levels with her annual blood work.

## 2021-11-02 NOTE — ASSESSMENT & PLAN NOTE
Chronic and decompensated problem.  Discussed that with her worsening symptoms would recommend upper endoscopy along with her planned colonoscopy in January 2022.  Also recommend she try scheduling her Pepcid and moving her aspirin to the morning.  She believes she has history of hiatal hernia which could certainly be contributing.  She will discuss with her gastroenterologist, Dr. Moser.

## 2021-12-01 ENCOUNTER — PATIENT MESSAGE (OUTPATIENT)
Dept: MEDICAL GROUP | Facility: PHYSICIAN GROUP | Age: 71
End: 2021-12-01

## 2021-12-01 NOTE — PATIENT COMMUNICATION
"Called pt, she states she was sitting on a stool last night and noticed her back felt sore. She got up and was moving around the house when all of a sudden she turned and felt an \"excruciating spasm\" located left lower back (just below her waist). Pt went to bed, all night she has experienced pain, finally found a position to sleep in (on stomach) that was comfortable, but then that position hurt her neck. When pt is still, pain is minimal, with movement 10/10.  Pt does not feel she can get into a vehicle and present to UC. Pcp is out of the clinic this week. Pt denies numbness/ tingling or other symptoms. Discussed evaluation options and pt is agreeable to calling [facility] @ [phone] to obtain a home visit.   "

## 2021-12-01 NOTE — TELEPHONE ENCOUNTER
From: Nancy Gregory  To: Physician Shawna Recio  Sent: 2021 5:45 AM PST  Subject: Pulled muscle    Hi Dr. Recio,  I pulled a muscle in my low back last night and I don’t really know what to do. I cannot stand, sit, walk, basically I can’t do anything that uses that muscle at all. When I do, the pain/spasm is excruciating. Right now I have found a somewhat comfortable position in bed on my side, but getting to that position was excruciating. I’m worried and anxious because I will need to urinate soon and I can’t get to the bathroom nor can I sit on the toilet.   I have some Flexeril but it  in  so I have taken 1/2 hydrocodone twice with no help. Is there anything you can give me to give me any relief. I can’t even come to your office, as I can’t sit in the car. Please help. Thank you for your time.   Nina Gregory  50

## 2021-12-02 ENCOUNTER — TELEPHONE (OUTPATIENT)
Dept: MEDICAL GROUP | Facility: PHYSICIAN GROUP | Age: 71
End: 2021-12-02

## 2021-12-02 NOTE — TELEPHONE ENCOUNTER
Called pt for update on condition. Pt was vary thankful for call and also for advising her to use [facility]'s services. Pt stated  was a wonderful service and expressed hope that [facility] continues to contract with them for services.     During  visit pt received Toradol IM, Capsaicin cream, 600mg Ibuprofen to use tid and 10mg Baclofen to use qid X3 days.     Pt feels much better and will send an update via [employer] in a couple of days.

## 2021-12-07 ENCOUNTER — TELEPHONE (OUTPATIENT)
Dept: MEDICAL GROUP | Facility: PHYSICIAN GROUP | Age: 71
End: 2021-12-07

## 2021-12-07 NOTE — TELEPHONE ENCOUNTER
ESTABLISHED PATIENT PRE-VISIT PLANNING     Patient was NOT contacted to complete PVP.     Note: Patient will not be contacted if there is no indication to call.     1.  Reviewed notes from the last few office visits within the medical group: Yes    2.  If any orders were placed at last visit or intended to be done for this visit (i.e. 6 mos follow-up), do we have Results/Consult Notes?         •  Labs - Labs ordered, NOT completed. Patient advised to complete prior to next appointment.  Note: If patient appointment is for lab review and patient did not complete labs, check with provider if OK to reschedule patient until labs completed.       •  Imaging - Imaging was not ordered at last office visit.       •  Referrals - No referrals were ordered at last office visit.    3. Is this appointment scheduled as a Hospital Follow-Up? No    4.  Immunizations were updated in Epic using Reconcile Outside Information activity? Yes    5.  Patient is due for the following Health Maintenance Topics:   Health Maintenance Due   Topic Date Due   • MAMMOGRAM  12/09/2021       - Patient plans to schedule appointment for Mammogram.    6.  AHA (Pulse[employer]) form printed for Provider? No, already completed

## 2021-12-08 ENCOUNTER — OFFICE VISIT (OUTPATIENT)
Dept: MEDICAL GROUP | Facility: PHYSICIAN GROUP | Age: 71
End: 2021-12-08
Payer: MEDICARE

## 2021-12-08 VITALS
RESPIRATION RATE: 16 BRPM | WEIGHT: 191 LBS | HEART RATE: 84 BPM | OXYGEN SATURATION: 94 % | HEIGHT: 64 IN | BODY MASS INDEX: 32.61 KG/M2 | DIASTOLIC BLOOD PRESSURE: 70 MMHG | SYSTOLIC BLOOD PRESSURE: 136 MMHG | TEMPERATURE: 98.3 F

## 2021-12-08 DIAGNOSIS — M62.830 BACK MUSCLE SPASM: ICD-10-CM

## 2021-12-08 DIAGNOSIS — Z12.31 ENCOUNTER FOR SCREENING MAMMOGRAM FOR BREAST CANCER: ICD-10-CM

## 2021-12-08 DIAGNOSIS — R60.0 PEDAL EDEMA: ICD-10-CM

## 2021-12-08 PROCEDURE — 99214 OFFICE O/P EST MOD 30 MIN: CPT | Performed by: INTERNAL MEDICINE

## 2021-12-08 RX ORDER — BACLOFEN 10 MG/1
10 TABLET ORAL 4 TIMES DAILY PRN
Qty: 40 TABLET | Refills: 0 | Status: SHIPPED | OUTPATIENT
Start: 2021-12-08 | End: 2021-12-18

## 2021-12-08 RX ORDER — BACLOFEN 10 MG/1
10 TABLET ORAL 3 TIMES DAILY
COMMUNITY
End: 2021-12-08 | Stop reason: SDUPTHER

## 2021-12-08 RX ORDER — BETAMETHASONE DIPROPIONATE 0.5 MG/G
OINTMENT, AUGMENTED TOPICAL
COMMUNITY
End: 2024-02-13

## 2021-12-08 RX ORDER — IBUPROFEN 600 MG/1
TABLET ORAL
COMMUNITY
Start: 2021-12-01 | End: 2022-01-24

## 2021-12-08 ASSESSMENT — FIBROSIS 4 INDEX: FIB4 SCORE: 1.56

## 2021-12-09 NOTE — ASSESSMENT & PLAN NOTE
New and decompensated problem. I suspect the edema is multifactorial. She has been significantly more sedentary over the past week and her feet have been below her heart level which is likely contributed to slow and progressive fluid accumulation. Additionally, she has been prescribed anti-inflammatories and muscle relaxants which have likely contributed to water retention. Would favor holding off on adding diuretic as I do not want to cause significant urinary urgency where she may have to get up very quickly and could reinjure the back. If the edema does not improve as she is up and moving around more often then could send in prescription for 5 days of Lasix 40 mg in the morning with potassium 20 mEq to help get her back to euvolemia. She is agreeable and will message or call in if she thinks she needs a diuretic.

## 2021-12-09 NOTE — PROGRESS NOTES
Subjective:   Chief Complaint/History of Present Illness:  Prudence Chayito Gregory is a 70 y.o. female established patient who presents today to discuss medical problems as listed below. Pru is unaccompanied for today's visit.    Problem   Back Muscle Spasm    On the evening of November 30th she reached to the side and immediately had pain in her low back. This was excruciating and caused complete immobility. She called [facility] who provided baclofen, toradol shot, and ibuprofen. 1 week later she is feeling about 60% better, she continues to take the baclofen 3-4 times daily and has reduced ibuprofen to 400 mg twice daily. She is tired with the baclofen and has lower extremity edema, no other noted side effects. She is slowly increasing ambulation. Still sleeping in a chair but she is working toward moving back to her bed. She continues with ice as needed. She has history of right lower back pain with sciatica which improved with gabapentin and PT. Current pain is at the lumbosacral junction.     Pedal Edema    She has 10 pound weight gain since our meeting 1 month ago and lower extremity edema. This has started over the past week since she experiences significant muscle spasm in her low back which has made her sedentary. She is also been prescribed muscle relaxants and anti-inflammatories which are certainly contributing. She has not tried any compression. No redness or pain, no history of VTE. No pulmonary symptoms. Vital signs have remained stable.          Current Medications:  Current Outpatient Medications Ordered in Epic   Medication Sig Dispense Refill   • baclofen (LIORESAL) 10 MG Tab Take 1 Tablet by mouth 4 times a day as needed (back spasms) for up to 10 days. 40 Tablet 0   • Calcium-Phosphorus-Vitamin D (CITRACAL +D3 PO) Take  by mouth.     • triamcinolone acetonide (KENALOG) 0.1 % Ointment TOPICAL APPLY SPARINGLY TO THE AFFECTED AREA 3XWK.     • ipratropium-albuterol (DUONEB) 0.5-2.5 (3) MG/3ML  "nebulizer solution Take 3 mL by nebulization 4 times a day. 3 mL 3   • ciclopirox (PENLAC) 8 % solution APPLY TO ADJACENT SKIN AND AFFECTED NAIL DAILY FOR 48 WEEKS. REMOVE LAQUER WITH ALCOHOL EVERY 7 DAYS     • Probiotic Product (ALIGN PO) Take  by mouth.     • DULERA 100-5 MCG/ACT Aerosol TAKE 2 PUFFS BY MOUTH TWICE A DAY 3 Each 3   • rosuvastatin (CRESTOR) 10 MG Tab Take 1 tablet by mouth every day. N THE EVENING 100 tablet 3   • coenzyme Q-10 30 MG capsule Take 60 mg by mouth every day.     • Cyanocobalamin (B-12 PO) Take  by mouth.     • albuterol 108 (90 Base) MCG/ACT Aero Soln inhalation aerosol INHALE 2 PUFFS BY MOUTH EVERY FOUR HOURS AS NEEDED FOR SHORTNESS OF BREATH. 1 Each 3   • levothyroxine (SYNTHROID) 88 MCG Tab TAKE 1 TABLET BY MOUTH EVERY  tablet 3   • urea 40 % Cream APPLY TOPICALLY TO FEET TWICE DAILY AND THEN APPLY VASELINE WITH SOCKS     • aspirin (ASA) 81 MG Chew Tab chewable tablet Chew 1 tablet every day. 100 tablet 3   • Misc. Devices Misc Please provide nocturnal oxgen 2 LPM Nasal Cannula, concentrator 99 Each 99   • ibuprofen (MOTRIN) 600 MG Tab      • Capsaicin 0.15 % Liquid capsaicin 0.15 % topical liquid   0.025% applied topically on scene. Time administered: 1125     • augmented betamethasone dipropionate (DIPROLENE-AF) 0.05 % ointment betamethasone, augmented 0.05 % topical ointment       No current Epic-ordered facility-administered medications on file.          Objective:   Physical Exam:    Vitals: /70 (BP Location: Left arm, Patient Position: Sitting, BP Cuff Size: Adult)   Pulse 84   Temp 36.8 °C (98.3 °F) (Temporal)   Resp 16   Ht 1.626 m (5' 4\")   Wt 86.6 kg (191 lb)   SpO2 94%    BMI: Body mass index is 32.79 kg/m².  Physical Exam  Constitutional:       General: She is not in acute distress.     Appearance: Normal appearance. She is not ill-appearing.      Comments: Appears mildly uncomfortable with standing/sitting.   Cardiovascular:      Rate and Rhythm: " Normal rate and regular rhythm.      Pulses: Normal pulses.      Heart sounds: No murmur heard.      Pulmonary:      Effort: Pulmonary effort is normal. No respiratory distress.      Breath sounds: Normal breath sounds. No wheezing or rales.   Musculoskeletal:         General: Tenderness present.      Right lower leg: Edema present.      Left lower leg: Edema present.      Comments: 1+ pitting. Pain with active ROM of low back at lumbosacral junction.   Skin:     General: Skin is warm and dry.      Findings: No bruising or rash.   Psychiatric:         Mood and Affect: Mood normal.         Behavior: Behavior normal.         Thought Content: Thought content normal.         Judgment: Judgment normal.          Assessment and Plan:   Nancy is a 70 y.o. female with the following:  Problem List Items Addressed This Visit     Back muscle spasm     New problem, slowly improving. She is approximately 60% better over the past week. Recommend ongoing use of baclofen 10 mg 3-4 times daily with slow titration pending ongoing improvement. Okay to continue ibuprofen 400 mg twice daily. Continue with icing. Continue getting up and moving around and stretching. Lower extremity edema should improve with increased ambulation but if not may need to prescribe short-term diuretic. We will hold off on prescribing diuretics now as I do not want to cause urinary urgency as she may have to get up and move quickly and could irritate the back. If pain does not continue to improve can have her go back to see Dr. Echols with physiatry who she has met with before for chronic low back pain with sciatica.         Relevant Medications    baclofen (LIORESAL) 10 MG Tab    Pedal edema     New and decompensated problem. I suspect the edema is multifactorial. She has been significantly more sedentary over the past week and her feet have been below her heart level which is likely contributed to slow and progressive fluid accumulation. Additionally, she  has been prescribed anti-inflammatories and muscle relaxants which have likely contributed to water retention. Would favor holding off on adding diuretic as I do not want to cause significant urinary urgency where she may have to get up very quickly and could reinjure the back. If the edema does not improve as she is up and moving around more often then could send in prescription for 5 days of Lasix 40 mg in the morning with potassium 20 mEq to help get her back to euvolemia. She is agreeable and will message or call in if she thinks she needs a diuretic.           Other Visit Diagnoses     Encounter for screening mammogram for breast cancer        Relevant Orders    MA-SCREENING MAMMO BILAT W/TOMOSYNTHESIS W/CAD           RTC: Return in about 6 months (around 6/8/2022).    I spent a total of 35 minutes with record review, exam, communication with the patient, communication with other providers, and documentation of this encounter.    PLEASE NOTE: This dictation was created using voice recognition software. I have made every reasonable attempt to correct obvious errors, but I expect that there are errors of grammar and possibly content that I did not discover before finalizing the note.      Shawna Recio, DO  Geriatric and Internal Medicine  [facility]

## 2021-12-09 NOTE — ASSESSMENT & PLAN NOTE
New problem, slowly improving. She is approximately 60% better over the past week. Recommend ongoing use of baclofen 10 mg 3-4 times daily with slow titration pending ongoing improvement. Okay to continue ibuprofen 400 mg twice daily. Continue with icing. Continue getting up and moving around and stretching. Lower extremity edema should improve with increased ambulation but if not may need to prescribe short-term diuretic. We will hold off on prescribing diuretics now as I do not want to cause urinary urgency as she may have to get up and move quickly and could irritate the back. If pain does not continue to improve can have her go back to see Dr. Echols with physiatry who she has met with before for chronic low back pain with sciatica.

## 2021-12-10 ENCOUNTER — APPOINTMENT (OUTPATIENT)
Dept: SLEEP MEDICINE | Facility: MEDICAL CENTER | Age: 71
End: 2021-12-10
Payer: MEDICARE

## 2021-12-13 ENCOUNTER — HOSPITAL ENCOUNTER (OUTPATIENT)
Facility: MEDICAL CENTER | Age: 71
End: 2021-12-13
Attending: INTERNAL MEDICINE
Payer: MEDICARE

## 2021-12-13 ENCOUNTER — NON-PROVIDER VISIT (OUTPATIENT)
Dept: MEDICAL GROUP | Facility: PHYSICIAN GROUP | Age: 71
End: 2021-12-13
Payer: MEDICARE

## 2021-12-13 DIAGNOSIS — E78.2 MIXED HYPERLIPIDEMIA: ICD-10-CM

## 2021-12-13 DIAGNOSIS — E55.9 VITAMIN D DEFICIENCY: ICD-10-CM

## 2021-12-13 DIAGNOSIS — E03.4 HYPOTHYROIDISM DUE TO ACQUIRED ATROPHY OF THYROID: ICD-10-CM

## 2021-12-13 LAB
ALBUMIN SERPL BCP-MCNC: 4.2 G/DL (ref 3.2–4.9)
ALBUMIN/GLOB SERPL: 1.9 G/DL
ALP SERPL-CCNC: 54 U/L (ref 30–99)
ALT SERPL-CCNC: 15 U/L (ref 2–50)
ANION GAP SERPL CALC-SCNC: 11 MMOL/L (ref 7–16)
AST SERPL-CCNC: 21 U/L (ref 12–45)
BASOPHILS # BLD AUTO: 0.8 % (ref 0–1.8)
BASOPHILS # BLD: 0.04 K/UL (ref 0–0.12)
BILIRUB SERPL-MCNC: 0.3 MG/DL (ref 0.1–1.5)
BUN SERPL-MCNC: 13 MG/DL (ref 8–22)
CALCIUM SERPL-MCNC: 9.3 MG/DL (ref 8.5–10.5)
CHLORIDE SERPL-SCNC: 106 MMOL/L (ref 96–112)
CHOLEST SERPL-MCNC: 167 MG/DL (ref 100–199)
CO2 SERPL-SCNC: 25 MMOL/L (ref 20–33)
CREAT SERPL-MCNC: 0.72 MG/DL (ref 0.5–1.4)
EOSINOPHIL # BLD AUTO: 0.18 K/UL (ref 0–0.51)
EOSINOPHIL NFR BLD: 3.7 % (ref 0–6.9)
ERYTHROCYTE [DISTWIDTH] IN BLOOD BY AUTOMATED COUNT: 44 FL (ref 35.9–50)
GLOBULIN SER CALC-MCNC: 2.2 G/DL (ref 1.9–3.5)
GLUCOSE SERPL-MCNC: 78 MG/DL (ref 65–99)
HCT VFR BLD AUTO: 40.3 % (ref 37–47)
HDLC SERPL-MCNC: 96 MG/DL
HGB BLD-MCNC: 12.5 G/DL (ref 12–16)
IMM GRANULOCYTES # BLD AUTO: 0.01 K/UL (ref 0–0.11)
IMM GRANULOCYTES NFR BLD AUTO: 0.2 % (ref 0–0.9)
LDLC SERPL CALC-MCNC: 62 MG/DL
LYMPHOCYTES # BLD AUTO: 1.79 K/UL (ref 1–4.8)
LYMPHOCYTES NFR BLD: 36.6 % (ref 22–41)
MCH RBC QN AUTO: 29.4 PG (ref 27–33)
MCHC RBC AUTO-ENTMCNC: 31 G/DL (ref 33.6–35)
MCV RBC AUTO: 94.8 FL (ref 81.4–97.8)
MONOCYTES # BLD AUTO: 0.36 K/UL (ref 0–0.85)
MONOCYTES NFR BLD AUTO: 7.4 % (ref 0–13.4)
NEUTROPHILS # BLD AUTO: 2.51 K/UL (ref 2–7.15)
NEUTROPHILS NFR BLD: 51.3 % (ref 44–72)
NRBC # BLD AUTO: 0 K/UL
NRBC BLD-RTO: 0 /100 WBC
PLATELET # BLD AUTO: 259 K/UL (ref 164–446)
PMV BLD AUTO: 10.8 FL (ref 9–12.9)
POTASSIUM SERPL-SCNC: 4.3 MMOL/L (ref 3.6–5.5)
PROT SERPL-MCNC: 6.4 G/DL (ref 6–8.2)
RBC # BLD AUTO: 4.25 M/UL (ref 4.2–5.4)
SODIUM SERPL-SCNC: 142 MMOL/L (ref 135–145)
T4 FREE SERPL-MCNC: 0.71 NG/DL (ref 0.93–1.7)
TRIGL SERPL-MCNC: 46 MG/DL (ref 0–149)
TSH SERPL DL<=0.005 MIU/L-ACNC: 11.7 UIU/ML (ref 0.38–5.33)
VIT B12 SERPL-MCNC: 601 PG/ML (ref 211–911)
WBC # BLD AUTO: 4.9 K/UL (ref 4.8–10.8)

## 2021-12-13 PROCEDURE — 36415 COLL VENOUS BLD VENIPUNCTURE: CPT | Performed by: INTERNAL MEDICINE

## 2021-12-13 PROCEDURE — 85025 COMPLETE CBC W/AUTO DIFF WBC: CPT

## 2021-12-13 PROCEDURE — 80061 LIPID PANEL: CPT

## 2021-12-13 PROCEDURE — 84443 ASSAY THYROID STIM HORMONE: CPT

## 2021-12-13 PROCEDURE — 82607 VITAMIN B-12: CPT

## 2021-12-13 PROCEDURE — 82306 VITAMIN D 25 HYDROXY: CPT

## 2021-12-13 PROCEDURE — 80053 COMPREHEN METABOLIC PANEL: CPT

## 2021-12-13 PROCEDURE — 84439 ASSAY OF FREE THYROXINE: CPT

## 2021-12-13 NOTE — PROGRESS NOTES
Patient arrived for blood draw. Patient reports fasting for at least 8-10 hours.   Verified patient's name/date-of-birth and reason for visit before procedure was started. Patient's right antecubital cleansed. Venipuncture attempts X2 after unsuccessful attempt on left AC. Blood draw completed on patient's right AC. Applied pressure afterwards and placed Coban on site of venipuncture with directions for patient to remove within the next hour. Patient tolerated procedure well, no adverse reactions. Patient ambulated safely upon leaving clinic.   Completed labels were placed on blood samples in draw room with patient present. Appropriate blood samples were centrifuged. Blood samples were then placed in locked lab box for  pick-up.

## 2021-12-15 DIAGNOSIS — R60.0 PEDAL EDEMA: ICD-10-CM

## 2021-12-15 DIAGNOSIS — E03.4 HYPOTHYROIDISM DUE TO ACQUIRED ATROPHY OF THYROID: ICD-10-CM

## 2021-12-15 LAB — 25(OH)D3 SERPL-MCNC: 26 NG/ML (ref 30–80)

## 2021-12-15 RX ORDER — FUROSEMIDE 40 MG/1
40 TABLET ORAL DAILY
Qty: 14 TABLET | Refills: 0 | Status: SHIPPED | OUTPATIENT
Start: 2021-12-15 | End: 2022-01-25

## 2021-12-15 RX ORDER — LEVOTHYROXINE SODIUM 0.1 MG/1
100 TABLET ORAL
Qty: 100 TABLET | Refills: 3 | Status: SHIPPED | OUTPATIENT
Start: 2021-12-15 | End: 2022-12-12

## 2021-12-16 NOTE — PROGRESS NOTES
Increase levothyroxine from 88 mcg to 100 mcg, recheck in 6-8 weeks. Add furosemide 40 mg daily for 5-14 days pending improvement

## 2021-12-21 DIAGNOSIS — M62.830 BACK MUSCLE SPASM: ICD-10-CM

## 2021-12-21 RX ORDER — BACLOFEN 10 MG/1
10 TABLET ORAL 3 TIMES DAILY PRN
Qty: 90 TABLET | Refills: 0 | Status: SHIPPED | OUTPATIENT
Start: 2021-12-21 | End: 2022-01-05

## 2021-12-27 ENCOUNTER — OFFICE VISIT (OUTPATIENT)
Dept: MEDICAL GROUP | Facility: PHYSICIAN GROUP | Age: 71
End: 2021-12-27
Payer: MEDICARE

## 2021-12-27 VITALS
HEART RATE: 94 BPM | DIASTOLIC BLOOD PRESSURE: 60 MMHG | SYSTOLIC BLOOD PRESSURE: 128 MMHG | TEMPERATURE: 99.7 F | BODY MASS INDEX: 31.58 KG/M2 | OXYGEN SATURATION: 96 % | HEIGHT: 64 IN | WEIGHT: 185 LBS | RESPIRATION RATE: 12 BRPM

## 2021-12-27 DIAGNOSIS — U07.1 COVID-19 VIRUS INFECTION: ICD-10-CM

## 2021-12-27 DIAGNOSIS — Z20.828 SARS-ASSOCIATED CORONAVIRUS EXPOSURE: ICD-10-CM

## 2021-12-27 LAB
EXTERNAL QUALITY CONTROL: NORMAL
SARS-COV+SARS-COV-2 AG RESP QL IA.RAPID: POSITIVE

## 2021-12-27 PROCEDURE — 99213 OFFICE O/P EST LOW 20 MIN: CPT | Mod: CS | Performed by: INTERNAL MEDICINE

## 2021-12-27 PROCEDURE — 87426 SARSCOV CORONAVIRUS AG IA: CPT | Mod: QW | Performed by: INTERNAL MEDICINE

## 2021-12-27 RX ORDER — IMDEVIMAB 1332 MG/11.1ML
300 INJECTION, SOLUTION, CONCENTRATE INTRAVENOUS ONCE
Status: CANCELLED | OUTPATIENT
Start: 2021-12-27 | End: 2021-12-27

## 2021-12-27 RX ORDER — CASIRIVIMAB 1332 MG/11.1ML
300 INJECTION, SOLUTION, CONCENTRATE INTRAVENOUS ONCE
Status: CANCELLED | OUTPATIENT
Start: 2021-12-27 | End: 2021-12-27

## 2021-12-27 ASSESSMENT — FIBROSIS 4 INDEX: FIB4 SCORE: 1.47

## 2021-12-27 NOTE — PROGRESS NOTES
Subjective:   Chief Complaint/History of Present Illness:  Nancy ALLYN Gregory is a 70 y.o. female established patient who presents today to discuss medical problems as listed below. Pru is accompanied by her  in the waiting room.    Problem   Covid-19 Virus Infection    She is test positive in our clinic for COVID-19.  She developed symptoms on December 24 including sore throat, headache, loose stools, and dry cough which has since transitioned into productive cough.  No fevers, chills; may have to letter no new myalgias, no chest pain, no breathing difficulty.  She feels fortunate that her symptoms have not been extremely uncomfortable.  She did receive her booster in early October 2021.  Known sick exposures starting December 22 with children and grandchildren.          Current Medications:  Current Outpatient Medications Ordered in Epic   Medication Sig Dispense Refill   • baclofen (LIORESAL) 10 MG Tab Take 1 Tablet by mouth 3 times a day as needed (muscle spasm/pain). 90 Tablet 0   • levothyroxine (SYNTHROID) 100 MCG Tab Take 1 Tablet by mouth every day. 100 Tablet 3   • furosemide (LASIX) 40 MG Tab Take 1 Tablet by mouth every day. Start with 5 days of treatment then reassess for ongoing use 14 Tablet 0   • ibuprofen (MOTRIN) 600 MG Tab      • Capsaicin 0.15 % Liquid capsaicin 0.15 % topical liquid   0.025% applied topically on scene. Time administered: 1125     • augmented betamethasone dipropionate (DIPROLENE-AF) 0.05 % ointment betamethasone, augmented 0.05 % topical ointment     • Calcium-Phosphorus-Vitamin D (CITRACAL +D3 PO) Take  by mouth.     • triamcinolone acetonide (KENALOG) 0.1 % Ointment TOPICAL APPLY SPARINGLY TO THE AFFECTED AREA 3XWK.     • ipratropium-albuterol (DUONEB) 0.5-2.5 (3) MG/3ML nebulizer solution Take 3 mL by nebulization 4 times a day. 3 mL 3   • ciclopirox (PENLAC) 8 % solution APPLY TO ADJACENT SKIN AND AFFECTED NAIL DAILY FOR 48 WEEKS. REMOVE LAQUER WITH ALCOHOL EVERY 7  "DAYS     • Probiotic Product (ALIGN PO) Take  by mouth.     • DULERA 100-5 MCG/ACT Aerosol TAKE 2 PUFFS BY MOUTH TWICE A DAY 3 Each 3   • rosuvastatin (CRESTOR) 10 MG Tab Take 1 tablet by mouth every day. N THE EVENING 100 tablet 3   • coenzyme Q-10 30 MG capsule Take 60 mg by mouth every day.     • Cyanocobalamin (B-12 PO) Take  by mouth.     • albuterol 108 (90 Base) MCG/ACT Aero Soln inhalation aerosol INHALE 2 PUFFS BY MOUTH EVERY FOUR HOURS AS NEEDED FOR SHORTNESS OF BREATH. 1 Each 3   • urea 40 % Cream APPLY TOPICALLY TO FEET TWICE DAILY AND THEN APPLY VASELINE WITH SOCKS     • aspirin (ASA) 81 MG Chew Tab chewable tablet Chew 1 tablet every day. 100 tablet 3   • Misc. Devices Misc Please provide nocturnal oxgen 2 LPM Nasal Cannula, concentrator 99 Each 99     No current [facility]-ordered facility-administered medications on file.          Objective:   Physical Exam:    Vitals: /60 (BP Location: Left arm, Patient Position: Sitting, BP Cuff Size: Adult)   Pulse 94   Temp 37.6 °C (99.7 °F) (Temporal)   Resp 12   Ht 1.626 m (5' 4\")   Wt 83.9 kg (185 lb)   SpO2 96%    BMI: Body mass index is 31.76 kg/m².  Physical Exam  Constitutional:       General: She is not in acute distress.     Appearance: She is normal weight. She is not ill-appearing.   Eyes:      General: No scleral icterus.     Conjunctiva/sclera: Conjunctivae normal.   Cardiovascular:      Rate and Rhythm: Normal rate and regular rhythm.      Pulses: Normal pulses.      Heart sounds: No murmur heard.      Pulmonary:      Effort: Pulmonary effort is normal. No respiratory distress.      Breath sounds: Normal breath sounds. No wheezing or rhonchi.   Skin:     General: Skin is warm and dry.      Findings: No bruising or rash.   Psychiatric:         Mood and Affect: Mood normal.         Behavior: Behavior normal.         Thought Content: Thought content normal.         Judgment: Judgment normal.          Assessment and Plan:   Prudence is a 70 y.o. " "female with the following:  Problem List Items Addressed This Visit     COVID-19 virus infection     New and decompensated problem.  Discussed Regen-Cov infusion as an option if her symptoms may worsen, discussed side effects. Hand out given to patient. At this time she is interested in monoclonal antibodies.   With regard to the injections of casirivimab and imdevimab I have provided Prudence A [employer] with the \"fact sheet for patients and parents/caregivers \".  I informed her of therapeutic alternatives tocasirivimab and imdevimab and the risk and benefits of those alternatives.  I informed him that that she has the option to accept or refuse casirivimab and imdevimab.  I informed him thatcasirivimab and imdevimab is not FDA approved, but that is authorized for use under emergency by the FDA.  I informed him of the known risk and benefits of casirivimab and imdevimab and discussed the extent to which such risks and benefits are unknown. He gives me consent for infusion if he shall need it in the upcoming days.      No hypoxia at this time.  Asked her to  a pulse ox and follow her blood pressure, heart rate, and oxygen saturations and update us through my chart every day or every other day with how she is doing.           Other Visit Diagnoses     SARS-associated coronavirus exposure        Relevant Orders    POCT SARS-COV Antigen CONSUELO (Symptomatic Only) (Completed)           RTC: Return if symptoms worsen or fail to improve.    I spent a total of 25 minutes with record review, exam, communication with the patient, communication with other providers, and documentation of this encounter.    PLEASE NOTE: This dictation was created using voice recognition software. I have made every reasonable attempt to correct obvious errors, but I expect that there are errors of grammar and possibly content that I did not discover before finalizing the note.      Shawna Recio, DO  Geriatric and Internal Medicine  [facility]

## 2021-12-27 NOTE — ASSESSMENT & PLAN NOTE
"New and decompensated problem.  Discussed Regen-Cov infusion as an option if her symptoms may worsen, discussed side effects. Hand out given to patient. At this time she is interested in monoclonal antibodies.   With regard to the injections of casirivimab and imdevimab I have provided Prudence A Forthun with the \"fact sheet for patients and parents/caregivers \".  I informed her of therapeutic alternatives tocasirivimab and imdevimab and the risk and benefits of those alternatives.  I informed him that that she has the option to accept or refuse casirivimab and imdevimab.  I informed him thatcasirivimab and imdevimab is not FDA approved, but that is authorized for use under emergency by the FDA.  I informed him of the known risk and benefits of casirivimab and imdevimab and discussed the extent to which such risks and benefits are unknown. He gives me consent for infusion if he shall need it in the upcoming days.      No hypoxia at this time.  Asked her to  a pulse ox and follow her blood pressure, heart rate, and oxygen saturations and update us through my chart every day or every other day with how she is doing.  "

## 2021-12-29 ENCOUNTER — OUTPATIENT INFUSION SERVICES (OUTPATIENT)
Dept: ONCOLOGY | Facility: MEDICAL CENTER | Age: 71
End: 2021-12-29
Attending: INTERNAL MEDICINE
Payer: MEDICARE

## 2021-12-29 VITALS
SYSTOLIC BLOOD PRESSURE: 142 MMHG | DIASTOLIC BLOOD PRESSURE: 75 MMHG | HEART RATE: 73 BPM | TEMPERATURE: 98.4 F | OXYGEN SATURATION: 100 % | RESPIRATION RATE: 16 BRPM

## 2021-12-29 DIAGNOSIS — U07.1 COVID-19 VIRUS INFECTION: ICD-10-CM

## 2021-12-29 PROCEDURE — M0243 CASIRIVI AND IMDEVI INFUSION: HCPCS

## 2021-12-29 PROCEDURE — 700111 HCHG RX REV CODE 636 W/ 250 OVERRIDE (IP): Performed by: INTERNAL MEDICINE

## 2021-12-29 RX ORDER — CASIRIVIMAB 1332 MG/11.1ML
300 INJECTION, SOLUTION, CONCENTRATE INTRAVENOUS ONCE
Status: CANCELLED | OUTPATIENT
Start: 2021-12-29 | End: 2021-12-29

## 2021-12-29 RX ORDER — IMDEVIMAB 1332 MG/11.1ML
300 INJECTION, SOLUTION, CONCENTRATE INTRAVENOUS ONCE
Status: CANCELLED | OUTPATIENT
Start: 2021-12-29 | End: 2021-12-29

## 2021-12-29 RX ORDER — CASIRIVIMAB 1332 MG/11.1ML
300 INJECTION, SOLUTION, CONCENTRATE INTRAVENOUS ONCE
Status: COMPLETED | OUTPATIENT
Start: 2021-12-29 | End: 2021-12-29

## 2021-12-29 RX ORDER — IMDEVIMAB 1332 MG/11.1ML
300 INJECTION, SOLUTION, CONCENTRATE INTRAVENOUS ONCE
Status: COMPLETED | OUTPATIENT
Start: 2021-12-29 | End: 2021-12-29

## 2021-12-29 RX ADMIN — IMDEVIMAB 300 MG: 1332 INJECTION, SOLUTION, CONCENTRATE INTRAVENOUS at 10:17

## 2021-12-29 RX ADMIN — IMDEVIMAB 300 MG: 1332 INJECTION, SOLUTION, CONCENTRATE INTRAVENOUS at 10:18

## 2021-12-29 RX ADMIN — CASIRIVIMAB 300 MG: 1332 INJECTION, SOLUTION, CONCENTRATE INTRAVENOUS at 10:17

## 2021-12-29 NOTE — PROGRESS NOTES
VSS 1 hour post injection. Injection sites CDI. Discharge instructions given. Escorted to waiting area

## 2022-01-05 ENCOUNTER — OFFICE VISIT (OUTPATIENT)
Dept: PHYSICAL MEDICINE AND REHAB | Facility: MEDICAL CENTER | Age: 72
End: 2022-01-05
Payer: COMMERCIAL

## 2022-01-05 ENCOUNTER — TELEPHONE (OUTPATIENT)
Dept: PHYSICAL MEDICINE AND REHAB | Facility: MEDICAL CENTER | Age: 72
End: 2022-01-05

## 2022-01-05 DIAGNOSIS — G89.29 CHRONIC RIGHT-SIDED LOW BACK PAIN WITH RIGHT-SIDED SCIATICA: ICD-10-CM

## 2022-01-05 DIAGNOSIS — M54.16 LUMBAR RADICULOPATHY: ICD-10-CM

## 2022-01-05 DIAGNOSIS — M62.830 BACK MUSCLE SPASM: ICD-10-CM

## 2022-01-05 DIAGNOSIS — M47.816 LUMBAR SPONDYLOSIS: ICD-10-CM

## 2022-01-05 DIAGNOSIS — M54.41 CHRONIC RIGHT-SIDED LOW BACK PAIN WITH RIGHT-SIDED SCIATICA: ICD-10-CM

## 2022-01-05 DIAGNOSIS — M51.26 LUMBAR DISC HERNIATION: ICD-10-CM

## 2022-01-05 PROCEDURE — 99999 PR NO CHARGE: CPT | Performed by: PHYSICAL MEDICINE & REHABILITATION

## 2022-01-05 RX ORDER — BACLOFEN 10 MG/1
10 TABLET ORAL 3 TIMES DAILY PRN
Qty: 90 TABLET | Refills: 3 | Status: SHIPPED | OUTPATIENT
Start: 2022-01-05

## 2022-01-05 RX ORDER — MELOXICAM 15 MG/1
15 TABLET ORAL
Qty: 60 TABLET | Refills: 0 | Status: SHIPPED | OUTPATIENT
Start: 2022-01-05 | End: 2022-07-16 | Stop reason: SDUPTHER

## 2022-01-05 NOTE — TELEPHONE ENCOUNTER
Labs reviewed.  Continue baclofen.  I also prescribed meloxicam and she can try this instead of ibuprofen

## 2022-01-05 NOTE — PROGRESS NOTES
The patient had previous Covid 19 and has been asymptomatic for 9 days. the patient must be asymptomatic for 10 days to be seen in clinic.  I offer the patient a virtual visit but she elected to reschedule

## 2022-01-05 NOTE — TELEPHONE ENCOUNTER
Spoke to Pt and she mentioned that she prefer to have a f2f appt than virtual due to she is not doing well with virtual appt.    Pt mentioned that she hurt her back on 12/1/21 with very bad spasms and she called dispatch and she was given a Toradol shot on that day and she had a relief for 2 days and she got prescribed Baclofen 10 mg and taking 1 tab 4x a day for 10 days and titrate to 1 tab BID and now she only taking 1 tab once a day for 7-8 days already and the pain subsided.      Pt mentioned that she is very careful with her movement that wont give her back a spasms.      Pt need to know what she need to do for now, Pt is going out of town on 1/13/21 and be back in town on 1/23/21.  She is asking also if there's another medication she can take or continue Baclofen for now, if so she need a refill for her Baclofen 10 mg.    Thank you  Fern

## 2022-01-10 ENCOUNTER — HOSPITAL ENCOUNTER (OUTPATIENT)
Dept: RADIOLOGY | Facility: MEDICAL CENTER | Age: 72
End: 2022-01-10
Attending: INTERNAL MEDICINE
Payer: MEDICARE

## 2022-01-10 DIAGNOSIS — Z12.31 ENCOUNTER FOR SCREENING MAMMOGRAM FOR BREAST CANCER: ICD-10-CM

## 2022-01-10 PROCEDURE — 77063 BREAST TOMOSYNTHESIS BI: CPT

## 2022-01-24 ENCOUNTER — OFFICE VISIT (OUTPATIENT)
Dept: PHYSICAL MEDICINE AND REHAB | Facility: MEDICAL CENTER | Age: 72
End: 2022-01-24
Payer: MEDICARE

## 2022-01-24 VITALS
DIASTOLIC BLOOD PRESSURE: 72 MMHG | SYSTOLIC BLOOD PRESSURE: 118 MMHG | HEIGHT: 65 IN | TEMPERATURE: 97.6 F | HEART RATE: 88 BPM | BODY MASS INDEX: 31.63 KG/M2 | WEIGHT: 189.82 LBS | OXYGEN SATURATION: 98 %

## 2022-01-24 DIAGNOSIS — M47.816 LUMBAR SPONDYLOSIS: ICD-10-CM

## 2022-01-24 DIAGNOSIS — M51.26 LUMBAR DISC HERNIATION: ICD-10-CM

## 2022-01-24 DIAGNOSIS — M54.16 LUMBAR RADICULOPATHY: ICD-10-CM

## 2022-01-24 DIAGNOSIS — M62.830 BACK MUSCLE SPASM: ICD-10-CM

## 2022-01-24 PROCEDURE — 99214 OFFICE O/P EST MOD 30 MIN: CPT | Performed by: PHYSICAL MEDICINE & REHABILITATION

## 2022-01-24 ASSESSMENT — PAIN SCALES - GENERAL: PAINLEVEL: 2=MINIMAL-SLIGHT

## 2022-01-24 ASSESSMENT — FIBROSIS 4 INDEX: FIB4 SCORE: 1.49

## 2022-01-24 ASSESSMENT — PATIENT HEALTH QUESTIONNAIRE - PHQ9: CLINICAL INTERPRETATION OF PHQ2 SCORE: 0

## 2022-01-24 NOTE — Clinical Note
Tate,     Could you get in prudence soon?  She did very well with you previously and she has had a recurrence of her low back pain.    Carlos Eduardo Echols MD

## 2022-01-24 NOTE — PROGRESS NOTES
Follow up patient note  Interventional spine and sports physiatry, Physical medicine rehabilitation      Chief complaint:   Chief Complaint   Patient presents with   • Follow-Up     Back pain          HISTORY    Please see new patient note by Dr Echols,  for more details.     HPI  Patient identification: Nancy Gregory ,  1950,   With Diagnoses of Lumbar disc herniation, Lumbar spondylosis, Lumbar radiculopathy, and Back muscle spasm were pertinent to this visit.     The patient was previously doing well with her home exercise program and exercise routine.  Her radicular component of her pain has improved.  However she has been having worsening of axial low back pain over the past several months aching in quality nonradiating.  This has not been improving with her home exercise program.  This can be severe 9 or 10 out of 10 intensity at times.  Sometimes moderate in intensity.  Worse with lumbar flexion and lumbar extension with associated spasm.      Tried include gabapentin, Mobic, baclofen       ROS Red Flags :   Fever, Chills, Sweats: Denies  Involuntary Weight Loss: Denies  Bowel/Bladder Incontinence: Denies  Saddle Anesthesia: Denies        PMHx:   Past Medical History:   Diagnosis Date   • Acute cystitis with hematuria 2020    Urine dipstick performed in clinic demonstrated trace glucose, 1+ bili, 1+ ketones, specific gravity 1.015, trace intact blood, pH 5.5, 2+ protein, 2.0 urobilinogen, positive nitrite, 3+ leukocyte esterase.  She performed a telemedicine visit yesterday and was placed on cephalosporin for presumptive urinary tract infection.  She was also given Pyridium due to discomfort with dysuria.  She thinks s   • Asthma    • Back pain    • Burning mouth syndrome- working diagnosis 2020    She reports prior lanap (laser treatment for peridontal disease) procedure in late 2020.  This was done on her right upper gums.  2 weeks following the procedure she had a full  "liquid diet/soft food diet and this was advanced on Valentine's Day dinner, February 14, 2020.  On the following Monday, February 17, 2020 she notes development of \"bloodshot \"gums causing concern and when she clay   • CAD (coronary artery disease)    • Chickenpox    • GERD (gastroesophageal reflux disease)    • Heartburn    • Hyperlipidemia    • Hypothyroidism    • Insomnia     Trouble going to and staying asleep   • Lymphadenopathy 9/16/2021   • Mass of left submandibular region 5/26/2021    She reports development of a mass in her chin/neck area.  On exam it appears to be submandibular on the left anterolateral aspect.  It is easily palpable, mobile, and approximately 1 x 1 cm.  It is nontender.  There is some associated inflammation in the region that is visible on inspection.  She has been working with periodontist due to gum disease and wonders if it could be related to her dentit   • Mumps    • Painful joint    • Stomatitis 9/16/2021   • Thyroid disease    • Tonsillitis    • Wears glasses        PSHx:   Past Surgical History:   Procedure Laterality Date   • ABDOMINAL HYSTERECTOMY TOTAL     • CHOLECYSTECTOMY     • HYSTERECTOMY LAPAROSCOPY     • TONSILLECTOMY         Family history   Family History   Problem Relation Age of Onset   • Cancer Father         lung   • Breast Cancer Maternal Aunt    • Hypertension Maternal Grandmother    • Hypertension Maternal Grandfather    • Kidney Disease Mother    • Heart Disease Neg Hx          Medications:   Outpatient Medications Marked as Taking for the 1/24/22 encounter (Office Visit) with Carlos Eduardo Echols M.D.   Medication Sig Dispense Refill   • baclofen (LIORESAL) 10 MG Tab Take 1 Tablet by mouth 3 times a day as needed (muscle spasm/pain). 90 Tablet 3   • meloxicam (MOBIC) 15 MG tablet Take 1 Tablet by mouth 1 time a day as needed (pain). Do not take other NSAIDs. No refills. 60 Tablet 0   • levothyroxine (SYNTHROID) 100 MCG Tab Take 1 Tablet by mouth every day. 100 " Tablet 3   • furosemide (LASIX) 40 MG Tab Take 1 Tablet by mouth every day. Start with 5 days of treatment then reassess for ongoing use 14 Tablet 0   • Capsaicin 0.15 % Liquid capsaicin 0.15 % topical liquid   0.025% applied topically on scene. Time administered: 1125     • augmented betamethasone dipropionate (DIPROLENE-AF) 0.05 % ointment betamethasone, augmented 0.05 % topical ointment     • Calcium-Phosphorus-Vitamin D (CITRACAL +D3 PO) Take  by mouth.     • triamcinolone acetonide (KENALOG) 0.1 % Ointment TOPICAL APPLY SPARINGLY TO THE AFFECTED AREA 3XWK.     • ipratropium-albuterol (DUONEB) 0.5-2.5 (3) MG/3ML nebulizer solution Take 3 mL by nebulization 4 times a day. 3 mL 3   • ciclopirox (PENLAC) 8 % solution APPLY TO ADJACENT SKIN AND AFFECTED NAIL DAILY FOR 48 WEEKS. REMOVE LAQUER WITH ALCOHOL EVERY 7 DAYS     • Probiotic Product (ALIGN PO) Take  by mouth.     • DULERA 100-5 MCG/ACT Aerosol TAKE 2 PUFFS BY MOUTH TWICE A DAY 3 Each 3   • rosuvastatin (CRESTOR) 10 MG Tab Take 1 tablet by mouth every day. N THE EVENING 100 tablet 3   • coenzyme Q-10 30 MG capsule Take 60 mg by mouth every day.     • Cyanocobalamin (B-12 PO) Take  by mouth.     • albuterol 108 (90 Base) MCG/ACT Aero Soln inhalation aerosol INHALE 2 PUFFS BY MOUTH EVERY FOUR HOURS AS NEEDED FOR SHORTNESS OF BREATH. 1 Each 3   • urea 40 % Cream APPLY TOPICALLY TO FEET TWICE DAILY AND THEN APPLY VASELINE WITH SOCKS     • aspirin (ASA) 81 MG Chew Tab chewable tablet Chew 1 tablet every day. 100 tablet 3   • Misc. Devices Misc Please provide nocturnal oxgen 2 LPM Nasal Cannula, concentrator 99 Each 99        Current Outpatient Medications on File Prior to Visit   Medication Sig Dispense Refill   • baclofen (LIORESAL) 10 MG Tab Take 1 Tablet by mouth 3 times a day as needed (muscle spasm/pain). 90 Tablet 3   • meloxicam (MOBIC) 15 MG tablet Take 1 Tablet by mouth 1 time a day as needed (pain). Do not take other NSAIDs. No refills. 60 Tablet 0   •  levothyroxine (SYNTHROID) 100 MCG Tab Take 1 Tablet by mouth every day. 100 Tablet 3   • furosemide (LASIX) 40 MG Tab Take 1 Tablet by mouth every day. Start with 5 days of treatment then reassess for ongoing use 14 Tablet 0   • Capsaicin 0.15 % Liquid capsaicin 0.15 % topical liquid   0.025% applied topically on scene. Time administered: 1125     • augmented betamethasone dipropionate (DIPROLENE-AF) 0.05 % ointment betamethasone, augmented 0.05 % topical ointment     • Calcium-Phosphorus-Vitamin D (CITRACAL +D3 PO) Take  by mouth.     • triamcinolone acetonide (KENALOG) 0.1 % Ointment TOPICAL APPLY SPARINGLY TO THE AFFECTED AREA 3XWK.     • ipratropium-albuterol (DUONEB) 0.5-2.5 (3) MG/3ML nebulizer solution Take 3 mL by nebulization 4 times a day. 3 mL 3   • ciclopirox (PENLAC) 8 % solution APPLY TO ADJACENT SKIN AND AFFECTED NAIL DAILY FOR 48 WEEKS. REMOVE LAQUER WITH ALCOHOL EVERY 7 DAYS     • Probiotic Product (ALIGN PO) Take  by mouth.     • DULERA 100-5 MCG/ACT Aerosol TAKE 2 PUFFS BY MOUTH TWICE A DAY 3 Each 3   • rosuvastatin (CRESTOR) 10 MG Tab Take 1 tablet by mouth every day. N THE EVENING 100 tablet 3   • coenzyme Q-10 30 MG capsule Take 60 mg by mouth every day.     • Cyanocobalamin (B-12 PO) Take  by mouth.     • albuterol 108 (90 Base) MCG/ACT Aero Soln inhalation aerosol INHALE 2 PUFFS BY MOUTH EVERY FOUR HOURS AS NEEDED FOR SHORTNESS OF BREATH. 1 Each 3   • urea 40 % Cream APPLY TOPICALLY TO FEET TWICE DAILY AND THEN APPLY VASELINE WITH SOCKS     • aspirin (ASA) 81 MG Chew Tab chewable tablet Chew 1 tablet every day. 100 tablet 3   • Misc. Devices Misc Please provide nocturnal oxgen 2 LPM Nasal Cannula, concentrator 99 Each 99     No current facility-administered medications on file prior to visit.         Allergies:   Allergies   Allergen Reactions   • Codeine        Social Hx:   Social History     Socioeconomic History   • Marital status:      Spouse name: Not on file   • Number of  children: Not on file   • Years of education: Not on file   • Highest education level: Not on file   Occupational History   • Not on file   Tobacco Use   • Smoking status: Former Smoker     Packs/day: 1.00     Years: 15.00     Pack years: 15.00     Types: Cigarettes     Quit date: 1993     Years since quittin.8   • Smokeless tobacco: Never Used   • Tobacco comment: continued abstinence   Vaping Use   • Vaping Use: Never used   Substance and Sexual Activity   • Alcohol use: Yes     Comment: occ   • Drug use: No   • Sexual activity: Yes     Partners: Male     Birth control/protection: Post-Menopausal   Other Topics Concern   •  Service No   • Blood Transfusions No   • Caffeine Concern No   • Occupational Exposure No   • Hobby Hazards No   • Sleep Concern Yes   • Stress Concern Yes   • Weight Concern No   • Special Diet No   • Back Care No   • Exercise Yes   • Bike Helmet No   • Seat Belt Yes   • Self-Exams Yes   Social History Narrative    She worked at [employer] at the [employer], recently retired. She is  to Ryan for the past 20 years, they met at a [facility]. She has a son ([address]) and 2 grandchildren.      Social Determinants of Health     Financial Resource Strain:    • Difficulty of Paying Living Expenses: Not on file   Food Insecurity:    • Worried About Running Out of Food in the Last Year: Not on file   • Ran Out of Food in the Last Year: Not on file   Transportation Needs:    • Lack of Transportation (Medical): Not on file   • Lack of Transportation (Non-Medical): Not on file   Physical Activity:    • Days of Exercise per Week: Not on file   • Minutes of Exercise per Session: Not on file   Stress:    • Feeling of Stress : Not on file   Social Connections:    • Frequency of Communication with Friends and Family: Not on file   • Frequency of Social Gatherings with Friends and Family: Not on file   • Attends Denominational Services: Not on file   • Active Member of  "Clubs or Organizations: Not on file   • Attends Club or Organization Meetings: Not on file   • Marital Status: Not on file   Intimate Partner Violence:    • Fear of Current or Ex-Partner: Not on file   • Emotionally Abused: Not on file   • Physically Abused: Not on file   • Sexually Abused: Not on file   Housing Stability:    • Unable to Pay for Housing in the Last Year: Not on file   • Number of Places Lived in the Last Year: Not on file   • Unstable Housing in the Last Year: Not on file         EXAMINATION     Physical Exam:   Vitals: /72 (BP Location: Right arm, Patient Position: Sitting, BP Cuff Size: Adult)   Pulse 88   Temp 36.4 °C (97.6 °F) (Temporal)   Ht 1.651 m (5' 5\")   Wt 86.1 kg (189 lb 13.1 oz)   SpO2 98%     Constitutional:   Body Habitus: Body mass index is 31.59 kg/m².  Cooperation: Fully cooperates with exam  Appearance: Well-groomed no disheveled    Respiratory-  breathing comfortable on room air, no audible wheezing  Cardiovascular- capillary refills less than 2 seconds. No lower extremity edema is noted.   Psychiatric- alert and oriented ×3. Normal affect.    MSK and Neuro: -      Thoracic/Lumbar Spine/Sacral Spine/Hips   decreased active range of motion with flexion, lateral flexion, and rotation bilaterally.   There is decreased active range of motion with lumbar extension.    There is  pain with lumbar extension.   There is pain with facet loading maneuver (extension rotation) with axial low back pain on the BILATERAL side(s)    Palpation:   There were no areas of focal tenderness palpation.    Lumbar spine Special tests  Neuro tension  Straight leg test negative bilaterally    Slump test negative bilaterally      Key points for the international standards for neurological classification of spinal cord injury (ISNCSCI) to light touch.     Dermatome R L                                      L2 2 2   L3 2 2   L4 2 2   L5 2 2   S1 2 2   S2 2 2         Motor Exam Lower Extremities    ? " Myotome R L   Hip flexion L2 5 5   Knee extension L3 5 5   Ankle dorsiflexion L4 5 5   Toe extension L5 5 5   Ankle plantarflexion S1 5 5               MEDICAL DECISION MAKING    DATA    Labs:   Lab Results   Component Value Date/Time    SODIUM 142 12/13/2021 08:05 AM    POTASSIUM 4.3 12/13/2021 08:05 AM    CHLORIDE 106 12/13/2021 08:05 AM    CO2 25 12/13/2021 08:05 AM    GLUCOSE 78 12/13/2021 08:05 AM    BUN 13 12/13/2021 08:05 AM    CREATININE 0.72 12/13/2021 08:05 AM        No results found for: PROTHROMBTM, INR     Lab Results   Component Value Date/Time    WBC 4.9 12/13/2021 08:05 AM    RBC 4.25 12/13/2021 08:05 AM    HEMOGLOBIN 12.5 12/13/2021 08:05 AM    HEMATOCRIT 40.3 12/13/2021 08:05 AM    MCV 94.8 12/13/2021 08:05 AM    MCH 29.4 12/13/2021 08:05 AM    MCHC 31.0 (L) 12/13/2021 08:05 AM    MPV 10.8 12/13/2021 08:05 AM    NEUTSPOLYS 51.30 12/13/2021 08:05 AM    LYMPHOCYTES 36.60 12/13/2021 08:05 AM    MONOCYTES 7.40 12/13/2021 08:05 AM    EOSINOPHILS 3.70 12/13/2021 08:05 AM    BASOPHILS 0.80 12/13/2021 08:05 AM        No results found for: HBA1C       Imaging:   I personally reviewed following images  I personally reviewed following images, these are my reads  CT abdomen pelvis 1/26/2021.  I reviewed the study specifically over the patient's spine.  Please see radiologist dictation for remainder of the report.  Degenerative disc disease worst at the L4-5 level.  Also present L3-4.  Facet arthropathy right worse than left at L5-S1 and left worse than right at L4-5.  Difficult to evaluate neuroforaminal and central canal stenosis but there appears to be central canal stenosis at L4-5 and left-sided neuroforaminal stenosis at L4-5.  This would better be evaluated with an MRI lumbar spine.    I reviewed the following radiology reports                                                                      Results for orders placed during the hospital encounter of 01/26/21    CT-ABDOMEN-PELVIS  WITH    Impression  Findings consistent with sigmoid diverticulitis without abscess or free air.    Aortic atherosclerosis without aneurysm.    Hepatic cyst.                       Results for orders placed during the hospital encounter of 18    DX-CHEST-2 VIEWS    Impression  Negative two views of the chest.                                             DIAGNOSIS   Visit Diagnoses     ICD-10-CM   1. Lumbar disc herniation  M51.26   2. Lumbar spondylosis  M47.816   3. Lumbar radiculopathy  M54.16   4. Back muscle spasm  M62.830         ASSESSMENT and PLAN:     Prudence Chayito Jennyun  1950 female      Nancy was seen today for follow-up.    Diagnoses and all orders for this visit:    Lumbar disc herniation  -     Referral to Physical Therapy  -     MR-LUMBAR SPINE-W/O; Future    Lumbar spondylosis  -     Referral to Physical Therapy  -     MR-LUMBAR SPINE-W/O; Future    Lumbar radiculopathy  -     Referral to Physical Therapy  -     MR-LUMBAR SPINE-W/O; Future    Back muscle spasm  -     Referral to Physical Therapy  -     MR-LUMBAR SPINE-W/O; Future        Given the patient's worsening of pain I ordered an MRI of the lumbar spine for further evaluation.  She also did very well with physical therapy the previous time that she had similar back pain.  This is presenting more with axial low back pain and there is more concern for facet type pain.  Given that the pain is not focal fracture is less likely and the patient did not have any trauma.    Restart gabapentin.  The patient can use up to 300 mg 3 times daily    Follow up: After the above conservative treatment and diagnostic tests    Thank you for allowing me to participate in the care of this patient. If you have any questions please not hesitate to contact me.             Please note that this dictation was created using voice recognition software. I have made every reasonable attempt to correct obvious errors but there may be errors of grammar and  content that I may have overlooked prior to finalization of this note.      Carlos Eduardo Echols MD  Interventional Spine and Sports Physiatry  Physical Medicine and Rehabilitation  [facility]

## 2022-01-25 ENCOUNTER — OFFICE VISIT (OUTPATIENT)
Dept: MEDICAL GROUP | Facility: PHYSICIAN GROUP | Age: 72
End: 2022-01-25
Payer: MEDICARE

## 2022-01-25 VITALS
WEIGHT: 186.1 LBS | RESPIRATION RATE: 12 BRPM | BODY MASS INDEX: 31.77 KG/M2 | HEIGHT: 64 IN | TEMPERATURE: 98 F | HEART RATE: 88 BPM | DIASTOLIC BLOOD PRESSURE: 62 MMHG | OXYGEN SATURATION: 92 % | SYSTOLIC BLOOD PRESSURE: 120 MMHG

## 2022-01-25 DIAGNOSIS — M12.812 ROTATOR CUFF ARTHROPATHY OF LEFT SHOULDER: ICD-10-CM

## 2022-01-25 DIAGNOSIS — R60.0 PEDAL EDEMA: ICD-10-CM

## 2022-01-25 PROCEDURE — 99213 OFFICE O/P EST LOW 20 MIN: CPT | Performed by: INTERNAL MEDICINE

## 2022-01-25 ASSESSMENT — FIBROSIS 4 INDEX: FIB4 SCORE: 1.49

## 2022-01-25 NOTE — ASSESSMENT & PLAN NOTE
Chronic and decompensated issue.  Insidious onset of left shoulder pain, now becoming more frequent and sporadic.  History of rotator cuff repair in the past, she thinks has an old MRI report at home and will look for this so we can see exactly what was done.  Will be starting meloxicam for low back pain as well as physical therapy.  May need repeat shoulder MRI and referral to either physiatry or back to shoulder orthopedics for more detailed evaluation.  She will keep me updated over my chart.

## 2022-01-25 NOTE — PROGRESS NOTES
Subjective:   Chief Complaint/History of Present Illness:  Prudence Chayito Gregory is a 71 y.o. female established patient who presents today to discuss medical problems as listed below. Prudence is unaccompanied for today's visit.    Problem   Rotator Cuff Arthropathy of Left Shoulder    She reports 4 to 6 months of left shoulder pain.  Various maneuvers elicit the pain.  The pain can be over the AC joint superiorly, the deltoid laterally as well as posteriorly.  It became more bothersome as she has been sleeping on her back more since she threw out her back several weeks ago.  No clear injury to the shoulder itself.  Prior history of rotator cuff repair around 2016 by physician who is no longer in practice.  No known weakness.  No overlying skin changes.     Pedal Edema    Pedal edema developed in bilateral ankles around December 2021 after she threw out her back and was taking muscle relaxants and less active.  Improvement of left lower extremity with furosemide however right lower extremity continues to have residual edema, approximately 20 to 30% better from where she started.  No prior occurrence of the same.  Injury where she tripped over the door of a  in late December 2021.  No overlying skin changes.  No significant tenderness or inhibition in her walking or gait.  She has not tried any compression.  The edema does not go away after her feet of been elevated all night.  No prior venous studies.  Normal pulses.          Current Medications:  Current Outpatient Medications Ordered in Epic   Medication Sig Dispense Refill   • baclofen (LIORESAL) 10 MG Tab Take 1 Tablet by mouth 3 times a day as needed (muscle spasm/pain). 90 Tablet 3   • meloxicam (MOBIC) 15 MG tablet Take 1 Tablet by mouth 1 time a day as needed (pain). Do not take other NSAIDs. No refills. 60 Tablet 0   • levothyroxine (SYNTHROID) 100 MCG Tab Take 1 Tablet by mouth every day. 100 Tablet 3   • Capsaicin 0.15 % Liquid capsaicin 0.15 %  "topical liquid   0.025% applied topically on scene. Time administered: 1125     • augmented betamethasone dipropionate (DIPROLENE-AF) 0.05 % ointment betamethasone, augmented 0.05 % topical ointment     • triamcinolone acetonide (KENALOG) 0.1 % Ointment TOPICAL APPLY SPARINGLY TO THE AFFECTED AREA 3XWK.     • ipratropium-albuterol (DUONEB) 0.5-2.5 (3) MG/3ML nebulizer solution Take 3 mL by nebulization 4 times a day. 3 mL 3   • ciclopirox (PENLAC) 8 % solution APPLY TO ADJACENT SKIN AND AFFECTED NAIL DAILY FOR 48 WEEKS. REMOVE LAQUER WITH ALCOHOL EVERY 7 DAYS     • Probiotic Product (ALIGN PO) Take  by mouth.     • DULERA 100-5 MCG/ACT Aerosol TAKE 2 PUFFS BY MOUTH TWICE A DAY 3 Each 3   • rosuvastatin (CRESTOR) 10 MG Tab Take 1 tablet by mouth every day. N THE EVENING 100 tablet 3   • coenzyme Q-10 30 MG capsule Take 60 mg by mouth every day.     • Cyanocobalamin (B-12 PO) Take  by mouth.     • albuterol 108 (90 Base) MCG/ACT Aero Soln inhalation aerosol INHALE 2 PUFFS BY MOUTH EVERY FOUR HOURS AS NEEDED FOR SHORTNESS OF BREATH. 1 Each 3   • urea 40 % Cream APPLY TOPICALLY TO FEET TWICE DAILY AND THEN APPLY VASELINE WITH SOCKS     • aspirin (ASA) 81 MG Chew Tab chewable tablet Chew 1 tablet every day. 100 tablet 3   • Misc. Devices Misc Please provide nocturnal oxgen 2 LPM Nasal Cannula, concentrator 99 Each 99     No current [facility]-ordered facility-administered medications on file.          Objective:   Physical Exam:    Vitals: /62 (BP Location: Left arm, Patient Position: Sitting, BP Cuff Size: Adult)   Pulse 88   Temp 36.7 °C (98 °F) (Temporal)   Resp 12   Ht 1.626 m (5' 4\")   Wt 84.4 kg (186 lb 1.6 oz)   SpO2 92%    BMI: Body mass index is 31.94 kg/m².  Physical Exam  Constitutional:       General: She is not in acute distress.     Appearance: Normal appearance. She is obese. She is not ill-appearing.   Pulmonary:      Effort: Pulmonary effort is normal. No respiratory distress.   Musculoskeletal:    "   Right lower leg: Edema present.      Left lower leg: No edema.      Comments: Mild lateral > medial pitting edema at malleoli. Mild associated tenderness on lateral aspect. 2+ DP and PT. Normal ROM. No skin color changes. No bony tenderness otherwise.   Skin:     General: Skin is warm and dry.      Findings: No bruising or rash.   Psychiatric:         Mood and Affect: Mood normal.         Behavior: Behavior normal.         Thought Content: Thought content normal.         Judgment: Judgment normal.          Assessment and Plan:   Nancy is a 71 y.o. female with the following:  Problem List Items Addressed This Visit     Pedal edema     Chronic and ongoing problem.  Still with some mild residual edema on the lateral greater than medial aspect of the right ankle.  Skin does not appear compromised.  Arterial exam is normal.  Musculoskeletal exam otherwise normal with good passive range of motion.  Could consider x-ray imaging of the foot and ankle to make sure there is no occult fracture with the injury a month ago with a  door.  Also consider venous studies due to unilateral nature to see if there is an injury to a vein or a valve that is causing the ongoing edema.  She will keep me updated over my chart and try 3 more days of Lasix 40 mg daily.         Rotator cuff arthropathy of left shoulder     Chronic and decompensated issue.  Insidious onset of left shoulder pain, now becoming more frequent and sporadic.  History of rotator cuff repair in the past, she thinks has an old MRI report at home and will look for this so we can see exactly what was done.  Will be starting meloxicam for low back pain as well as physical therapy.  May need repeat shoulder MRI and referral to either physiatry or back to shoulder orthopedics for more detailed evaluation.  She will keep me updated over my chart.                  Annual Health Assessment Questions:    1.  Are you currently engaging in any exercise or physical  activity? No  No  Because of back and ankle   2.  How would you describe your mood or emotional well-being today? good    3.  Have you had any falls in the last year? Yes    4.  Have you noticed any problems with your balance or had difficulty walking? Yes  Swelling not going down very cautious     5.  In the last six months have you experienced any leakage of urine? Yes    6. DPA/Advanced Directive: Patient has Advanced Directive, but it is not on file. Instructed to bring in a copy to scan into their chart.       RTC: Return in about 3 months (around 4/25/2022).    I spent a total of 29 minutes with record review, exam, communication with the patient, communication with other providers, and documentation of this encounter.    PLEASE NOTE: This dictation was created using voice recognition software. I have made every reasonable attempt to correct obvious errors, but I expect that there are errors of grammar and possibly content that I did not discover before finalizing the note.      Shawna Recio, DO  Geriatric and Internal Medicine  [facility]

## 2022-01-25 NOTE — ASSESSMENT & PLAN NOTE
Chronic and ongoing problem.  Still with some mild residual edema on the lateral greater than medial aspect of the right ankle.  Skin does not appear compromised.  Arterial exam is normal.  Musculoskeletal exam otherwise normal with good passive range of motion.  Could consider x-ray imaging of the foot and ankle to make sure there is no occult fracture with the injury a month ago with a  door.  Also consider venous studies due to unilateral nature to see if there is an injury to a vein or a valve that is causing the ongoing edema.  She will keep me updated over my chart and try 3 more days of Lasix 40 mg daily.

## 2022-02-03 ENCOUNTER — APPOINTMENT (OUTPATIENT)
Dept: PHYSICAL THERAPY | Facility: REHABILITATION | Age: 72
End: 2022-02-03
Attending: PHYSICAL MEDICINE & REHABILITATION
Payer: MEDICARE

## 2022-02-03 DIAGNOSIS — R30.0 DYSURIA: ICD-10-CM

## 2022-02-03 RX ORDER — NITROFURANTOIN 25; 75 MG/1; MG/1
100 CAPSULE ORAL 2 TIMES DAILY
Qty: 6 CAPSULE | Refills: 0 | Status: SHIPPED | OUTPATIENT
Start: 2022-02-03 | End: 2022-05-02

## 2022-02-04 ENCOUNTER — APPOINTMENT (OUTPATIENT)
Dept: MEDICAL GROUP | Facility: PHYSICIAN GROUP | Age: 72
End: 2022-02-04
Payer: MEDICARE

## 2022-02-04 ENCOUNTER — HOSPITAL ENCOUNTER (OUTPATIENT)
Facility: MEDICAL CENTER | Age: 72
End: 2022-02-04
Attending: INTERNAL MEDICINE
Payer: MEDICARE

## 2022-02-04 DIAGNOSIS — R30.0 DYSURIA: ICD-10-CM

## 2022-02-04 LAB
APPEARANCE UR: CLEAR
BACTERIA #/AREA URNS HPF: NEGATIVE /HPF
BILIRUB UR QL STRIP.AUTO: NEGATIVE
COLOR UR: YELLOW
EPI CELLS #/AREA URNS HPF: NEGATIVE /HPF
GLUCOSE UR STRIP.AUTO-MCNC: NEGATIVE MG/DL
HYALINE CASTS #/AREA URNS LPF: ABNORMAL /LPF
KETONES UR STRIP.AUTO-MCNC: NEGATIVE MG/DL
LEUKOCYTE ESTERASE UR QL STRIP.AUTO: ABNORMAL
MICRO URNS: ABNORMAL
NITRITE UR QL STRIP.AUTO: NEGATIVE
PH UR STRIP.AUTO: 6.5 [PH] (ref 5–8)
PROT UR QL STRIP: NEGATIVE MG/DL
RBC # URNS HPF: ABNORMAL /HPF
RBC UR QL AUTO: NEGATIVE
SP GR UR STRIP.AUTO: 1.01
UROBILINOGEN UR STRIP.AUTO-MCNC: 0.2 MG/DL
WBC #/AREA URNS HPF: ABNORMAL /HPF

## 2022-02-04 PROCEDURE — 81001 URINALYSIS AUTO W/SCOPE: CPT

## 2022-02-04 PROCEDURE — 87086 URINE CULTURE/COLONY COUNT: CPT

## 2022-02-07 LAB
BACTERIA UR CULT: NORMAL
SIGNIFICANT IND 70042: NORMAL
SITE SITE: NORMAL
SOURCE SOURCE: NORMAL

## 2022-02-09 ENCOUNTER — PHYSICAL THERAPY (OUTPATIENT)
Dept: PHYSICAL THERAPY | Facility: REHABILITATION | Age: 72
End: 2022-02-09
Attending: PHYSICAL MEDICINE & REHABILITATION
Payer: MEDICARE

## 2022-02-09 DIAGNOSIS — M54.41 CHRONIC RIGHT-SIDED LOW BACK PAIN WITH RIGHT-SIDED SCIATICA: ICD-10-CM

## 2022-02-09 DIAGNOSIS — G89.29 CHRONIC RIGHT-SIDED LOW BACK PAIN WITH RIGHT-SIDED SCIATICA: ICD-10-CM

## 2022-02-09 DIAGNOSIS — M51.26 LUMBAR DISC HERNIATION: ICD-10-CM

## 2022-02-09 PROCEDURE — 97110 THERAPEUTIC EXERCISES: CPT

## 2022-02-09 PROCEDURE — 97161 PT EVAL LOW COMPLEX 20 MIN: CPT

## 2022-02-09 SDOH — ECONOMIC STABILITY: GENERAL: QUALITY OF LIFE: FAIR

## 2022-02-09 ASSESSMENT — ENCOUNTER SYMPTOMS
PAIN SCALE AT LOWEST: 0
PAIN SCALE: 0
PAIN SCALE AT HIGHEST: 9

## 2022-02-09 NOTE — OP THERAPY EVALUATION
Outpatient Physical Therapy  INITIAL EVALUATION    [facility] [place]  [address]  [place] [address]  Phone:  [phone]  Fax:  [phone]    Date of Evaluation: 2022    Patient: Nancy Gregory  YOB: 1950  MRN: [id number]     Referring Provider: Carlos Eduardo Echols M.D.  [address] [facility]  [address]  [place] [address]   Referring Diagnosis Other intervertebral disc displacement, lumbar region [M51.26];Spondylosis without myelopathy or radiculopathy, lumbar region [M47.816];Muscle spasm of back [M62.830];Radiculopathy, lumbar region [M54.16]     Time Calculation  Start time: 0900  Stop time: 945 Time Calculation (min): 45 minutes         Chief Complaint: Back Problem    Visit Diagnoses     ICD-10-CM   1. Lumbar disc herniation  M51.26   2. Chronic 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  M54.41    G89.29       Date of onset of impairment: 2021    Subjective:   History of Present Illness:     Date of onset:  2021  Quality of life:  Fair  Pain:     Current pain ratin    At best pain ratin    At worst pain ratin  Activities of Daily Living:     Patient reported ADL status: Limited flexion tolerance  Limited lifting tolerance  Limited sitting tolerance.   Patient Goals:     Patient goals for therapy:  Increased motion, decreased pain and increased strength    Patient is a 71 y.o. female that presents to therapy with lower back pain. States that symptoms were due to injury, bending injury. Reports the pain quality to be sharp/dull, intermittent and are primarily along the lower back. Reports that symptoms now getting better / not changing. States that aggravating factors are bending, lifting, twisting. States that easng factors are rest, medication.  Denies red flags.   Past Medical History:   Diagnosis Date   • Acute cystitis with hematuria 2020    Urine dipstick performed in clinic demonstrated trace  "glucose, 1+ bili, 1+ ketones, specific gravity 1.015, trace intact blood, pH 5.5, 2+ protein, 2.0 urobilinogen, positive nitrite, 3+ leukocyte esterase.  She performed a telemedicine visit yesterday and was placed on cephalosporin for presumptive urinary tract infection.  She was also given Pyridium due to discomfort with dysuria.  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  This was done on her right upper gums.  2 weeks following the procedure she had a full liquid diet/soft food diet and this was advanced on Valentine's Day dinner, February 14, 2020.  On the following Monday, February 17, 2020 she notes development of \"bloodshot \"gums causing concern and when she clay   • CAD (coronary artery disease)    • Chickenpox    • GERD (gastroesophageal reflux disease)    • Heartburn    • Hyperlipidemia    • Hypothyroidism    • Insomnia     Trouble going to and staying asleep   • Lymphadenopathy 9/16/2021   • Mass of left submandibular region 5/26/2021    She reports development of a mass in her chin/neck area.  On exam it appears to be submandibular on the left anterolateral aspect.  It is easily palpable, mobile, and approximately 1 x 1 cm.  It is nontender.  There is some associated inflammation in the region that is visible on inspection.  She has been working with periodontist due to gum disease and wonders if it could be related to her dentit   • Mumps    • Painful joint    • Stomatitis 9/16/2021   • Thyroid disease    • Tonsillitis    • Wears glasses      Past Surgical History:   Procedure Laterality Date   • ABDOMINAL HYSTERECTOMY TOTAL     • CHOLECYSTECTOMY     • HYSTERECTOMY LAPAROSCOPY     • TONSILLECTOMY       Social History     Tobacco Use   • Smoking status: Former Smoker     Packs/day: 1.00     Years: 15.00     Pack years: 15.00     Types: Cigarettes     " Quit date: 1993     Years since quittin.8   • Smokeless tobacco: Never Used   • Tobacco comment: continued abstinence   Substance Use Topics   • Alcohol use: Yes     Comment: occ     Family and Occupational History     Socioeconomic History   • Marital status:      Spouse name: Not on file   • Number of children: Not on file   • Years of education: Not on file   • Highest education level: Not on file   Occupational History   • Not on file       Objective     Postural Observations  Seated posture: poor  Standing posture: poor        Neurological Testing     Reflexes   Left   Patellar (L4): normal (2+)  Achilles (S1): normal (2+)  Ankle clonus reflex: negative  Babinski sign: negative    Right   Patellar (L4): normal (2+)  Achilles (S1): normal (2+)  Ankle clonus reflex: negative  Babinski sign: negative    Myotome testing   Lumbar (left)   L1 (hip flexors): 5  L2 (hip flexors): 5  L3 (knee extensors): 5  L4 (ankle dorsiflexors): 5  L5 (great toe extension): 5  S1 (ankle plantar flexors): 5    Lumbar (right)   L1 (hip flexors): 5  L2 (hip flexors): 5  L3 (knee extensors): 5  L4 (ankle dorsiflexors): 5  L5 (great toe extension): 5  S1 (ankle plantar flexors): 5    Dermatome testing   Lumbar (left)   All left lumbar dermatomes intact    Lumbar (right)   All right lumbar dermatomes intact    Palpation   Left   Hypertonic in the lumbar paraspinals and quadratus lumborum.   Tenderness of the lumbar paraspinals and quadratus lumborum.     Right   Hypertonic in the lumbar paraspinals and quadratus lumborum.   Tenderness of the lumbar paraspinals and quadratus lumborum.     Active Range of Motion     Lumbar   Flexion: Lumbar active flexion: 51deg.  Extension: Lumbar active extension: 12deg.  Left lateral flexion: Left lateral lumbar spine flexion: 18deg.  Right lateral flexion: Right lateral lumbar spine flexion: 21deg.    Strength:      Left Hip   Planes of Motion   Abduction: 4  Adduction: 5    Right Hip    Planes of Motion   Abduction: 4  Adduction: 5    Tests     Left Hip   Negative SI compression and SI distraction.   SLR: Negative.     Right Hip   Negative SI compression and SI distraction.   SLR: Negative.     Ayana LumbarTest     Lying repeated motions:   Flexion in lying     Symptoms during testing: produces    Symptoms after testing: worse  Extension in lying     Symptoms during testing: produces    Symptoms after testing: better  Repeat extension in lying     Symptoms during testing: produces    Symptoms after testing: better        Therapeutic Exercises (CPT 09738):     1. GLENROY to standing ext, x10 every 3-4 hours    2. Edu to limit flexion, x5min      Time-based treatments/modalities:    Physical Therapy Timed Treatment Charges  Therapeutic exercise minutes (CPT 68849): 10 minutes      Assessment, Response and Plan:   Impairments: abnormal or restricted ROM, activity intolerance, impaired physical strength and pain with function    Assessment details:  Patient presents with signs and symptoms consistent with a lumbar derangement. Patient limitations include weakness, decreased ROM, and pain. Patient will benefit from skilled therapy to improve the aforementioned deficits and decrease further functional decline.   Goals:   Short Term Goals:   1) Patient's lumbar flexion will improve by 15deg to facilitate improved function.  2) Patient's hip abd strength will improve by a half muscle grade to facilitate standing tolerance.  Short term goal time span:  2-4 weeks      Long Term Goals:    1) Patient's symptoms will improve to allow for ambulation >20min.  2) Patient's LBDI will improve by 10 to demonstrate functional improvement  Long term goal time span:  6-8 weeks    Plan:   Therapy options:  Physical therapy treatment to continue  Planned therapy interventions:  E Stim Unattended (CPT 61945), Manual Therapy (CPT 45739), Neuromuscular Re-education (CPT 76849), Therapeutic Exercise (CPT 67594), Hot or Cold  Pack Therapy (CPT 69715) and Mechanical Traction (CPT 09087)  Frequency:  2x week  Duration in weeks:  8  Discussed with:  Patient      Functional Assessment Used  PT Functional Assessment Tool Used: LBDI  PT Functional Assessment Score: 28     Referring provider co-signature:  I have reviewed this plan of care and my co-signature certifies the need for services.    Certification Period: 02/09/2022 to  04/06/22    Physician Signature: ________________________________ Date: ______________

## 2022-02-11 ENCOUNTER — PHYSICAL THERAPY (OUTPATIENT)
Dept: PHYSICAL THERAPY | Facility: REHABILITATION | Age: 72
End: 2022-02-11
Attending: PHYSICAL MEDICINE & REHABILITATION
Payer: MEDICARE

## 2022-02-11 ENCOUNTER — TELEPHONE (OUTPATIENT)
Dept: PHYSICAL THERAPY | Facility: REHABILITATION | Age: 72
End: 2022-02-11

## 2022-02-11 DIAGNOSIS — M51.26 LUMBAR DISC HERNIATION: ICD-10-CM

## 2022-02-11 PROCEDURE — 97110 THERAPEUTIC EXERCISES: CPT

## 2022-02-11 NOTE — OP THERAPY DISCHARGE SUMMARY
Outpatient Physical Therapy  DISCHARGE SUMMARY NOTE      [facility] [place]  [address]  [facility] [address]  Phone:  [phone]  Fax:  [phone]    Date of Visit: 02/11/2022    Patient: Nancy Alvaradoun  YOB: 1950  MRN: [id number]     Referring Provider: Carlos Eduardo Echols M.D.   Referring Diagnosis Diagnosis Chronic 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 [M54.41, G89.29];Lumbar disc herniation [M51.26];Lumbar radiculopathy [M54.16];Lumbar spondylosis [M47.816]              Comments:  Discharge and close of old case past POC cert date.     Limitations Remaining:  Unknown.    Recommendations:  Admin discharge    Kwame Elmore, PT, DPT    Date: 2/11/2022

## 2022-02-11 NOTE — OP THERAPY DAILY TREATMENT
Outpatient Physical Therapy  DAILY TREATMENT     [facility] [place]  [address]  [facility] [address]  Phone:  [phone]  Fax:  [phone]    Date: 02/11/2022    Patient: Nancy Gregory  YOB: 1950  MRN: [id number]     Time Calculation    Start time: 1115  Stop time: 1200 Time Calculation (min): 45 minutes         Chief Complaint: Back Problem    Visit #: 2    SUBJECTIVE:  Patient reports that symptoms are stable. Notes that she feels ok today.     OBJECTIVE:  Current objective measures:           Therapeutic Exercises (CPT 77605):     1. Nu step , x10min    3. Press up on elbow, x10, decrease; better    4. Postural edu, x5min    5. Walking progream, x5min    6. Standing ext when unable to press up, x10    7. Basic tra edu, x5min    8. Multifidi press, x20    9. Seated hip abd, x20      Time-based treatments/modalities:    Physical Therapy Timed Treatment Charges  Therapeutic exercise minutes (CPT 86460): 40 minutes      Pain rating (1-10) before treatment:  0  Pain rating (1-10) after treatment:  0    ASSESSMENT:   Response to treatment: Patient responded well to therapy with an overall decrease in symptoms. Patient continues to demonstrate decreased pain with extension. Plan to continue with current POC.     PLAN/RECOMMENDATIONS:   Plan for treatment: therapy treatment to continue next visit.  Planned interventions for next visit: continue with current treatment.

## 2022-02-18 ENCOUNTER — PHYSICAL THERAPY (OUTPATIENT)
Dept: PHYSICAL THERAPY | Facility: REHABILITATION | Age: 72
End: 2022-02-18
Attending: PHYSICAL MEDICINE & REHABILITATION
Payer: MEDICARE

## 2022-02-18 DIAGNOSIS — M51.26 LUMBAR DISC HERNIATION: ICD-10-CM

## 2022-02-18 PROCEDURE — 97110 THERAPEUTIC EXERCISES: CPT

## 2022-02-19 NOTE — OP THERAPY DAILY TREATMENT
Outpatient Physical Therapy  DAILY TREATMENT     [facility] [place]  [address]  [facility] [address]  Phone:  [phone]  Fax:  [phone]    Date: 02/18/2022    Patient: Nancy Gregory  YOB: 1950  MRN: [id number]     Time Calculation    Start time: 1630  Stop time: 1715 Time Calculation (min): 45 minutes         Chief Complaint: Back Problem    Visit #: 3    SUBJECTIVE:  Patient reports an increase in back spasm due to over doing her exercise. Patient notes that she only has had a few twinges.     OBJECTIVE:  Current objective measures:           Therapeutic Exercises (CPT 91419):     1. Nu step , x10min    3. Press up on elbow, x10, decrease; better    4. Postural edu, x5min    5. Walking progream, x5min    6. Standing ext when unable to press up, x10    7. Basic tra edu, x5min    8. Multifidi press, x20    9. Seated hip abd, x20    10. Edu re: house work such as cooking to cleaning, x15min      Time-based treatments/modalities:    Physical Therapy Timed Treatment Charges  Therapeutic exercise minutes (CPT 94912): 45 minutes      Pain rating (1-10) before treatment:  1  Pain rating (1-10) after treatment:  1    ASSESSMENT:   Response to treatment: Patient responded well to therapy with an overall decrease in symptoms and improvement in function. Continue to advance exercise as able.     PLAN/RECOMMENDATIONS:   Plan for treatment: therapy treatment to continue next visit.  Planned interventions for next visit: continue with current treatment.

## 2022-02-22 ENCOUNTER — APPOINTMENT (OUTPATIENT)
Dept: PHYSICAL THERAPY | Facility: REHABILITATION | Age: 72
End: 2022-02-22
Attending: PHYSICAL MEDICINE & REHABILITATION
Payer: MEDICARE

## 2022-03-03 ENCOUNTER — PHYSICAL THERAPY (OUTPATIENT)
Dept: PHYSICAL THERAPY | Facility: REHABILITATION | Age: 72
End: 2022-03-03
Attending: PHYSICAL MEDICINE & REHABILITATION
Payer: MEDICARE

## 2022-03-03 DIAGNOSIS — M51.26 LUMBAR DISC HERNIATION: ICD-10-CM

## 2022-03-03 DIAGNOSIS — G89.29 CHRONIC RIGHT-SIDED LOW BACK PAIN WITH RIGHT-SIDED SCIATICA: ICD-10-CM

## 2022-03-03 DIAGNOSIS — M54.41 CHRONIC RIGHT-SIDED LOW BACK PAIN WITH RIGHT-SIDED SCIATICA: ICD-10-CM

## 2022-03-03 PROCEDURE — 97110 THERAPEUTIC EXERCISES: CPT

## 2022-03-03 NOTE — OP THERAPY DAILY TREATMENT
Outpatient Physical Therapy  DAILY TREATMENT     [facility] [place]  [address]  [facility] [address]  Phone:  [phone]  Fax:  [phone]    Date: 03/03/2022    Patient: Nancy Gregory  YOB: 1950  MRN: [id number]     Time Calculation    Start time: 0730  Stop time: 0815 Time Calculation (min): 45 minutes         Chief Complaint: Back Problem    Visit #: 4    SUBJECTIVE:  Patient reports that at times her back feels like it needs to pop. Otherwise no issues.     OBJECTIVE:  Current objective measures:   ROF testing: x10 no symptoms          Therapeutic Exercises (CPT 88105):     1. Nu step , x10min, L2    3. Press up on elbow, x10, decrease; better    4. Postural edu, x5min    5. Walking progream, x5min    6. Standing ext when unable to press up, x10    7. Basic tra edu, x5min    8. Multifidi press, x20    9. Seated hip abd, x20    10. ROF, x15    11. Bridge, 2x5    12. Lumbar rot supine, x20 with ball    13. Lumbar flex with ball, DNT      Time-based treatments/modalities:    Physical Therapy Timed Treatment Charges  Therapeutic exercise minutes (CPT 85445): 40 minutes      Pain rating (1-10) before treatment:  0  Pain rating (1-10) after treatment:  1    ASSESSMENT:   Response to treatment: Patient continues to demonstrate a high fear of moving. Notes that she is scared to be in pain. Patient visits expected to be extended due to high fear avoidance to movement.     PLAN/RECOMMENDATIONS:   Plan for treatment: therapy treatment to continue next visit.  Planned interventions for next visit: continue with current treatment.

## 2022-03-07 ENCOUNTER — APPOINTMENT (OUTPATIENT)
Dept: PHYSICAL MEDICINE AND REHAB | Facility: MEDICAL CENTER | Age: 72
End: 2022-03-07
Payer: MEDICARE

## 2022-03-10 ENCOUNTER — PHYSICAL THERAPY (OUTPATIENT)
Dept: PHYSICAL THERAPY | Facility: REHABILITATION | Age: 72
End: 2022-03-10
Attending: PHYSICAL MEDICINE & REHABILITATION
Payer: MEDICARE

## 2022-03-10 DIAGNOSIS — M54.41 CHRONIC RIGHT-SIDED LOW BACK PAIN WITH RIGHT-SIDED SCIATICA: ICD-10-CM

## 2022-03-10 DIAGNOSIS — M47.816 LUMBAR SPONDYLOSIS: ICD-10-CM

## 2022-03-10 DIAGNOSIS — M51.26 LUMBAR DISC HERNIATION: ICD-10-CM

## 2022-03-10 DIAGNOSIS — M54.16 LUMBAR RADICULOPATHY: ICD-10-CM

## 2022-03-10 DIAGNOSIS — G89.29 CHRONIC RIGHT-SIDED LOW BACK PAIN WITH RIGHT-SIDED SCIATICA: ICD-10-CM

## 2022-03-10 PROCEDURE — 97110 THERAPEUTIC EXERCISES: CPT

## 2022-03-10 NOTE — OP THERAPY DAILY TREATMENT
Outpatient Physical Therapy  DAILY TREATMENT     [facility] [place]  [address]  [facility] [address]  Phone:  [phone]  Fax:  [phone]    Date: 03/10/2022    Patient: Nancy Gregory  YOB: 1950  MRN: [id number]     Time Calculation    Start time: 1030  Stop time: 1115 Time Calculation (min): 45 minutes         Chief Complaint: Back Problem    Visit #: 5    SUBJECTIVE:  Patient reports that she is still feeling better. Notes an overall decrease in pain and only minor soreness from flexion.     OBJECTIVE:  Current objective measures:           Therapeutic Exercises (CPT 84613):     1. Nu step , x10min, L2    3. Press up on elbow, x10, decrease; better    4. Postural edu, x5min    5. Walking progream, x5min    6. Standing ext when unable to press up, x10    7. Basic tra edu, x5min    8. Multifidi press, x20    9. Seated hip abd, x20    10. ROF, x15    11. Bridge, 2x5    12. Bird dog, x20    13. Shuttle, x3c x4min      Time-based treatments/modalities:    Physical Therapy Timed Treatment Charges  Therapeutic exercise minutes (CPT 60966): 45 minutes      Pain rating (1-10) before treatment:  1  Pain rating (1-10) after treatment:  1    ASSESSMENT:   Response to treatment: Patient responded well to therapy with an overall decrease in symptoms and improvement in function. Plan to continue with current program.     PLAN/RECOMMENDATIONS:   Plan for treatment: therapy treatment to continue next visit.  Planned interventions for next visit: continue with current treatment.

## 2022-03-14 ENCOUNTER — OFFICE VISIT (OUTPATIENT)
Dept: PHYSICAL MEDICINE AND REHAB | Facility: MEDICAL CENTER | Age: 72
End: 2022-03-14
Payer: MEDICARE

## 2022-03-14 VITALS
HEART RATE: 95 BPM | DIASTOLIC BLOOD PRESSURE: 72 MMHG | HEIGHT: 65 IN | WEIGHT: 188.49 LBS | OXYGEN SATURATION: 98 % | BODY MASS INDEX: 31.4 KG/M2 | TEMPERATURE: 97.5 F | SYSTOLIC BLOOD PRESSURE: 138 MMHG

## 2022-03-14 DIAGNOSIS — M47.816 LUMBAR SPONDYLOSIS: ICD-10-CM

## 2022-03-14 DIAGNOSIS — M54.16 LUMBAR RADICULOPATHY: ICD-10-CM

## 2022-03-14 DIAGNOSIS — M54.41 CHRONIC RIGHT-SIDED LOW BACK PAIN WITH RIGHT-SIDED SCIATICA: ICD-10-CM

## 2022-03-14 DIAGNOSIS — M51.26 LUMBAR DISC HERNIATION: ICD-10-CM

## 2022-03-14 DIAGNOSIS — M62.830 BACK MUSCLE SPASM: ICD-10-CM

## 2022-03-14 DIAGNOSIS — G89.29 CHRONIC RIGHT-SIDED LOW BACK PAIN WITH RIGHT-SIDED SCIATICA: ICD-10-CM

## 2022-03-14 PROCEDURE — 99214 OFFICE O/P EST MOD 30 MIN: CPT | Performed by: PHYSICAL MEDICINE & REHABILITATION

## 2022-03-14 ASSESSMENT — PAIN SCALES - GENERAL: PAINLEVEL: 3=SLIGHT PAIN

## 2022-03-14 ASSESSMENT — FIBROSIS 4 INDEX: FIB4 SCORE: 1.49

## 2022-03-14 ASSESSMENT — PATIENT HEALTH QUESTIONNAIRE - PHQ9: CLINICAL INTERPRETATION OF PHQ2 SCORE: 0

## 2022-03-14 NOTE — PROGRESS NOTES
Follow up patient note  Interventional spine and sports physiatry, Physical medicine rehabilitation      Chief complaint:   Chief Complaint   Patient presents with   • Follow-Up     Back pain          HISTORY    Please see new patient note by Dr Echols,  for more details.     HPI  Patient identification: Nancy Gregory ,  1950,   With Diagnoses of Lumbar disc herniation, Lumbar spondylosis, Lumbar radiculopathy, Back muscle spasm, and Chronic 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 were pertinent to this visit.     The patient is doing quite well and responded very well to conservative treatments with her home exercise program.  She has been working on core strengthening and stretching exercises.  She is also doing this daily.  She has been working with physical therapy.  Her pain was previously 9 out of 10 in intensity and now it is 3 out of 10 in intensity and she is very happy with this improvement.  Her muscle spasms have improved significantly to the point where she is no longer requiring baclofen.    Tried include gabapentin, Mobic, baclofen       ROS Red Flags :   Fever, Chills, Sweats: Denies  Involuntary Weight Loss: Denies  Bowel/Bladder Incontinence: Denies  Saddle Anesthesia: Denies        PMHx:   Past Medical History:   Diagnosis Date   • Acute cystitis with hematuria 2020    Urine dipstick performed in clinic demonstrated trace glucose, 1+ bili, 1+ ketones, specific gravity 1.015, trace intact blood, pH 5.5, 2+ protein, 2.0 urobilinogen, positive nitrite, 3+ leukocyte esterase.  She performed a telemedicine visit yesterday and was placed on cephalosporin for presumptive urinary tract infection.  She was also given Pyridium due to discomfort with dysuria.  She thinks s   • Asthma    • Back pain    • Burning mouth syndrome- working diagnosis 2020    She reports prior lanap (laser treatment for peridontal disease) procedure in late 2020.  This was done on her  "right upper gums.  2 weeks following the procedure she had a full liquid diet/soft food diet and this was advanced on Valentine's Day dinner, February 14, 2020.  On the following Monday, February 17, 2020 she notes development of \"bloodshot \"gums causing concern and when she clay   • CAD (coronary artery disease)    • Chickenpox    • GERD (gastroesophageal reflux disease)    • Heartburn    • Hyperlipidemia    • Hypothyroidism    • Insomnia     Trouble going to and staying asleep   • Lymphadenopathy 9/16/2021   • Mass of left submandibular region 5/26/2021    She reports development of a mass in her chin/neck area.  On exam it appears to be submandibular on the left anterolateral aspect.  It is easily palpable, mobile, and approximately 1 x 1 cm.  It is nontender.  There is some associated inflammation in the region that is visible on inspection.  She has been working with periodontist due to gum disease and wonders if it could be related to her dentit   • Mumps    • Painful joint    • Stomatitis 9/16/2021   • Thyroid disease    • Tonsillitis    • Wears glasses        PSHx:   Past Surgical History:   Procedure Laterality Date   • ABDOMINAL HYSTERECTOMY TOTAL     • CHOLECYSTECTOMY     • HYSTERECTOMY LAPAROSCOPY     • TONSILLECTOMY         Family history   Family History   Problem Relation Age of Onset   • Cancer Father         lung   • Breast Cancer Maternal Aunt    • Hypertension Maternal Grandmother    • Hypertension Maternal Grandfather    • Kidney Disease Mother    • Heart Disease Neg Hx          Medications:   Outpatient Medications Marked as Taking for the 3/14/22 encounter (Office Visit) with Carlos Eduardo Echols M.D.   Medication Sig Dispense Refill   • furosemide (LASIX) 20 MG Tab Take 1 Tablet by mouth every day. 30 Tablet 0   • baclofen (LIORESAL) 10 MG Tab Take 1 Tablet by mouth 3 times a day as needed (muscle spasm/pain). 90 Tablet 3   • meloxicam (MOBIC) 15 MG tablet Take 1 Tablet by mouth 1 time a day as " needed (pain). Do not take other NSAIDs. No refills. 60 Tablet 0   • levothyroxine (SYNTHROID) 100 MCG Tab Take 1 Tablet by mouth every day. 100 Tablet 3   • Capsaicin 0.15 % Liquid capsaicin 0.15 % topical liquid   0.025% applied topically on scene. Time administered: 1125     • augmented betamethasone dipropionate (DIPROLENE-AF) 0.05 % ointment betamethasone, augmented 0.05 % topical ointment     • triamcinolone acetonide (KENALOG) 0.1 % Ointment TOPICAL APPLY SPARINGLY TO THE AFFECTED AREA 3XWK.     • ipratropium-albuterol (DUONEB) 0.5-2.5 (3) MG/3ML nebulizer solution Take 3 mL by nebulization 4 times a day. 3 mL 3   • ciclopirox (PENLAC) 8 % solution APPLY TO ADJACENT SKIN AND AFFECTED NAIL DAILY FOR 48 WEEKS. REMOVE LAQUER WITH ALCOHOL EVERY 7 DAYS     • Probiotic Product (ALIGN PO) Take  by mouth.     • DULERA 100-5 MCG/ACT Aerosol TAKE 2 PUFFS BY MOUTH TWICE A DAY 3 Each 3   • rosuvastatin (CRESTOR) 10 MG Tab Take 1 tablet by mouth every day. N THE EVENING 100 tablet 3   • coenzyme Q-10 30 MG capsule Take 60 mg by mouth every day.     • Cyanocobalamin (B-12 PO) Take  by mouth.     • albuterol 108 (90 Base) MCG/ACT Aero Soln inhalation aerosol INHALE 2 PUFFS BY MOUTH EVERY FOUR HOURS AS NEEDED FOR SHORTNESS OF BREATH. 1 Each 3   • urea 40 % Cream APPLY TOPICALLY TO FEET TWICE DAILY AND THEN APPLY VASELINE WITH SOCKS     • aspirin (ASA) 81 MG Chew Tab chewable tablet Chew 1 tablet every day. 100 tablet 3   • Misc. Devices Misc Please provide nocturnal oxgen 2 LPM Nasal Cannula, concentrator 99 Each 99        Current Outpatient Medications on File Prior to Visit   Medication Sig Dispense Refill   • furosemide (LASIX) 20 MG Tab Take 1 Tablet by mouth every day. 30 Tablet 0   • baclofen (LIORESAL) 10 MG Tab Take 1 Tablet by mouth 3 times a day as needed (muscle spasm/pain). 90 Tablet 3   • meloxicam (MOBIC) 15 MG tablet Take 1 Tablet by mouth 1 time a day as needed (pain). Do not take other NSAIDs. No refills. 60  Tablet 0   • levothyroxine (SYNTHROID) 100 MCG Tab Take 1 Tablet by mouth every day. 100 Tablet 3   • Capsaicin 0.15 % Liquid capsaicin 0.15 % topical liquid   0.025% applied topically on scene. Time administered: 1125     • augmented betamethasone dipropionate (DIPROLENE-AF) 0.05 % ointment betamethasone, augmented 0.05 % topical ointment     • triamcinolone acetonide (KENALOG) 0.1 % Ointment TOPICAL APPLY SPARINGLY TO THE AFFECTED AREA 3XWK.     • ipratropium-albuterol (DUONEB) 0.5-2.5 (3) MG/3ML nebulizer solution Take 3 mL by nebulization 4 times a day. 3 mL 3   • ciclopirox (PENLAC) 8 % solution APPLY TO ADJACENT SKIN AND AFFECTED NAIL DAILY FOR 48 WEEKS. REMOVE LAQUER WITH ALCOHOL EVERY 7 DAYS     • Probiotic Product (ALIGN PO) Take  by mouth.     • DULERA 100-5 MCG/ACT Aerosol TAKE 2 PUFFS BY MOUTH TWICE A DAY 3 Each 3   • rosuvastatin (CRESTOR) 10 MG Tab Take 1 tablet by mouth every day. N THE EVENING 100 tablet 3   • coenzyme Q-10 30 MG capsule Take 60 mg by mouth every day.     • Cyanocobalamin (B-12 PO) Take  by mouth.     • albuterol 108 (90 Base) MCG/ACT Aero Soln inhalation aerosol INHALE 2 PUFFS BY MOUTH EVERY FOUR HOURS AS NEEDED FOR SHORTNESS OF BREATH. 1 Each 3   • urea 40 % Cream APPLY TOPICALLY TO FEET TWICE DAILY AND THEN APPLY VASELINE WITH SOCKS     • aspirin (ASA) 81 MG Chew Tab chewable tablet Chew 1 tablet every day. 100 tablet 3   • Misc. Devices Misc Please provide nocturnal oxgen 2 LPM Nasal Cannula, concentrator 99 Each 99   • nitrofurantoin (MACROBID) 100 MG Cap Take 1 Capsule by mouth 2 times a day. 6 Capsule 0     No current facility-administered medications on file prior to visit.         Allergies:   Allergies   Allergen Reactions   • Codeine        Social Hx:   Social History     Socioeconomic History   • Marital status:      Spouse name: Not on file   • Number of children: Not on file   • Years of education: Not on file   • Highest education level: Not on file  "  Occupational History   • Not on file   Tobacco Use   • Smoking status: Former Smoker     Packs/day: 1.00     Years: 15.00     Pack years: 15.00     Types: Cigarettes     Quit date: 1993     Years since quittin.9   • Smokeless tobacco: Never Used   • Tobacco comment: continued abstinence   Vaping Use   • Vaping Use: Never used   Substance and Sexual Activity   • Alcohol use: Yes     Comment: occ   • Drug use: No   • Sexual activity: Yes     Partners: Male     Birth control/protection: Post-Menopausal   Other Topics Concern   •  Service No   • Blood Transfusions No   • Caffeine Concern No   • Occupational Exposure No   • Hobby Hazards No   • Sleep Concern Yes   • Stress Concern Yes   • Weight Concern No   • Special Diet No   • Back Care No   • Exercise Yes   • Bike Helmet No   • Seat Belt Yes   • Self-Exams Yes   Social History Narrative    She worked at [employer] at the [employer], recently retired. She is  to Ryan for the past 20 years, they met at a [employer]. She has a son ([place] [address], [place]) and 2 grandchildren.      Social Determinants of Health     Financial Resource Strain: Not on file   Food Insecurity: Not on file   Transportation Needs: Not on file   Physical Activity: Not on file   Stress: Not on file   Social Connections: Not on file   Intimate Partner Violence: Not on file   Housing Stability: Not on file         EXAMINATION     Physical Exam:   Vitals: /72 (BP Location: Right arm, Patient Position: Sitting, BP Cuff Size: Adult)   Pulse 95   Temp 36.4 °C (97.5 °F) (Temporal)   Ht 1.651 m (5' 5\")   Wt 85.5 kg (188 lb 7.9 oz)   SpO2 98%     Constitutional:   Body Habitus: Body mass index is 31.37 kg/m².  Cooperation: Fully cooperates with exam  Appearance: Well-groomed no disheveled    Respiratory-  breathing comfortable on room air, no audible wheezing  Cardiovascular- capillary refills less than 2 seconds. No lower extremity edema is noted. "   Psychiatric- alert and oriented ×3. Normal affect.    MSK and Neuro: -      Thoracic/Lumbar Spine/Sacral Spine/Hips       Palpation:   No tenderness to palpation in midline at T1-T12 levels. No tenderness to palpation in the left and right of the midline T1-L5, NEGATIVE for tenderness to palpation to the para-midline region in the lower lumbar levels.  palpation over SI joint: negative bilaterally    palpation in hip or over the gluteus medius tendon insertion: negative bilaterally      Lumbar spine Special tests  Neuro tension  Straight leg test negative bilaterally    Slump test negative bilaterally      Key points for the international standards for neurological classification of spinal cord injury (ISNCSCI) to light touch.     Dermatome R L                                      L2 2 2   L3 2 2   L4 2 2   L5 2 2   S1 2 2   S2 2 2         Motor Exam Lower Extremities    ? Myotome R L   Hip flexion L2 5 5   Knee extension L3 5 5   Ankle dorsiflexion L4 5 5   Toe extension L5 5 5   Ankle plantarflexion S1 5 5               MEDICAL DECISION MAKING    DATA    Labs:   Lab Results   Component Value Date/Time    SODIUM 142 12/13/2021 08:05 AM    POTASSIUM 4.3 12/13/2021 08:05 AM    CHLORIDE 106 12/13/2021 08:05 AM    CO2 25 12/13/2021 08:05 AM    GLUCOSE 78 12/13/2021 08:05 AM    BUN 13 12/13/2021 08:05 AM    CREATININE 0.72 12/13/2021 08:05 AM        No results found for: PROTHROMBTM, INR     Lab Results   Component Value Date/Time    WBC 4.9 12/13/2021 08:05 AM    RBC 4.25 12/13/2021 08:05 AM    HEMOGLOBIN 12.5 12/13/2021 08:05 AM    HEMATOCRIT 40.3 12/13/2021 08:05 AM    MCV 94.8 12/13/2021 08:05 AM    MCH 29.4 12/13/2021 08:05 AM    MCHC 31.0 (L) 12/13/2021 08:05 AM    MPV 10.8 12/13/2021 08:05 AM    NEUTSPOLYS 51.30 12/13/2021 08:05 AM    LYMPHOCYTES 36.60 12/13/2021 08:05 AM    MONOCYTES 7.40 12/13/2021 08:05 AM    EOSINOPHILS 3.70 12/13/2021 08:05 AM    BASOPHILS 0.80 12/13/2021 08:05 AM        No results found  for: HBA1C       Imaging:     I personally reviewed following images, these are my reads  CT abdomen pelvis 2021.  I reviewed the study specifically over the patient's spine.  Please see radiologist dictation for remainder of the report.  Degenerative disc disease worst at the L4-5 level.  Also present L3-4.  Facet arthropathy right worse than left at L5-S1 and left worse than right at L4-5.  Difficult to evaluate neuroforaminal and central canal stenosis but there appears to be central canal stenosis at L4-5 and left-sided neuroforaminal stenosis at L4-5.  This would better be evaluated with an MRI lumbar spine.      I reviewed the following radiology reports                                                  MRI lumbar spine 3/27/2007 findings at L3-4 and L4-5 there is mild bilateral neuroforaminal stenosis.  Degenerative changes at L3-4 and L4-5 are mild.                    Results for orders placed during the hospital encounter of 21    CT-ABDOMEN-PELVIS WITH    Impression  Findings consistent with sigmoid diverticulitis without abscess or free air.    Aortic atherosclerosis without aneurysm.    Hepatic cyst.                       Results for orders placed during the hospital encounter of 18    DX-CHEST-2 VIEWS    Impression  Negative two views of the chest.                                             DIAGNOSIS   Visit Diagnoses     ICD-10-CM   1. Lumbar disc herniation  M51.26   2. Lumbar spondylosis  M47.816   3. Lumbar radiculopathy  M54.16   4. Back muscle spasm  M62.830   5. Chronic 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  M54.41    G89.29         ASSESSMENT and PLAN:     Prudence Chayito Alvaradoun  1950 female      Prsiddharthance was seen today for follow-up.    Diagnoses and all orders for this visit:    Lumbar disc herniation    Lumbar spondylosis    Lumbar radiculopathy    Back muscle spasm    Chronic 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        Functionally the  patient is doing quite well and desponded well to physical therapy and home exercise program.  She has had excellent improvement in her core strength.  Her posture is improving.  Her pain and function are improving.  She is now stable and is no longer requiring baclofen.  We will hold off on the MRI of the lumbar spine at this point as this is no longer necessary.  Discontinue baclofen.  She could use this as needed during flares of pain though.      I reviewed notes from physical therapy including from 3/10/2022 from Kwame Elmore, PT, DPT.    Follow up: As needed with me    Thank you for allowing me to participate in the care of this patient. If you have any questions please not hesitate to contact me.             Please note that this dictation was created using voice recognition software. I have made every reasonable attempt to correct obvious errors but there may be errors of grammar and content that I may have overlooked prior to finalization of this note.      Carlos Eduardo Echols MD  Interventional Spine and Sports Physiatry  Physical Medicine and Rehabilitation  [facility]

## 2022-03-14 NOTE — Clinical Note
Tate,   Excellent work Tate!  Prudence is doing much better since she has been working with you and she has been consistent with her home exercise program.  Carlos Eduardo Echols MD

## 2022-03-17 ENCOUNTER — PHYSICAL THERAPY (OUTPATIENT)
Dept: PHYSICAL THERAPY | Facility: REHABILITATION | Age: 72
End: 2022-03-17
Attending: PHYSICAL MEDICINE & REHABILITATION
Payer: MEDICARE

## 2022-03-17 DIAGNOSIS — M47.816 LUMBAR SPONDYLOSIS: ICD-10-CM

## 2022-03-17 DIAGNOSIS — G89.29 CHRONIC RIGHT-SIDED LOW BACK PAIN WITH RIGHT-SIDED SCIATICA: ICD-10-CM

## 2022-03-17 DIAGNOSIS — M51.26 LUMBAR DISC HERNIATION: ICD-10-CM

## 2022-03-17 DIAGNOSIS — M54.16 LUMBAR RADICULOPATHY: ICD-10-CM

## 2022-03-17 DIAGNOSIS — M54.41 CHRONIC RIGHT-SIDED LOW BACK PAIN WITH RIGHT-SIDED SCIATICA: ICD-10-CM

## 2022-03-17 PROCEDURE — 97110 THERAPEUTIC EXERCISES: CPT

## 2022-03-17 NOTE — OP THERAPY DAILY TREATMENT
Outpatient Physical Therapy  DAILY TREATMENT     [facility] [place]  [address], [address]  [place] [address]  Phone:  [phone]  Fax:  [phone]    Date: 03/17/2022    Patient: Nacny Gregory  YOB: 1950  MRN: [id number]     Time Calculation    Start time: 0730  Stop time: 0815 Time Calculation (min): 45 minutes         Chief Complaint: Back Problem    Visit #: 6    SUBJECTIVE:  Patient reports a minor increase in LB soreness today. States symptoms are on the R.     OBJECTIVE:  Current objective measures:           Therapeutic Exercises (CPT 21285):     1. Nu step , x10min, L2    3. GLENROY, x10, decrease; better    4. Postural edu, x5min    5. Walking progream, x5min    6. Standing ext when unable to press up, x10    7. Basic tra edu, x5min    8. Multifidi press, x20    9. Seated hip abd, x20    10. Band squats, x15    11. Bridge, 2x5      Time-based treatments/modalities:    Physical Therapy Timed Treatment Charges  Therapeutic exercise minutes (CPT 11522): 40 minutes      Pain rating (1-10) before treatment:  2  Pain rating (1-10) after treatment:  0    ASSESSMENT:   Response to treatment: Patient responded well to therapy with decreased pain with GLENROY. Patient will continue an increase in GLENROY and then report back on next session.     PLAN/RECOMMENDATIONS:   Plan for treatment: therapy treatment to continue next visit.  Planned interventions for next visit: continue with current treatment.

## 2022-03-30 ENCOUNTER — PHYSICAL THERAPY (OUTPATIENT)
Dept: PHYSICAL THERAPY | Facility: REHABILITATION | Age: 72
End: 2022-03-30
Attending: PHYSICAL MEDICINE & REHABILITATION
Payer: MEDICARE

## 2022-03-30 DIAGNOSIS — G89.29 CHRONIC RIGHT-SIDED LOW BACK PAIN WITH RIGHT-SIDED SCIATICA: ICD-10-CM

## 2022-03-30 DIAGNOSIS — M51.26 LUMBAR DISC HERNIATION: ICD-10-CM

## 2022-03-30 DIAGNOSIS — M47.816 LUMBAR SPONDYLOSIS: ICD-10-CM

## 2022-03-30 DIAGNOSIS — M54.16 LUMBAR RADICULOPATHY: ICD-10-CM

## 2022-03-30 DIAGNOSIS — M54.41 CHRONIC RIGHT-SIDED LOW BACK PAIN WITH RIGHT-SIDED SCIATICA: ICD-10-CM

## 2022-03-30 PROCEDURE — 97110 THERAPEUTIC EXERCISES: CPT

## 2022-03-30 NOTE — OP THERAPY DAILY TREATMENT
Outpatient Physical Therapy  DAILY TREATMENT     [facility] [place]  [address]  [place] [address]  Phone:  [phone]  Fax:  [phone]    Date: 03/30/2022    Patient: Nancy Gregory  YOB: 1950  MRN: [id number]     Time Calculation    Start time: 0815  Stop time: 0855 Time Calculation (min): 40 minutes         Chief Complaint: Back Problem    Visit #: 14    SUBJECTIVE:  Patient reports that symptoms have continued to be improved. Notes she is doing all her normal ativities.     OBJECTIVE:  Current objective measures:           Therapeutic Exercises (CPT 83936):     1. Nu step , x10min, L2    3. GLENROY, x10, decrease; better    4. Postural edu, x5min    5. Walking progream, x5min    6. Standing ext when unable to press up, x10    7. Basic tra edu, x5min    8. Multifidi press, x20    9. Seated hip abd, x20    10. Band squats, x15    11. Bridge, 2x5      Time-based treatments/modalities:    Physical Therapy Timed Treatment Charges  Therapeutic exercise minutes (CPT 29196): 40 minutes      Pain rating (1-10) before treatment:  0  Pain rating (1-10) after treatment:  0    ASSESSMENT:   Response to treatment: Patient continues to demonstrated improved function and movement. Plan to continue to progress patient to full HEP.     PLAN/RECOMMENDATIONS:   Plan for treatment: therapy treatment to continue next visit.  Planned interventions for next visit: continue with current treatment.

## 2022-04-12 ENCOUNTER — APPOINTMENT (OUTPATIENT)
Dept: PHYSICAL THERAPY | Facility: REHABILITATION | Age: 72
End: 2022-04-12
Attending: PHYSICAL MEDICINE & REHABILITATION
Payer: MEDICARE

## 2022-04-14 DIAGNOSIS — J45.40 MODERATE PERSISTENT ASTHMA WITHOUT COMPLICATION: ICD-10-CM

## 2022-04-14 RX ORDER — ALBUTEROL SULFATE 90 UG/1
2 AEROSOL, METERED RESPIRATORY (INHALATION) EVERY 4 HOURS PRN
Qty: 6.7 EACH | Refills: 3 | Status: SHIPPED | OUTPATIENT
Start: 2022-04-14 | End: 2022-12-26

## 2022-04-19 ENCOUNTER — PHYSICAL THERAPY (OUTPATIENT)
Dept: PHYSICAL THERAPY | Facility: REHABILITATION | Age: 72
End: 2022-04-19
Attending: PHYSICAL MEDICINE & REHABILITATION
Payer: MEDICARE

## 2022-04-19 DIAGNOSIS — M47.816 LUMBAR SPONDYLOSIS: ICD-10-CM

## 2022-04-19 DIAGNOSIS — M54.16 LUMBAR RADICULOPATHY: ICD-10-CM

## 2022-04-19 DIAGNOSIS — M54.41 CHRONIC RIGHT-SIDED LOW BACK PAIN WITH RIGHT-SIDED SCIATICA: ICD-10-CM

## 2022-04-19 DIAGNOSIS — M51.26 LUMBAR DISC HERNIATION: ICD-10-CM

## 2022-04-19 DIAGNOSIS — G89.29 CHRONIC RIGHT-SIDED LOW BACK PAIN WITH RIGHT-SIDED SCIATICA: ICD-10-CM

## 2022-04-19 PROCEDURE — 97110 THERAPEUTIC EXERCISES: CPT

## 2022-04-19 NOTE — OP THERAPY DAILY TREATMENT
Outpatient Physical Therapy  DAILY TREATMENT     [facility] [place]  [address]  [place] [address]  Phone:  [phone]  Fax:  [phone]    Date: 04/19/2022    Patient: Nancy Gregory  YOB: 1950  MRN: [id number]     Time Calculation    Start time: 0945  Stop time: 1030 Time Calculation (min): 45 minutes         Chief Complaint: Back Problem    Visit #: 15    SUBJECTIVE:  Patient reports that her greatest limitation is walking tolerance.     OBJECTIVE:  Current objective measures:           Therapeutic Exercises (CPT 75227):     1. Nu step , x10min, L2    3. GLENROY, x10, decrease; better    4. Postural edu, x5min    5. Walking progream, x5min    6. Standing ext when unable to press up, x10    7. Basic tra edu, x5min    8. Multifidi press, x20    9. Seated hip abd, x20    10. Band squats, x15    11. Bridge, 2x5      Time-based treatments/modalities:    Physical Therapy Timed Treatment Charges  Therapeutic exercise minutes (CPT 89527): 40 minutes      Pain rating (1-10) before treatment:  1  Pain rating (1-10) after treatment:  1    ASSESSMENT:   Response to treatment: Patient responded well to therapy with an overall improvement in exercise tolerance and no increase in pain.     PLAN/RECOMMENDATIONS:   Plan for treatment: therapy treatment to continue next visit.  Planned interventions for next visit: continue with current treatment.

## 2022-04-26 ENCOUNTER — APPOINTMENT (OUTPATIENT)
Dept: PHYSICAL THERAPY | Facility: REHABILITATION | Age: 72
End: 2022-04-26
Attending: PHYSICAL MEDICINE & REHABILITATION
Payer: MEDICARE

## 2022-05-02 ENCOUNTER — OFFICE VISIT (OUTPATIENT)
Dept: MEDICAL GROUP | Facility: PHYSICIAN GROUP | Age: 72
End: 2022-05-02
Payer: MEDICARE

## 2022-05-02 VITALS
BODY MASS INDEX: 32.56 KG/M2 | SYSTOLIC BLOOD PRESSURE: 136 MMHG | HEIGHT: 64 IN | RESPIRATION RATE: 12 BRPM | WEIGHT: 190.7 LBS | HEART RATE: 77 BPM | DIASTOLIC BLOOD PRESSURE: 60 MMHG | TEMPERATURE: 98 F | OXYGEN SATURATION: 96 %

## 2022-05-02 DIAGNOSIS — E03.4 HYPOTHYROIDISM DUE TO ACQUIRED ATROPHY OF THYROID: ICD-10-CM

## 2022-05-02 DIAGNOSIS — E78.2 MIXED HYPERLIPIDEMIA: ICD-10-CM

## 2022-05-02 DIAGNOSIS — G89.29 CHRONIC RIGHT-SIDED LOW BACK PAIN WITH RIGHT-SIDED SCIATICA: ICD-10-CM

## 2022-05-02 DIAGNOSIS — E55.9 VITAMIN D DEFICIENCY: ICD-10-CM

## 2022-05-02 DIAGNOSIS — Z12.12 SCREENING FOR COLORECTAL CANCER: ICD-10-CM

## 2022-05-02 DIAGNOSIS — M54.41 CHRONIC RIGHT-SIDED LOW BACK PAIN WITH RIGHT-SIDED SCIATICA: ICD-10-CM

## 2022-05-02 DIAGNOSIS — Z12.11 SCREENING FOR COLORECTAL CANCER: ICD-10-CM

## 2022-05-02 PROCEDURE — 99214 OFFICE O/P EST MOD 30 MIN: CPT | Performed by: INTERNAL MEDICINE

## 2022-05-02 ASSESSMENT — FIBROSIS 4 INDEX: FIB4 SCORE: 1.49

## 2022-05-02 NOTE — ASSESSMENT & PLAN NOTE
Chronic and ongoing problem.  Continue rosuvastatin 10 mg daily and aspirin 81 mg daily, lipid levels are all at goal.

## 2022-05-02 NOTE — ASSESSMENT & PLAN NOTE
Previous problem with decompensation early 2022.  She is now off the baclofen but noted great improvement with meloxicam with other joint pains.  She is worried about potential increased risk of peptic ulcer disease and will try Tylenol arthritis in place of meloxicam and on the day she does take meloxicam she will take a PPI.

## 2022-05-02 NOTE — ASSESSMENT & PLAN NOTE
Chronic and ongoing problem.  She continues to be frustrated by her weight gain.  She had good success with weight loss in the past with diet and exercise but has slowly trended up again.  She declines formal referral to weight loss clinic or medications but we did discuss the role of Wegovy, phentermine/topiramate, etc.  She will think about and let me know if she desires referral to weight loss clinic.

## 2022-05-02 NOTE — ASSESSMENT & PLAN NOTE
Chronic and ongoing problem.  TSH had drifted off on last lab evaluation.  We increased her levothyroxine 200 mcg daily, will recheck TSH and free T4 to ensure improvement.  She does report associated weight gain.

## 2022-05-02 NOTE — PROGRESS NOTES
Subjective:   Chief Complaint/History of Present Illness:  Prudence Chayito Gregory is a 71 y.o. female established patient who presents today to discuss medical problems as listed below. Pru is unaccompanied for today's visit.    Problem   Bmi 32.0-32.9,Adult    Current BMI 32.73 with weight of 190 lb. She reports multiple approaches to weight loss including weight watchers, personal training with carb/fat/protein tracking, and intermittent fasting. She was doing well until she had a back problem flare up and was unable to exercise regularly.     Chronic Right-Sided Low Back Pain With Right-Sided Sciatica    She reports remote history of disc bulging on MRI lumbar spine in the past. She does feel like the symptoms have increased slightly and she has seen orthopedic surgery in the past but is not interested in operative repair. She wonders if she should see a chiropractor. It does not appear she is ever met with a physiatrist. She would be interested in the least intervention necessary to help with pain especially when it flares up. She had decompensated in early 2022, better now after treatment with medicines.     Mixed Hyperlipidemia       Ref. Range 12/13/2021 08:05   Cholesterol,Tot Latest Ref Range: 100 - 199 mg/dL 167   Triglycerides Latest Ref Range: 0 - 149 mg/dL 46   HDL Latest Ref Range: >=40 mg/dL 96   LDL Latest Ref Range: <100 mg/dL 62     The 10-year ASCVD risk score (Mercedes DC Jr., et al., 2013) is: 10.4%    Her 10-year ASCVD score is 8%, this reduces to 6% and 5% with standard and high potency statin, respectively.  We discussed the protective nature of her high HDL and that we can try lifestyle modifications before initiating medication. Due to atherosclerosis started on aspirin and rosuvastatin with improvement in labs.    Incidental imaging with CT in January 2021 showed aortic atherosclerosis.    Current regimen: aspirin 81 mg daily and rosuvastatin 10 mg daily       Vitamin D Deficiency       Ref.  Range 12/13/2021 08:05   25-Hydroxy   Vitamin D 25 Latest Ref Range: 30 - 80 ng/mL 26 (L)     She has prior history of vitamin D deficiency. Unknown if she has osteopenia or osteoporosis.  No known history of chronic kidney disease or hyperparathyroidism.    Previously taking 800 IU daily, now taking 2,000 IU     Hypothyroidism Due to Acquired Atrophy of Thyroid       Ref. Range 12/13/2021 08:05   TSH Latest Ref Range: 0.380 - 5.330 uIU/mL 11.700 (H)   Free T-4 Latest Ref Range: 0.93 - 1.70 ng/dL 0.71 (L)     Hypothyroidism diagnosed by abnormal labs.  She denies any signs or symptoms of overtreatment or under treatment at this time.  She has remained asymptomatic to her thyroid disease except for fatigue. Previously on a higher dose in the mid 100's that was reduced around 7921-2494.    Current regimen: levothyroxine 100 mcg daily  Previous regimen: levothyroxine 88 mcg daily          Current Medications:  Current Outpatient Medications Ordered in Epic   Medication Sig Dispense Refill   • albuterol 108 (90 Base) MCG/ACT Aero Soln inhalation aerosol INHALE 2 PUFFS BY MOUTH EVERY FOUR HOURS AS NEEDED FOR SHORTNESS OF BREATH. 6.7 Each 3   • baclofen (LIORESAL) 10 MG Tab Take 1 Tablet by mouth 3 times a day as needed (muscle spasm/pain). (Patient taking differently: Take 10 mg by mouth 3 times a day as needed (muscle spasm/pain). Have not taken for a month) 90 Tablet 3   • meloxicam (MOBIC) 15 MG tablet Take 1 Tablet by mouth 1 time a day as needed (pain). Do not take other NSAIDs. No refills. 60 Tablet 0   • levothyroxine (SYNTHROID) 100 MCG Tab Take 1 Tablet by mouth every day. 100 Tablet 3   • Capsaicin 0.15 % Liquid capsaicin 0.15 % topical liquid   0.025% applied topically on scene. Time administered: 1125     • augmented betamethasone dipropionate (DIPROLENE-AF) 0.05 % ointment betamethasone, augmented 0.05 % topical ointment     • triamcinolone acetonide (KENALOG) 0.1 % Ointment TOPICAL APPLY SPARINGLY TO THE  "AFFECTED AREA 3XWK.     • ipratropium-albuterol (DUONEB) 0.5-2.5 (3) MG/3ML nebulizer solution Take 3 mL by nebulization 4 times a day. 3 mL 3   • ciclopirox (PENLAC) 8 % solution APPLY TO ADJACENT SKIN AND AFFECTED NAIL DAILY FOR 48 WEEKS. REMOVE LAQUER WITH ALCOHOL EVERY 7 DAYS     • Probiotic Product (ALIGN PO) Take  by mouth.     • DULERA 100-5 MCG/ACT Aerosol TAKE 2 PUFFS BY MOUTH TWICE A DAY 3 Each 3   • rosuvastatin (CRESTOR) 10 MG Tab Take 1 tablet by mouth every day. N THE EVENING 100 tablet 3   • coenzyme Q-10 30 MG capsule Take 60 mg by mouth every day.     • Cyanocobalamin (B-12 PO) Take  by mouth.     • urea 40 % Cream APPLY TOPICALLY TO FEET TWICE DAILY AND THEN APPLY VASELINE WITH SOCKS     • aspirin (ASA) 81 MG Chew Tab chewable tablet Chew 1 tablet every day. 100 tablet 3   • Misc. Devices Misc Please provide nocturnal oxgen 2 LPM Nasal Cannula, concentrator 99 Each 99     No current [facility]-ordered facility-administered medications on file.          Objective:   Physical Exam:    Vitals: /60 (BP Location: Left arm, Patient Position: Sitting, BP Cuff Size: Adult)   Pulse 77   Temp 36.7 °C (98 °F) (Temporal)   Resp 12   Ht 1.626 m (5' 4\")   Wt 86.5 kg (190 lb 11.2 oz)   SpO2 96%    BMI: Body mass index is 32.73 kg/m².  Physical Exam  Constitutional:       General: She is not in acute distress.     Appearance: Normal appearance. She is not ill-appearing.   HENT:      Ears:      Comments: Hearing grossly normal  Eyes:      General: No scleral icterus.     Conjunctiva/sclera: Conjunctivae normal.   Cardiovascular:      Rate and Rhythm: Normal rate and regular rhythm.      Pulses: Normal pulses.   Pulmonary:      Effort: Pulmonary effort is normal. No respiratory distress.      Breath sounds: Normal breath sounds. No wheezing or rhonchi.   Musculoskeletal:      Right lower leg: No edema.      Left lower leg: No edema.   Skin:     General: Skin is warm and dry.      Findings: No bruising or rash. "   Neurological:      Gait: Gait normal.   Psychiatric:         Mood and Affect: Mood normal.         Behavior: Behavior normal.         Thought Content: Thought content normal.         Judgment: Judgment normal.          Assessment and Plan:   Nancy is a 71 y.o. female with the following:  Problem List Items Addressed This Visit     Vitamin D deficiency     Chronic and ongoing problem, recheck levels after consistent use with 2,000 IU daily.         Relevant Orders    VITAMIN D,25 HYDROXY    Hypothyroidism due to acquired atrophy of thyroid     Chronic and ongoing problem.  TSH had drifted off on last lab evaluation.  We increased her levothyroxine 200 mcg daily, will recheck TSH and free T4 to ensure improvement.  She does report associated weight gain.         Mixed hyperlipidemia     Chronic and ongoing problem.  Continue rosuvastatin 10 mg daily and aspirin 81 mg daily, lipid levels are all at goal.         Chronic 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     Previous problem with decompensation early 2022.  She is now off the baclofen but noted great improvement with meloxicam with other joint pains.  She is worried about potential increased risk of peptic ulcer disease and will try Tylenol arthritis in place of meloxicam and on the day she does take meloxicam she will take a PPI.         BMI 32.0-32.9,adult     Chronic and ongoing problem.  She continues to be frustrated by her weight gain.  She had good success with weight loss in the past with diet and exercise but has slowly trended up again.  She declines formal referral to weight loss clinic or medications but we did discuss the role of Wegovy, phentermine/topiramate, etc.  She will think about and let me know if she desires referral to weight loss clinic.           Other Visit Diagnoses     Screening for colorectal cancer        Relevant Orders    Referral to GI for Colonoscopy           RTC: Return in about 6 months (around  11/2/2022).    I spent a total of 36 minutes with record review, exam, communication with the patient, communication with other providers, and documentation of this encounter.    PLEASE NOTE: This dictation was created using voice recognition software. I have made every reasonable attempt to correct obvious errors, but I expect that there are errors of grammar and possibly content that I did not discover before finalizing the note.      Shawna Recio, DO  Geriatric and Internal Medicine  [facility]

## 2022-05-03 ENCOUNTER — APPOINTMENT (OUTPATIENT)
Dept: PHYSICAL THERAPY | Facility: REHABILITATION | Age: 72
End: 2022-05-03
Attending: PHYSICAL MEDICINE & REHABILITATION
Payer: MEDICARE

## 2022-05-10 ENCOUNTER — PHYSICAL THERAPY (OUTPATIENT)
Dept: PHYSICAL THERAPY | Facility: REHABILITATION | Age: 72
End: 2022-05-10
Attending: PHYSICAL MEDICINE & REHABILITATION
Payer: MEDICARE

## 2022-05-10 DIAGNOSIS — M54.16 LUMBAR RADICULOPATHY: ICD-10-CM

## 2022-05-10 DIAGNOSIS — M54.41 CHRONIC RIGHT-SIDED LOW BACK PAIN WITH RIGHT-SIDED SCIATICA: ICD-10-CM

## 2022-05-10 DIAGNOSIS — M47.816 LUMBAR SPONDYLOSIS: ICD-10-CM

## 2022-05-10 DIAGNOSIS — G89.29 CHRONIC RIGHT-SIDED LOW BACK PAIN WITH RIGHT-SIDED SCIATICA: ICD-10-CM

## 2022-05-10 DIAGNOSIS — M51.26 LUMBAR DISC HERNIATION: ICD-10-CM

## 2022-05-10 PROCEDURE — 97110 THERAPEUTIC EXERCISES: CPT

## 2022-05-10 NOTE — OP THERAPY DAILY TREATMENT
Outpatient Physical Therapy  DAILY TREATMENT     [facility] [place]  [address], [address]  [facility] [address]  Phone:  [phone]  Fax:  [phone]    Date: 05/10/2022    Patient: Nancy Gregory  YOB: 1950  MRN: [id number]     Time Calculation    Start time: 0945  Stop time: 1025 Time Calculation (min): 40 minutes         Chief Complaint: Back Problem    Visit #: [id number]    SUBJECTIVE:  Patient reports that she feels that she is good with stopping therapy. States that she has increased her fitness and has improved her function.     OBJECTIVE:  Current objective measures:   Hip abd 5/5  Improved flexion to floor          Therapeutic Exercises (CPT 88206):       Therapeutic Exercise Summary: Access Code: [email or web address]  URL: [email or web address]  Date: 05/10/2022  Prepared by: Kwame Elmore    Program Notes  Walking program      Exercises  Prone Press Up - 1-6 x daily - 7 x weekly - 1 sets - 10 reps  Supine Transversus Abdominis Bracing - Hands on Stomach - 1 x daily - 7 x weekly - 2 sets - 10 reps  Seated Multifidi Isometric - 1 x daily - 7 x weekly - 2 sets - 10 reps  Beginner Bridge - 1 x daily - 7 x weekly - 2 sets - 10 reps  Bridge with Heels on Swiss Ball - 1 x daily - 7 x weekly - 2 sets - 10 reps  Bird Dog - 1 x daily - 7 x weekly - 2 sets - 10 reps  Bird Dog on Swiss Ball - 1 x daily - 7 x weekly - 2 sets - 10 reps  Hooklying Isometric Clamshell - 1 x daily - 7 x weekly - 2 sets - 10 reps  Clamshell - 1 x daily - 7 x weekly - 2 sets - 10 reps  Sit to Stand Without Arm Support - 1 x daily - 7 x weekly - 2 sets - 10 reps  Modified Clement Stretch - 3 x daily - 7 x weekly - 3 reps - 1 sets - 15sec hold  Supine Hamstring Stretch - 3 x daily - 7 x weekly - 3 reps - 1 sets - 15sec hold        Time-based treatments/modalities:    Physical Therapy Timed Treatment Charges  Therapeutic exercise minutes (CPT 20774): 38 minutes      Pain rating (1-10) before treatment:   0  Pain rating (1-10) after treatment:  0    ASSESSMENT:   Response to treatment: Patient responded well to therapy and now will discharge from PT. Plan to transition to full HEP.     PLAN/RECOMMENDATIONS:   Plan for treatment: Discharge to HEP  Planned interventions for next visit: Discharge to HEP

## 2022-05-10 NOTE — OP THERAPY PROGRESS SUMMARY
Outpatient Physical Therapy  PROGRESS SUMMARY NOTE / Discharge Note      [facility] [place]  [address]  [place] [address]  Phone:  [phone]  Fax:  [phone]    Date of Visit: 05/10/2022    Patient: Nancy Gregory  YOB: 1950  MRN: [id number]     Referring Provider: Carlos Eduardo Echols M.D.  [address] [facility]  [address]  [place] [address]   Referring Diagnosis Other intervertebral disc displacement, lumbar region [M51.26];Spondylosis without myelopathy or radiculopathy, lumbar region [M47.816];Muscle spasm of back [M62.830];Radiculopathy, lumbar region [M54.16]     Visit Diagnoses     ICD-10-CM   1. Chronic 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  M54.41    G89.29   2. Lumbar disc herniation  M51.26   3. Lumbar radiculopathy  M54.16   4. Lumbar spondylosis  M47.816       Rehab Potential: excellent    Progress Report Period: Eval to 5/10/22    Functional Assessment Used    LBDI 2      Objective Findings and Assessment:   Objective findings and assessment details: Hip abd 5/5  Improved flexion to floor    Goals:   Short Term Goals:   1) Patient's lumbar flexion will improve by 15deg to facilitate improved function. MET  2) Patient's hip abd strength will improve by a half muscle grade to facilitate standing tolerance. MET  Short term goal time span:  2-4 weeks      Long Term Goals:    1) Patient's symptoms will improve to allow for ambulation >20min. MET  2) Patient's LBDI will improve by 10 to demonstrate functional improvement MET  Long term goal time span:  6-8 weeks    Plan:   Planned therapy interventions:  E Stim Unattended (CPT 16157), Hot or Cold Pack Therapy (CPT 27605), Neuromuscular Re-education (CPT 60918) and Therapeutic Exercise (CPT 41340)  Frequency:  1x week  Duration in weeks:  1      Referring provider co-signature:  I have reviewed this plan of care and my co-signature certifies the need for services.     Certification  Period: 05/10/2022 to 05/17/22    Physician Signature: ________________________________ Date: ______________

## 2022-05-16 ENCOUNTER — OFFICE VISIT (OUTPATIENT)
Dept: CARDIOLOGY | Facility: MEDICAL CENTER | Age: 72
End: 2022-05-16
Payer: MEDICARE

## 2022-05-16 VITALS
WEIGHT: 188 LBS | BODY MASS INDEX: 32.1 KG/M2 | HEART RATE: 80 BPM | HEIGHT: 64 IN | SYSTOLIC BLOOD PRESSURE: 130 MMHG | OXYGEN SATURATION: 96 % | RESPIRATION RATE: 16 BRPM | DIASTOLIC BLOOD PRESSURE: 78 MMHG

## 2022-05-16 DIAGNOSIS — R93.1 ELEVATED CORONARY ARTERY CALCIUM SCORE: ICD-10-CM

## 2022-05-16 DIAGNOSIS — I25.10 CORONARY ARTERY DISEASE INVOLVING NATIVE CORONARY ARTERY OF NATIVE HEART WITHOUT ANGINA PECTORIS: ICD-10-CM

## 2022-05-16 DIAGNOSIS — E78.2 MIXED HYPERLIPIDEMIA: ICD-10-CM

## 2022-05-16 PROCEDURE — 99214 OFFICE O/P EST MOD 30 MIN: CPT | Performed by: INTERNAL MEDICINE

## 2022-05-16 ASSESSMENT — ENCOUNTER SYMPTOMS
DIZZINESS: 0
ABDOMINAL PAIN: 0
FEVER: 0
EYES NEGATIVE: 1
BLURRED VISION: 0
BACK PAIN: 1
FOCAL WEAKNESS: 0
PALPITATIONS: 0
CARDIOVASCULAR NEGATIVE: 1
CHILLS: 0
DEPRESSION: 0
RESPIRATORY NEGATIVE: 1
VOMITING: 0
COUGH: 0
HEADACHES: 0
WEAKNESS: 0
CLAUDICATION: 0
CONSTITUTIONAL NEGATIVE: 1
NEUROLOGICAL NEGATIVE: 1
SHORTNESS OF BREATH: 0
NAUSEA: 0
PSYCHIATRIC NEGATIVE: 1
MYALGIAS: 0
GASTROINTESTINAL NEGATIVE: 1
WEIGHT LOSS: 0
BRUISES/BLEEDS EASILY: 0
NERVOUS/ANXIOUS: 0
DOUBLE VISION: 0

## 2022-05-16 ASSESSMENT — FIBROSIS 4 INDEX: FIB4 SCORE: 1.49

## 2022-05-16 NOTE — PROGRESS NOTES
"Chief Complaint   Patient presents with   • Coronary Artery Disease   • Hyperlipidemia     F/V Dx: Coronary artery disease involving native coronary artery without angina pectoris           Subjective     Prudence Chayito Gregory is a 71 y.o. female who presents today for follow up of coronary artery disease.    Since the patient's last visit on 05/12/21,s he has been doing well clinically. She denies chest pain, shortness of breath, palpitations, nausea/vomiting or diaphoresis. She has gained weight due to limited mobility due to back pain. She is able to walk 1 to 2 miles per day.     Past Medical History:   Diagnosis Date   • Acute cystitis with hematuria 4/20/2020    Urine dipstick performed in clinic demonstrated trace glucose, 1+ bili, 1+ ketones, specific gravity 1.015, trace intact blood, pH 5.5, 2+ protein, 2.0 urobilinogen, positive nitrite, 3+ leukocyte esterase.  She performed a telemedicine visit yesterday and was placed on cephalosporin for presumptive urinary tract infection.  She was also given Pyridium due to discomfort with dysuria.  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  This was done on her right upper gums.  2 weeks following the procedure she had a full liquid diet/soft food diet and this was advanced on Valentine's Day dinner, February 14, 2020.  On the following Monday, February 17, 2020 she notes development of \"bloodshot \"gums causing concern and when she clay   • CAD (coronary artery disease)    • Chickenpox    • GERD (gastroesophageal reflux disease)    • Heartburn    • Hyperlipidemia    • Hypothyroidism    • Insomnia     Trouble going to and staying asleep   • Lymphadenopathy 9/16/2021   • Mass of left submandibular region 5/26/2021    She reports development of a mass in her chin/neck area.  On exam it appears to be submandibular on " the left anterolateral aspect.  It is easily palpable, mobile, and approximately 1 x 1 cm.  It is nontender.  There is some associated inflammation in the region that is visible on inspection.  She has been working with periodontist due to gum disease and wonders if it could be related to her dentit   • Mumps    • Painful joint    • Stomatitis 2021   • Thyroid disease    • Tonsillitis    • Wears glasses      Past Surgical History:   Procedure Laterality Date   • ABDOMINAL HYSTERECTOMY TOTAL     • CHOLECYSTECTOMY     • HYSTERECTOMY LAPAROSCOPY     • TONSILLECTOMY       Family History   Problem Relation Age of Onset   • Cancer Father         lung   • Breast Cancer Maternal Aunt    • Hypertension Maternal Grandmother    • Hypertension Maternal Grandfather    • Kidney Disease Mother    • Heart Disease Neg Hx      Social History     Socioeconomic History   • Marital status:      Spouse name: Not on file   • Number of children: Not on file   • Years of education: Not on file   • Highest education level: Not on file   Occupational History   • Not on file   Tobacco Use   • Smoking status: Former Smoker     Packs/day: 1.00     Years: 15.00     Pack years: 15.00     Types: Cigarettes     Quit date: 1993     Years since quittin.1   • Smokeless tobacco: Never Used   • Tobacco comment: continued abstinence   Vaping Use   • Vaping Use: Never used   Substance and Sexual Activity   • Alcohol use: Yes     Comment: occ   • Drug use: No   • Sexual activity: Yes     Partners: Male     Birth control/protection: Post-Menopausal   Other Topics Concern   •  Service No   • Blood Transfusions No   • Caffeine Concern No   • Occupational Exposure No   • Hobby Hazards No   • Sleep Concern Yes   • Stress Concern Yes   • Weight Concern No   • Special Diet No   • Back Care No   • Exercise Yes   • Bike Helmet No   • Seat Belt Yes   • Self-Exams Yes   Social History Narrative    She worked at [employer] at the [employer], recently retired. She is  to Ryan for the past 20 years, they met at a [employer]. She has a son (Inocencio Rahul, [place]) and 2 grandchildren.      Social Determinants of Health     Financial Resource Strain: Not on file   Food Insecurity: Not on file   Transportation Needs: Not on file   Physical Activity: Not on file   Stress: Not on file   Social Connections: Not on file   Intimate Partner Violence: Not on file   Housing Stability: Not on file     Allergies   Allergen Reactions   • Codeine      (Medications reviewed.)  Outpatient Encounter Medications as of 5/16/2022   Medication Sig Dispense Refill   • albuterol 108 (90 Base) MCG/ACT Aero Soln inhalation aerosol INHALE 2 PUFFS BY MOUTH EVERY FOUR HOURS AS NEEDED FOR SHORTNESS OF BREATH. 6.7 Each 3   • baclofen (LIORESAL) 10 MG Tab Take 1 Tablet by mouth 3 times a day as needed (muscle spasm/pain). (Patient taking differently: Take 10 mg by mouth 3 times a day as needed (muscle spasm/pain). Have not taken for a month) 90 Tablet 3   • meloxicam (MOBIC) 15 MG tablet Take 1 Tablet by mouth 1 time a day as needed (pain). Do not take other NSAIDs. No refills. 60 Tablet 0   • levothyroxine (SYNTHROID) 100 MCG Tab Take 1 Tablet by mouth every day. 100 Tablet 3   • Capsaicin 0.15 % Liquid capsaicin 0.15 % topical liquid   0.025% applied topically on scene. Time administered: 1125     • augmented betamethasone dipropionate (DIPROLENE-AF) 0.05 % ointment betamethasone, augmented 0.05 % topical ointment     • triamcinolone acetonide (KENALOG) 0.1 % Ointment TOPICAL APPLY SPARINGLY TO THE AFFECTED AREA 3XWK.     • ipratropium-albuterol (DUONEB) 0.5-2.5 (3) MG/3ML nebulizer solution Take 3 mL by nebulization 4 times a day. (Patient taking differently: Take 3 mL by nebulization every four hours as needed.) 3 mL 3   • Probiotic Product (ALIGN PO) Take 1 Tablet by mouth every day.     • DULERA 100-5 MCG/ACT Aerosol TAKE 2 PUFFS BY MOUTH TWICE A DAY 3 Each 3  "  • rosuvastatin (CRESTOR) 10 MG Tab Take 1 tablet by mouth every day. N THE EVENING 100 tablet 3   • coenzyme Q-10 30 MG capsule Take 60 mg by mouth every day.     • Cyanocobalamin (B-12 PO) Take 1 Tablet by mouth every day.     • aspirin (ASA) 81 MG Chew Tab chewable tablet Chew 1 tablet every day. 100 tablet 3   • Misc. Devices Misc Please provide nocturnal oxgen 2 LPM Nasal Cannula, concentrator 99 Each 99   • [DISCONTINUED] ciclopirox (PENLAC) 8 % solution APPLY TO ADJACENT SKIN AND AFFECTED NAIL DAILY FOR 48 WEEKS. REMOVE LAQUER WITH ALCOHOL EVERY 7 DAYS (Patient not taking: Reported on 5/16/2022)     • [DISCONTINUED] urea 40 % Cream APPLY TOPICALLY TO FEET TWICE DAILY AND THEN APPLY VASELINE WITH SOCKS (Patient not taking: Reported on 5/16/2022)       No facility-administered encounter medications on file as of 5/16/2022.     Review of Systems   Constitutional: Negative.  Negative for chills, fever, malaise/fatigue and weight loss.   HENT: Negative.  Negative for hearing loss.    Eyes: Negative.  Negative for blurred vision and double vision.   Respiratory: Negative.  Negative for cough and shortness of breath.    Cardiovascular: Negative.  Negative for chest pain, palpitations, claudication and leg swelling.   Gastrointestinal: Negative.  Negative for abdominal pain, nausea and vomiting.   Genitourinary: Negative.  Negative for dysuria and urgency.   Musculoskeletal: Positive for back pain. Negative for joint pain and myalgias.   Skin: Negative.  Negative for itching and rash.   Neurological: Negative.  Negative for dizziness, focal weakness, weakness and headaches.   Endo/Heme/Allergies: Negative.  Does not bruise/bleed easily.   Psychiatric/Behavioral: Negative.  Negative for depression. The patient is not nervous/anxious.               Objective     /78 (BP Location: Left arm, Patient Position: Sitting, BP Cuff Size: Adult)   Pulse 80   Resp 16   Ht 1.626 m (5' 4\")   Wt 85.3 kg (188 lb)   SpO2 " 96%   BMI 32.27 kg/m²     Physical Exam  Constitutional:       Appearance: She is well-developed.   HENT:      Head: Normocephalic and atraumatic.   Neck:      Vascular: No JVD.   Cardiovascular:      Rate and Rhythm: Normal rate and regular rhythm.      Heart sounds: Normal heart sounds.   Pulmonary:      Effort: Pulmonary effort is normal.      Breath sounds: Normal breath sounds.   Abdominal:      General: Bowel sounds are normal.      Palpations: Abdomen is soft.      Comments: No hepatosplenomegaly.   Musculoskeletal:         General: Normal range of motion.   Lymphadenopathy:      Cervical: No cervical adenopathy.   Skin:     General: Skin is warm and dry.   Neurological:      Mental Status: She is alert and oriented to person, place, and time.            CARDIAC STUDIES/PROCEDURES:     CT CARDIAC CALCIUM SCORING (03/29/21)  Coronary calcification:  LMA - 0.0  LCX - 138  LAD - 108  RCA - 0.0  PDA - 0.0  Total Calcium Score: 246  Percentile: Calcium score is worse than the 75th percentile for the patient's age and sex.     EKG performed on (04/21/21) EKG shows sinus rhythm with anterior Q waves.  EKG performed on (04/21/21) EKG shows sinus rhythm with anterior Q waves.     Laboratory results of (12/13/21) were reviewed. Cholesterol profile of 167/46/96/62 mg/dL noted.  Laboratory results of (03/11/20) Cholesterol profile of 214/55/76/127 mg/dL noted.     MYOCARDIAL PERFUSION STUDY CONCLUSIONS (04/22/21)  NUCLEAR IMAGING INTERPRETATION  No evidence of significant jeopardized viable myocardium or definite prior myocardial infarction.  Normal left ventricular size, ejection fraction, and wall motion.  ECG INTERPRETATION  Negative stress ECG for ischemia.     Assessment & Plan     1. Coronary artery disease involving native coronary artery of native heart without angina pectoris     2. Elevated coronary artery calcium score     3. Mixed hyperlipidemia         Medical Decision Making: Today's  Assessment/Status/Plan:        1. Coronary artery disease with positive CT coronary calcium study: She remains clinically doing well. I will continue with current medical care including rosuvastatin. We will discontinue aspirin. She will work on diet and exercise with exercising if possible. She will try to incorporate fish in her diet.   2. Hyperlipidemia: She is doing well on statin therapy without myalgia symptoms.    We will follow up the patient in one year.    CC Shawna Strickland

## 2022-05-17 ENCOUNTER — APPOINTMENT (OUTPATIENT)
Dept: PHYSICAL THERAPY | Facility: REHABILITATION | Age: 72
End: 2022-05-17
Attending: PHYSICAL MEDICINE & REHABILITATION
Payer: MEDICARE

## 2022-05-21 DIAGNOSIS — E78.2 MIXED HYPERLIPIDEMIA: ICD-10-CM

## 2022-05-23 RX ORDER — ROSUVASTATIN CALCIUM 10 MG/1
TABLET, COATED ORAL
Qty: 100 TABLET | Refills: 3 | Status: SHIPPED | OUTPATIENT
Start: 2022-05-23 | End: 2023-07-10

## 2022-05-24 ENCOUNTER — APPOINTMENT (OUTPATIENT)
Dept: PHYSICAL THERAPY | Facility: REHABILITATION | Age: 72
End: 2022-05-24
Attending: PHYSICAL MEDICINE & REHABILITATION
Payer: MEDICARE

## 2022-05-31 ENCOUNTER — APPOINTMENT (OUTPATIENT)
Dept: PHYSICAL THERAPY | Facility: REHABILITATION | Age: 72
End: 2022-05-31
Attending: PHYSICAL MEDICINE & REHABILITATION
Payer: MEDICARE

## 2022-07-12 DIAGNOSIS — J45.40 MODERATE PERSISTENT ASTHMA WITHOUT COMPLICATION: ICD-10-CM

## 2022-07-13 RX ORDER — MOMETASONE FUROATE AND FORMOTEROL FUMARATE DIHYDRATE 100; 5 UG/1; UG/1
AEROSOL RESPIRATORY (INHALATION)
Qty: 39 EACH | Refills: 1 | Status: SHIPPED | OUTPATIENT
Start: 2022-07-13 | End: 2023-05-10

## 2022-07-16 DIAGNOSIS — M54.41 CHRONIC RIGHT-SIDED LOW BACK PAIN WITH RIGHT-SIDED SCIATICA: ICD-10-CM

## 2022-07-16 DIAGNOSIS — M51.26 LUMBAR DISC HERNIATION: ICD-10-CM

## 2022-07-16 DIAGNOSIS — M47.816 LUMBAR SPONDYLOSIS: ICD-10-CM

## 2022-07-16 DIAGNOSIS — G89.29 CHRONIC RIGHT-SIDED LOW BACK PAIN WITH RIGHT-SIDED SCIATICA: ICD-10-CM

## 2022-07-16 DIAGNOSIS — M54.16 LUMBAR RADICULOPATHY: ICD-10-CM

## 2022-07-16 DIAGNOSIS — M62.830 BACK MUSCLE SPASM: ICD-10-CM

## 2022-07-18 RX ORDER — MELOXICAM 15 MG/1
15 TABLET ORAL
Qty: 30 TABLET | Refills: 0 | Status: SHIPPED | OUTPATIENT
Start: 2022-07-18 | End: 2022-08-19

## 2022-07-18 NOTE — TELEPHONE ENCOUNTER
Requested Prescriptions     Pending Prescriptions Disp Refills   • meloxicam (MOBIC) 15 MG tablet 30 Tablet 0     Sig: Take 1 Tablet by mouth 1 time a day as needed (pain). Do not take other NSAIDs. No refills.   Jody Madrid M.D.

## 2022-08-19 DIAGNOSIS — M54.16 LUMBAR RADICULOPATHY: ICD-10-CM

## 2022-08-19 DIAGNOSIS — M51.26 LUMBAR DISC HERNIATION: ICD-10-CM

## 2022-08-19 DIAGNOSIS — M47.816 LUMBAR SPONDYLOSIS: ICD-10-CM

## 2022-08-19 DIAGNOSIS — M54.41 CHRONIC RIGHT-SIDED LOW BACK PAIN WITH RIGHT-SIDED SCIATICA: ICD-10-CM

## 2022-08-19 DIAGNOSIS — M62.830 BACK MUSCLE SPASM: ICD-10-CM

## 2022-08-19 DIAGNOSIS — G89.29 CHRONIC RIGHT-SIDED LOW BACK PAIN WITH RIGHT-SIDED SCIATICA: ICD-10-CM

## 2022-08-19 RX ORDER — MELOXICAM 15 MG/1
15 TABLET ORAL
Qty: 30 TABLET | Refills: 0 | Status: SHIPPED | OUTPATIENT
Start: 2022-08-19 | End: 2022-09-26

## 2022-09-20 ENCOUNTER — OFFICE VISIT (OUTPATIENT)
Dept: MEDICAL GROUP | Facility: PHYSICIAN GROUP | Age: 72
End: 2022-09-20
Payer: MEDICARE

## 2022-09-20 VITALS
SYSTOLIC BLOOD PRESSURE: 110 MMHG | DIASTOLIC BLOOD PRESSURE: 60 MMHG | HEART RATE: 87 BPM | TEMPERATURE: 97.1 F | OXYGEN SATURATION: 95 % | BODY MASS INDEX: 32.96 KG/M2 | WEIGHT: 192 LBS | RESPIRATION RATE: 12 BRPM

## 2022-09-20 DIAGNOSIS — J96.11 CHRONIC RESPIRATORY FAILURE WITH HYPOXIA (HCC): ICD-10-CM

## 2022-09-20 DIAGNOSIS — E03.4 HYPOTHYROIDISM DUE TO ACQUIRED ATROPHY OF THYROID: ICD-10-CM

## 2022-09-20 DIAGNOSIS — Z23 NEED FOR VACCINATION: ICD-10-CM

## 2022-09-20 DIAGNOSIS — Z00.00 ENCOUNTER FOR SUBSEQUENT ANNUAL WELLNESS VISIT (AWV) IN MEDICARE PATIENT: Primary | ICD-10-CM

## 2022-09-20 DIAGNOSIS — E55.9 VITAMIN D DEFICIENCY: ICD-10-CM

## 2022-09-20 DIAGNOSIS — E78.2 MIXED HYPERLIPIDEMIA: ICD-10-CM

## 2022-09-20 DIAGNOSIS — I70.0 AORTIC ATHEROSCLEROSIS (HCC): ICD-10-CM

## 2022-09-20 PROCEDURE — G0439 PPPS, SUBSEQ VISIT: HCPCS | Mod: 25 | Performed by: INTERNAL MEDICINE

## 2022-09-20 PROCEDURE — 90662 IIV NO PRSV INCREASED AG IM: CPT | Performed by: INTERNAL MEDICINE

## 2022-09-20 PROCEDURE — G0008 ADMIN INFLUENZA VIRUS VAC: HCPCS | Performed by: INTERNAL MEDICINE

## 2022-09-20 ASSESSMENT — ACTIVITIES OF DAILY LIVING (ADL): BATHING_REQUIRES_ASSISTANCE: 0

## 2022-09-20 ASSESSMENT — FIBROSIS 4 INDEX: FIB4 SCORE: 1.49

## 2022-09-20 ASSESSMENT — PATIENT HEALTH QUESTIONNAIRE - PHQ9: CLINICAL INTERPRETATION OF PHQ2 SCORE: 0

## 2022-09-20 ASSESSMENT — ENCOUNTER SYMPTOMS: GENERAL WELL-BEING: GOOD

## 2022-09-20 NOTE — PROGRESS NOTES
Chief Complaint   Patient presents with    Annual Exam     LOV 5/2/22  Last Labs in December         HPI:  Prudence Chayito Gregory is a 71 y.o. female here for Medicare Annual Wellness Visit     Problem   Chronic respiratory failure with hypoxia (HCC)- nocturnal oxygen    She is found to have hypoxia on overnight sleep testing and recommended to go on supplemental oxygen therapy.  She is prescribed 2 L via nasal cannula however due to intolerance from epistaxis and dry oral cavity she is not using it at this time.     Aortic Atherosclerosis (Hcc)    CT abdomen/pelvis (1/2021):  Aortic atherosclerosis without aneurysm.    New incidental finding during CT imaging for diverticulitis.     Current regimen: Crestor 10 mg daily     Mixed Hyperlipidemia     Latest Reference Range & Units 12/13/21 08:05   Cholesterol,Tot 100 - 199 mg/dL 167   Triglycerides 0 - 149 mg/dL 46   HDL >=40 mg/dL 96   LDL <100 mg/dL 62     The 10-year ASCVD risk score (Mercedes HER Jr., et al., 2013) is: 7%    Due to atherosclerosis started on aspirin and rosuvastatin with improvement in labs.    Incidental imaging with CT in January 2021 showed aortic atherosclerosis.    Current regimen: rosuvastatin 10 mg daily       Vitamin D Deficiency       Ref. Range 12/13/2021 08:05   25-Hydroxy   Vitamin D 25 Latest Ref Range: 30 - 80 ng/mL 26 (L)     She has prior history of vitamin D deficiency. Unknown if she has osteopenia or osteoporosis.  No known history of chronic kidney disease or hyperparathyroidism.    Previously taking 800 IU daily, now taking 2,000 IU     Hypothyroidism Due to Acquired Atrophy of Thyroid       Ref. Range 12/13/2021 08:05   TSH Latest Ref Range: 0.380 - 5.330 uIU/mL 11.700 (H)   Free T-4 Latest Ref Range: 0.93 - 1.70 ng/dL 0.71 (L)     Hypothyroidism diagnosed by abnormal labs.  She denies any signs or symptoms of overtreatment or under treatment at this time.  She has remained asymptomatic to her thyroid disease except for fatigue.  Previously on a higher dose in the mid 100's that was reduced around [id number].    Current regimen: levothyroxine 100 mcg daily  Previous regimen: levothyroxine 88 mcg daily              Current supplements as per medication list.       Allergies: Codeine    Current social contact/activities: traveling, [place] in [place] and [place] planned in November    She  reports that she quit smoking about 29 years ago. Her smoking use included cigarettes. She has a 15.00 pack-year smoking history. She has never used smokeless tobacco. She reports current alcohol use. She reports that she does not use drugs.  Counseling given: Not Answered  Tobacco comments: continued abstinence      DPA/Advanced Directive:  Patient has Advanced Directive, but it is not on file. Instructed to bring in a copy to scan into their chart.    ROS:    Gait: Uses no assistive device  Ostomy: No  Other tubes: No  Amputations: No  Chronic oxygen use: Yes (nocturnal)  Last eye exam: almost a year ago  Goes every year [facility]   Wears hearing aids: No   : Reports urinary leakage during the last 6 months that has somewhat interfered with their daily activities or sleep.    Screening:    Depression Screening  Little interest or pleasure in doing things?  0 - not at all  Feeling down, depressed , or hopeless? 0 - not at all  Trouble falling or staying asleep, or sleeping too much?     Feeling tired or having little energy?     Poor appetite or overeating?     Feeling bad about yourself - or that you are a failure or have let yourself or your family down?    Trouble concentrating on things, such as reading the newspaper or watching television?    Moving or speaking so slowly that other people could have noticed.  Or the opposite - being so fidgety or restless that you have been moving around a lot more than usual?     Thoughts that you would be better off dead, or of hurting yourself?     Patient Health Questionnaire Score:      If depressive symptoms  identified deferred to follow up visit unless specifically addressed in assessment and plan.    Interpretation of PHQ-9 Total Score   Score Severity   1-4 No Depression   5-9 Mild Depression   10-14 Moderate Depression   15-19 Moderately Severe Depression   20-27 Severe Depression    Screening for Cognitive Impairment  Three Minute Recall (daughter, heaven, mountain) 3/3    Inocencio clock face with all 12 numbers and set the hands to show 10 past 11.  Yes    Cognitive concerns identified deferred for follow up unless specifically addressed in assessment and plan.    Fall Risk Assessment  Has the patient had two or more falls in the last year or any fall with injury in the last year?  No    Safety Assessment  Throw rugs on floor.  Yes  Handrails on all stairs.  No  Good lighting in all hallways.  Yes  Difficulty hearing.  No  Patient counseled about all safety risks that were identified.    Functional Assessment ADLs  Are there any barriers preventing you from cooking for yourself or meeting nutritional needs?  No.    Are there any barriers preventing you from driving safely or obtaining transportation?  No.    Are there any barriers preventing you from using a telephone or calling for help?  No.    Are there any barriers preventing you from shopping?  No.    Are there any barriers preventing you from taking care of your own finances?  No.    Are there any barriers preventing you from managing your medications?  No.    Are there any barriers preventing you from showering, bathing or dressing yourself? No.    Are you currently engaging in any exercise or physical activity?  Yes.  Walk and goes to gym but not regularly  What is your perception of your health? Good.    Health Maintenance Summary            COLORECTAL CANCER SCREENING (COLONOSCOPY - Every 3 Months) Due soon on 10/3/2022      07/03/2022  REFERRAL TO GI FOR COLONOSCOPY    06/23/2022  REFERRAL TO GI FOR COLONOSCOPY    01/13/2012  REFERRAL TO GI FOR COLONOSCOPY               Postponed - IMM ZOSTER VACCINES (1 of 2) Postponed until 10/2/2022      No completion history exists for this topic.              MAMMOGRAM (Yearly) Next due on 1/10/2023      01/10/2022  MA-SCREENING MAMMO BILAT W/TOMOSYNTHESIS W/CAD    12/09/2020  MA-SCREENING MAMMO BILAT W/TOMOSYNTHESIS W/CAD    11/06/2019  MA-SCREENING MAMMO BILAT W/TOMOSYNTHESIS W/CAD    09/28/2018  MA-SCREENING MAMMO BILAT W/TOMOSYNTHESIS W/CAD    11/28/2017  MA-MAMMO DIAGNOSTIC UNILAT W/HERRERA W/CAD LEFT    Only the first 5 history entries have been loaded, but more history exists.              BONE DENSITY (Every 2 Years) Next due on 3/24/2023      03/24/2021  DS-BONE DENSITY STUDY (DEXA)              Annual Wellness Visit (Every 366 Days) Next due on 9/21/2023 09/20/2022  Level of Service: ANNUAL WELLNESS VISIT-INCLUDES PPPS SUBSEQUE*    09/15/2021  Level of Service: ME ANNUAL WELLNESS VISIT-INCLUDES PPPS SUBSEQUE*              IMM DTaP/Tdap/Td Vaccine (3 - Td or Tdap) Next due on 1/4/2028 01/04/2018  Imm Admin: Tdap Vaccine    01/02/2008  Imm Admin: Tdap Vaccine              IMM HEP B VACCINE (Series Information) Completed      03/27/2000  Imm Admin: Hepatitis B Vaccine (Adol/Adult)    10/27/1999  Imm Admin: Hepatitis B Vaccine (Adol/Adult)    09/27/1999  Imm Admin: Hepatitis B Vaccine (Adol/Adult)              IMM PNEUMOCOCCAL VACCINE: 65+ Years (Series Information) Completed      03/05/2020  Imm Admin: Pneumococcal polysaccharide vaccine (PPSV-23)    11/14/2018  Imm Admin: Pneumococcal Conjugate Vaccine (Prevnar/PCV-13)    09/27/2012  Imm Admin: Pneumococcal polysaccharide vaccine (PPSV-23)              HEPATITIS C SCREENING  Completed      03/11/2020  HEP C VIRUS ANTIBODY              COVID-19 Vaccine (Series Information) Completed      07/05/2022  Imm Admin: PFIZER COATS CAP SARS-COV-2 VACCINATION (12+)    10/04/2021  Imm Admin: PFIZER PURPLE CAP SARS-COV-2 VACCINATION (12+)    02/12/2021  Imm Admin: PFIZER PURPLE  CAP SARS-COV-2 VACCINATION (12+)    2021  Imm Admin: PFIZER PURPLE CAP SARS-COV-2 VACCINATION (12+)              IMM INFLUENZA (Series Information) Completed      2022  Imm Admin: Influenza Vaccine Adult HD    10/04/2021  Imm Admin: Influenza, Unspecified - HISTORICAL DATA    10/12/2020  Imm Admin: Influenza Vaccine Adult HD    2019  Imm Admin: Influenza Vaccine Adult HD    10/04/2018  Imm Admin: Influenza Vaccine Quad Inj (Pf)    Only the first 5 history entries have been loaded, but more history exists.              IMM MENINGOCOCCAL ACWY VACCINE (Series Information) Aged Out      No completion history exists for this topic.                    Patient Care Team:  Shawna Recio D.O. as PCP - General (Internal Medicine)  Shawna Recio D.O. as PCP - [facility] Paneled  Harlan García Ass't as [facility]   Sathya Silva O.D. (Optometry)  Jovani Dewey M.D. (Interventional Cardiology)  [facility]  DEON Reveles (Nurse Practitioner Family)      Social History     Tobacco Use    Smoking status: Former     Packs/day: 1.00     Years: 15.00     Pack years: 15.00     Types: Cigarettes     Quit date: 1993     Years since quittin.4    Smokeless tobacco: Never    Tobacco comments:     continued abstinence   Vaping Use    Vaping Use: Never used   Substance Use Topics    Alcohol use: Yes     Comment: occ    Drug use: No     Family History   Problem Relation Age of Onset    Cancer Father         lung    Breast Cancer Maternal Aunt     Hypertension Maternal Grandmother     Hypertension Maternal Grandfather     Kidney Disease Mother     Heart Disease Neg Hx      She  has a past medical history of Acute cystitis with hematuria (2020), Asthma, Back pain, Burning mouth syndrome- working diagnosis (2020), CAD (coronary artery disease), Chickenpox, GERD (gastroesophageal reflux disease), Heartburn, Hyperlipidemia, Hypothyroidism, Insomnia,  Lymphadenopathy (9/16/2021), Mass of left submandibular region (5/26/2021), Mumps, Painful joint, Stomatitis (9/16/2021), Thyroid disease, Tonsillitis, and Wears glasses.    She has no past medical history of Apnea, sleep, Daytime sleepiness, Encounter for long-term (current) use of other medications, Gasping for breath, Morning headache, or Snoring.   Past Surgical History:   Procedure Laterality Date    ABDOMINAL HYSTERECTOMY TOTAL      CHOLECYSTECTOMY      HYSTERECTOMY LAPAROSCOPY      TONSILLECTOMY         Exam:   /60 (BP Location: Left arm, Patient Position: Sitting, BP Cuff Size: Adult)   Pulse 87   Temp 36.2 °C (97.1 °F) (Temporal)   Resp 12   Wt 87.1 kg (192 lb)   SpO2 95%  Body mass index is 31.95 kg/m² (pended).    Hearing excellent.    Dentition good  Alert, oriented in no acute distress.  Eye contact is good, speech goal directed, affect calm  Physical Exam  Constitutional:       General: She is not in acute distress.     Appearance: Normal appearance. She is not ill-appearing.   HENT:      Ears:      Comments: Hearing grossly normal  Eyes:      General: No scleral icterus.     Conjunctiva/sclera: Conjunctivae normal.   Cardiovascular:      Rate and Rhythm: Normal rate and regular rhythm.      Pulses: Normal pulses.   Pulmonary:      Effort: Pulmonary effort is normal. No respiratory distress.      Breath sounds: Normal breath sounds. No wheezing or rhonchi.   Skin:     Findings: No bruising or rash.   Psychiatric:         Mood and Affect: Mood normal.         Behavior: Behavior normal.         Thought Content: Thought content normal.         Judgment: Judgment normal.        Assessment and Plan. The following treatment and monitoring plan is recommended:        Prudence is a 71 y.o. female with the following:  Problem List Items Addressed This Visit       Vitamin D deficiency     Chronic and ongoing problem, due for recheck, adjust supplementation based on updated lab levels, she will  complete around November.         Relevant Orders    VITAMIN D,25 HYDROXY (DEFICIENCY)    Hypothyroidism due to acquired atrophy of thyroid     Chronic problem, requires follow up, recheck thyroid and continue adjusting levothyroxine as indicated. No side effects on increased dose of levothyroxine 100 mcg daily.         Relevant Orders    TSH    FREE THYROXINE    Mixed hyperlipidemia     Chronic and stable problem, lipids have improved with initiation of rosuvastatin which was started due to aortic atherosclerosis.  No noted side effects to this medicine.         Relevant Orders    CBC WITH DIFFERENTIAL    Comp Metabolic Panel    Lipid Profile    VITAMIN B12    Aortic atherosclerosis (HCC)     Chronic and stable problem, continue Crestor 10 mg daily.  Advised by cardiology to stop the aspirin 81 mg.         Chronic respiratory failure with hypoxia (HCC)- nocturnal oxygen     Chronic ongoing problem.  She is not tolerating supplemental oxygen is not using it currently.  It causes epistaxis and dry oral cavity.  She is trying to work on sleeping on her side.          Other Visit Diagnoses       Encounter for subsequent annual wellness visit (AWV) in Medicare patient    -  Primary    Need for vaccination        Relevant Orders    Influenza Vaccine, High Dose (65+ Only) (Completed)             Services suggested: No services needed at this time  Health Care Screening: Age-appropriate preventive services recommended by USPTF and ACIP covered by Medicare were discussed today. Services ordered if indicated and agreed upon by the patient.  Referrals offered: Community-based lifestyle interventions to reduce health risks and promote self-management and wellness, fall prevention, nutrition, physical activity, tobacco-use cessation, weight loss, and mental health services as per orders if indicated.    Discussion today about general wellness and lifestyle habits:    Prevent falls and reduce trip hazards; Cautioned about  securing or removing rugs.  Have a working fire alarm and carbon monoxide detector;   Engage in regular physical activity and social activities     Follow-up: Return in about 4 months (around 1/20/2023).    I spent a total of 32 minutes with record review, exam, communication with the patient, communication with other providers, and documentation of this encounter.       PLEASE NOTE: This dictation was created using voice recognition software. I have made every reasonable attempt to correct obvious errors, but I expect that there are errors of grammar and possibly content that I did not discover before finalizing the note.      Shawna Recio, DO  Geriatric and Internal Medicine  [facility]

## 2022-09-20 NOTE — ASSESSMENT & PLAN NOTE
Chronic and ongoing problem, due for recheck, adjust supplementation based on updated lab levels, she will complete around November.

## 2022-09-21 NOTE — ASSESSMENT & PLAN NOTE
Chronic ongoing problem.  She is not tolerating supplemental oxygen is not using it currently.  It causes epistaxis and dry oral cavity.  She is trying to work on sleeping on her side.

## 2022-09-21 NOTE — ASSESSMENT & PLAN NOTE
Chronic problem, requires follow up, recheck thyroid and continue adjusting levothyroxine as indicated. No side effects on increased dose of levothyroxine 100 mcg daily.

## 2022-09-21 NOTE — ASSESSMENT & PLAN NOTE
Chronic and stable problem, continue Crestor 10 mg daily.  Advised by cardiology to stop the aspirin 81 mg.

## 2022-09-21 NOTE — ASSESSMENT & PLAN NOTE
Chronic and stable problem, lipids have improved with initiation of rosuvastatin which was started due to aortic atherosclerosis.  No noted side effects to this medicine.

## 2022-09-24 DIAGNOSIS — M54.16 LUMBAR RADICULOPATHY: ICD-10-CM

## 2022-09-24 DIAGNOSIS — M54.41 CHRONIC RIGHT-SIDED LOW BACK PAIN WITH RIGHT-SIDED SCIATICA: ICD-10-CM

## 2022-09-24 DIAGNOSIS — G89.29 CHRONIC RIGHT-SIDED LOW BACK PAIN WITH RIGHT-SIDED SCIATICA: ICD-10-CM

## 2022-09-24 DIAGNOSIS — M47.816 LUMBAR SPONDYLOSIS: ICD-10-CM

## 2022-09-24 DIAGNOSIS — M62.830 BACK MUSCLE SPASM: ICD-10-CM

## 2022-09-24 DIAGNOSIS — M51.26 LUMBAR DISC HERNIATION: ICD-10-CM

## 2022-09-26 RX ORDER — MELOXICAM 15 MG/1
TABLET ORAL
Qty: 100 TABLET | Refills: 3 | Status: SHIPPED | OUTPATIENT
Start: 2022-09-26

## 2022-10-14 ENCOUNTER — HOSPITAL ENCOUNTER (OUTPATIENT)
Dept: LAB | Facility: MEDICAL CENTER | Age: 72
End: 2022-10-14
Attending: INTERNAL MEDICINE
Payer: MEDICARE

## 2022-10-14 DIAGNOSIS — E55.9 VITAMIN D DEFICIENCY: ICD-10-CM

## 2022-10-14 DIAGNOSIS — E03.4 HYPOTHYROIDISM DUE TO ACQUIRED ATROPHY OF THYROID: ICD-10-CM

## 2022-10-14 DIAGNOSIS — E78.2 MIXED HYPERLIPIDEMIA: ICD-10-CM

## 2022-10-14 LAB
25(OH)D3 SERPL-MCNC: 30 NG/ML (ref 30–100)
ALBUMIN SERPL BCP-MCNC: 4.4 G/DL (ref 3.2–4.9)
ALBUMIN/GLOB SERPL: 1.6 G/DL
ALP SERPL-CCNC: 68 U/L (ref 30–99)
ALT SERPL-CCNC: 14 U/L (ref 2–50)
ANION GAP SERPL CALC-SCNC: 9 MMOL/L (ref 7–16)
AST SERPL-CCNC: 18 U/L (ref 12–45)
BASOPHILS # BLD AUTO: 0.5 % (ref 0–1.8)
BASOPHILS # BLD: 0.03 K/UL (ref 0–0.12)
BILIRUB SERPL-MCNC: 0.5 MG/DL (ref 0.1–1.5)
BUN SERPL-MCNC: 10 MG/DL (ref 8–22)
CALCIUM SERPL-MCNC: 9.5 MG/DL (ref 8.5–10.5)
CHLORIDE SERPL-SCNC: 107 MMOL/L (ref 96–112)
CHOLEST SERPL-MCNC: 180 MG/DL (ref 100–199)
CO2 SERPL-SCNC: 26 MMOL/L (ref 20–33)
CREAT SERPL-MCNC: 0.63 MG/DL (ref 0.5–1.4)
EOSINOPHIL # BLD AUTO: 0.14 K/UL (ref 0–0.51)
EOSINOPHIL NFR BLD: 2.4 % (ref 0–6.9)
ERYTHROCYTE [DISTWIDTH] IN BLOOD BY AUTOMATED COUNT: 42.9 FL (ref 35.9–50)
GFR SERPLBLD CREATININE-BSD FMLA CKD-EPI: 94 ML/MIN/1.73 M 2
GLOBULIN SER CALC-MCNC: 2.7 G/DL (ref 1.9–3.5)
GLUCOSE SERPL-MCNC: 97 MG/DL (ref 65–99)
HCT VFR BLD AUTO: 44.6 % (ref 37–47)
HDLC SERPL-MCNC: 91 MG/DL
HGB BLD-MCNC: 14.4 G/DL (ref 12–16)
IMM GRANULOCYTES # BLD AUTO: 0.01 K/UL (ref 0–0.11)
IMM GRANULOCYTES NFR BLD AUTO: 0.2 % (ref 0–0.9)
LDLC SERPL CALC-MCNC: 76 MG/DL
LYMPHOCYTES # BLD AUTO: 2.34 K/UL (ref 1–4.8)
LYMPHOCYTES NFR BLD: 40.2 % (ref 22–41)
MCH RBC QN AUTO: 29.9 PG (ref 27–33)
MCHC RBC AUTO-ENTMCNC: 32.3 G/DL (ref 33.6–35)
MCV RBC AUTO: 92.7 FL (ref 81.4–97.8)
MONOCYTES # BLD AUTO: 0.48 K/UL (ref 0–0.85)
MONOCYTES NFR BLD AUTO: 8.2 % (ref 0–13.4)
NEUTROPHILS # BLD AUTO: 2.82 K/UL (ref 2–7.15)
NEUTROPHILS NFR BLD: 48.5 % (ref 44–72)
NRBC # BLD AUTO: 0 K/UL
NRBC BLD-RTO: 0 /100 WBC
PLATELET # BLD AUTO: 255 K/UL (ref 164–446)
PMV BLD AUTO: 11.4 FL (ref 9–12.9)
POTASSIUM SERPL-SCNC: 4.3 MMOL/L (ref 3.6–5.5)
PROT SERPL-MCNC: 7.1 G/DL (ref 6–8.2)
RBC # BLD AUTO: 4.81 M/UL (ref 4.2–5.4)
SODIUM SERPL-SCNC: 142 MMOL/L (ref 135–145)
T4 FREE SERPL-MCNC: 1.41 NG/DL (ref 0.93–1.7)
TRIGL SERPL-MCNC: 63 MG/DL (ref 0–149)
TSH SERPL DL<=0.005 MIU/L-ACNC: 0.12 UIU/ML (ref 0.38–5.33)
VIT B12 SERPL-MCNC: 921 PG/ML (ref 211–911)
WBC # BLD AUTO: 5.8 K/UL (ref 4.8–10.8)

## 2022-10-14 PROCEDURE — 82607 VITAMIN B-12: CPT

## 2022-10-14 PROCEDURE — 80061 LIPID PANEL: CPT

## 2022-10-14 PROCEDURE — 82306 VITAMIN D 25 HYDROXY: CPT

## 2022-10-14 PROCEDURE — 85025 COMPLETE CBC W/AUTO DIFF WBC: CPT

## 2022-10-14 PROCEDURE — 36415 COLL VENOUS BLD VENIPUNCTURE: CPT

## 2022-10-14 PROCEDURE — 84439 ASSAY OF FREE THYROXINE: CPT

## 2022-10-14 PROCEDURE — 84443 ASSAY THYROID STIM HORMONE: CPT

## 2022-10-14 PROCEDURE — 80053 COMPREHEN METABOLIC PANEL: CPT

## 2022-10-17 ENCOUNTER — TELEPHONE (OUTPATIENT)
Dept: HEALTH INFORMATION MANAGEMENT | Facility: OTHER | Age: 72
End: 2022-10-17

## 2022-12-16 ENCOUNTER — APPOINTMENT (OUTPATIENT)
Dept: URGENT CARE | Facility: PHYSICIAN GROUP | Age: 72
End: 2022-12-16
Payer: MEDICARE

## 2022-12-27 ENCOUNTER — OFFICE VISIT (OUTPATIENT)
Dept: URGENT CARE | Facility: PHYSICIAN GROUP | Age: 72
End: 2022-12-27
Payer: MEDICARE

## 2022-12-27 VITALS
HEIGHT: 65 IN | RESPIRATION RATE: 18 BRPM | BODY MASS INDEX: 30.66 KG/M2 | HEART RATE: 103 BPM | OXYGEN SATURATION: 95 % | TEMPERATURE: 99 F | WEIGHT: 184 LBS | SYSTOLIC BLOOD PRESSURE: 144 MMHG | DIASTOLIC BLOOD PRESSURE: 82 MMHG

## 2022-12-27 DIAGNOSIS — J45.21 MILD INTERMITTENT ASTHMA WITH EXACERBATION: ICD-10-CM

## 2022-12-27 DIAGNOSIS — R05.1 ACUTE COUGH: ICD-10-CM

## 2022-12-27 PROCEDURE — 99214 OFFICE O/P EST MOD 30 MIN: CPT | Mod: 25 | Performed by: NURSE PRACTITIONER

## 2022-12-27 PROCEDURE — 94640 AIRWAY INHALATION TREATMENT: CPT | Performed by: NURSE PRACTITIONER

## 2022-12-27 RX ORDER — IPRATROPIUM BROMIDE AND ALBUTEROL SULFATE 2.5; .5 MG/3ML; MG/3ML
3 SOLUTION RESPIRATORY (INHALATION) ONCE
Status: COMPLETED | OUTPATIENT
Start: 2022-12-27 | End: 2022-12-27

## 2022-12-27 RX ORDER — PREDNISONE 10 MG/1
TABLET ORAL
Qty: 30 TABLET | Refills: 0 | Status: SHIPPED | OUTPATIENT
Start: 2022-12-27 | End: 2023-01-06

## 2022-12-27 RX ADMIN — IPRATROPIUM BROMIDE AND ALBUTEROL SULFATE 3 ML: 2.5; .5 SOLUTION RESPIRATORY (INHALATION) at 16:09

## 2022-12-27 ASSESSMENT — FIBROSIS 4 INDEX: FIB4 SCORE: 1.34

## 2022-12-29 ASSESSMENT — ENCOUNTER SYMPTOMS
SHORTNESS OF BREATH: 1
DYSPNEA ON EXERTION: 1
GASTROINTESTINAL NEGATIVE: 1
COUGH: 1
EYES NEGATIVE: 1
CONSTITUTIONAL NEGATIVE: 1
WHEEZING: 1
FEVER: 0
NEUROLOGICAL NEGATIVE: 1
MUSCULOSKELETAL NEGATIVE: 1

## 2022-12-29 NOTE — PROGRESS NOTES
"Subjective:   Prudence Chayito Gregory is a 71 y.o. female who presents for Asthma (Asthma issues since last night )      Patient presents with worsening shortness of breath, cough, and wheezing.  She states she got a URI around Thanksgiving, and has been coughing since then.  She did have a few days where she felt like she was getting better, and then worsened.  She does have a nebulizer at home, which she used earlier and was ineffective.  She does have an inhaler that she uses which she reports is somewhat helpful.  She denies chest pain or dizziness.  She states she is not coughing up any mucus.  She denies fever or chills.    Asthma  She complains of cough, shortness of breath and wheezing. Primary symptoms comments: Coughing since Thanksgiving. This is a chronic (Exacerbation last night) problem. The problem occurs constantly. The problem has been gradually worsening. The cough is productive of sputum. Associated symptoms include dyspnea on exertion. Pertinent negatives include no fever. Her symptoms are aggravated by URI. Her symptoms are alleviated by OTC cough suppressant. She reports no improvement on treatment. Her symptoms are not alleviated by beta-agonist. Her past medical history is significant for asthma.     Review of Systems   Constitutional: Negative.  Negative for fever.   HENT: Negative.     Eyes: Negative.    Respiratory:  Positive for cough, shortness of breath and wheezing.    Cardiovascular:  Positive for dyspnea on exertion.   Gastrointestinal: Negative.    Genitourinary: Negative.    Musculoskeletal: Negative.    Skin: Negative.    Neurological: Negative.      Medications, Allergies, and current problem list reviewed today in Epic.     Objective:     BP (!) 144/82 (BP Location: Left arm, Patient Position: Sitting, BP Cuff Size: Adult)   Pulse (!) 103   Temp 37.2 °C (99 °F) (Temporal)   Resp 18   Ht 1.651 m (5' 5\")   Wt 83.5 kg (184 lb)   SpO2 95%     Physical Exam  Vitals reviewed. "   Constitutional:       Appearance: Normal appearance.   HENT:      Head: Normocephalic and atraumatic.      Right Ear: Tympanic membrane, ear canal and external ear normal.      Left Ear: Tympanic membrane, ear canal and external ear normal.      Nose: Nose normal.      Mouth/Throat:      Mouth: Mucous membranes are moist.      Pharynx: Oropharynx is clear.   Eyes:      Extraocular Movements: Extraocular movements intact.      Conjunctiva/sclera: Conjunctivae normal.      Pupils: Pupils are equal, round, and reactive to light.   Cardiovascular:      Rate and Rhythm: Normal rate and regular rhythm.   Pulmonary:      Effort: Pulmonary effort is normal.      Breath sounds: Wheezing present.   Abdominal:      General: Abdomen is flat.      Palpations: Abdomen is soft.   Musculoskeletal:         General: Normal range of motion.      Cervical back: Normal range of motion and neck supple.   Skin:     General: Skin is warm and dry.      Capillary Refill: Capillary refill takes less than 2 seconds.   Neurological:      General: No focal deficit present.      Mental Status: She is alert.   Psychiatric:         Mood and Affect: Mood normal.         Behavior: Behavior normal.       Assessment/Plan:     Diagnosis and associated orders:     1. Mild intermittent asthma with exacerbation  ipratropium-albuterol (DUONEB) nebulizer solution    predniSONE (DELTASONE) 10 MG Tab      2. Acute cough  ipratropium-albuterol (DUONEB) nebulizer solution    predniSONE (DELTASONE) 10 MG Tab         Comments/MDM:     Patient reports significant improvement after DuoNeb treatment.  Her lungs sound improved on auscultation as well.  Patient was given a steroid burst.  She will follow-up with her PCP next week.  Patient will present to urgent care or ER if she has any difficulty breathing between now and then.  Patient reports she knows when to go to the ER for her asthma, and will do so if she feels she is worsening.         Differential  diagnosis, natural history, supportive care, and indications for immediate follow-up discussed.    Advised the patient to follow-up with the primary care physician for recheck, reevaluation, and consideration of further management.    Please note that this dictation was created using voice recognition software. I have made a reasonable attempt to correct obvious errors, but I expect that there are errors of grammar and possibly content that I did not discover before finalizing the note.    This note was electronically signed by KADE Dang

## 2022-12-30 ENCOUNTER — OFFICE VISIT (OUTPATIENT)
Dept: URGENT CARE | Facility: PHYSICIAN GROUP | Age: 72
End: 2022-12-30
Payer: MEDICARE

## 2022-12-30 ENCOUNTER — HOSPITAL ENCOUNTER (OUTPATIENT)
Dept: RADIOLOGY | Facility: MEDICAL CENTER | Age: 72
End: 2022-12-30
Attending: PHYSICIAN ASSISTANT
Payer: MEDICARE

## 2022-12-30 VITALS
DIASTOLIC BLOOD PRESSURE: 62 MMHG | OXYGEN SATURATION: 93 % | WEIGHT: 187.2 LBS | HEART RATE: 107 BPM | HEIGHT: 65 IN | TEMPERATURE: 97 F | RESPIRATION RATE: 16 BRPM | BODY MASS INDEX: 31.19 KG/M2 | SYSTOLIC BLOOD PRESSURE: 124 MMHG

## 2022-12-30 DIAGNOSIS — R05.1 ACUTE COUGH: ICD-10-CM

## 2022-12-30 DIAGNOSIS — J45.901 EXACERBATION OF ASTHMA, UNSPECIFIED ASTHMA SEVERITY, UNSPECIFIED WHETHER PERSISTENT: ICD-10-CM

## 2022-12-30 PROCEDURE — 99214 OFFICE O/P EST MOD 30 MIN: CPT | Performed by: PHYSICIAN ASSISTANT

## 2022-12-30 PROCEDURE — 71046 X-RAY EXAM CHEST 2 VIEWS: CPT

## 2022-12-30 ASSESSMENT — FIBROSIS 4 INDEX: FIB4 SCORE: 1.34

## 2022-12-30 NOTE — PROGRESS NOTES
Subjective:   Nancy Gregory is a 71 y.o. female who presents for Follow-Up (Was seen for athsma attack 12/27/22 Not feeling much better) and Chest Pressure (Cough dry,)      HPI  The patient is a 71-year-old female with a history of asthma who presents to the Urgent Care with continued cough and asthma symptoms.  Onset about a month ago during Thanksgiving.  Associated shortness of breath and occasional wheezing.  She has been using her albuterol inhaler every 4 hours but this causes her to feel jittery.  She also has a nebulizer at home and Dulera.  She has tried some over-the-counter cough suppressants with some relief.  She was seen here in the urgent care 3 days ago and had a nebulizer treatment which helped her symptoms.  She is on her third day of a tapered prednisone course.  She denies any worsening but has not noticed much improvement.  She denies any fevers, chills, chest pain, vomiting or diarrhea.          Medications:    albuterol Aers  ALIGN PO  augmented betamethasone dipropionate  B-12 PO  baclofen Tabs  Capsaicin Liqd  coenzyme Q-10  Dulera Aero  ipratropium-albuterol  levothyroxine Tabs  meloxicam  Misc. Devices Misc  predniSONE Tabs  rosuvastatin Tabs  triamcinolone acetonide Oint    Allergies: Codeine    Problem List: Nancy Gregory does not have any pertinent problems on file.    Surgical History:  Past Surgical History:   Procedure Laterality Date    ABDOMINAL HYSTERECTOMY TOTAL      CHOLECYSTECTOMY      HYSTERECTOMY LAPAROSCOPY      TONSILLECTOMY         Past Social Hx: Nancy Gregory  reports that she quit smoking about 29 years ago. Her smoking use included cigarettes. She has a 15.00 pack-year smoking history. She has never used smokeless tobacco. She reports current alcohol use. She reports that she does not use drugs.     Past Family Hx:  Nancy Gregory family history includes Breast Cancer in her maternal aunt; Cancer in her father; Hypertension in her maternal  "grandfather and maternal grandmother; Kidney Disease in her mother.     Problem list, medications, and allergies reviewed by myself today in Epic.     Objective:     /62   Pulse (!) 107   Temp 36.1 °C (97 °F) (Temporal)   Resp 16   Ht 1.651 m (5' 5\")   Wt 84.9 kg (187 lb 3.2 oz)   SpO2 93%   BMI 31.15 kg/m²     Physical Exam  Vitals reviewed.   Constitutional:       General: She is not in acute distress.     Appearance: Normal appearance. She is not ill-appearing or toxic-appearing.   HENT:      Head: Normocephalic.      Mouth/Throat:      Mouth: Mucous membranes are moist.      Pharynx: Oropharynx is clear.   Eyes:      Conjunctiva/sclera: Conjunctivae normal.      Pupils: Pupils are equal, round, and reactive to light.   Cardiovascular:      Rate and Rhythm: Normal rate and regular rhythm.      Heart sounds: Normal heart sounds.   Pulmonary:      Effort: Pulmonary effort is normal. No respiratory distress.      Breath sounds: Wheezing (slight expiratory) present. No rhonchi or rales.   Musculoskeletal:      Cervical back: Neck supple.   Lymphadenopathy:      Cervical: No cervical adenopathy.   Skin:     General: Skin is warm and dry.   Neurological:      General: No focal deficit present.      Mental Status: She is alert and oriented to person, place, and time.   Psychiatric:         Mood and Affect: Mood normal.         Behavior: Behavior normal.       RADIOLOGY RESULTS   DX-CHEST-2 VIEWS    Result Date: 12/30/2022 12/30/2022 12:47 PM HISTORY/REASON FOR EXAM:  Cough one month. History of asthma. TECHNIQUE/EXAM DESCRIPTION AND NUMBER OF VIEWS: Two views of the chest. COMPARISON:  2/8/2018 FINDINGS: The mediastinal and cardiac silhouette is unremarkable. There is atherosclerosis of the aorta. The lung parenchyma is clear. Lungs are minimally hyperinflated. There is no significant pleural effusion. There is no visible pneumothorax. There are no acute bony abnormalities.     1.  No evidence of acute " cardiopulmonary process.   2.  Mild hyperinflation consistent with history of asthma.         Diagnosis and associated orders:     1. Exacerbation of asthma, unspecified asthma severity, unspecified whether persistent    2. Acute cough  - DX-CHEST-2 VIEWS; Future       Comments/MDM:     X-ray results per radiologist interpretation above. I personally reviewed images and radiologist report which showed no evidence of acute cardiopulmonary process.  Patient's presenting symptoms and exam findings are consistent with acute asthma exacerbation.  She is on her third day of prednisone course.  Patient politely refused a nebulizer treatment here in the clinic.  She is going to treat herself at home.  Continue prednisone treatment as previously prescribed.  Continue nebulizer treatments as well as inhalers.  Recommend starting Claritin or Zyrtec.  Avoid exacerbating factors.  Over-the-counter cough medicines as directed.       I personally reviewed prior external notes and test results pertinent to today's visit. Pathogenesis of diagnosis discussed including typical length and natural progression. Supportive care, natural history, differential diagnoses, and indications for immediate follow-up discussed. Patient expresses understanding and agrees to plan. Patient denies any other questions or concerns.     Follow-up with the primary care physician for recheck, reevaluation, and consideration of further management.    Time spent evaluating the patient was 32 minutes which included preparing for the visit, obtaining history, examination, ordering labs/tests/procedures/medications, independent interpretation, discussion of plan, counseling/education, medical information reconciliation, and documentation into chart.     Please note that this dictation was created using voice recognition software. I have made a reasonable attempt to correct obvious errors, but I expect that there are errors of grammar and possibly content that I  did not discover before finalizing the note.    This note was electronically signed by Thad Wise PA-C

## 2023-01-18 ENCOUNTER — OFFICE VISIT (OUTPATIENT)
Dept: MEDICAL GROUP | Facility: PHYSICIAN GROUP | Age: 73
End: 2023-01-18
Payer: MEDICARE

## 2023-01-18 VITALS
HEIGHT: 65 IN | HEART RATE: 84 BPM | RESPIRATION RATE: 18 BRPM | DIASTOLIC BLOOD PRESSURE: 70 MMHG | OXYGEN SATURATION: 95 % | BODY MASS INDEX: 30.49 KG/M2 | TEMPERATURE: 98.9 F | SYSTOLIC BLOOD PRESSURE: 118 MMHG | WEIGHT: 183 LBS

## 2023-01-18 DIAGNOSIS — I70.0 AORTIC ATHEROSCLEROSIS (HCC): ICD-10-CM

## 2023-01-18 DIAGNOSIS — E66.9 OBESITY (BMI 30.0-34.9): ICD-10-CM

## 2023-01-18 DIAGNOSIS — J96.11 CHRONIC RESPIRATORY FAILURE WITH HYPOXIA (HCC): ICD-10-CM

## 2023-01-18 DIAGNOSIS — J45.40 MODERATE PERSISTENT ASTHMA WITHOUT COMPLICATION: ICD-10-CM

## 2023-01-18 PROBLEM — E66.811 OBESITY (BMI 30.0-34.9): Status: ACTIVE | Noted: 2021-09-15

## 2023-01-18 PROCEDURE — 99214 OFFICE O/P EST MOD 30 MIN: CPT | Performed by: INTERNAL MEDICINE

## 2023-01-18 RX ORDER — CLOBETASOL PROPIONATE 0.5 MG/G
CREAM TOPICAL
COMMUNITY
Start: 2023-01-16

## 2023-01-18 ASSESSMENT — PATIENT HEALTH QUESTIONNAIRE - PHQ9: CLINICAL INTERPRETATION OF PHQ2 SCORE: 0

## 2023-01-18 ASSESSMENT — FIBROSIS 4 INDEX: FIB4 SCORE: 1.36

## 2023-01-19 NOTE — ASSESSMENT & PLAN NOTE
Chronic ongoing problem, decompensated in late December/early January 2023.  She went to urgent care a few times had chest x-ray imaging required steroids and duo nebs which had fairly delayed improvement for her.  She has not had PFT testing since 2012, will refer her to pulmonary medicine to establish care and see if she would qualify for step up therapy for her asthma.  In the meantime continue Dulera, albuterol as needed, and DuoNeb's as needed.

## 2023-01-19 NOTE — ASSESSMENT & PLAN NOTE
"Chronic ongoing problem, continues to frustrate her, she feels like she has been on many diets and they are hard to sustain.  Should she felt like intermittent fasting was a good match for her she did not have the \"diet food \".  She is going to try that again to get back in the gym and if she feels like she hits a wall discussed that we could refer her to weight loss endocrinology or consider trial of metformin or phentermine/topiramate.  "

## 2023-01-19 NOTE — ASSESSMENT & PLAN NOTE
Chronic ongoing problem, does not tolerate her nasal cannula oxygen due to it causing bloody noses and a dry oral cavity, she tries to focus on sleeping on her side and not her back.

## 2023-01-19 NOTE — PROGRESS NOTES
Subjective:   Chief Complaint/History of Present Illness:  Prudence Chayito Gregory is a 72 y.o. female established patient who presents today to discuss medical problems as listed below. Pru is unaccompanied for today's visit.    Problem   Obesity (Bmi 30.0-34.9)    Current BMI 30.45 with weight of 183 lb, previously in Sept 2022  31.95 with weight of 192 lb. She reports multiple approaches to weight loss including weight watchers, personal training with carb/fat/protein tracking, and intermittent fasting. She was doing well until she had a back problem flare up and was unable to exercise regularly.     Chronic respiratory failure with hypoxia (HCC)- nocturnal oxygen    She is found to have hypoxia on overnight sleep testing and recommended to go on supplemental oxygen therapy.  She is prescribed 2 L via nasal cannula however due to intolerance from epistaxis and dry oral cavity she is not using it at this time.     Aortic Atherosclerosis (Hcc)    CT abdomen/pelvis (1/2021):  Aortic atherosclerosis without aneurysm.    New incidental finding during CT imaging for diverticulitis.     Current regimen: Crestor 10 mg daily     Moderate Persistent Asthma Without Complication    She was diagnosed with asthma in 1994.  She reports previous hospitalizations for her asthma but no prior intubation/ventilation episodes.  She has been on several medications over the years.  She started on Flovent but this did not work for her.  She was then transition onto Advair with Singulair, she stopped the Singulair but then noted that the Advair really was not working anymore.  Most recently she has been put on Dulera with as needed albuterol.  Dulera as prescribed as 2 puffs twice daily but she tries to maintain on 1 puff twice daily.  She uses her Ventolin occasionally can usually make one inhaler last 8 to 9 months.  She saw Dr. Choudhary with pulmonary medicine in 2012 and at that time a PFT testing she was noted to be moderate stage  II obstruction with FEV1 64% and FEV1/FVC ratio 55%.  She denies any nocturnal symptoms at this time.    Current regimen: Dulera 100-5 mcg, albuterol 2 puffs every 4 hours as needed, duo nebs every 4 hours as needed            Current Medications:  Current Outpatient Medications Ordered in Epic   Medication Sig Dispense Refill    clobetasol (TEMOVATE) 0.05 % Cream       albuterol 108 (90 Base) MCG/ACT Aero Soln inhalation aerosol INHALE 2 PUFFS BY MOUTH EVERY FOUR HOURS AS NEEDED FOR SHORTNESS OF BREATH. 6.7 Each 3    levothyroxine (SYNTHROID) 100 MCG Tab TAKE 1 TABLET BY MOUTH EVERY  Tablet 3    meloxicam (MOBIC) 15 MG tablet TAKE 1 TABLET BY MOUTH 1 TIME A DAY AS NEEDED (PAIN). DO NOT TAKE OTHER NSAIDS 100 Tablet 3    DULERA 100-5 MCG/ACT Aerosol TAKE 2 PUFFS BY MOUTH TWICE A DAY 39 Each 1    rosuvastatin (CRESTOR) 10 MG Tab TAKE 1 TABLET BY MOUTH DAILY IN THE EVENING 100 Tablet 3    baclofen (LIORESAL) 10 MG Tab Take 1 Tablet by mouth 3 times a day as needed (muscle spasm/pain). (Patient taking differently: Take 10 mg by mouth 3 times a day as needed (muscle spasm/pain). Have not taken for a month) 90 Tablet 3    Capsaicin 0.15 % Liquid capsaicin 0.15 % topical liquid   0.025% applied topically on scene. Time administered: 1125      augmented betamethasone dipropionate (DIPROLENE-AF) 0.05 % ointment betamethasone, augmented 0.05 % topical ointment      triamcinolone acetonide (KENALOG) 0.1 % Ointment TOPICAL APPLY SPARINGLY TO THE AFFECTED AREA 3XWK.      ipratropium-albuterol (DUONEB) 0.5-2.5 (3) MG/3ML nebulizer solution Take 3 mL by nebulization 4 times a day. (Patient taking differently: Take 3 mL by nebulization every four hours as needed.) 3 mL 3    Probiotic Product (ALIGN PO) Take 1 Tablet by mouth every day.      coenzyme Q-10 30 MG capsule Take 60 mg by mouth every day.      Cyanocobalamin (B-12 PO) Take 1 Tablet by mouth every day.      Misc. Devices Misc Please provide nocturnal oxgen 2 LPM  "Nasal Cannula, concentrator 99 Each 99     No current [facility]-ordered facility-administered medications on file.          Objective:   Physical Exam:    Vitals: /70 (BP Location: Left arm, Patient Position: Sitting, BP Cuff Size: Adult)   Pulse 84   Temp 37.2 °C (98.9 °F) (Temporal)   Resp 18   Ht 1.651 m (5' 5\")   Wt 83 kg (183 lb)   SpO2 95%    BMI: Body mass index is 30.45 kg/m².  Physical Exam  Constitutional:       General: She is not in acute distress.     Appearance: Normal appearance. She is not ill-appearing.   Eyes:      General: No scleral icterus.     Conjunctiva/sclera: Conjunctivae normal.   Cardiovascular:      Rate and Rhythm: Normal rate and regular rhythm.      Pulses: Normal pulses.   Pulmonary:      Effort: Pulmonary effort is normal. No respiratory distress.      Breath sounds: No wheezing, rhonchi or rales.      Comments: Tight sounding breath sounds  Abdominal:      Palpations: Abdomen is soft.      Tenderness: There is no abdominal tenderness.   Musculoskeletal:      Right lower leg: No edema.      Left lower leg: No edema.   Skin:     General: Skin is warm and dry.      Findings: No bruising or rash.   Neurological:      Gait: Gait normal.   Psychiatric:         Mood and Affect: Mood normal.         Behavior: Behavior normal.         Thought Content: Thought content normal.         Judgment: Judgment normal.             Assessment and Plan:   Emilienclynn is a 72 y.o. female with the following:  Problem List Items Addressed This Visit       Moderate persistent asthma without complication     Chronic ongoing problem, decompensated in late December/early January 2023.  She went to urgent care a few times had chest x-ray imaging required steroids and duo nebs which had fairly delayed improvement for her.  She has not had PFT testing since 2012, will refer her to pulmonary medicine to establish care and see if she would qualify for step up therapy for her asthma.  In the meantime continue " "Dulera, albuterol as needed, and DuoNeb's as needed.         Relevant Orders    PULMONARY FUNCTION TESTS -Test requested: Complete Pulmonary Function Test; Include MIPS/MEPS? No    Referral to Pulmonary and Sleep Medicine    Aortic atherosclerosis (HCC)     Chronic ongoing problem, continue rosuvastatin 10 mg daily to prevent further atherosclerosis.         Obesity (BMI 30.0-34.9)     Chronic ongoing problem, continues to frustrate her, she feels like she has been on many diets and they are hard to sustain.  Should she felt like intermittent fasting was a good match for her she did not have the \"diet food \".  She is going to try that again to get back in the gym and if she feels like she hits a wall discussed that we could refer her to weight loss endocrinology or consider trial of metformin or phentermine/topiramate.         Relevant Orders    Patient identified as having weight management issue.  Appropriate orders and counseling given.    Chronic respiratory failure with hypoxia (HCC)- nocturnal oxygen     Chronic ongoing problem, does not tolerate her nasal cannula oxygen due to it causing bloody noses and a dry oral cavity, she tries to focus on sleeping on her side and not her back.               RTC: Return in about 3 months (around 4/18/2023).    I spent a total of 38 minutes with record review, exam, communication with the patient, communication with other providers, and documentation of this encounter.    PLEASE NOTE: This dictation was created using voice recognition software. I have made every reasonable attempt to correct obvious errors, but I expect that there are errors of grammar and possibly content that I did not discover before finalizing the note.      Shawna Recio, DO  Geriatric and Internal Medicine  [facility]      "

## 2023-01-24 DIAGNOSIS — Z12.31 ENCOUNTER FOR SCREENING MAMMOGRAM FOR BREAST CANCER: ICD-10-CM

## 2023-01-31 ENCOUNTER — HOSPITAL ENCOUNTER (OUTPATIENT)
Dept: RADIOLOGY | Facility: MEDICAL CENTER | Age: 73
End: 2023-01-31
Attending: INTERNAL MEDICINE
Payer: MEDICARE

## 2023-01-31 DIAGNOSIS — Z12.31 ENCOUNTER FOR SCREENING MAMMOGRAM FOR BREAST CANCER: ICD-10-CM

## 2023-01-31 PROCEDURE — 77063 BREAST TOMOSYNTHESIS BI: CPT

## 2023-02-09 ENCOUNTER — OFFICE VISIT (OUTPATIENT)
Dept: URGENT CARE | Facility: PHYSICIAN GROUP | Age: 73
End: 2023-02-09
Payer: MEDICARE

## 2023-02-09 VITALS
WEIGHT: 183 LBS | OXYGEN SATURATION: 95 % | TEMPERATURE: 97 F | RESPIRATION RATE: 18 BRPM | HEIGHT: 65 IN | BODY MASS INDEX: 30.49 KG/M2 | HEART RATE: 84 BPM | DIASTOLIC BLOOD PRESSURE: 84 MMHG | SYSTOLIC BLOOD PRESSURE: 128 MMHG

## 2023-02-09 DIAGNOSIS — H00.012 HORDEOLUM EXTERNUM OF RIGHT LOWER EYELID: ICD-10-CM

## 2023-02-09 PROCEDURE — 99213 OFFICE O/P EST LOW 20 MIN: CPT | Performed by: PHYSICIAN ASSISTANT

## 2023-02-09 RX ORDER — ERYTHROMYCIN 5 MG/G
1 OINTMENT OPHTHALMIC 4 TIMES DAILY
Qty: 3.5 G | Refills: 0 | Status: SHIPPED | OUTPATIENT
Start: 2023-02-09 | End: 2023-02-16

## 2023-02-09 ASSESSMENT — ENCOUNTER SYMPTOMS
EYE PAIN: 0
DOUBLE VISION: 0
BLURRED VISION: 0
EYE DISCHARGE: 1
PHOTOPHOBIA: 0
EYE REDNESS: 1

## 2023-02-09 ASSESSMENT — FIBROSIS 4 INDEX: FIB4 SCORE: 1.36

## 2023-02-09 ASSESSMENT — VISUAL ACUITY: OU: 1

## 2023-02-10 NOTE — PROGRESS NOTES
Subjective:   Normalynn Gregory is a 72 y.o. female who presents today with   Chief Complaint   Patient presents with    Stye     X 4 days, R eye       Eye Problem   The right eye is affected. This is a new problem. Episode onset: 4 days. Associated symptoms include an eye discharge and eye redness. Pertinent negatives include no blurred vision, double vision or photophobia.   No recent injury or trauma to the eye.  Patient denies any other associated symptoms.  She states that she does not wear contacts.    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    She has no past medical history of Apnea, sleep, Daytime sleepiness, Encounter for long-term (current) use of other medications, Gasping for breath, Morning headache, or Snoring.  MEDS:   Current Outpatient Medications:     erythromycin 5 MG/GM Ointment, Apply 1 Application to both eyes 4 times a day for 7 days., Disp: 3.5 g, Rfl: 0    clobetasol (TEMOVATE) 0.05 % Cream, , Disp: , Rfl:     albuterol 108 (90 Base) MCG/ACT Aero Soln inhalation aerosol, INHALE 2 PUFFS BY MOUTH EVERY FOUR HOURS AS NEEDED FOR SHORTNESS OF BREATH., Disp: 6.7 Each, Rfl: 3    levothyroxine (SYNTHROID) 100 MCG Tab, TAKE 1 TABLET BY MOUTH EVERY DAY, Disp: 100 Tablet, Rfl: 3    meloxicam (MOBIC) 15 MG tablet, TAKE 1 TABLET BY MOUTH 1 TIME A DAY AS NEEDED (PAIN). DO NOT TAKE OTHER NSAIDS, Disp: 100 Tablet, Rfl: 3    DULERA 100-5 MCG/ACT Aerosol, TAKE 2 PUFFS BY MOUTH TWICE A DAY, Disp: 39 Each, Rfl: 1    rosuvastatin (CRESTOR) 10 MG Tab, TAKE 1 TABLET BY MOUTH DAILY IN THE EVENING,  Disp: 100 Tablet, Rfl: 3    baclofen (LIORESAL) 10 MG Tab, Take 1 Tablet by mouth 3 times a day as needed (muscle spasm/pain). (Patient taking differently: Take 10 mg by mouth 3 times a day as needed (muscle spasm/pain). Have not taken for a month), Disp: 90 Tablet, Rfl: 3    Capsaicin 0.15 % Liquid, capsaicin 0.15 % topical liquid  0.025% applied topically on scene. Time administered: 1125, Disp: , Rfl:     augmented betamethasone dipropionate (DIPROLENE-AF) 0.05 % ointment, betamethasone, augmented 0.05 % topical ointment, Disp: , Rfl:     triamcinolone acetonide (KENALOG) 0.1 % Ointment, TOPICAL APPLY SPARINGLY TO THE AFFECTED AREA 3XWK., Disp: , Rfl:     ipratropium-albuterol (DUONEB) 0.5-2.5 (3) MG/3ML nebulizer solution, Take 3 mL by nebulization 4 times a day. (Patient taking differently: Take 3 mL by nebulization every four hours as needed.), Disp: 3 mL, Rfl: 3    Probiotic Product (ALIGN PO), Take 1 Tablet by mouth every day., Disp: , Rfl:     coenzyme Q-10 30 MG capsule, Take 60 mg by mouth every day., Disp: , Rfl:     Cyanocobalamin (B-12 PO), Take 1 Tablet by mouth every day., Disp: , Rfl:     Misc. Devices Misc, Please provide nocturnal oxgen 2 LPM Nasal Cannula, concentrator, Disp: 99 Each, Rfl: 99  ALLERGIES:   Allergies   Allergen Reactions    Codeine      SURGHX:   Past Surgical History:   Procedure Laterality Date    ABDOMINAL HYSTERECTOMY TOTAL      CHOLECYSTECTOMY      HYSTERECTOMY LAPAROSCOPY      TONSILLECTOMY       SOCHX:  reports that she quit smoking about 29 years ago. Her smoking use included cigarettes. She has a 15.00 pack-year smoking history. She has never used smokeless tobacco. She reports current alcohol use. She reports that she does not use drugs.  FH: Reviewed with patient, not pertinent to this visit.       Review of Systems   Eyes:  Positive for discharge and redness. Negative for blurred vision, double vision, photophobia and pain.      Objective:   /84   Pulse 84    "Temp 36.1 °C (97 °F)   Resp 18   Ht 1.651 m (5' 5\")   Wt 83 kg (183 lb)   SpO2 95%   BMI 30.45 kg/m²   Physical Exam  Vitals and nursing note reviewed.   Constitutional:       General: She is not in acute distress.     Appearance: Normal appearance. She is well-developed. She is not ill-appearing or toxic-appearing.   HENT:      Head: Normocephalic and atraumatic.      Right Ear: Hearing normal.      Left Ear: Hearing normal.   Eyes:      General: Vision grossly intact.         Right eye: Discharge and hordeolum present. No foreign body.         Left eye: No discharge or hordeolum.      Extraocular Movements: Extraocular movements intact.      Conjunctiva/sclera:      Right eye: Right conjunctiva is injected.      Pupils: Pupils are equal, round, and reactive to light.        Comments: No periorbital swelling or erythema noted today.   Cardiovascular:      Rate and Rhythm: Normal rate.   Pulmonary:      Effort: Pulmonary effort is normal.   Musculoskeletal:      Comments: Normal movement in all 4 extremities   Skin:     General: Skin is warm and dry.   Neurological:      Mental Status: She is alert.      Coordination: Coordination normal.   Psychiatric:         Mood and Affect: Mood normal.         Assessment/Plan:   Assessment    1. Hordeolum externum of right lower eyelid  - erythromycin 5 MG/GM Ointment; Apply 1 Application to both eyes 4 times a day for 7 days.  Dispense: 3.5 g; Refill: 0  Symptoms and presentation are consistent with stye of the right lower eyelid that we will treat accordingly with antibiotic ointment.  Recommend patient continue with warm compress to the area as well.    Differential diagnosis, natural history, supportive care, and indications for immediate follow-up discussed.   Patient given instructions and understanding of medications and treatment.    If not improving in 3-5 days, F/U with PCP or return to UC if symptoms worsen.    Patient agreeable to plan.      Please note that " this dictation was created using voice recognition software. I have made every reasonable attempt to correct obvious errors, but I expect that there are errors of grammar and possibly content that I did not discover before finalizing the note.    Jose Raul Khoury PA-C

## 2023-02-16 ENCOUNTER — OFFICE VISIT (OUTPATIENT)
Dept: MEDICAL GROUP | Facility: PHYSICIAN GROUP | Age: 73
End: 2023-02-16
Payer: MEDICARE

## 2023-02-16 VITALS
OXYGEN SATURATION: 95 % | DIASTOLIC BLOOD PRESSURE: 68 MMHG | WEIGHT: 189 LBS | TEMPERATURE: 97.3 F | RESPIRATION RATE: 12 BRPM | HEIGHT: 65 IN | BODY MASS INDEX: 31.49 KG/M2 | HEART RATE: 76 BPM | SYSTOLIC BLOOD PRESSURE: 118 MMHG

## 2023-02-16 DIAGNOSIS — H00.012 HORDEOLUM EXTERNUM OF RIGHT LOWER EYELID: ICD-10-CM

## 2023-02-16 PROCEDURE — 99213 OFFICE O/P EST LOW 20 MIN: CPT | Performed by: NURSE PRACTITIONER

## 2023-02-16 ASSESSMENT — ENCOUNTER SYMPTOMS
SHORTNESS OF BREATH: 1
CHILLS: 0
WHEEZING: 1
COUGH: 0
WEIGHT LOSS: 0
BLOOD IN STOOL: 0
FEVER: 0
ABDOMINAL PAIN: 0
CONSTIPATION: 0
DIARRHEA: 0
PALPITATIONS: 1

## 2023-02-16 ASSESSMENT — FIBROSIS 4 INDEX: FIB4 SCORE: 1.36

## 2023-02-16 NOTE — PROGRESS NOTES
Chief Complaint   Patient presents with    Stye     Still draining every night. Has been using ointment but makes eye sight blurry.        Subjective:     HPI:   Prudence Chayito Gregory is a 72 y.o. female here to discuss the evaluation and management of:        Hordeolum externum of right lower eyelid  Started about a week ago-was initially very painful.  She did have some erythema and edema.  She used hot compresses and was able to get some drainage.  She did not notice particular improvement so she went to urgent care and received some topical erythromycin ointment.  She has been doing this with difficulty.  She has some improvement in her redness and pain.  Unfortunately she has had this issue in the past where she had to see ophthalmology and is concerned that the lesion is not resolving sooner.  She denies vision changes.      ROS  Review of Systems   Constitutional:  Negative for chills, fever, malaise/fatigue and weight loss.   Respiratory:  Positive for shortness of breath and wheezing. Negative for cough.    Cardiovascular:  Positive for palpitations. Negative for chest pain and leg swelling.   Gastrointestinal:  Negative for abdominal pain, blood in stool, constipation and diarrhea.       Allergies   Allergen Reactions    Codeine        Current medicines (including changes today)  Current Outpatient Medications   Medication Sig Dispense Refill    erythromycin 5 MG/GM Ointment Apply 1 Application to both eyes 4 times a day for 7 days. 3.5 g 0    clobetasol (TEMOVATE) 0.05 % Cream       albuterol 108 (90 Base) MCG/ACT Aero Soln inhalation aerosol INHALE 2 PUFFS BY MOUTH EVERY FOUR HOURS AS NEEDED FOR SHORTNESS OF BREATH. 6.7 Each 3    levothyroxine (SYNTHROID) 100 MCG Tab TAKE 1 TABLET BY MOUTH EVERY  Tablet 3    meloxicam (MOBIC) 15 MG tablet TAKE 1 TABLET BY MOUTH 1 TIME A DAY AS NEEDED (PAIN). DO NOT TAKE OTHER NSAIDS 100 Tablet 3    DULERA 100-5 MCG/ACT Aerosol TAKE 2 PUFFS BY MOUTH TWICE A DAY 39  Each 1    rosuvastatin (CRESTOR) 10 MG Tab TAKE 1 TABLET BY MOUTH DAILY IN THE EVENING 100 Tablet 3    baclofen (LIORESAL) 10 MG Tab Take 1 Tablet by mouth 3 times a day as needed (muscle spasm/pain). (Patient taking differently: Take 10 mg by mouth 3 times a day as needed (muscle spasm/pain). Have not taken for a month) 90 Tablet 3    Capsaicin 0.15 % Liquid capsaicin 0.15 % topical liquid   0.025% applied topically on scene. Time administered: 1125      augmented betamethasone dipropionate (DIPROLENE-AF) 0.05 % ointment betamethasone, augmented 0.05 % topical ointment      triamcinolone acetonide (KENALOG) 0.1 % Ointment TOPICAL APPLY SPARINGLY TO THE AFFECTED AREA 3XWK.      ipratropium-albuterol (DUONEB) 0.5-2.5 (3) MG/3ML nebulizer solution Take 3 mL by nebulization 4 times a day. (Patient taking differently: Take 3 mL by nebulization every four hours as needed.) 3 mL 3    Probiotic Product (ALIGN PO) Take 1 Tablet by mouth every day.      coenzyme Q-10 30 MG capsule Take 60 mg by mouth every day.      Cyanocobalamin (B-12 PO) Take 1 Tablet by mouth every day.      Misc. Devices Misc Please provide nocturnal oxgen 2 LPM Nasal Cannula, concentrator 99 Each 99     No current facility-administered medications for this visit.       Social History     Tobacco Use    Smoking status: Former     Packs/day: 1.00     Years: 15.00     Pack years: 15.00     Types: Cigarettes     Quit date: 1993     Years since quittin.8    Smokeless tobacco: Never    Tobacco comments:     continued abstinence   Vaping Use    Vaping Use: Never used   Substance Use Topics    Alcohol use: Yes     Comment: occ    Drug use: No       Patient Active Problem List    Diagnosis Date Noted    Hordeolum externum of right lower eyelid 2023    Rotator cuff arthropathy of left shoulder 2022    COVID-19 virus infection 2021    Back muscle spasm 2021    Pedal edema 2021    Gastroesophageal reflux disease without  "esophagitis 11/02/2021    Stress incontinence of urine 09/16/2021    Obesity (BMI 30.0-34.9) 09/15/2021    Chronic respiratory failure with hypoxia (HCC)- nocturnal oxygen 09/15/2021    WARD on CPAP 08/24/2021    Osteopenia of neck of left femur 05/26/2021    Coronary artery disease involving native coronary artery without angina pectoris 05/12/2021    Elevated coronary artery calcium score 04/21/2021    Aortic atherosclerosis (HCC) 03/22/2021    Chronic 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 03/22/2021    Nocturnal hypoxia 12/21/2020    Primary insomnia 10/12/2020    Lichen sclerosus 07/23/2020    Mixed hyperlipidemia 03/26/2020    Moderate persistent asthma without complication 03/05/2020    Vitamin D deficiency 03/05/2020    Hypothyroidism due to acquired atrophy of thyroid 03/05/2020    Skin lesion 03/05/2020       Family History   Problem Relation Age of Onset    Cancer Father         lung    Breast Cancer Maternal Aunt     Hypertension Maternal Grandmother     Hypertension Maternal Grandfather     Kidney Disease Mother     Heart Disease Neg Hx           Objective:     /68   Pulse 76   Temp 36.3 °C (97.3 °F) (Temporal)   Resp 12   Ht 1.651 m (5' 5\")   Wt 85.7 kg (189 lb)   SpO2 95%  Body mass index is 31.45 kg/m².    Physical Exam:  Physical Exam  Constitutional:       General: She is not in acute distress.  HENT:      Head: Normocephalic.      Right Ear: Tympanic membrane and external ear normal.      Left Ear: Tympanic membrane and external ear normal.   Eyes:      General:         Right eye: Hordeolum present.      Conjunctiva/sclera: Conjunctivae normal.      Pupils: Pupils are equal, round, and reactive to light.      Comments: No preseptal or periocular erythema   Neck:      Trachea: No tracheal deviation.   Cardiovascular:      Rate and Rhythm: Normal rate and regular rhythm.      Heart sounds: Normal heart sounds.   Pulmonary:      Effort: Pulmonary effort is normal.      Breath " sounds: Normal breath sounds.   Abdominal:      General: Bowel sounds are normal.      Palpations: Abdomen is soft.   Musculoskeletal:         General: Normal range of motion.      Cervical back: Normal range of motion and neck supple.   Lymphadenopathy:      Head:      Right side of head: No preauricular adenopathy.      Left side of head: No preauricular adenopathy.      Cervical: No cervical adenopathy.   Skin:     General: Skin is warm and dry.   Neurological:      Mental Status: She is alert and oriented to person, place, and time.      Sensory: Sensation is intact.      Gait: Gait is intact.   Psychiatric:         Mood and Affect: Mood and affect normal.         Judgment: Judgment normal.       Assessment and Plan:     The following treatment plan was discussed:    1. Hordeolum externum of right lower eyelid  Referral to Ophthalmology  -New problem, improving.  I am placing referral to ophthalmology as she has needed to go to them in the past and we are going into the weekend.  The lesion is improving and I expect resolution but we did discuss ER precautions if she were to develop preseptal cellulitis.          Any change or worsening of signs or symptoms, patient encouraged to follow-up or report to emergency room for further evaluation. Patient verbalizes understanding and agrees.    Follow-Up: Return if symptoms worsen or fail to improve.      PLEASE NOTE: This dictation was created using voice recognition software. I have made every reasonable attempt to correct obvious errors, but I expect that there are errors of grammar and possibly content that I did not discover before finalizing the note.

## 2023-02-17 NOTE — ASSESSMENT & PLAN NOTE
Started about a week ago-was initially very painful.  She did have some erythema and edema.  She used hot compresses and was able to get some drainage.  She did not notice particular improvement so she went to urgent care and received some topical erythromycin ointment.  She has been doing this with difficulty.  She has some improvement in her redness and pain.  Unfortunately she has had this issue in the past where she had to see ophthalmology and is concerned that the lesion is not resolving sooner.  She denies vision changes.

## 2023-02-21 ASSESSMENT — ENCOUNTER SYMPTOMS
RESPIRATORY SYMPTOMS COMMENTS: YES
CHEST TIGHTNESS: 1
WHEEZING: 1
HEMOPTYSIS: 0
SHORTNESS OF BREATH: 1
DYSPNEA AT REST: 0

## 2023-03-03 ENCOUNTER — OFFICE VISIT (OUTPATIENT)
Dept: SLEEP MEDICINE | Facility: MEDICAL CENTER | Age: 73
End: 2023-03-03
Attending: INTERNAL MEDICINE
Payer: MEDICARE

## 2023-03-03 VITALS
HEIGHT: 65 IN | OXYGEN SATURATION: 93 % | SYSTOLIC BLOOD PRESSURE: 142 MMHG | HEART RATE: 84 BPM | DIASTOLIC BLOOD PRESSURE: 82 MMHG | WEIGHT: 190 LBS | BODY MASS INDEX: 31.65 KG/M2

## 2023-03-03 DIAGNOSIS — G47.33 OSA (OBSTRUCTIVE SLEEP APNEA): ICD-10-CM

## 2023-03-03 DIAGNOSIS — J45.40 MODERATE PERSISTENT ASTHMA WITHOUT COMPLICATION: ICD-10-CM

## 2023-03-03 PROCEDURE — 99211 OFF/OP EST MAY X REQ PHY/QHP: CPT | Performed by: INTERNAL MEDICINE

## 2023-03-03 PROCEDURE — 99214 OFFICE O/P EST MOD 30 MIN: CPT | Performed by: INTERNAL MEDICINE

## 2023-03-03 ASSESSMENT — FIBROSIS 4 INDEX: FIB4 SCORE: 1.36

## 2023-03-03 NOTE — PROGRESS NOTES
"Pulmonary Clinic Office Note    Reason for visit: \"new patient\"    Chart reviewed prior to patient interview.    Nancy Chayito Gregory is a 72 y.o. year old female, with a PMHx of asthma who presented to the Pulmonary Clinic for a new referral.    Background:  She was diagnosed with asthma at age 44, she did have PFTs. She was diagnosed in [place], [place]    Today:  She had an asthma exacerbation in Jan 2023 that required to be seen at urgent care and saw her PCP. She thought it was triggered by a cold.  GERD symptoms taking PPI and controlled  No history of respiratory failure requiring mechanical ventilation  No history of hemoptysis.  No Personal history of non-resolving or recurrent pneumonia  No Personal history of DVT or pulmonary embolism  No Personal history of recurrent sinusitis or epistaxis/purulent discharge  No reported NSAID precipitated cough, wheeze or sinus congestion    cold air intolerance  No exercise induced cough wheeze  No family hx of atopy and or asthma  No history of nasal polyps  hx of recent COVID-19 infection in Dec 2021, no admission, no steroids. She did received the monoclonal antibiodies.    Pulmonary History:  Inhaler Regimen before this clinic visit: albuterol, duonebs, and has been on Dulera for years. Has used albuterol 2-3 times in the last 2 weeks.  Tobacco use:  quit in 1994, started smoking at age 21, 1/2 ppd.  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   Pet(s) exposure: 1 dog  TB exposure: none    MMRC Dyspnea Scale  Grade  Description of Breathlessness   0  I only get breathless with strenuous exercise.   1  I get short of breath when hurrying on level ground or walking up a slight hill.   2  On level ground, I walk slower than people of the same age because of breathlessness, or have to stop for breath when walking at my own pace.   3  I stop for breath after walking about 100 yards or after a few minutes on level ground.   4  I am too breathless to leave the house or " "I am breathless when dressing.     Influenza Vaccine:  yes  COVID vaccine: yes but not the recent booster.  Pneumovax:  yes  Hospitalizations due to the main problem address today: years ago in 1997  ER visits due to the main problem address today: just one as above.    Sleep History: she does have WARD and has a cpap machine    ----------------------------------------------------------------------------------------------------------------------------------------------------------------------------------------------------------------------------------------------------------------  Past Medical History:   Diagnosis Date    Acute cystitis with hematuria 4/20/2020    Urine dipstick performed in clinic demonstrated trace glucose, 1+ bili, 1+ ketones, specific gravity 1.015, trace intact blood, pH 5.5, 2+ protein, 2.0 urobilinogen, positive nitrite, 3+ leukocyte esterase.  She performed a telemedicine visit yesterday and was placed on cephalosporin for presumptive urinary tract infection.  She was also given Pyridium due to discomfort with dysuria.  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  This was done on her right upper gums.  2 weeks following the procedure she had a full liquid diet/soft food diet and this was advanced on [employer] Day dinner, February 14, 2020.  On the following Monday, February 17, 2020 she notes development of \"bloodshot \"gums causing concern and when she clay    CAD (coronary artery disease)     Chickenpox     GERD (gastroesophageal reflux disease)     Heartburn     Hyperlipidemia     Hypothyroidism     Insomnia     Trouble going to and staying asleep    Lymphadenopathy 9/16/2021    Mass of left submandibular region 5/26/2021    She reports development of a mass in her chin/neck area.  On exam it appears to be submandibular on the left " anterolateral aspect.  It is easily palpable, mobile, and approximately 1 x 1 cm.  It is nontender.  There is some associated inflammation in the region that is visible on inspection.  She has been working with periodontist due to gum disease and wonders if it could be related to her dentit    Mumps     Painful joint     Stomatitis 2021    Thyroid disease     Tonsillitis     Wears glasses      Allergies   Allergen Reactions    Codeine      Family History   Problem Relation Age of Onset    Cancer Father         lung    Breast Cancer Maternal Aunt     Hypertension Maternal Grandmother     Hypertension Maternal Grandfather     Kidney Disease Mother     Heart Disease Neg Hx      Past Surgical History:   Procedure Laterality Date    ABDOMINAL HYSTERECTOMY TOTAL      CHOLECYSTECTOMY      HYSTERECTOMY LAPAROSCOPY      TONSILLECTOMY       Social History     Tobacco Use    Smoking status: Former     Packs/day: 1.00     Years: 15.00     Pack years: 15.00     Types: Cigarettes     Start date: 10/10/1978     Quit date: 1993     Years since quittin.9    Smokeless tobacco: Never    Tobacco comments:     continued abstinence   Vaping Use    Vaping Use: Never used   Substance Use Topics    Alcohol use: Yes     Alcohol/week: 2.4 oz     Types: 4 Glasses of wine per week     Comment: occ    Drug use: No         Review of Systems (Positive in Bold, otherwise negative)    Constitutional: fever, chills, night sweats, weightloss  HEENT: headaches, migraines, vision changes, blurred vision, dry eyes,  changes in hearing, rhinorrhea, sinus congestion, dysphagia  Hoarseness of voice, choking  CV: chest pain, KEY, orthopnea, edema, palpitations  Resp: SOB, cough, hemoptysis, asthma, repeated infections, sputum production, wheezing  GI: changes in appetite, nausea, vomiting, diarrhea, constipation, pain, GERD  : Dysuria, hematuria, nocturia  Lymph: swollen glands  MSK: Muscle weakness, wasting, arthralgia, myalgia  Neuro/Psych:  "Sensory disturbances, seizures, syncope, anxiety, depression    Objective:  Vitals: BP (!) 142/82 (BP Location: Right arm, Patient Position: Sitting, BP Cuff Size: Adult)   Pulse 84   Ht 1.651 m (5' 5\")   Wt 86.2 kg (190 lb)   SpO2 93%   BMI 31.62 kg/m²     Wt Readings from Last 3 Encounters:   03/03/23 86.2 kg (190 lb)   02/16/23 85.7 kg (189 lb)   02/09/23 83 kg (183 lb)     Gen: AAO x 3, appears of stated age, well-nourished and in NAD  Eyes: sclerae anicteric, conjunctivae appear normal, PEERLA, EOM in tact  ENT: EAC appears normal w/o erythema/exudate.  Neck: Supple w/o evidence of LAD, thyroid normal and size and w/o nodularity  CV: RRR w/o murmurs, rubs, gallops. Normal S1/S2. No peripheral edema orJVD.  Lungs: CTAB w/o wheezing, rales, rhonchi. Good air entry, normal chest excursion.  GI: Soft and non-tender, + BS x 4. No rebound or guarding.  Extremities: +2/4 bilateral pedal pulses, cool and dry, no edema.  MS: +5/5 bilateral UE/LE muscle strength testing, no obvious joint deformities, swelling noted, good overall muscle tone  Neuro: No focal neurological deficits    ----------------------------------------------------------------------------------------------------------------------------------------------------------------------------------------------------------------------------------------------------------------  Labs, Imaging & Scoring Systems:    Lab results since patient's previous encounter were reviewed with the patient.  Pertinent results discussed below or included within the note.    PFTs (Date: none todate)-    CXR: (Date: 12/30/22)-  1.  No evidence of acute cardiopulmonary process.  2.  Mild hyperinflation consistent with history of asthma.    CT Chest: (Date: none to date )-    ECHO, (date: none to date)      PSG 1/12/21  ASSESSMENT:   Mild obstructive sleep apnea hypopnea - AHI 5.4  Persistent nocturnal desaturation - kevin saturation 63% - saturations <90% for 27.4% of the " recording  Did not meet the split-night protocol secondary to an insufficient number of qualifying events before 2 AM  RECOMMENDATION:  If significant signs, symptoms, and or comorbidities warrant, recommend a positive airway pressure titration. Alternative treatments for OSAH include behavioral modification, use of a dental appliance, and nasopharyngeal reconstructive surgery. Behavior modification includes weight loss, eliminating alcohol and sedatives, and avoiding the supine position. If alternative treatments are used, a follow-up diagnostic study is warranted to ensure efficacy.    2/4/21 overnight oximetry  Impression:   The study was done on  supplemental o2 at 2LPM. The total analyzed time was 7 hrs 11 min. O2 Sat. kevin was 81% and mean O2 sat was 90 % and baseline O2 at 96%. O2 sat was below 88% for 0.7 min of the flow evaluation time. Oxygen Desaturation (>=3%) Index was 0.3/hr.    Recommendation:  unremarkable oximetry, continue the current therapy. Clinical correlation recommended.   ----------------------------------------------------------------------------------------------------------------------------------------------------------------------------------------------------------------------------------------------------------------      Impression:    Asthma mild intermittent   WARD      Discussion:    Prudenclynn Chayito Gregory is a 72 y.o. with a past medical history of asthma that has been managed with Dulera and as needed albuterol for many years.  She seems to be stable enough to start considering de-escalation of treatment.  We will continue to discuss stopping Dulera in the next office visit but for now given recent exacerbation recommend to continue this as a maintenance therapy.    Plan:    Continue with Dulera for maintenance therapy until next office visit.  Recommend to obtain pulmonary function tests which patient already has an appointment to do so  Recommend to continue management by sleep  "medicine for her underlying sleep apnea    * Patient Education                         - Educated the patient on his/her disease processes                         - Answered all questions to the patients satisfaction.                         - Reminded the patient to bring all of his/her medication bottles to the next office visit.                           * Return To Clinic:  3 months or PRN      The patient voiced understanding of the above treatment approach and agreed with the direction of care. The patient was educated about their conditions and all questions were addressed during this encounter. I have also reminded the patient to bring all of their medications to the next office visit.  Nancy Stratton Jennyciara was instructed to call the office if any questions or concerns arise prior to their next appointment.  This note reflects my clinical thought processes and is intended primarily for the exchange of information between healthcare providers.  It is not written primarily to communicate with the patient or family directly, which takes place in-person in clinic, by phone/virtual visits or the bedside.  This note might contain sensitive information, including substance use, mental health and consideration of sensitive, serious or other \"do-not-miss\" diagnoses, which is further discussed at the bedside.    Shayan Evans MD FACP  Pulmonary/Critical Care    Answers submitted by the patient for this visit:  Pulmonary History Questionnaire (Submitted on 2/21/2023)  What is the reason for your visit today?: Asthma  Do you cough first thing in the morning or at other times during the day?: No  Do you have a cough on most days? If so, how long have you had this cough?: No  Bring up phlegm (mucus, sputum) in the morning or other times during the day?: No  Do you cough up blood from your chest?: No  Do you experience wheezing?: Yes  How often?: rarely  Do you experience any chest tightness?: Yes  How often?: " rare  Experience shortness of breath?: Yes  Experience shortness of breath at rest?: No  How far can you walk before becoming short of breath or need to rest? : A mile  Please list what causes you to become short of breath:: Walking fast or exacerbstion of asthma from moldy, damp places or breathing in very cold air  Have you ever been hospitalized?: Yes  Reason, year, and hospital in which you were hospitalized:: Twice for asthma between [id number]  Have you ever needed to be intubated or placed on a ventilator? : No  Have you ever had problems with anesthesia?: No  Have you experienced post-operative delirium?: No  Any complications with surgery?: No  What year did you receive your last Flu shot?: 2022  What year did you receive you last Pneumonia shot?: Cant remember  Have you had a TB skin test? If so, please list the year and result:: 2018  Have you had Allergy skin testing? If so, please list the year and result:: No  Please list all your occupations from your first job to your current job. Include Industry/Company, location, year, and your specific job.: Retired  a ramona Job: No  a Mine or Quarry: No  with Pottery: No  Cotton Mill: No  Hemp Mill: No  Brick Plant: No  with Asbestos: No  as a Sandblaster: No  manufacturing of glass, ceramics, or abrasives: No  Acids: No  Arsenics: No  Cadmium: No  Chromium: No  Fibrogenic Dust: No  Lead: Mo  Nickel: No  Plastic: No  Solvents: No  TDI: No  Uranium: No  Please list the places you have lived, the year, and Country or State in the .S.:: [place] [address], [facility] [id number], [place] [phone], [facility] 2000-present  Birds?: No  Dogs?: Yes  Cats?: No  Mice and/or Deer Mice?: No  Reptiles?: No  Blood Chemistries: Yes [facility]  Blood Count: Yes  Cardiac Ultrasound: No  Chest X-ray: Yes  Pulmonary Function Test: No  CT Scan, Sinus: No  Electrocardiogram: No  Sleep Study: Yes [facility]  Coffee: 2  Carbonated soft drinks: 1 diet coke    Conflicting answers have  been found for some questions. Please document the patient's answers manually.

## 2023-03-03 NOTE — PATIENT INSTRUCTIONS
Thanks for coming to the office today.  Please bring all of your medication bottles to the next office visit, specially inhalers.  If requested, please obtain the prior records from your lung doctor.  If you have been prescribed inhalers, please use them as indicated and please rinse your mouth after using them each time.  Call if any questions!                    Return To Clinic:  3 months. Please do not forget to come at least 20 minutes prior to your appointment.  It has been a pleasure seeing you today.    Shayan Evans MD  Pulmonary/Critical Care

## 2023-03-06 ENCOUNTER — OFFICE VISIT (OUTPATIENT)
Dept: MEDICAL GROUP | Facility: PHYSICIAN GROUP | Age: 73
End: 2023-03-06
Payer: MEDICARE

## 2023-03-06 VITALS
SYSTOLIC BLOOD PRESSURE: 132 MMHG | HEIGHT: 65 IN | DIASTOLIC BLOOD PRESSURE: 76 MMHG | HEART RATE: 87 BPM | WEIGHT: 189 LBS | OXYGEN SATURATION: 97 % | RESPIRATION RATE: 12 BRPM | BODY MASS INDEX: 31.49 KG/M2 | TEMPERATURE: 97.2 F

## 2023-03-06 DIAGNOSIS — Z78.0 MENOPAUSE: ICD-10-CM

## 2023-03-06 DIAGNOSIS — K14.6 BURNING MOUTH SYNDROME: ICD-10-CM

## 2023-03-06 DIAGNOSIS — E03.4 HYPOTHYROIDISM DUE TO ACQUIRED ATROPHY OF THYROID: ICD-10-CM

## 2023-03-06 DIAGNOSIS — E55.9 VITAMIN D DEFICIENCY: ICD-10-CM

## 2023-03-06 DIAGNOSIS — J45.40 MODERATE PERSISTENT ASTHMA WITHOUT COMPLICATION: ICD-10-CM

## 2023-03-06 PROCEDURE — 99214 OFFICE O/P EST MOD 30 MIN: CPT | Performed by: INTERNAL MEDICINE

## 2023-03-06 RX ORDER — NEOMYCIN SULFATE, POLYMYXIN B SULFATE, AND DEXAMETHASONE 3.5; 10000; 1 MG/G; [USP'U]/G; MG/G
OINTMENT OPHTHALMIC
COMMUNITY
Start: 2023-03-01

## 2023-03-06 RX ORDER — GABAPENTIN 100 MG/1
100-300 CAPSULE ORAL EVERY EVENING
COMMUNITY
End: 2023-07-05

## 2023-03-06 ASSESSMENT — FIBROSIS 4 INDEX: FIB4 SCORE: 1.36

## 2023-03-06 NOTE — ASSESSMENT & PLAN NOTE
Chronic ongoing problem, needs follow up lab work due to dropping TSH and associated palpitations at night. Okay to continue current dose of levothyroxine 100 mcg daily and will adjust dosage based on lab results if indicated.

## 2023-03-06 NOTE — ASSESSMENT & PLAN NOTE
Chronic ongoing problem, recent exacerbation earlier this year. Now established with Dr. Evans with plans to update PFTs. Continue current regimen with Dulera, albuterol inhaler as needed and duonebs as needed.

## 2023-03-06 NOTE — PROGRESS NOTES
"Subjective:   Chief Complaint/History of Present Illness:  Prudence Chayito Gregory is a 72 y.o. female established patient who presents today to discuss medical problems as listed below. Pru is unaccompanied for today's visit.    Problem   Burning mouth syndrome- working diagnosis    She reports prior lanap (laser treatment for peridontal disease) procedure in late January 2020.  This was done on her right upper gums.  2 weeks following the procedure she had a full liquid diet/soft food diet and this was advanced on Uma[place]'s Day dinner, February 14, 2020.  On the following Monday, February 17, 2020 she notes development of \"bloodshot \"gums causing concern and when she called the person who performed (Dr. Yuliana Dawson) the laser treatment she was advised to go to her regular dentist (Dr. Flores).  She followed up with her dentist in February who performed pictures and gave her an over-the-counter mouth rinse, unclear what the active ingredient was.  She followed up 2 weeks later and felt only mild improvement.  Her dentist suspected she irritated her gums with sharp food on the night of Umaña's Day or possibly some kind of allergic reaction.  She was due to follow-up again on March 27 however that appointment with her dentist was canceled due to COVID-19.  She describes the symptoms as erythema on her gums and feels that the gums themselves are thick with a hive-like sensation they are also sensitive.  Then starting on Wednesday, April 15, 2020 she noted a lump on the left side submandibular that is nontender.  On recollection she thinks she may have had a salivary gland blockage in the past that she treated by sucking on hard candy.      Symptoms have continued and she report seeing her dentist x3, periodontist x2, and myself x2 for this problem with no resolution and in face worsening. She also saw an oral surgeon, Dr. Quarles, and an ENT, Dr. Singletary.    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 Offered TCA in the past which she declined.    Her mouth burning self resolved after the episode in [id number]. It recurred again as of last week, no clear triggers. She had asthma exacerbation and took steroids earlier this year. Also had a stye in the eye which has now resolved. She saw her dentist and there was nothing on exam to explain the symptoms.     Moderate Persistent Asthma Without Complication    She was diagnosed with asthma in 1994.  She reports previous hospitalizations for her asthma but no prior intubation/ventilation episodes.  She has been on several medications over the years.  She started on Flovent but this did not work for her.  She was then transition onto Advair with Singulair, she stopped the Singulair but then noted that the Advair really was not working anymore.  Most recently she has been put on Dulera with as needed albuterol.  Dulera as prescribed as 2 puffs twice daily but she tries to maintain on 1 puff twice daily.  She uses her Ventolin occasionally can usually make one inhaler last 8 to 9 months.  She saw Dr. Choudhary with pulmonary medicine in 2012 and at that time a PFT testing she was noted to be moderate stage II obstruction with FEV1 64% and FEV1/FVC ratio 55%.  She denies any nocturnal symptoms at this time.    Current regimen: Dulera 100-5 mcg, albuterol 2 puffs every 4 hours as needed, duo nebs every 4 hours as needed       Vitamin D Deficiency     Latest Reference Range & Units 12/13/21 08:05 10/14/22 09:32   25-Hydroxy   Vitamin D 25 30 - 100 ng/mL 26 (L) 30     She has prior history of vitamin D deficiency. Unknown if she has osteopenia or osteoporosis.  No known history of chronic kidney disease or hyperparathyroidism.    Previously taking 800 IU daily, now taking 2,000 IU     Hypothyroidism Due to Acquired Atrophy of Thyroid       Latest Reference Range & Units 07/24/21 11:31 12/13/21 08:05  10/14/22 09:32   TSH 0.380 - 5.330 uIU/mL 1.930 11.700 (H) 0.120 (L)   Free T-4 0.93 - 1.70 ng/dL  0.71 (L) 1.41     Hypothyroidism diagnosed by abnormal labs.  She denies any signs or symptoms of overtreatment or under treatment at this time.  She has remained asymptomatic to her thyroid disease except for fatigue. Previously on a higher dose in the mid 100's that was reduced around 5156-5911.    Current regimen: levothyroxine 100 mcg daily  Previous regimen: levothyroxine 88 mcg daily          Current Medications:  Current Outpatient Medications Ordered in Epic   Medication Sig Dispense Refill    gabapentin (NEURONTIN) 100 MG Cap Take 100-300 mg by mouth every evening.      clobetasol (TEMOVATE) 0.05 % Cream       albuterol 108 (90 Base) MCG/ACT Aero Soln inhalation aerosol INHALE 2 PUFFS BY MOUTH EVERY FOUR HOURS AS NEEDED FOR SHORTNESS OF BREATH. 6.7 Each 3    levothyroxine (SYNTHROID) 100 MCG Tab TAKE 1 TABLET BY MOUTH EVERY  Tablet 3    meloxicam (MOBIC) 15 MG tablet TAKE 1 TABLET BY MOUTH 1 TIME A DAY AS NEEDED (PAIN). DO NOT TAKE OTHER NSAIDS 100 Tablet 3    DULERA 100-5 MCG/ACT Aerosol TAKE 2 PUFFS BY MOUTH TWICE A DAY 39 Each 1    rosuvastatin (CRESTOR) 10 MG Tab TAKE 1 TABLET BY MOUTH DAILY IN THE EVENING 100 Tablet 3    baclofen (LIORESAL) 10 MG Tab Take 1 Tablet by mouth 3 times a day as needed (muscle spasm/pain). (Patient taking differently: Take 10 mg by mouth 3 times a day as needed (muscle spasm/pain). Have not taken for a month) 90 Tablet 3    Capsaicin 0.15 % Liquid capsaicin 0.15 % topical liquid   0.025% applied topically on scene. Time administered: 1125      augmented betamethasone dipropionate (DIPROLENE-AF) 0.05 % ointment betamethasone, augmented 0.05 % topical ointment      triamcinolone acetonide (KENALOG) 0.1 % Ointment TOPICAL APPLY SPARINGLY TO THE AFFECTED AREA 3XWK.      ipratropium-albuterol (DUONEB) 0.5-2.5 (3) MG/3ML nebulizer solution Take 3 mL by nebulization 4 times a  "day. (Patient taking differently: Take 3 mL by nebulization every four hours as needed.) 3 mL 3    Probiotic Product (ALIGN PO) Take 1 Tablet by mouth every day.      coenzyme Q-10 30 MG capsule Take 60 mg by mouth every day.      Cyanocobalamin (B-12 PO) Take 1 Tablet by mouth every day.      Misc. Devices Misc Please provide nocturnal oxgen 2 LPM Nasal Cannula, concentrator 99 Each 99    neomycin-polymixin-dexamethasone (MAXITROL) 3.5-49445-4.1 Ointment ophthalmic ointment        No current Epic-ordered facility-administered medications on file.          Objective:   Physical Exam:    Vitals: /76   Pulse 87   Temp 36.2 °C (97.2 °F) (Temporal)   Resp 12   Ht 1.651 m (5' 5\")   Wt 85.7 kg (189 lb)   SpO2 97%    BMI: Body mass index is 31.45 kg/m².  Physical Exam  Constitutional:       General: She is not in acute distress.     Appearance: Normal appearance. She is not ill-appearing.   HENT:      Right Ear: There is no impacted cerumen.      Left Ear: There is no impacted cerumen.      Mouth/Throat:      Comments: No white plaques, no ulcerations, no trauma, mucosa and tongue are moist and normal appearing on exam  Eyes:      Conjunctiva/sclera: Conjunctivae normal.      Comments: Resolving hordeolum    Neck:      Comments: Small palpable small mobile lymph node left anterior cervical chain  Cardiovascular:      Rate and Rhythm: Normal rate and regular rhythm.      Pulses: Normal pulses.   Pulmonary:      Effort: Pulmonary effort is normal. No respiratory distress.      Breath sounds: No wheezing or rhonchi.   Skin:     General: Skin is warm and dry.      Findings: No bruising or rash.   Neurological:      Gait: Gait normal.   Psychiatric:         Mood and Affect: Mood normal.         Behavior: Behavior normal.         Thought Content: Thought content normal.         Judgment: Judgment normal.             Assessment and Plan:   Tejudence is a 72 y.o. female with the following:  Problem List Items Addressed " This Visit       Burning mouth syndrome- working diagnosis     Previous problem, decompensated with new exacerbation, exam is reassuring. 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 Will trial gabapentin at night as symptoms are the worst when she wakes up in the morning. She will monitor for oversedation or balance issues on the gabapentin. Start at 100 mg and can increase by 100 mg every 3 night to max of 300 mg then update me.         Relevant Orders    VITAMIN B12    VITAMIN B6    Hypothyroidism due to acquired atrophy of thyroid     Chronic ongoing problem, needs follow up lab work due to dropping TSH and associated palpitations at night. Okay to continue current dose of levothyroxine 100 mcg daily and will adjust dosage based on lab results if indicated.         Relevant Orders    CBC WITH DIFFERENTIAL    Comp Metabolic Panel    TSH    FREE THYROXINE    Moderate persistent asthma without complication     Chronic ongoing problem, recent exacerbation earlier this year. Now established with Dr. Evans with plans to update PFTs. Continue current regimen with Dulera, albuterol inhaler as needed and duonebs as needed.         Vitamin D deficiency     Chronic ongoing problem, recheck levels to ensure stability, continue vitamin D 2,000 IU daily.         Relevant Orders    VITAMIN D,25 HYDROXY (DEFICIENCY)     Other Visit Diagnoses       Menopause        Relevant Orders    DS-BONE DENSITY STUDY (DEXA)            RTC: Return in about 6 weeks (around 4/19/2023).    I spent a total of 30 minutes with record review, exam, communication with the patient, communication with other providers, and documentation of this encounter.    PLEASE NOTE: This dictation was created using voice recognition software. I have made every reasonable attempt to correct obvious errors, but I expect that there are errors of grammar and possibly content that I did not discover before finalizing the note.      Shawna  DO Almita  Geriatric and Internal Medicine  [facility]

## 2023-03-06 NOTE — ASSESSMENT & PLAN NOTE
Previous problem, decompensated with new exacerbation, exam is reassuring. 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 Will trial gabapentin at night as symptoms are the worst when she wakes up in the morning. She will monitor for oversedation or balance issues on the gabapentin. Start at 100 mg and can increase by 100 mg every 3 night to max of 300 mg then update me.

## 2023-03-10 ENCOUNTER — HOSPITAL ENCOUNTER (OUTPATIENT)
Dept: LAB | Facility: MEDICAL CENTER | Age: 73
End: 2023-03-10
Attending: INTERNAL MEDICINE
Payer: MEDICARE

## 2023-03-10 DIAGNOSIS — E55.9 VITAMIN D DEFICIENCY: ICD-10-CM

## 2023-03-10 DIAGNOSIS — K14.6 BURNING MOUTH SYNDROME: ICD-10-CM

## 2023-03-10 DIAGNOSIS — E03.4 HYPOTHYROIDISM DUE TO ACQUIRED ATROPHY OF THYROID: ICD-10-CM

## 2023-03-10 LAB
25(OH)D3 SERPL-MCNC: 35 NG/ML (ref 30–100)
ALBUMIN SERPL BCP-MCNC: 4.4 G/DL (ref 3.2–4.9)
ALBUMIN/GLOB SERPL: 1.7 G/DL
ALP SERPL-CCNC: 79 U/L (ref 30–99)
ALT SERPL-CCNC: 15 U/L (ref 2–50)
ANION GAP SERPL CALC-SCNC: 11 MMOL/L (ref 7–16)
AST SERPL-CCNC: 15 U/L (ref 12–45)
BASOPHILS # BLD AUTO: 0.8 % (ref 0–1.8)
BASOPHILS # BLD: 0.05 K/UL (ref 0–0.12)
BILIRUB SERPL-MCNC: 0.3 MG/DL (ref 0.1–1.5)
BUN SERPL-MCNC: 12 MG/DL (ref 8–22)
CALCIUM ALBUM COR SERPL-MCNC: 9.2 MG/DL (ref 8.5–10.5)
CALCIUM SERPL-MCNC: 9.5 MG/DL (ref 8.5–10.5)
CHLORIDE SERPL-SCNC: 104 MMOL/L (ref 96–112)
CO2 SERPL-SCNC: 27 MMOL/L (ref 20–33)
CREAT SERPL-MCNC: 0.63 MG/DL (ref 0.5–1.4)
EOSINOPHIL # BLD AUTO: 0.18 K/UL (ref 0–0.51)
EOSINOPHIL NFR BLD: 3 % (ref 0–6.9)
ERYTHROCYTE [DISTWIDTH] IN BLOOD BY AUTOMATED COUNT: 41.4 FL (ref 35.9–50)
GFR SERPLBLD CREATININE-BSD FMLA CKD-EPI: 94 ML/MIN/1.73 M 2
GLOBULIN SER CALC-MCNC: 2.6 G/DL (ref 1.9–3.5)
GLUCOSE SERPL-MCNC: 85 MG/DL (ref 65–99)
HCT VFR BLD AUTO: 44.4 % (ref 37–47)
HGB BLD-MCNC: 14.3 G/DL (ref 12–16)
IMM GRANULOCYTES # BLD AUTO: 0.04 K/UL (ref 0–0.11)
IMM GRANULOCYTES NFR BLD AUTO: 0.7 % (ref 0–0.9)
LYMPHOCYTES # BLD AUTO: 2.22 K/UL (ref 1–4.8)
LYMPHOCYTES NFR BLD: 36.6 % (ref 22–41)
MCH RBC QN AUTO: 29.1 PG (ref 27–33)
MCHC RBC AUTO-ENTMCNC: 32.2 G/DL (ref 33.6–35)
MCV RBC AUTO: 90.2 FL (ref 81.4–97.8)
MONOCYTES # BLD AUTO: 0.51 K/UL (ref 0–0.85)
MONOCYTES NFR BLD AUTO: 8.4 % (ref 0–13.4)
NEUTROPHILS # BLD AUTO: 3.06 K/UL (ref 2–7.15)
NEUTROPHILS NFR BLD: 50.5 % (ref 44–72)
NRBC # BLD AUTO: 0 K/UL
NRBC BLD-RTO: 0 /100 WBC
PLATELET # BLD AUTO: 232 K/UL (ref 164–446)
PMV BLD AUTO: 11.4 FL (ref 9–12.9)
POTASSIUM SERPL-SCNC: 3.9 MMOL/L (ref 3.6–5.5)
PROT SERPL-MCNC: 7 G/DL (ref 6–8.2)
RBC # BLD AUTO: 4.92 M/UL (ref 4.2–5.4)
SODIUM SERPL-SCNC: 142 MMOL/L (ref 135–145)
T4 FREE SERPL-MCNC: 1.36 NG/DL (ref 0.93–1.7)
TSH SERPL DL<=0.005 MIU/L-ACNC: 0.23 UIU/ML (ref 0.38–5.33)
VIT B12 SERPL-MCNC: 658 PG/ML (ref 211–911)
WBC # BLD AUTO: 6.1 K/UL (ref 4.8–10.8)

## 2023-03-10 PROCEDURE — 36415 COLL VENOUS BLD VENIPUNCTURE: CPT

## 2023-03-10 PROCEDURE — 84439 ASSAY OF FREE THYROXINE: CPT

## 2023-03-10 PROCEDURE — 84207 ASSAY OF VITAMIN B-6: CPT

## 2023-03-10 PROCEDURE — 82306 VITAMIN D 25 HYDROXY: CPT

## 2023-03-10 PROCEDURE — 84443 ASSAY THYROID STIM HORMONE: CPT

## 2023-03-10 PROCEDURE — 85025 COMPLETE CBC W/AUTO DIFF WBC: CPT

## 2023-03-10 PROCEDURE — 82607 VITAMIN B-12: CPT

## 2023-03-10 PROCEDURE — 80053 COMPREHEN METABOLIC PANEL: CPT

## 2023-03-15 LAB — VIT B6 SERPL-MCNC: 27.8 NMOL/L (ref 20–125)

## 2023-03-24 ENCOUNTER — HOSPITAL ENCOUNTER (OUTPATIENT)
Dept: PULMONOLOGY | Facility: MEDICAL CENTER | Age: 73
End: 2023-03-24
Attending: INTERNAL MEDICINE
Payer: MEDICARE

## 2023-03-24 DIAGNOSIS — J45.40 MODERATE PERSISTENT ASTHMA WITHOUT COMPLICATION: ICD-10-CM

## 2023-04-04 ENCOUNTER — OFFICE VISIT (OUTPATIENT)
Dept: MEDICAL GROUP | Facility: PHYSICIAN GROUP | Age: 73
End: 2023-04-04
Payer: MEDICARE

## 2023-04-04 VITALS
HEIGHT: 65 IN | BODY MASS INDEX: 30.59 KG/M2 | TEMPERATURE: 98 F | WEIGHT: 183.6 LBS | SYSTOLIC BLOOD PRESSURE: 128 MMHG | DIASTOLIC BLOOD PRESSURE: 62 MMHG | HEART RATE: 90 BPM | RESPIRATION RATE: 16 BRPM | OXYGEN SATURATION: 94 %

## 2023-04-04 DIAGNOSIS — K57.32 SIGMOID DIVERTICULITIS: ICD-10-CM

## 2023-04-04 DIAGNOSIS — F41.1 GENERALIZED ANXIETY DISORDER: ICD-10-CM

## 2023-04-04 PROCEDURE — 99214 OFFICE O/P EST MOD 30 MIN: CPT | Performed by: INTERNAL MEDICINE

## 2023-04-04 RX ORDER — METRONIDAZOLE 500 MG/1
500 TABLET ORAL 3 TIMES DAILY
Qty: 21 TABLET | Refills: 0 | Status: SHIPPED | OUTPATIENT
Start: 2023-04-04 | End: 2023-07-05

## 2023-04-04 RX ORDER — FLUOXETINE 10 MG/1
10 CAPSULE ORAL DAILY
Qty: 30 CAPSULE | Refills: 1 | Status: SHIPPED | OUTPATIENT
Start: 2023-04-04 | End: 2023-04-26

## 2023-04-04 RX ORDER — CIPROFLOXACIN 500 MG/1
500 TABLET, FILM COATED ORAL 2 TIMES DAILY
Qty: 14 TABLET | Refills: 0 | Status: SHIPPED | OUTPATIENT
Start: 2023-04-04 | End: 2023-07-05

## 2023-04-04 ASSESSMENT — ANXIETY QUESTIONNAIRES
4. TROUBLE RELAXING: MORE THAN HALF THE DAYS
GAD7 TOTAL SCORE: 18
5. BEING SO RESTLESS THAT IT IS HARD TO SIT STILL: NEARLY EVERY DAY
6. BECOMING EASILY ANNOYED OR IRRITABLE: NEARLY EVERY DAY
IF YOU CHECKED OFF ANY PROBLEMS ON THIS QUESTIONNAIRE, HOW DIFFICULT HAVE THESE PROBLEMS MADE IT FOR YOU TO DO YOUR WORK, TAKE CARE OF THINGS AT HOME, OR GET ALONG WITH OTHER PEOPLE: NOT DIFFICULT AT ALL
2. NOT BEING ABLE TO STOP OR CONTROL WORRYING: NEARLY EVERY DAY
1. FEELING NERVOUS, ANXIOUS, OR ON EDGE: MORE THAN HALF THE DAYS
7. FEELING AFRAID AS IF SOMETHING AWFUL MIGHT HAPPEN: NEARLY EVERY DAY
3. WORRYING TOO MUCH ABOUT DIFFERENT THINGS: MORE THAN HALF THE DAYS

## 2023-04-04 ASSESSMENT — FIBROSIS 4 INDEX: FIB4 SCORE: 1.2

## 2023-04-04 NOTE — ASSESSMENT & PLAN NOTE
New decompensated problem however likely longstanding per patient report. We had detailed discussion of treatment options including SSRI, anxiolytics, etc and have decided to proceed with fluoxetine 10 mg daily. She has a degree of anxiety all the time so maintenance medication makes the most sense, discussed we could augment with buspirone if needed moving forward. Advised her to update me in a few week and we will recheck GAD7 over mychart in about 4 weeks to determine if she needs dose titrated. Discussed potential side effects including GI and sexual side effects.

## 2023-04-04 NOTE — ASSESSMENT & PLAN NOTE
New decompensated problem, no clear trigger, recognizes she could be a bit better about staying hydrated. Will complete treatment with ciprofloxacin 500 mg twice daily x7 days and metronidazole 500 mg three times daily x7 days, can extend to 10 days if not fully resolved. Still with tenderness on palpation to bilateral lower abdomen, but improving. Discussed antispasmodic for the gut which she declines at this time. She will keep me updated on her progress. Reassuring colonoscopy this past Fall.

## 2023-04-04 NOTE — PROGRESS NOTES
"Subjective:   Chief Complaint/History of Present Illness:  Tejudence Chayito Gregory is a 72 y.o. female established patient who presents today to discuss medical problems as listed below. Pru is unaccompanied for today's visit.    Problem   Sigmoid Diverticulitis    She has history of recurrent diverticulitis, last episode in 2021, has seen GI with recent colonoscopy in Fall 2022 with 1 polyp. Lower abdominal pain started this past Thursday night, no clear trigger or cause. Worsened on Friday with diarrhea, anorexia, chills, and cramping. She had previous Rx for cipro/flagyl and started this while waiting to be seen. On day 5 symptoms started to improve, she is  on examination.     Generalized Anxiety Disorder    SHANTI-7 Questionnaire (4/4/2023)    Feeling nervous, anxious, or on edge: More than half the days  Not being able to sop or control worrying: Nearly every day  Worrying too much about different things: More than half the days  Trouble relaxing: More than half the days  Being so restless that it's hard to sit still: Nearly every day  Becoming easily annoyed or irritable: Nearly every day  Feeling afraid as if something awful might happen: Nearly every day  Total: 18      Longstanding anxiety, she reports she has been \"in denial\" for years. Was trialed on Zoloft in the mid 1990's but does not recall it helping. She can be irritable as well as easy to anger that she feels is out of proportion to the situation. No panic attacks.             Current Medications:  Current Outpatient Medications Ordered in Epic   Medication Sig Dispense Refill    metroNIDAZOLE (FLAGYL) 500 MG Tab Take 1 Tablet by mouth 3 times a day. 21 Tablet 0    ciprofloxacin (CIPRO) 500 MG Tab Take 1 Tablet by mouth 2 times a day. 14 Tablet 0    FLUoxetine (PROZAC) 10 MG Cap Take 1 Capsule by mouth every day. 30 Capsule 1    neomycin-polymixin-dexamethasone (MAXITROL) 3.5-49113-0.1 Ointment ophthalmic ointment       gabapentin " (NEURONTIN) 100 MG Cap Take 100-300 mg by mouth every evening.      clobetasol (TEMOVATE) 0.05 % Cream       albuterol 108 (90 Base) MCG/ACT Aero Soln inhalation aerosol INHALE 2 PUFFS BY MOUTH EVERY FOUR HOURS AS NEEDED FOR SHORTNESS OF BREATH. 6.7 Each 3    levothyroxine (SYNTHROID) 100 MCG Tab TAKE 1 TABLET BY MOUTH EVERY  Tablet 3    meloxicam (MOBIC) 15 MG tablet TAKE 1 TABLET BY MOUTH 1 TIME A DAY AS NEEDED (PAIN). DO NOT TAKE OTHER NSAIDS 100 Tablet 3    DULERA 100-5 MCG/ACT Aerosol TAKE 2 PUFFS BY MOUTH TWICE A DAY 39 Each 1    rosuvastatin (CRESTOR) 10 MG Tab TAKE 1 TABLET BY MOUTH DAILY IN THE EVENING 100 Tablet 3    baclofen (LIORESAL) 10 MG Tab Take 1 Tablet by mouth 3 times a day as needed (muscle spasm/pain). (Patient taking differently: Take 10 mg by mouth 3 times a day as needed (muscle spasm/pain). Have not taken for a month) 90 Tablet 3    Capsaicin 0.15 % Liquid capsaicin 0.15 % topical liquid   0.025% applied topically on scene. Time administered: 1125      augmented betamethasone dipropionate (DIPROLENE-AF) 0.05 % ointment betamethasone, augmented 0.05 % topical ointment      triamcinolone acetonide (KENALOG) 0.1 % Ointment TOPICAL APPLY SPARINGLY TO THE AFFECTED AREA 3XWK.      ipratropium-albuterol (DUONEB) 0.5-2.5 (3) MG/3ML nebulizer solution Take 3 mL by nebulization 4 times a day. (Patient taking differently: Take 3 mL by nebulization every four hours as needed.) 3 mL 3    Probiotic Product (ALIGN PO) Take 1 Tablet by mouth every day.      coenzyme Q-10 30 MG capsule Take 60 mg by mouth every day.      Cyanocobalamin (B-12 PO) Take 1 Tablet by mouth every day.      Misc. Devices Misc Please provide nocturnal oxgen 2 LPM Nasal Cannula, concentrator 99 Each 99     No current [facility]-ordered facility-administered medications on file.          Objective:   Physical Exam:    Vitals: /62 (BP Location: Left arm, Patient Position: Sitting, BP Cuff Size: Adult)   Pulse 90   Temp 36.7 °C  "(98 °F) (Temporal)   Resp 16   Ht 1.651 m (5' 5\")   Wt 83.3 kg (183 lb 9.6 oz)   SpO2 94%    BMI: Body mass index is 30.55 kg/m².  Physical Exam  Constitutional:       Appearance: Normal appearance. She is not toxic-appearing.      Comments: Appears mildly uncomfortable   Eyes:      General: No scleral icterus.     Conjunctiva/sclera: Conjunctivae normal.   Cardiovascular:      Rate and Rhythm: Normal rate and regular rhythm.      Pulses: Normal pulses.   Pulmonary:      Effort: Pulmonary effort is normal. No respiratory distress.      Breath sounds: No wheezing or rhonchi.   Abdominal:      General: Bowel sounds are normal. There is no distension.      Palpations: Abdomen is soft.      Tenderness: There is abdominal tenderness. There is no guarding.      Comments: Left and center lower abdominal tenderness on palpation   Skin:     General: Skin is warm and dry.      Findings: No rash.   Psychiatric:         Behavior: Behavior normal.         Thought Content: Thought content normal.         Judgment: Judgment normal.      Comments: Feeling anxious at baseline             Assessment and Plan:   Prudence is a 72 y.o. female with the following:  Problem List Items Addressed This Visit       Generalized anxiety disorder     New decompensated problem however likely longstanding per patient report. We had detailed discussion of treatment options including SSRI, anxiolytics, etc and have decided to proceed with fluoxetine 10 mg daily. She has a degree of anxiety all the time so maintenance medication makes the most sense, discussed we could augment with buspirone if needed moving forward. Advised her to update me in a few week and we will recheck GAD7 over mychart in about 4 weeks to determine if she needs dose titrated. Discussed potential side effects including GI and sexual side effects.         Relevant Medications    FLUoxetine (PROZAC) 10 MG Cap    Sigmoid diverticulitis     New decompensated problem, no clear " trigger, recognizes she could be a bit better about staying hydrated. Will complete treatment with ciprofloxacin 500 mg twice daily x7 days and metronidazole 500 mg three times daily x7 days, can extend to 10 days if not fully resolved. Still with tenderness on palpation to bilateral lower abdomen, but improving. Discussed antispasmodic for the gut which she declines at this time. She will keep me updated on her progress. Reassuring colonoscopy this past Fall.         Relevant Medications    metroNIDAZOLE (FLAGYL) 500 MG Tab    ciprofloxacin (CIPRO) 500 MG Tab          RTC: Return in about 3 months (around 7/4/2023).    I spent a total of 36 minutes with record review, exam, communication with the patient, communication with other providers, and documentation of this encounter.    PLEASE NOTE: This dictation was created using voice recognition software. I have made every reasonable attempt to correct obvious errors, but I expect that there are errors of grammar and possibly content that I did not discover before finalizing the note.      Shawna Recio, DO  Geriatric and Internal Medicine  [facility]

## 2023-04-10 ENCOUNTER — HOSPITAL ENCOUNTER (OUTPATIENT)
Dept: RADIOLOGY | Facility: MEDICAL CENTER | Age: 73
End: 2023-04-10
Attending: INTERNAL MEDICINE
Payer: MEDICARE

## 2023-04-10 DIAGNOSIS — Z78.0 MENOPAUSE: ICD-10-CM

## 2023-04-10 PROCEDURE — 77080 DXA BONE DENSITY AXIAL: CPT

## 2023-05-08 ENCOUNTER — PATIENT MESSAGE (OUTPATIENT)
Dept: SLEEP MEDICINE | Facility: MEDICAL CENTER | Age: 73
End: 2023-05-08
Payer: MEDICARE

## 2023-05-10 DIAGNOSIS — J45.901 MODERATE ASTHMA WITH EXACERBATION, UNSPECIFIED WHETHER PERSISTENT: ICD-10-CM

## 2023-05-10 RX ORDER — PREDNISONE 20 MG/1
40 TABLET ORAL DAILY
Qty: 10 TABLET | Refills: 0 | Status: SHIPPED | OUTPATIENT
Start: 2023-05-10 | End: 2023-05-15

## 2023-05-10 RX ORDER — FLUTICASONE FUROATE AND VILANTEROL 200; 25 UG/1; UG/1
1 POWDER RESPIRATORY (INHALATION) DAILY
Qty: 60 EACH | Refills: 3 | Status: SHIPPED | OUTPATIENT
Start: 2023-05-10 | End: 2023-09-11

## 2023-05-12 NOTE — TELEPHONE ENCOUNTER
Called patient, recommend sleep study.  Will need to set up nocturnal oxygen soon.   Patient will contact us again during working hours.    [employer] Pregnancy And Lactation Text: This medication is Pregnancy Category B and is considered safe. There is some data to suggest avoiding during the third trimester. It is unknown if this medication is excreted in breast milk.

## 2023-05-14 NOTE — PROGRESS NOTES
Chart reviewed. Breo and Prednisone sent by primary care provider. Closing t-con.     Sebastian Loza D.O.

## 2023-05-16 ENCOUNTER — APPOINTMENT (OUTPATIENT)
Dept: PULMONOLOGY | Facility: MEDICAL CENTER | Age: 73
End: 2023-05-16
Attending: INTERNAL MEDICINE
Payer: MEDICARE

## 2023-06-13 DIAGNOSIS — E03.4 HYPOTHYROIDISM DUE TO ACQUIRED ATROPHY OF THYROID: ICD-10-CM

## 2023-06-13 RX ORDER — LEVOTHYROXINE SODIUM 88 UG/1
88 TABLET ORAL
Qty: 50 TABLET | Refills: 3 | Status: SHIPPED | OUTPATIENT
Start: 2023-06-13 | End: 2024-02-13

## 2023-06-13 RX ORDER — LEVOTHYROXINE SODIUM 0.1 MG/1
100 TABLET ORAL
Qty: 50 TABLET | Refills: 3 | Status: SHIPPED | OUTPATIENT
Start: 2023-06-13 | End: 2024-02-13

## 2023-06-21 ENCOUNTER — HOSPITAL ENCOUNTER (OUTPATIENT)
Dept: LAB | Facility: MEDICAL CENTER | Age: 73
End: 2023-06-21
Attending: INTERNAL MEDICINE
Payer: MEDICARE

## 2023-06-21 DIAGNOSIS — E03.4 HYPOTHYROIDISM DUE TO ACQUIRED ATROPHY OF THYROID: ICD-10-CM

## 2023-06-21 PROCEDURE — 36415 COLL VENOUS BLD VENIPUNCTURE: CPT

## 2023-06-21 PROCEDURE — 84443 ASSAY THYROID STIM HORMONE: CPT

## 2023-06-21 PROCEDURE — 84439 ASSAY OF FREE THYROXINE: CPT

## 2023-06-22 LAB
T4 FREE SERPL-MCNC: 0.98 NG/DL (ref 0.93–1.7)
TSH SERPL DL<=0.005 MIU/L-ACNC: 1.69 UIU/ML (ref 0.38–5.33)

## 2023-07-05 ENCOUNTER — OFFICE VISIT (OUTPATIENT)
Dept: MEDICAL GROUP | Facility: PHYSICIAN GROUP | Age: 73
End: 2023-07-05
Payer: MEDICARE

## 2023-07-05 VITALS
RESPIRATION RATE: 16 BRPM | DIASTOLIC BLOOD PRESSURE: 62 MMHG | SYSTOLIC BLOOD PRESSURE: 118 MMHG | TEMPERATURE: 96.7 F | BODY MASS INDEX: 31.28 KG/M2 | HEART RATE: 72 BPM | OXYGEN SATURATION: 93 % | WEIGHT: 188 LBS

## 2023-07-05 DIAGNOSIS — M25.60 STIFFNESS IN JOINT: ICD-10-CM

## 2023-07-05 DIAGNOSIS — K57.32 SIGMOID DIVERTICULITIS: ICD-10-CM

## 2023-07-05 DIAGNOSIS — F41.1 GENERALIZED ANXIETY DISORDER: ICD-10-CM

## 2023-07-05 PROCEDURE — 3078F DIAST BP <80 MM HG: CPT | Performed by: INTERNAL MEDICINE

## 2023-07-05 PROCEDURE — 99214 OFFICE O/P EST MOD 30 MIN: CPT | Performed by: INTERNAL MEDICINE

## 2023-07-05 PROCEDURE — 3074F SYST BP LT 130 MM HG: CPT | Performed by: INTERNAL MEDICINE

## 2023-07-05 RX ORDER — FLUOXETINE 10 MG/1
20 CAPSULE ORAL DAILY
Qty: 100 CAPSULE | Refills: 3
Start: 2023-07-05 | End: 2023-07-27 | Stop reason: SDUPTHER

## 2023-07-05 RX ORDER — CONJUGATED ESTROGENS 0.62 MG/G
CREAM VAGINAL
COMMUNITY
Start: 2023-04-26

## 2023-07-05 RX ORDER — DICYCLOMINE HYDROCHLORIDE 10 MG/1
10 CAPSULE ORAL EVERY 6 HOURS PRN
Qty: 30 CAPSULE | Refills: 1 | Status: SHIPPED | OUTPATIENT
Start: 2023-07-05

## 2023-07-05 ASSESSMENT — FIBROSIS 4 INDEX: FIB4 SCORE: 1.2

## 2023-07-05 NOTE — ASSESSMENT & PLAN NOTE
Recent problem, improved, discussed need for ciprofloxacin and metronidazole as needed for flareups that are consistent with sigmoid diverticulitis.  Will get her a medicine to have on hand for abdominal cramping and discussed risk for perforation and abscess with recurrent episodes, she will make sure she gets checked out if she has recurrence and keep me updated in the interim.  New prescription for Bentyl.

## 2023-07-05 NOTE — PROGRESS NOTES
"Subjective:   Chief Complaint/History of Present Illness:  Prudence Chayito Gregory is a 72 y.o. female established patient who presents today to discuss medical problems as listed below. Pru is unaccompanied for today's visit.    Problem   Stiffness in Joint    She reports fairly diffuse stiffness in the joints.  She will wake up with it often in the morning and can get better but then after sitting for 20 to 30 minutes and can decompensate.  She has in her bilateral hands, bilateral knees, and also her forearms and elbows.  She has associated joint swelling which has been more troublesome.  She tries to limit meloxicam to avoid risk of GI distress or kidney impairment.  No unusual rashes, oral ulcers, or other changes that she is noted.     Sigmoid Diverticulitis    She has history of recurrent diverticulitis, last episode in 2021, has seen GI with recent colonoscopy in Fall 2022 with 1 polyp.  Took ciprofloxacin and metronidazole in April 2023 due to exacerbation of left lower quadrant pain, improved after about a week.  Advised her red flag features that would prompt need for evaluation in the emergency department.     Generalized Anxiety Disorder    PHQ9-18 in April 2023    Longstanding anxiety, she reports she has been \"in denial\" for years. Was trialed on Zoloft in the mid 1990's but does not recall it helping. She can be irritable as well as easy to anger that she feels is out of proportion to the situation. No panic attacks.      Some improvement with Prozac but feels the benefit has tapered off and would like to try a dose adjustment.    Current regimen: fluoxetine 20 mg daily  Previous regimen: fluoxetine 10 mg daily            Current Medications:  Current Outpatient Medications Ordered in Epic   Medication Sig Dispense Refill    PREMARIN 0.625 MG/GM Cream APPLY 1.0 GRAM TO AFFECTED AREA 3X/WEEK      FLUoxetine (PROZAC) 10 MG Cap Take 2 Capsules by mouth every day. 100 Capsule 3    dicyclomine (BENTYL) 10 MG " Cap Take 1 Capsule by mouth every 6 hours as needed (abdominal cramping). 30 Capsule 1    levothyroxine (SYNTHROID) 100 MCG Tab Take 1 Tablet by mouth every 48 hours. To alternate with 88 mcg dose 50 Tablet 3    levothyroxine (SYNTHROID) 88 MCG Tab Take 1 Tablet by mouth every 48 hours. To alternate with 100 mcg dose 50 Tablet 3    fluticasone furoate-vilanterol (BREO) 200-25 MCG/ACT AEROSOL POWDER, BREATH ACTIVATED Inhale 1 Puff every day. 60 Each 3    neomycin-polymixin-dexamethasone (MAXITROL) 3.5-02778-0.1 Ointment ophthalmic ointment       clobetasol (TEMOVATE) 0.05 % Cream       albuterol 108 (90 Base) MCG/ACT Aero Soln inhalation aerosol INHALE 2 PUFFS BY MOUTH EVERY FOUR HOURS AS NEEDED FOR SHORTNESS OF BREATH. 6.7 Each 3    meloxicam (MOBIC) 15 MG tablet TAKE 1 TABLET BY MOUTH 1 TIME A DAY AS NEEDED (PAIN). DO NOT TAKE OTHER NSAIDS 100 Tablet 3    rosuvastatin (CRESTOR) 10 MG Tab TAKE 1 TABLET BY MOUTH DAILY IN THE EVENING 100 Tablet 3    baclofen (LIORESAL) 10 MG Tab Take 1 Tablet by mouth 3 times a day as needed (muscle spasm/pain). (Patient taking differently: Take 10 mg by mouth 3 times a day as needed (muscle spasm/pain). Have not taken for a month) 90 Tablet 3    Capsaicin 0.15 % Liquid capsaicin 0.15 % topical liquid   0.025% applied topically on scene. Time administered: 1125      augmented betamethasone dipropionate (DIPROLENE-AF) 0.05 % ointment betamethasone, augmented 0.05 % topical ointment      triamcinolone acetonide (KENALOG) 0.1 % Ointment TOPICAL APPLY SPARINGLY TO THE AFFECTED AREA 3XWK.      ipratropium-albuterol (DUONEB) 0.5-2.5 (3) MG/3ML nebulizer solution Take 3 mL by nebulization 4 times a day. (Patient taking differently: Take 3 mL by nebulization every four hours as needed.) 3 mL 3    coenzyme Q-10 30 MG capsule Take 60 mg by mouth every day.      Cyanocobalamin (B-12 PO) Take 1 Tablet by mouth every day.      Misc. Devices Misc Please provide nocturnal oxgen 2 LPM Nasal Cannula,  concentrator 99 Each 99     No current [facility]-ordered facility-administered medications on file.          Objective:   Physical Exam:    Vitals: /62 (BP Location: Left arm, Patient Position: Sitting, BP Cuff Size: Adult)   Pulse 72   Temp 35.9 °C (96.7 °F) (Temporal)   Resp 16   Wt 85.3 kg (188 lb)   SpO2 93%    BMI: Body mass index is 31.28 kg/m² (pended).  Physical Exam  Constitutional:       General: She is not in acute distress.     Appearance: Normal appearance. She is not ill-appearing.   HENT:      Right Ear: External ear normal. There is no impacted cerumen.      Left Ear: External ear normal. There is no impacted cerumen.   Eyes:      General: No scleral icterus.     Conjunctiva/sclera: Conjunctivae normal.   Cardiovascular:      Rate and Rhythm: Normal rate and regular rhythm.      Pulses: Normal pulses.      Heart sounds: No murmur heard.  Pulmonary:      Effort: Pulmonary effort is normal. No respiratory distress.      Breath sounds: No wheezing, rhonchi or rales.   Abdominal:      General: Bowel sounds are normal. There is no distension.      Palpations: Abdomen is soft.      Tenderness: There is no abdominal tenderness.   Musculoskeletal:      Right lower leg: No edema.      Left lower leg: No edema.   Lymphadenopathy:      Cervical: No cervical adenopathy.   Skin:     General: Skin is warm and dry.      Findings: No rash.   Neurological:      Gait: Gait normal.   Psychiatric:         Mood and Affect: Mood normal.         Behavior: Behavior normal.         Thought Content: Thought content normal.         Judgment: Judgment normal.               Assessment and Plan:   Prudence is a 72 y.o. female with the following:  Problem List Items Addressed This Visit       Generalized anxiety disorder     Chronic ongoing problem, initially noted a benefit with the initiation of fluoxetine 10 mg daily but it tapered off fairly quickly.  She would like to try a dose adjustment as she is still feeling quite  irritable.  Increase fluoxetine 20 mg daily and keep me updated over MyChart.  Also room to increase again in 4 weeks if needed.  She will monitor closely and let me know.         Relevant Medications    FLUoxetine (PROZAC) 10 MG Cap    Sigmoid diverticulitis     Recent problem, improved, discussed need for ciprofloxacin and metronidazole as needed for flareups that are consistent with sigmoid diverticulitis.  Will get her a medicine to have on hand for abdominal cramping and discussed risk for perforation and abscess with recurrent episodes, she will make sure she gets checked out if she has recurrence and keep me updated in the interim.  New prescription for Bentyl.         Relevant Medications    dicyclomine (BENTYL) 10 MG Cap    Stiffness in joint     New decompensated problem.  Will screen for autoimmune causes, could consider switching out meloxicam for Celebrex to help with the GI distress potential.  Will refer to rheumatology if her evaluation is concerning for RA, polymyalgia rheumatica, etc.  She is agreeable.         Relevant Orders    CRP QUANTITIVE (NON-CARDIAC)    Sed Rate    RHEUMATOID ARTHRITIS FACTOR    CCP ANTIBODY    NICOLASA IGG ANDRES W/RFLX TO NICOLASA IGG IFA          RTC: Return in about 1 week (around 7/12/2023).    I spent a total of 36 minutes with record review, exam, communication with the patient, communication with other providers, and documentation of this encounter.    PLEASE NOTE: This dictation was created using voice recognition software. I have made every reasonable attempt to correct obvious errors, but I expect that there are errors of grammar and possibly content that I did not discover before finalizing the note.      Shawna Recio, DO  Geriatric and Internal Medicine  [facility]

## 2023-07-05 NOTE — ASSESSMENT & PLAN NOTE
Chronic ongoing problem, initially noted a benefit with the initiation of fluoxetine 10 mg daily but it tapered off fairly quickly.  She would like to try a dose adjustment as she is still feeling quite irritable.  Increase fluoxetine 20 mg daily and keep me updated over MyChart.  Also room to increase again in 4 weeks if needed.  She will monitor closely and let me know.

## 2023-07-05 NOTE — ASSESSMENT & PLAN NOTE
New decompensated problem.  Will screen for autoimmune causes, could consider switching out meloxicam for Celebrex to help with the GI distress potential.  Will refer to rheumatology if her evaluation is concerning for RA, polymyalgia rheumatica, etc.  She is agreeable.

## 2023-07-09 DIAGNOSIS — E78.2 MIXED HYPERLIPIDEMIA: ICD-10-CM

## 2023-07-10 RX ORDER — ROSUVASTATIN CALCIUM 10 MG/1
TABLET, COATED ORAL
Qty: 100 TABLET | Refills: 0 | Status: SHIPPED | OUTPATIENT
Start: 2023-07-10 | End: 2023-10-19

## 2023-07-10 NOTE — TELEPHONE ENCOUNTER
Requested Prescriptions     Pending Prescriptions Disp Refills   • rosuvastatin (CRESTOR) 10 MG Tab [Pharmacy Med Name: ROSUVASTATIN CALCIUM 10 MG TAB] 100 Tablet 0     Sig: TAKE 1 TABLET BY MOUTH DAILY IN THE EVENING   Jody Madrid M.D.

## 2023-07-14 ENCOUNTER — HOSPITAL ENCOUNTER (OUTPATIENT)
Dept: LAB | Facility: MEDICAL CENTER | Age: 73
End: 2023-07-14
Attending: INTERNAL MEDICINE
Payer: MEDICARE

## 2023-07-14 DIAGNOSIS — M25.60 STIFFNESS IN JOINT: ICD-10-CM

## 2023-07-14 LAB
CRP SERPL HS-MCNC: <0.3 MG/DL (ref 0–0.75)
ERYTHROCYTE [SEDIMENTATION RATE] IN BLOOD BY WESTERGREN METHOD: 7 MM/HOUR (ref 0–25)

## 2023-07-14 PROCEDURE — 86431 RHEUMATOID FACTOR QUANT: CPT

## 2023-07-14 PROCEDURE — 86140 C-REACTIVE PROTEIN: CPT

## 2023-07-14 PROCEDURE — 85652 RBC SED RATE AUTOMATED: CPT

## 2023-07-14 PROCEDURE — 36415 COLL VENOUS BLD VENIPUNCTURE: CPT

## 2023-07-14 PROCEDURE — 86038 ANTINUCLEAR ANTIBODIES: CPT

## 2023-07-14 PROCEDURE — 86200 CCP ANTIBODY: CPT

## 2023-07-15 LAB — RHEUMATOID FACT SER IA-ACNC: 16 IU/ML (ref 0–14)

## 2023-07-16 LAB — CCP IGG SERPL-ACNC: 2 UNITS (ref 0–19)

## 2023-07-17 LAB — NUCLEAR IGG SER QL IA: NORMAL

## 2023-07-25 ENCOUNTER — APPOINTMENT (OUTPATIENT)
Dept: MEDICAL GROUP | Facility: PHYSICIAN GROUP | Age: 73
End: 2023-07-25
Payer: MEDICARE

## 2023-07-27 DIAGNOSIS — F41.1 GENERALIZED ANXIETY DISORDER: ICD-10-CM

## 2023-07-27 RX ORDER — FLUOXETINE HYDROCHLORIDE 20 MG/1
20 CAPSULE ORAL DAILY
Qty: 100 CAPSULE | Refills: 3 | Status: SHIPPED | OUTPATIENT
Start: 2023-07-27 | End: 2023-10-30 | Stop reason: SDUPTHER

## 2023-08-22 ENCOUNTER — OFFICE VISIT (OUTPATIENT)
Dept: MEDICAL GROUP | Facility: PHYSICIAN GROUP | Age: 73
End: 2023-08-22
Payer: MEDICARE

## 2023-08-22 VITALS
DIASTOLIC BLOOD PRESSURE: 64 MMHG | WEIGHT: 191.4 LBS | RESPIRATION RATE: 12 BRPM | SYSTOLIC BLOOD PRESSURE: 120 MMHG | BODY MASS INDEX: 31.89 KG/M2 | TEMPERATURE: 97.2 F | OXYGEN SATURATION: 97 % | HEART RATE: 67 BPM | HEIGHT: 65 IN

## 2023-08-22 DIAGNOSIS — E78.2 MIXED HYPERLIPIDEMIA: ICD-10-CM

## 2023-08-22 DIAGNOSIS — K21.9 GASTROESOPHAGEAL REFLUX DISEASE WITHOUT ESOPHAGITIS: ICD-10-CM

## 2023-08-22 DIAGNOSIS — E03.4 HYPOTHYROIDISM DUE TO ACQUIRED ATROPHY OF THYROID: ICD-10-CM

## 2023-08-22 DIAGNOSIS — F41.1 GENERALIZED ANXIETY DISORDER: ICD-10-CM

## 2023-08-22 PROBLEM — H00.012 HORDEOLUM EXTERNUM OF RIGHT LOWER EYELID: Status: RESOLVED | Noted: 2023-02-16 | Resolved: 2023-08-22

## 2023-08-22 PROCEDURE — 3074F SYST BP LT 130 MM HG: CPT | Performed by: INTERNAL MEDICINE

## 2023-08-22 PROCEDURE — 3078F DIAST BP <80 MM HG: CPT | Performed by: INTERNAL MEDICINE

## 2023-08-22 PROCEDURE — 99214 OFFICE O/P EST MOD 30 MIN: CPT | Performed by: INTERNAL MEDICINE

## 2023-08-22 ASSESSMENT — FIBROSIS 4 INDEX: FIB4 SCORE: 1.2

## 2023-08-22 NOTE — ASSESSMENT & PLAN NOTE
Chronic ongoing problem, she is on meloxicam for knee pain.  We will continue with omeprazole 20 mg daily.  No GERD symptoms at this time unless she holds medicine for more than a day.

## 2023-08-22 NOTE — ASSESSMENT & PLAN NOTE
Chronic stable problem.  Continue medical therapy with rosuvastatin 10 mg daily.  Next lipid panel will be due later this year.

## 2023-08-22 NOTE — ASSESSMENT & PLAN NOTE
Chronic stable problem, labs have improved with alternating current dosages, continue levothyroxine 100 mcg alternating with 88 mcg every other day.

## 2023-08-22 NOTE — ASSESSMENT & PLAN NOTE
Chronic improved problem, she is almost 4 weeks into the new dosing of Prozac and is starting to feel less irritable.  She would like to continue fluoxetine 20 mg daily at this time and will continue making adjustments as needed in the coming weeks.  Next maneuver would be to increase to fluoxetine 30 mg daily.

## 2023-08-22 NOTE — PROGRESS NOTES
"Subjective:   Chief Complaint/History of Present Illness:  Prudence Chayito Gregory is a 72 y.o. female established patient who presents today to discuss medical problems as listed below. Pru is unaccompanied for today's visit.    Problem   Generalized Anxiety Disorder    PHQ9-18 in April 2023    Longstanding anxiety, she reports she has been \"in denial\" for years. Was trialed on Zoloft in the mid 1990's but does not recall it helping. She can be irritable as well as easy to anger that she feels is out of proportion to the situation. No panic attacks.      Some improvement with Prozac but feels the benefit has tapered off and would like to try a dose adjustment.    Current regimen: fluoxetine 20 mg daily  Previous regimen: fluoxetine 10 mg daily       Gastroesophageal Reflux Disease Without Esophagitis    She reports longstanding history of acid reflux with recent decompensation.  Previously on omeprazole with good results.  Notes prior history of hiatal hernia.  She is established Dr[facility].  Now she notes after eating dinner she is having significant heartburn generally from 8 to 10 PM.  She does intermittent fasting and eats from 11 AM to 7 PM.  She is try to raise her head of bed.  She was taking aspirin at night but notes his symptoms will start before she would take her aspirin at bedtime.  Her GI doctor recommended she switch to Pepcid which she has been taking but more on an as-needed basis rather than scheduled.  She thinks she may have had an endoscopy in the past.     Mixed Hyperlipidemia     Latest Reference Range & Units 10/14/22 09:32   Cholesterol,Tot 100 - 199 mg/dL 180   Triglycerides 0 - 149 mg/dL 63   HDL >=40 mg/dL 91   LDL <100 mg/dL 76     The 10-year ASCVD risk score (Dong DK, et al., 2019) is: 9.7%    Due to atherosclerosis started on aspirin and rosuvastatin with improvement in labs.    Incidental imaging with CT in January 2021 showed aortic atherosclerosis.    Current " regimen: rosuvastatin 10 mg daily       Hypothyroidism Due to Acquired Atrophy of Thyroid     Latest Reference Range & Units 06/21/23 14:54   TSH 0.380 - 5.330 uIU/mL 1.690   Free T-4 0.93 - 1.70 ng/dL 0.98     Hypothyroidism diagnosed by abnormal labs.  She denies any signs or symptoms of overtreatment or under treatment at this time.  She has remained asymptomatic to her thyroid disease except for fatigue. Previously on a higher dose in the mid 100's that was reduced around 6642-5377.    Current regimen: levothyroxine 100 mcg alternating with 88 mcg every other day  Previous regimen: levothyroxine 88 mcg daily     Hordeolum Externum of Right Lower Eyelid (Resolved)        Current Medications:  Current Outpatient Medications Ordered in Epic   Medication Sig Dispense Refill    FLUoxetine (PROZAC) 20 MG Cap Take 1 Capsule by mouth every day. 100 Capsule 3    rosuvastatin (CRESTOR) 10 MG Tab TAKE 1 TABLET BY MOUTH DAILY IN THE EVENING 100 Tablet 0    PREMARIN 0.625 MG/GM Cream APPLY 1.0 GRAM TO AFFECTED AREA 3X/WEEK      dicyclomine (BENTYL) 10 MG Cap Take 1 Capsule by mouth every 6 hours as needed (abdominal cramping). 30 Capsule 1    levothyroxine (SYNTHROID) 100 MCG Tab Take 1 Tablet by mouth every 48 hours. To alternate with 88 mcg dose 50 Tablet 3    levothyroxine (SYNTHROID) 88 MCG Tab Take 1 Tablet by mouth every 48 hours. To alternate with 100 mcg dose 50 Tablet 3    fluticasone furoate-vilanterol (BREO) 200-25 MCG/ACT AEROSOL POWDER, BREATH ACTIVATED Inhale 1 Puff every day. 60 Each 3    neomycin-polymixin-dexamethasone (MAXITROL) 3.5-11143-0.1 Ointment ophthalmic ointment       clobetasol (TEMOVATE) 0.05 % Cream       albuterol 108 (90 Base) MCG/ACT Aero Soln inhalation aerosol INHALE 2 PUFFS BY MOUTH EVERY FOUR HOURS AS NEEDED FOR SHORTNESS OF BREATH. 6.7 Each 3    meloxicam (MOBIC) 15 MG tablet TAKE 1 TABLET BY MOUTH 1 TIME A DAY AS NEEDED (PAIN). DO NOT TAKE OTHER NSAIDS 100 Tablet 3    baclofen  "(LIORESAL) 10 MG Tab Take 1 Tablet by mouth 3 times a day as needed (muscle spasm/pain). (Patient taking differently: Take 10 mg by mouth 3 times a day as needed (muscle spasm/pain). Have not taken for a month) 90 Tablet 3    Capsaicin 0.15 % Liquid capsaicin 0.15 % topical liquid   0.025% applied topically on scene. Time administered: 1125      augmented betamethasone dipropionate (DIPROLENE-AF) 0.05 % ointment betamethasone, augmented 0.05 % topical ointment      triamcinolone acetonide (KENALOG) 0.1 % Ointment TOPICAL APPLY SPARINGLY TO THE AFFECTED AREA 3XWK.      ipratropium-albuterol (DUONEB) 0.5-2.5 (3) MG/3ML nebulizer solution Take 3 mL by nebulization 4 times a day. (Patient taking differently: Take 3 mL by nebulization every four hours as needed.) 3 mL 3    coenzyme Q-10 30 MG capsule Take 60 mg by mouth every day.      Cyanocobalamin (B-12 PO) Take 1 Tablet by mouth every day.      Misc. Devices Misc Please provide nocturnal oxgen 2 LPM Nasal Cannula, concentrator 99 Each 99     No current [facility]-ordered facility-administered medications on file.          Objective:   Physical Exam:    Vitals: /64 (BP Location: Right arm, Patient Position: Sitting, BP Cuff Size: Adult)   Pulse 67   Temp 36.2 °C (97.2 °F) (Temporal)   Resp 12   Ht 1.651 m (5' 5\")   Wt 86.8 kg (191 lb 6.4 oz)   SpO2 97%    BMI: Body mass index is 31.85 kg/m².  Physical Exam  Constitutional:       General: She is not in acute distress.     Appearance: Normal appearance. She is not ill-appearing.   HENT:      Right Ear: External ear normal.      Left Ear: External ear normal.   Eyes:      General: No scleral icterus.     Conjunctiva/sclera: Conjunctivae normal.   Cardiovascular:      Rate and Rhythm: Normal rate and regular rhythm.      Pulses: Normal pulses.      Heart sounds: No murmur heard.  Pulmonary:      Effort: Pulmonary effort is normal. No respiratory distress.      Breath sounds: No wheezing or rhonchi. "   Lymphadenopathy:      Cervical: No cervical adenopathy.   Skin:     General: Skin is warm and dry.      Findings: No rash.   Psychiatric:         Mood and Affect: Mood normal.         Behavior: Behavior normal.         Thought Content: Thought content normal.         Judgment: Judgment normal.      Comments: Improved mood               Assessment and Plan:   Nancy is a 72 y.o. female with the following:  Problem List Items Addressed This Visit       Gastroesophageal reflux disease without esophagitis     Chronic ongoing problem, she is on meloxicam for knee pain.  We will continue with omeprazole 20 mg daily.  No GERD symptoms at this time unless she holds medicine for more than a day.         Generalized anxiety disorder     Chronic improved problem, she is almost 4 weeks into the new dosing of Prozac and is starting to feel less irritable.  She would like to continue fluoxetine 20 mg daily at this time and will continue making adjustments as needed in the coming weeks.  Next maneuver would be to increase to fluoxetine 30 mg daily.         Hypothyroidism due to acquired atrophy of thyroid     Chronic stable problem, labs have improved with alternating current dosages, continue levothyroxine 100 mcg alternating with 88 mcg every other day.         Mixed hyperlipidemia     Chronic stable problem.  Continue medical therapy with rosuvastatin 10 mg daily.  Next lipid panel will be due later this year.               RTC: Return in about 6 months (around 2/22/2024).    I spent a total of 34 minutes with record review, exam, communication with the patient, communication with other providers, and documentation of this encounter.    PLEASE NOTE: This dictation was created using voice recognition software. I have made every reasonable attempt to correct obvious errors, but I expect that there are errors of grammar and possibly content that I did not discover before finalizing the note.      Shawna Recio,  DO  Geriatric and Internal Medicine  [facility]

## 2023-09-11 DIAGNOSIS — J45.901 MODERATE ASTHMA WITH EXACERBATION, UNSPECIFIED WHETHER PERSISTENT: ICD-10-CM

## 2023-09-11 RX ORDER — FLUTICASONE FUROATE AND VILANTEROL TRIFENATATE 200; 25 UG/1; UG/1
1 POWDER RESPIRATORY (INHALATION)
Qty: 60 EACH | Refills: 3 | Status: SHIPPED | OUTPATIENT
Start: 2023-09-11 | End: 2024-03-25 | Stop reason: SDUPTHER

## 2023-10-12 ENCOUNTER — PATIENT MESSAGE (OUTPATIENT)
Dept: HEALTH INFORMATION MANAGEMENT | Facility: OTHER | Age: 73
End: 2023-10-12

## 2023-10-12 ENCOUNTER — NURSE TRIAGE (OUTPATIENT)
Dept: HEALTH INFORMATION MANAGEMENT | Facility: OTHER | Age: 73
End: 2023-10-12

## 2023-10-12 ENCOUNTER — APPOINTMENT (OUTPATIENT)
Dept: URGENT CARE | Facility: PHYSICIAN GROUP | Age: 73
End: 2023-10-12
Payer: MEDICARE

## 2023-10-12 NOTE — TELEPHONE ENCOUNTER
"Reason for Disposition   Poor fluid intake probably causing dizziness    Answer Assessment - Initial Assessment Questions  1. DESCRIPTION: \"Describe your dizziness.\"      Just feel a little off. Eyes feel dizzy. Better with closing eyes and lying down.   2. LIGHTHEADED: \"Do you feel lightheaded?\" (e.g., somewhat faint, woozy, weak upon standing)      No  3. VERTIGO: \"Do you feel like either you or the room is spinning or tilting?\" (i.e. vertigo)      Yes  4. SEVERITY: \"How bad is it?\"  \"Do you feel like you are going to faint?\" \"Can you stand and walk?\"    - MILD - walking normally    - MODERATE - interferes with normal activities (e.g., work, school)     - SEVERE - unable to stand, requires support to walk, feels like passing out now.       Mild--occassionally off balance. This morning during more significant episodes it was moderate to severe.   5. ONSET:  \"When did the dizziness begin?\"       Dizzy while laying down, getting eyebrows waxed yesterday, which is not a new activity for her. Lasted less than one minute. Disappeared when she sat up.  6. AGGRAVATING FACTORS: \"Does anything make it worse?\" (e.g., standing, change in head position)      Head position does not affect it. Bending over did make it worse one time while in the shower.   7. HEART RATE: \"Can you tell me your heart rate?\" \"How many beats in 15 seconds?\"  (Note: not all patients can do this)        60's by palp  8. CAUSE: \"What do you think is causing the dizziness?\"      unsure  9. RECURRENT SYMPTOM: \"Have you had dizziness before?\" If so, ask: \"When was the last time?\" \"What happened that time?\"      Several years ago had vertigo but cannot recall details.  10. OTHER SYMPTOMS: \"Do you have any other symptoms?\" (e.g., fever, chest pain, vomiting, diarrhea, bleeding)        Nausea this morning, resolved w/ banana.  11. PREGNANCY: \"Is there any chance you are pregnant?\" \"When was your last menstrual period?\"        N/A  OTHER:  Took Baclofen twice " yesterday for muscle strain; she has taken this medication in the past. First episode happened yesterday, prior to taking Baclofen.   This a.m. 0700, she got OOB to bathroom and felt slightly dizzy. Went back to bed. Did this twice and had some dizziness & nausea the second time. Ate a banana and had decaf coffee; felt a little better. Episodes lasted about 60-90 seconds long. Felt better with lying down on her side. She admits she is probably not hydrating enough. She is out shopping, not driving; does not have constant dizziness, but still just doesn't feel completely balanced.    Protocols used: Dizziness-A-OH

## 2023-10-13 ENCOUNTER — OFFICE VISIT (OUTPATIENT)
Dept: URGENT CARE | Facility: PHYSICIAN GROUP | Age: 73
End: 2023-10-13
Payer: MEDICARE

## 2023-10-13 VITALS
BODY MASS INDEX: 30.82 KG/M2 | DIASTOLIC BLOOD PRESSURE: 84 MMHG | HEART RATE: 79 BPM | TEMPERATURE: 97.3 F | RESPIRATION RATE: 18 BRPM | OXYGEN SATURATION: 95 % | SYSTOLIC BLOOD PRESSURE: 130 MMHG | HEIGHT: 65 IN | WEIGHT: 185 LBS

## 2023-10-13 DIAGNOSIS — M62.830 BACK MUSCLE SPASM: ICD-10-CM

## 2023-10-13 DIAGNOSIS — R42 DIZZY: ICD-10-CM

## 2023-10-13 DIAGNOSIS — J96.11 CHRONIC RESPIRATORY FAILURE WITH HYPOXIA (HCC): ICD-10-CM

## 2023-10-13 PROCEDURE — 3079F DIAST BP 80-89 MM HG: CPT | Performed by: FAMILY MEDICINE

## 2023-10-13 PROCEDURE — 99214 OFFICE O/P EST MOD 30 MIN: CPT | Performed by: FAMILY MEDICINE

## 2023-10-13 PROCEDURE — 3075F SYST BP GE 130 - 139MM HG: CPT | Performed by: FAMILY MEDICINE

## 2023-10-13 RX ORDER — MECLIZINE HCL 25MG 25 MG/1
TABLET, CHEWABLE ORAL
Qty: 10 TABLET | Refills: 0 | Status: SHIPPED | OUTPATIENT
Start: 2023-10-13

## 2023-10-13 ASSESSMENT — ENCOUNTER SYMPTOMS
DIZZINESS: 1
NAUSEA: 1

## 2023-10-13 ASSESSMENT — FIBROSIS 4 INDEX: FIB4 SCORE: 1.2

## 2023-10-13 NOTE — PROGRESS NOTES
"Subjective     Pru Chayito Gregory is a 72 y.o. female who presents with Dizziness (X2 days. Comes and goes. )      - This is a very pleasant 72 y.o. who has come to the walk-in clinic today for ~2 days w/ dizzy spell. Got out of bed and felt room spinning a little and like had a hang over and a little nausea. Went back to bed and woke up later in morning and wasn't as bad and last briefly for a few minutes. Today very mild spell earlier this morning. Did take some baclofen the night prior to this starting for back muscle spasms that she gets, stopped taking it and seems to be making her feel less dizzy.    No focal limb weakness/numbness, speech/vision change. No recent head trauma or headaches     Also uses oxygen at night but hasnt been using it lately b/c doesn't fit right.           ALLERGIES:  Codeine     PMH:  Past Medical History:   Diagnosis Date    Acute cystitis with hematuria 4/20/2020    Urine dipstick performed in clinic demonstrated trace glucose, 1+ bili, 1+ ketones, specific gravity 1.015, trace intact blood, pH 5.5, 2+ protein, 2.0 urobilinogen, positive nitrite, 3+ leukocyte esterase.  She performed a telemedicine visit yesterday and was placed on cephalosporin for presumptive urinary tract infection.  She was also given Pyridium due to discomfort with dysuria.  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  This was done on her right upper gums.  2 weeks following the procedure she had a full liquid diet/soft food diet and this was advanced on Valentine's Day dinner, February 14, 2020.  On the following Monday, February 17, 2020 she notes development of \"bloodshot \"gums causing concern and when she clay    CAD (coronary artery disease)     Chickenpox     GERD (gastroesophageal reflux disease)     Heartburn     Hordeolum externum of right lower eyelid " 2/16/2023    Hyperlipidemia     Hypothyroidism     Insomnia     Trouble going to and staying asleep    Lymphadenopathy 9/16/2021    Mass of left submandibular region 5/26/2021    She reports development of a mass in her chin/neck area.  On exam it appears to be submandibular on the left anterolateral aspect.  It is easily palpable, mobile, and approximately 1 x 1 cm.  It is nontender.  There is some associated inflammation in the region that is visible on inspection.  She has been working with periodontist due to gum disease and wonders if it could be related to her dentit    Mumps     Painful joint     Stomatitis 9/16/2021    Thyroid disease     Tonsillitis     Wears glasses         PSH:  Past Surgical History:   Procedure Laterality Date    ABDOMINAL HYSTERECTOMY TOTAL      CHOLECYSTECTOMY      HYSTERECTOMY LAPAROSCOPY      TONSILLECTOMY         MEDS:    Current Outpatient Medications:     Meclizine HCl (ANTIVERT) 25 MG Chew Tab, 1 q12hr prn vertigo, Disp: 10 Tablet, Rfl: 0    BREO ELLIPTA 200-25 MCG/ACT AEROSOL POWDER, BREATH ACTIVATED, INHALE 1 PUFF BY MOUTH EVERY DAY, Disp: 60 Each, Rfl: 3    FLUoxetine (PROZAC) 20 MG Cap, Take 1 Capsule by mouth every day., Disp: 100 Capsule, Rfl: 3    rosuvastatin (CRESTOR) 10 MG Tab, TAKE 1 TABLET BY MOUTH DAILY IN THE EVENING, Disp: 100 Tablet, Rfl: 0    PREMARIN 0.625 MG/GM Cream, APPLY 1.0 GRAM TO AFFECTED AREA 3X/WEEK, Disp: , Rfl:     dicyclomine (BENTYL) 10 MG Cap, Take 1 Capsule by mouth every 6 hours as needed (abdominal cramping)., Disp: 30 Capsule, Rfl: 1    levothyroxine (SYNTHROID) 100 MCG Tab, Take 1 Tablet by mouth every 48 hours. To alternate with 88 mcg dose, Disp: 50 Tablet, Rfl: 3    levothyroxine (SYNTHROID) 88 MCG Tab, Take 1 Tablet by mouth every 48 hours. To alternate with 100 mcg dose, Disp: 50 Tablet, Rfl: 3    neomycin-polymixin-dexamethasone (MAXITROL) 3.5-99455-2.1 Ointment ophthalmic ointment, , Disp: , Rfl:     clobetasol (TEMOVATE) 0.05 % Cream,  ", Disp: , Rfl:     albuterol 108 (90 Base) MCG/ACT Aero Soln inhalation aerosol, INHALE 2 PUFFS BY MOUTH EVERY FOUR HOURS AS NEEDED FOR SHORTNESS OF BREATH., Disp: 6.7 Each, Rfl: 3    meloxicam (MOBIC) 15 MG tablet, TAKE 1 TABLET BY MOUTH 1 TIME A DAY AS NEEDED (PAIN). DO NOT TAKE OTHER NSAIDS, Disp: 100 Tablet, Rfl: 3    baclofen (LIORESAL) 10 MG Tab, Take 1 Tablet by mouth 3 times a day as needed (muscle spasm/pain). (Patient taking differently: Take 10 mg by mouth 3 times a day as needed (muscle spasm/pain). Have not taken for a month), Disp: 90 Tablet, Rfl: 3    Capsaicin 0.15 % Liquid, capsaicin 0.15 % topical liquid  0.025% applied topically on scene. Time administered: 1125, Disp: , Rfl:     augmented betamethasone dipropionate (DIPROLENE-AF) 0.05 % ointment, betamethasone, augmented 0.05 % topical ointment, Disp: , Rfl:     triamcinolone acetonide (KENALOG) 0.1 % Ointment, TOPICAL APPLY SPARINGLY TO THE AFFECTED AREA 3XWK., Disp: , Rfl:     ipratropium-albuterol (DUONEB) 0.5-2.5 (3) MG/3ML nebulizer solution, Take 3 mL by nebulization 4 times a day. (Patient taking differently: Take 3 mL by nebulization every four hours as needed.), Disp: 3 mL, Rfl: 3    coenzyme Q-10 30 MG capsule, Take 60 mg by mouth every day., Disp: , Rfl:     Cyanocobalamin (B-12 PO), Take 1 Tablet by mouth every day., Disp: , Rfl:     ** I have documented what I find to be significant in regards to past medical, social, family and surgical history  in my HPI or under PMH/PSH/FH review section, otherwise it is noncontributory **         HPI    Review of Systems   Gastrointestinal:  Positive for nausea.   Neurological:  Positive for dizziness.   All other systems reviewed and are negative.             Objective     /84   Pulse 79   Temp 36.3 °C (97.3 °F) (Temporal)   Resp 18   Ht 1.651 m (5' 5\")   Wt 83.9 kg (185 lb)   SpO2 95%   BMI 30.79 kg/m²      Physical Exam  Vitals and nursing note reviewed.   Constitutional:       " General: She is not in acute distress.     Appearance: Normal appearance. She is well-developed.   HENT:      Head: Normocephalic.   Eyes:      Extraocular Movements: Extraocular movements intact.      Pupils: Pupils are equal, round, and reactive to light.   Cardiovascular:      Heart sounds: Normal heart sounds. No murmur heard.  Pulmonary:      Effort: Pulmonary effort is normal. No respiratory distress.      Breath sounds: Normal breath sounds.   Neurological:      General: No focal deficit present.      Mental Status: She is alert and oriented to person, place, and time.      Cranial Nerves: No cranial nerve deficit.      Motor: No weakness or abnormal muscle tone.      Coordination: Coordination normal.   Psychiatric:         Mood and Affect: Mood normal.         Behavior: Behavior normal.                             Assessment & Plan     1. Dizzy  Meclizine HCl (ANTIVERT) 25 MG Chew Tab      2. Chronic respiratory failure with hypoxia (HCC)- nocturnal oxygen        3. Back muscle spasm            - Dx, plan & d/c instructions discussed   - stop baclofen  - try to use oxygen at night as prescribed   - Rest, stay hydrated  - E.R. precautions discussed     Follow up with your regular primary care providers office in a week for a recheck on today's visit. ER if not improving in 2-3 days or if feeling/getting worse.     Any realistic side effects of medications that may have been given today reviewed.     Patient left in stable condition     Pertinent prior lab work and/or imaging studies in Epic have been reviewed by me today on day of this visit and taken into account for my treatment and plan today    Pertinent PMH/PSH and/or chronic conditions and medications if any were reviewed today and taken into account for my treatment and plan today    Pertinent prior office visit notes in [facility] have been reviewed by me today on day of this visit.    Please note that this dictation may have been created using voice  recognition software, if so I have made every reasonable attempt to correct obvious errors, but I expect that there are errors of grammar and possibly content that I did not discover before finalizing the note.

## 2023-10-30 DIAGNOSIS — F41.1 GENERALIZED ANXIETY DISORDER: ICD-10-CM

## 2023-10-30 RX ORDER — FLUOXETINE HYDROCHLORIDE 20 MG/1
20 CAPSULE ORAL DAILY
Qty: 100 CAPSULE | Refills: 3 | Status: SHIPPED | OUTPATIENT
Start: 2023-10-30

## 2023-10-30 RX ORDER — FLUOXETINE 10 MG/1
10 CAPSULE ORAL DAILY
Qty: 100 CAPSULE | Refills: 3 | Status: SHIPPED | OUTPATIENT
Start: 2023-10-30 | End: 2024-02-13

## 2023-11-16 ENCOUNTER — OFFICE VISIT (OUTPATIENT)
Dept: MEDICAL GROUP | Facility: PHYSICIAN GROUP | Age: 73
End: 2023-11-16
Payer: MEDICARE

## 2023-11-16 VITALS
OXYGEN SATURATION: 94 % | TEMPERATURE: 98.3 F | BODY MASS INDEX: 32.82 KG/M2 | HEIGHT: 65 IN | DIASTOLIC BLOOD PRESSURE: 62 MMHG | SYSTOLIC BLOOD PRESSURE: 128 MMHG | WEIGHT: 197 LBS | RESPIRATION RATE: 16 BRPM | HEART RATE: 82 BPM

## 2023-11-16 DIAGNOSIS — R63.5 WEIGHT GAIN: ICD-10-CM

## 2023-11-16 DIAGNOSIS — F41.1 GENERALIZED ANXIETY DISORDER: ICD-10-CM

## 2023-11-16 DIAGNOSIS — R73.01 ELEVATED FASTING BLOOD SUGAR: ICD-10-CM

## 2023-11-16 DIAGNOSIS — E78.2 MIXED HYPERLIPIDEMIA: ICD-10-CM

## 2023-11-16 DIAGNOSIS — E03.4 HYPOTHYROIDISM DUE TO ACQUIRED ATROPHY OF THYROID: ICD-10-CM

## 2023-11-16 DIAGNOSIS — Z00.00 MEDICARE ANNUAL WELLNESS VISIT, SUBSEQUENT: Primary | ICD-10-CM

## 2023-11-16 PROCEDURE — 3074F SYST BP LT 130 MM HG: CPT | Performed by: INTERNAL MEDICINE

## 2023-11-16 PROCEDURE — G0439 PPPS, SUBSEQ VISIT: HCPCS | Performed by: INTERNAL MEDICINE

## 2023-11-16 PROCEDURE — 3078F DIAST BP <80 MM HG: CPT | Performed by: INTERNAL MEDICINE

## 2023-11-16 RX ORDER — BUPROPION HYDROCHLORIDE 75 MG/1
75 TABLET ORAL 2 TIMES DAILY
Qty: 200 TABLET | Refills: 3 | Status: SHIPPED | OUTPATIENT
Start: 2023-11-16

## 2023-11-16 ASSESSMENT — PATIENT HEALTH QUESTIONNAIRE - PHQ9: CLINICAL INTERPRETATION OF PHQ2 SCORE: 0

## 2023-11-16 ASSESSMENT — ACTIVITIES OF DAILY LIVING (ADL): BATHING_REQUIRES_ASSISTANCE: 0

## 2023-11-16 ASSESSMENT — FIBROSIS 4 INDEX: FIB4 SCORE: 1.2

## 2023-11-16 ASSESSMENT — ENCOUNTER SYMPTOMS: GENERAL WELL-BEING: GOOD

## 2023-11-16 NOTE — PROGRESS NOTES
"Chief Complaint   Patient presents with    Annual Wellness Visit       HPI:  Prudence Chayito Gregory is a 72 y.o. here for Medicare Annual Wellness Visit     Problem   Weight Gain    She reports steady weight gain since we have started and increased Prozac. She notes the Prozac has been helpful for her mood but she would like to come up with a plan to help address the weight gain. Also do for recheck of her thyroid to ensure she is not hypothyroidism.     Generalized Anxiety Disorder    PHQ9-18 in April 2023    Longstanding anxiety, she reports she has been \"in denial\" for years. Was trialed on Zoloft in the mid 1990's but does not recall it helping. She can be irritable as well as easy to anger that she feels is out of proportion to the situation. No panic attacks.      Some improvement with Prozac but feels the benefit has tapered off and would like to try a dose adjustment.    Current regimen: fluoxetine 30 mg daily + Bupropion 75 mg twice daily  Previous regimen: fluoxetine 20 mg daily       Mixed Hyperlipidemia     Latest Reference Range & Units 10/14/22 09:32   Cholesterol,Tot 100 - 199 mg/dL 180   Triglycerides 0 - 149 mg/dL 63   HDL >=40 mg/dL 91   LDL <100 mg/dL 76     The 10-year ASCVD risk score (Dong DK, et al., 2019) is: 9.7%    Due to atherosclerosis started on aspirin and rosuvastatin with improvement in labs.    Incidental imaging with CT in January 2021 showed aortic atherosclerosis.    Current regimen: rosuvastatin 10 mg daily       Hypothyroidism Due to Acquired Atrophy of Thyroid     Latest Reference Range & Units 10/14/22 09:32 03/10/23 09:04 06/21/23 14:54   TSH 0.380 - 5.330 uIU/mL 0.120 (L) 0.230 (L) 1.690   Free T-4 0.93 - 1.70 ng/dL 1.41 1.36 0.98       Hypothyroidism diagnosed by abnormal labs.  She denies any signs or symptoms of overtreatment or under treatment at this time.  She has remained asymptomatic to her thyroid disease except for fatigue. Previously on a higher dose in the mid " 100's that was reduced around [id number].    Current regimen: levothyroxine 100 mcg alternating with 88 mcg every other day  Previous regimen: levothyroxine 88 mcg daily           Patient Active Problem List    Diagnosis Date Noted    Weight gain 11/16/2023    Stiffness in joint 07/05/2023    Sigmoid diverticulitis 04/04/2023    Generalized anxiety disorder 04/04/2023    Rotator cuff arthropathy of left shoulder 01/25/2022    COVID-19 virus infection 12/27/2021    Back muscle spasm 12/08/2021    Pedal edema 12/08/2021    Gastroesophageal reflux disease without esophagitis 11/02/2021    Stress incontinence of urine 09/16/2021    Obesity (BMI 30.0-34.9) 09/15/2021    Chronic respiratory failure with hypoxia (HCC)- nocturnal oxygen 09/15/2021    WARD on CPAP 08/24/2021    Osteopenia of neck of left femur 05/26/2021    Coronary artery disease involving native coronary artery without angina pectoris 05/12/2021    Elevated coronary artery calcium score 04/21/2021    Aortic atherosclerosis (HCC) 03/22/2021    Chronic 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 03/22/2021    Nocturnal hypoxia 12/21/2020    Primary insomnia 10/12/2020    Lichen sclerosus 07/23/2020    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 200 Tablet 3    FLUoxetine (PROZAC) 10 MG Cap Take 1 Capsule by mouth every day. In combination with 20 mg to equal 30 mg 100 Capsule 3    FLUoxetine (PROZAC) 20 MG Cap Take 1 Capsule by mouth every day. In combination with 10 mg to equal 30 mg 100 Capsule 3    rosuvastatin (CRESTOR) 10 MG Tab TAKE 1 TABLET BY MOUTH EVERY DAY IN THE EVENING 100  Tablet 3    Meclizine HCl (ANTIVERT) 25 MG Chew Tab 1 q12hr prn vertigo 10 Tablet 0    BREO ELLIPTA 200-25 MCG/ACT AEROSOL POWDER, BREATH ACTIVATED INHALE 1 PUFF BY MOUTH EVERY DAY 60 Each 3    PREMARIN 0.625 MG/GM Cream APPLY 1.0 GRAM TO AFFECTED AREA 3X/WEEK      dicyclomine (BENTYL) 10 MG Cap Take 1 Capsule by mouth every 6 hours as needed (abdominal cramping). 30 Capsule 1    levothyroxine (SYNTHROID) 100 MCG Tab Take 1 Tablet by mouth every 48 hours. To alternate with 88 mcg dose 50 Tablet 3    levothyroxine (SYNTHROID) 88 MCG Tab Take 1 Tablet by mouth every 48 hours. To alternate with 100 mcg dose 50 Tablet 3    neomycin-polymixin-dexamethasone (MAXITROL) 3.5-27347-5.1 Ointment ophthalmic ointment       clobetasol (TEMOVATE) 0.05 % Cream       albuterol 108 (90 Base) MCG/ACT Aero Soln inhalation aerosol INHALE 2 PUFFS BY MOUTH EVERY FOUR HOURS AS NEEDED FOR SHORTNESS OF BREATH. 6.7 Each 3    meloxicam (MOBIC) 15 MG tablet TAKE 1 TABLET BY MOUTH 1 TIME A DAY AS NEEDED (PAIN). DO NOT TAKE OTHER NSAIDS 100 Tablet 3    baclofen (LIORESAL) 10 MG Tab Take 1 Tablet by mouth 3 times a day as needed (muscle spasm/pain). (Patient taking differently: Take 10 mg by mouth 3 times a day as needed (muscle spasm/pain). Have not taken for a month) 90 Tablet 3    Capsaicin 0.15 % Liquid capsaicin 0.15 % topical liquid   0.025% applied topically on scene. Time administered: 1125      augmented betamethasone dipropionate (DIPROLENE-AF) 0.05 % ointment betamethasone, augmented 0.05 % topical ointment      triamcinolone acetonide (KENALOG) 0.1 % Ointment TOPICAL APPLY SPARINGLY TO THE AFFECTED AREA 3XWK.      ipratropium-albuterol (DUONEB) 0.5-2.5 (3) MG/3ML nebulizer solution Take 3 mL by nebulization 4 times a day. (Patient taking differently: Take 3 mL by nebulization every four hours as needed.) 3 mL 3    coenzyme Q-10 30 MG capsule Take 60 mg by mouth every day.      Cyanocobalamin (B-12 PO) Take 1 Tablet by mouth every day.        No current facility-administered medications for this visit.          Current supplements as per medication list.     Allergies: Codeine    Current social contact/activities: family     She  reports that she quit smoking about 30 years ago. Her smoking use included cigarettes. She started smoking about 45 years ago. She has a 15.0 pack-year smoking history. She has never used smokeless tobacco. She reports current alcohol use of about 2.4 oz of alcohol per week. She reports that she does not use drugs.  Counseling given: Not Answered  Tobacco comments: continued abstinence      ROS:    Gait: Uses no assistive device  Ostomy: No  Other tubes: No  Amputations: No  Chronic oxygen use: Yes sometimes at night  Last eye exam: 2022  Wears hearing aids: No   : Reports urinary leakage during the last 6 months that has somewhat interfered with their daily activities or sleep.      Depression Screening  Little interest or pleasure in doing things?  0 - not at all  Feeling down, depressed , or hopeless? 0 - not at all  Patient Health Questionnaire Score: 0     If depressive symptoms identified deferred to follow up visit unless specifically addressed in assessment and plan.    Interpretation of PHQ-9 Total Score   Score Severity   1-4 No Depression   5-9 Mild Depression   10-14 Moderate Depression   15-19 Moderately Severe Depression   20-27 Severe Depression    Screening for Cognitive Impairment  Do you or any of your friends or family members have any concern about your memory? No  Three Minute Recall (Banana, Sunrise, Chair) 3/3    Inocencio clock face with all 12 numbers and set the hands to show 20 past 8.  Yes 5/5  Cognitive concerns identified deferred for follow up unless specifically addressed in assessment and plan.    Fall Risk Assessment  Has the patient had two or more falls in the last year or any fall with injury in the last year?  No    Safety Assessment  Do you always wear your seatbelt?  Yes  Any changes to  home needed to function safely? No  Difficulty hearing.  No  Patient counseled about all safety risks that were identified.    Functional Assessment ADLs  Are there any barriers preventing you from cooking for yourself or meeting nutritional needs?  No.    Are there any barriers preventing you from driving safely or obtaining transportation?  No.    Are there any barriers preventing you from using a telephone or calling for help?  No    Are there any barriers preventing you from shopping?  No.    Are there any barriers preventing you from taking care of your own finances?  No    Are there any barriers preventing you from managing your medications?  No    Are there any barriers preventing you from showering, bathing or dressing yourself? No    Are there any barriers preventing you from doing housework or laundry? No  Are there any barriers preventing you from using the toilet?No  Are you currently engaging in any exercise or physical activity?  Yes. walking    Self-Assessment of Health  What is your perception of your health? Good  Do you sleep more than six hours a night? No  In the past 7 days, how much did pain keep you from doing your normal work? Some  Do you spend quality time with family or friends (virtually or in person)? Yes  Do you usually eat a heart healthy diet that constists of a variety of fruits, vegetables, whole grains and fiber? Yes  Do you eat foods high in fat and/or Fast Food more than three times per week? No    Advance Care Planning  Do you have an Advance Directive, Living Will, Durable Power of , or POLST? Yes    Living Will     is not on file - instructed patient to bring in a copy to scan into their chart      Health Maintenance Summary            Overdue - COVID-19 Vaccine (6 - Pfizer series) Overdue since 7/22/2023 03/22/2023  Imm Admin: PFIZER BIVALENT SARS-COV-2 VACCINE (12+)    07/05/2022  Imm Admin: PFIZER COATS CAP SARS-COV-2 VACCINATION (12+)    10/04/2021  Imm Admin:  PFIZER PURPLE CAP SARS-COV-2 VACCINATION (12+)    02/12/2021  Imm Admin: PFIZER PURPLE CAP SARS-COV-2 VACCINATION (12+)    01/22/2021  Imm Admin: PFIZER PURPLE CAP SARS-COV-2 VACCINATION (12+)              Overdue - Zoster (Shingles) Vaccines (2 of 2) Overdue since 9/18/2023 07/24/2023  Imm Admin: Zoster Vaccine Recombinant (RZV) (SHINGRIX)              Mammogram (Yearly) Next due on 1/31/2024 01/31/2023  MA-SCREENING MAMMO BILAT W/TOMOSYNTHESIS W/CAD    01/10/2022  MA-SCREENING MAMMO BILAT W/TOMOSYNTHESIS W/CAD    12/09/2020  MA-SCREENING MAMMO BILAT W/TOMOSYNTHESIS W/CAD    11/06/2019  MA-SCREENING MAMMO BILAT W/TOMOSYNTHESIS W/CAD    09/28/2018  MA-SCREENING MAMMO BILAT W/TOMOSYNTHESIS W/CAD    Only the first 5 history entries have been loaded, but more history exists.              Annual Wellness Visit (Every 366 Days) Next due on 11/16/2024 11/16/2023  Visit Dx: Medicare annual wellness visit, subsequent    09/20/2022  Level of Service: [place] ANNUAL WELLNESS VISIT-INCLUDES PPPS SUBSEQUE*    09/15/2021  Level of Service: [place] ANNUAL WELLNESS VISIT-INCLUDES PPPS SUBSEQUE*              Bone Density Scan (Every 2 Years) Next due on 4/10/2025      04/10/2023  DS-BONE DENSITY STUDY (DEXA)    03/24/2021  DS-BONE DENSITY STUDY (DEXA)              IMM DTaP/Tdap/Td Vaccine (3 - Td or Tdap) Next due on 1/4/2028 01/04/2018  Imm Admin: Tdap Vaccine    01/02/2008  Imm Admin: Tdap Vaccine              Colorectal Cancer Screening (Colonoscopy - Every 7 Years) Next due on 10/15/2029      10/15/2022  AMB EXTERNAL COLONOSCOPY RESULTS    10/12/2022  COLONOSCOPY RESULTS    07/03/2022  REFERRAL TO GI FOR COLONOSCOPY    06/23/2022  REFERRAL TO GI FOR COLONOSCOPY    01/13/2012  REFERRAL TO GI FOR COLONOSCOPY              Hepatitis B Vaccine (Hep B) (Series Information) Completed      03/27/2000  Imm Admin: Hepatitis B Vaccine (Adol/Adult)    10/27/1999  Imm Admin: Hepatitis B Vaccine (Adol/Adult)    09/27/1999  Imm  Admin: Hepatitis B Vaccine (Adol/Adult)              Pneumococcal Vaccine: 65+ Years (Series Information) Completed      03/05/2020  Imm Admin: Pneumococcal polysaccharide vaccine (PPSV-23)    11/14/2018  Imm Admin: Pneumococcal Conjugate Vaccine (Prevnar/PCV-13)    09/27/2012  Imm Admin: Pneumococcal polysaccharide vaccine (PPSV-23)              Hepatitis C Screening  Tentatively Complete      03/11/2020  Hepatitis C Antibody component of HEP C VIRUS ANTIBODY              Influenza Vaccine (Series Information) Completed      09/13/2023  Imm Admin: Influenza Vaccine, Quadrivalent, Adjuvanted (Pf)    09/20/2022  Imm Admin: Influenza Vaccine Adult HD    10/04/2021  Imm Admin: Influenza, Unspecified - HISTORICAL DATA    10/12/2020  Imm Admin: Influenza Vaccine Adult HD    11/07/2019  Imm Admin: Influenza Vaccine Adult HD    Only the first 5 history entries have been loaded, but more history exists.              Hepatitis A Vaccine (Hep A) (Series Information) Aged Out      No completion history exists for this topic.              HPV Vaccines (Series Information) Aged Out      No completion history exists for this topic.              Polio Vaccine (Inactivated Polio) (Series Information) Aged Out      No completion history exists for this topic.              Meningococcal Immunization (Series Information) Aged Out      No completion history exists for this topic.                    Patient Care Team:  Shawna Recio D.O. as PCP - General (Internal Medicine)  Shawna Recio D.O. as PCP - [facility] Paneled  Harlan García Ass't as [facility]   Sathya Silva O.D. (Optometry)  Jovani Dewey M.D. (Interventional Cardiology)  [facility]  DEON Reveles (Nurse Practitioner Family)        Social History     Tobacco Use    Smoking status: Former     Current packs/day: 0.00     Average packs/day: 1 pack/day for 15.0 years (15.0 ttl pk-yrs)     Types: Cigarettes     Start  "date: 10/10/1978     Quit date: 1993     Years since quittin.6    Smokeless tobacco: Never    Tobacco comments:     continued abstinence   Vaping Use    Vaping Use: Never used   Substance Use Topics    Alcohol use: Yes     Alcohol/week: 2.4 oz     Types: 4 Glasses of wine per week     Comment: occ    Drug use: No     Family History   Problem Relation Age of Onset    Cancer Father         lung    Breast Cancer Maternal Aunt     Hypertension Maternal Grandmother     Hypertension Maternal Grandfather     Kidney Disease Mother     Heart Disease Neg Hx      She  has a past medical history of Acute cystitis with hematuria (2020), Asthma, Back pain, Burning mouth syndrome- working diagnosis (2020), CAD (coronary artery disease), Chickenpox, GERD (gastroesophageal reflux disease), Heartburn, Hordeolum externum of right lower eyelid (2023), Hyperlipidemia, Hypothyroidism, Insomnia, Lymphadenopathy (2021), Mass of left submandibular region (2021), Mumps, Painful joint, Stomatitis (2021), Thyroid disease, Tonsillitis, and Wears glasses.    She has no past medical history of Apnea, sleep, Daytime sleepiness, Encounter for long-term (current) use of other medications, Gasping for breath, Morning headache, or Snoring.   Past Surgical History:   Procedure Laterality Date    ABDOMINAL HYSTERECTOMY TOTAL      CHOLECYSTECTOMY      HYSTERECTOMY LAPAROSCOPY      TONSILLECTOMY         Exam:   /62 (BP Location: Left arm, Patient Position: Sitting, BP Cuff Size: Adult)   Pulse 82   Temp 36.8 °C (98.3 °F) (Temporal)   Resp 16   Ht 1.651 m (5' 5\")   Wt 89.4 kg (197 lb)   SpO2 94%  Body mass index is 32.78 kg/m².    Hearing good.    Dentition good  Alert, oriented in no acute distress.  Eye contact is good, speech goal directed, affect calm  Physical Exam  Constitutional:       General: She is not in acute distress.     Appearance: Normal appearance. She is not ill-appearing.   HENT:      " Right Ear: Ear canal and external ear normal. There is no impacted cerumen.      Left Ear: Ear canal and external ear normal. There is no impacted cerumen.   Eyes:      General: No scleral icterus.     Conjunctiva/sclera: Conjunctivae normal.   Cardiovascular:      Rate and Rhythm: Normal rate and regular rhythm.      Pulses: Normal pulses.   Pulmonary:      Effort: Pulmonary effort is normal. No respiratory distress.      Breath sounds: No wheezing, rhonchi or rales.   Abdominal:      General: Bowel sounds are normal. There is no distension.      Palpations: Abdomen is soft.   Musculoskeletal:      Right lower leg: No edema.      Left lower leg: No edema.   Lymphadenopathy:      Cervical: No cervical adenopathy.   Skin:     General: Skin is warm and dry.      Findings: No rash.   Neurological:      Gait: Gait normal.   Psychiatric:         Mood and Affect: Mood normal.         Behavior: Behavior normal.         Thought Content: Thought content normal.         Judgment: Judgment normal.         Assessment and Plan. The following treatment and monitoring plan is recommended:    Problem List Items Addressed This Visit       Weight gain (Chronic)     New decompensated problem, doing really well on Prozac 30 mg daily but will try addition of Wellbutrin 75 mg twice daily with slow titration up as needed. She will keep me updated on her mood and her weight.         Generalized anxiety disorder     Chronic ongoing problem, Prozac has helped but experiencing some weight gain, will try adding bupropion 75 mg twice daily and continue fluoxetine 30 mg daily.         Relevant Medications    buPROPion (WELLBUTRIN) 75 MG Tab    Hypothyroidism due to acquired atrophy of thyroid     Chronic ongoing problem, previously had suppressed TSH, now experiencing some weight gain since we adjusted her medicine so need to make sure she is now not overtly hypothyroid.  We will update lab work and she will continue levothyroxine 100 mcg  alternating with 88 mcg every other day.         Relevant Orders    FREE THYROXINE    TSH    Mixed hyperlipidemia     Chronic ongoing problem.  She is due for daily lipid panel, continue rosuvastatin 10 mg daily in the interim.         Relevant Orders    VITAMIN B12    VITAMIN D,25 HYDROXY (DEFICIENCY)    Lipid Profile    Comp Metabolic Panel    CBC WITH DIFFERENTIAL     Other Visit Diagnoses       Medicare annual wellness visit, subsequent    -  Primary    Elevated fasting blood sugar        Relevant Orders    HEMOGLOBIN A1C                Services suggested: No services needed at this time  Health Care Screening: Age-appropriate preventive services recommended by USPTF and ACIP covered by Medicare were discussed today. Services ordered if indicated and agreed upon by the patient.  Referrals offered: Community-based lifestyle interventions to reduce health risks and promote self-management and wellness, fall prevention, nutrition, physical activity, tobacco-use cessation, weight loss, and mental health services as per orders if indicated.    Discussion today about general wellness and lifestyle habits:    Prevent falls and reduce trip hazards; Cautioned about securing or removing rugs.  Have a working fire alarm and carbon monoxide detector;   Engage in regular physical activity and social activities     Follow-up: Return in about 3 months (around 2/16/2024).    I spent a total of 34 minutes with record review, exam, communication with the patient, communication with other providers, and documentation of this encounter.      Shawna Recio, DO  Geriatric and Internal Medicine  [facility]

## 2023-11-16 NOTE — ASSESSMENT & PLAN NOTE
Chronic ongoing problem, previously had suppressed TSH, now experiencing some weight gain since we adjusted her medicine so need to make sure she is now not overtly hypothyroid.  We will update lab work and she will continue levothyroxine 100 mcg alternating with 88 mcg every other day.

## 2023-11-16 NOTE — ASSESSMENT & PLAN NOTE
Chronic ongoing problem.  She is due for daily lipid panel, continue rosuvastatin 10 mg daily in the interim.

## 2023-11-16 NOTE — ASSESSMENT & PLAN NOTE
New decompensated problem, doing really well on Prozac 30 mg daily but will try addition of Wellbutrin 75 mg twice daily with slow titration up as needed. She will keep me updated on her mood and her weight.

## 2023-12-04 ENCOUNTER — APPOINTMENT (OUTPATIENT)
Dept: MEDICAL GROUP | Facility: PHYSICIAN GROUP | Age: 73
End: 2023-12-04
Payer: MEDICARE

## 2024-01-12 ENCOUNTER — APPOINTMENT (OUTPATIENT)
Dept: MEDICAL GROUP | Facility: PHYSICIAN GROUP | Age: 74
End: 2024-01-12
Attending: FAMILY MEDICINE
Payer: MEDICARE

## 2024-01-12 ASSESSMENT — PATIENT HEALTH QUESTIONNAIRE - PHQ9
2. FEELING DOWN, DEPRESSED, IRRITABLE, OR HOPELESS: NOT AT ALL
1. LITTLE INTEREST OR PLEASURE IN DOING THINGS: SEVERAL DAYS

## 2024-01-12 ASSESSMENT — ACTIVITIES OF DAILY LIVING (ADL): BATHING_REQUIRES_ASSISTANCE: 0

## 2024-01-12 ASSESSMENT — ENCOUNTER SYMPTOMS: GENERAL WELL-BEING: GOOD

## 2024-01-15 PROBLEM — M12.812 ROTATOR CUFF ARTHROPATHY OF LEFT SHOULDER: Status: RESOLVED | Noted: 2022-01-25 | Resolved: 2024-01-15

## 2024-01-15 PROBLEM — U07.1 COVID-19 VIRUS INFECTION: Status: RESOLVED | Noted: 2021-12-27 | Resolved: 2024-01-15

## 2024-01-15 PROBLEM — M25.60 STIFFNESS IN JOINT: Status: RESOLVED | Noted: 2023-07-05 | Resolved: 2024-01-15

## 2024-01-15 PROBLEM — M62.830 BACK MUSCLE SPASM: Status: RESOLVED | Noted: 2021-12-08 | Resolved: 2024-01-15

## 2024-01-15 ASSESSMENT — PATIENT HEALTH QUESTIONNAIRE - PHQ9: CLINICAL INTERPRETATION OF PHQ2 SCORE: 0

## 2024-01-15 NOTE — ASSESSMENT & PLAN NOTE
Chronic, stable. Currently taking levothyroxine. Denies fatigue, palpitations, hair and skin changes, temperature intolerance, changes in bowel habits, and weight loss or weight gain.

## 2024-01-15 NOTE — ASSESSMENT & PLAN NOTE
Chronic, stable. Currently taking rosuvastatin 10 mg daily. Reports diet is good. She is not exercising regularly. Denies chest pain, claudication, and dizziness.

## 2024-01-15 NOTE — ASSESSMENT & PLAN NOTE
Chronic, stable. Currently well-managed with albuterol and duoneb as needed and Breo Ellipta inhaler. Denies recent exacerbation, infection, and shortness of breath.

## 2024-01-15 NOTE — ASSESSMENT & PLAN NOTE
"Chronic, stable. Reviewed CT abdomen/pelvis from January 2021: \"aortic atherosclerosis without aneurysm.\" Denies chest pain, pain with ambulation, and weakness of extremities.    "

## 2024-01-16 ENCOUNTER — OFFICE VISIT (OUTPATIENT)
Dept: FAMILY PLANNING/WOMEN'S HEALTH CLINIC | Facility: PHYSICIAN GROUP | Age: 74
End: 2024-01-16
Attending: FAMILY MEDICINE
Payer: MEDICARE

## 2024-01-16 VITALS
WEIGHT: 197 LBS | DIASTOLIC BLOOD PRESSURE: 72 MMHG | HEIGHT: 65 IN | BODY MASS INDEX: 32.82 KG/M2 | SYSTOLIC BLOOD PRESSURE: 124 MMHG

## 2024-01-16 DIAGNOSIS — G47.33 OSA (OBSTRUCTIVE SLEEP APNEA): ICD-10-CM

## 2024-01-16 DIAGNOSIS — I70.0 AORTIC ATHEROSCLEROSIS (HCC): ICD-10-CM

## 2024-01-16 DIAGNOSIS — F41.1 GENERALIZED ANXIETY DISORDER: ICD-10-CM

## 2024-01-16 DIAGNOSIS — E03.4 HYPOTHYROIDISM DUE TO ACQUIRED ATROPHY OF THYROID: ICD-10-CM

## 2024-01-16 DIAGNOSIS — K57.32 SIGMOID DIVERTICULITIS: ICD-10-CM

## 2024-01-16 DIAGNOSIS — M85.852 OSTEOPENIA OF NECK OF LEFT FEMUR: ICD-10-CM

## 2024-01-16 DIAGNOSIS — J96.11 CHRONIC RESPIRATORY FAILURE WITH HYPOXIA (HCC): ICD-10-CM

## 2024-01-16 DIAGNOSIS — Z12.31 SCREENING MAMMOGRAM, ENCOUNTER FOR: ICD-10-CM

## 2024-01-16 DIAGNOSIS — N39.3 STRESS INCONTINENCE OF URINE: ICD-10-CM

## 2024-01-16 DIAGNOSIS — E78.2 MIXED HYPERLIPIDEMIA: ICD-10-CM

## 2024-01-16 DIAGNOSIS — J45.40 MODERATE PERSISTENT ASTHMA WITHOUT COMPLICATION: ICD-10-CM

## 2024-01-16 PROBLEM — R60.0 PEDAL EDEMA: Status: RESOLVED | Noted: 2021-12-08 | Resolved: 2024-01-16

## 2024-01-16 PROBLEM — R93.1 ELEVATED CORONARY ARTERY CALCIUM SCORE: Status: RESOLVED | Noted: 2021-04-21 | Resolved: 2024-01-16

## 2024-01-16 PROCEDURE — 3078F DIAST BP <80 MM HG: CPT

## 2024-01-16 PROCEDURE — G0439 PPPS, SUBSEQ VISIT: HCPCS

## 2024-01-16 PROCEDURE — 1125F AMNT PAIN NOTED PAIN PRSNT: CPT

## 2024-01-16 PROCEDURE — 3074F SYST BP LT 130 MM HG: CPT

## 2024-01-16 SDOH — ECONOMIC STABILITY: TRANSPORTATION INSECURITY
IN THE PAST 12 MONTHS, HAS THE LACK OF TRANSPORTATION KEPT YOU FROM MEDICAL APPOINTMENTS OR FROM GETTING MEDICATIONS?: NO

## 2024-01-16 SDOH — ECONOMIC STABILITY: INCOME INSECURITY: IN THE PAST 12 MONTHS, HAS THE ELECTRIC, GAS, OIL, OR WATER COMPANY THREATENED TO SHUT OFF SERVICE IN YOUR HOME?: NO

## 2024-01-16 SDOH — ECONOMIC STABILITY: INCOME INSECURITY: IN THE LAST 12 MONTHS, WAS THERE A TIME WHEN YOU WERE NOT ABLE TO PAY THE MORTGAGE OR RENT ON TIME?: NO

## 2024-01-16 SDOH — ECONOMIC STABILITY: INCOME INSECURITY: HOW HARD IS IT FOR YOU TO PAY FOR THE VERY BASICS LIKE FOOD, HOUSING, MEDICAL CARE, AND HEATING?: NOT HARD AT ALL

## 2024-01-16 SDOH — ECONOMIC STABILITY: HOUSING INSECURITY
IN THE LAST 12 MONTHS, WAS THERE A TIME WHEN YOU DID NOT HAVE A STEADY PLACE TO SLEEP OR SLEPT IN A SHELTER (INCLUDING NOW)?: NO

## 2024-01-16 SDOH — ECONOMIC STABILITY: FOOD INSECURITY: WITHIN THE PAST 12 MONTHS, YOU WORRIED THAT YOUR FOOD WOULD RUN OUT BEFORE YOU GOT MONEY TO BUY MORE.: NEVER TRUE

## 2024-01-16 SDOH — ECONOMIC STABILITY: FOOD INSECURITY: WITHIN THE PAST 12 MONTHS, THE FOOD YOU BOUGHT JUST DIDN'T LAST AND YOU DIDN'T HAVE MONEY TO GET MORE.: NEVER TRUE

## 2024-01-16 SDOH — ECONOMIC STABILITY: TRANSPORTATION INSECURITY
IN THE PAST 12 MONTHS, HAS LACK OF TRANSPORTATION KEPT YOU FROM MEETINGS, WORK, OR FROM GETTING THINGS NEEDED FOR DAILY LIVING?: NO

## 2024-01-16 SDOH — ECONOMIC STABILITY: HOUSING INSECURITY: IN THE LAST 12 MONTHS, HOW MANY PLACES HAVE YOU LIVED?: 1

## 2024-01-16 ASSESSMENT — FIBROSIS 4 INDEX: FIB4 SCORE: 1.22

## 2024-01-16 ASSESSMENT — PAIN SCALES - GENERAL: PAINLEVEL: 5=MODERATE PAIN

## 2024-01-16 NOTE — ASSESSMENT & PLAN NOTE
Chronic, stable. Denies recent flare ups. Reports that she has flagyl and cipro on hand. Previously followed by GI, now PCP.

## 2024-01-16 NOTE — PROGRESS NOTES
Comprehensive Health Assessment Program     Nancy Chayito Gregory is a 73 y.o. here for her comprehensive health assessment.    Patient Active Problem List    Diagnosis Date Noted    Weight gain 11/16/2023    Sigmoid diverticulitis 04/04/2023    Generalized anxiety disorder 04/04/2023    Gastroesophageal reflux disease without esophagitis 11/02/2021    Stress incontinence of urine 09/16/2021    WARD (obstructive sleep apnea) 08/24/2021    Osteopenia of neck of left femur 05/26/2021    Coronary artery disease involving native coronary artery without angina pectoris 05/12/2021    Aortic atherosclerosis (HCC) 03/22/2021    Chronic 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 03/22/2021    Chronic respiratory failure with hypoxia (HCC)- nocturnal oxygen 12/21/2020    Primary insomnia 10/12/2020    Lichen sclerosus 07/23/2020    Burning mouth syndrome- working diagnosis 04/20/2020    Mixed hyperlipidemia 03/26/2020    Moderate persistent asthma without complication 03/05/2020    Vitamin D deficiency 03/05/2020    Hypothyroidism due to acquired atrophy of thyroid 03/05/2020    Skin lesion 03/05/2020       Current Outpatient Medications   Medication Sig Dispense Refill    FLUoxetine (PROZAC) 10 MG Cap Take 1 Capsule by mouth every day. In combination with 20 mg to equal 30 mg 100 Capsule 3    FLUoxetine (PROZAC) 20 MG Cap Take 1 Capsule by mouth every day. In combination with 10 mg to equal 30 mg 100 Capsule 3    rosuvastatin (CRESTOR) 10 MG Tab TAKE 1 TABLET BY MOUTH EVERY DAY IN THE EVENING 100 Tablet 3    Meclizine HCl (ANTIVERT) 25 MG Chew Tab 1 q12hr prn vertigo 10 Tablet 0    BREO ELLIPTA 200-25 MCG/ACT AEROSOL POWDER, BREATH ACTIVATED INHALE 1 PUFF BY MOUTH EVERY DAY 60 Each 3    PREMARIN 0.625 MG/GM Cream APPLY 1.0 GRAM TO AFFECTED AREA 3X/WEEK      dicyclomine (BENTYL) 10 MG Cap Take 1 Capsule by mouth every 6 hours as needed (abdominal cramping). 30 Capsule 1    levothyroxine (SYNTHROID) 100 MCG Tab  Take 1 Tablet by mouth every 48 hours. To alternate with 88 mcg dose 50 Tablet 3    levothyroxine (SYNTHROID) 88 MCG Tab Take 1 Tablet by mouth every 48 hours. To alternate with 100 mcg dose 50 Tablet 3    clobetasol (TEMOVATE) 0.05 % Cream       albuterol 108 (90 Base) MCG/ACT Aero Soln inhalation aerosol INHALE 2 PUFFS BY MOUTH EVERY FOUR HOURS AS NEEDED FOR SHORTNESS OF BREATH. 6.7 Each 3    meloxicam (MOBIC) 15 MG tablet TAKE 1 TABLET BY MOUTH 1 TIME A DAY AS NEEDED (PAIN). DO NOT TAKE OTHER NSAIDS 100 Tablet 3    baclofen (LIORESAL) 10 MG Tab Take 1 Tablet by mouth 3 times a day as needed (muscle spasm/pain). (Patient taking differently: Take 10 mg by mouth 3 times a day as needed (muscle spasm/pain). Have not taken for a month) 90 Tablet 3    triamcinolone acetonide (KENALOG) 0.1 % Ointment TOPICAL APPLY SPARINGLY TO THE AFFECTED AREA 3XWK.      ipratropium-albuterol (DUONEB) 0.5-2.5 (3) MG/3ML nebulizer solution Take 3 mL by nebulization 4 times a day. (Patient taking differently: Take 3 mL by nebulization every four hours as needed.) 3 mL 3    coenzyme Q-10 30 MG capsule Take 60 mg by mouth every day.      Cyanocobalamin (B-12 PO) Take 1 Tablet by mouth every day.      buPROPion (WELLBUTRIN) 75 MG Tab Take 1 Tablet by mouth 2 times a day. (Patient not taking: Reported on 1/16/2024) 200 Tablet 3    neomycin-polymixin-dexamethasone (MAXITROL) 3.5-78046-1.1 Ointment ophthalmic ointment  (Patient not taking: Reported on 1/16/2024)      Capsaicin 0.15 % Liquid capsaicin 0.15 % topical liquid   0.025% applied topically on scene. Time administered: 1125 (Patient not taking: Reported on 1/16/2024)      augmented betamethasone dipropionate (DIPROLENE-AF) 0.05 % ointment betamethasone, augmented 0.05 % topical ointment (Patient not taking: Reported on 1/16/2024)       No current facility-administered medications for this visit.          Current supplements as per medication list.     Allergies:   Codeine  Social  History     Tobacco Use    Smoking status: Former     Current packs/day: 0.00     Average packs/day: 1 pack/day for 15.0 years (15.0 ttl pk-yrs)     Types: Cigarettes     Start date: 10/10/1978     Quit date: 1993     Years since quittin.8    Smokeless tobacco: Never    Tobacco comments:     continued abstinence   Vaping Use    Vaping Use: Never used   Substance Use Topics    Alcohol use: Yes     Alcohol/week: 1.8 oz     Types: 3 Glasses of wine per week     Comment: occasionally    Drug use: No     Family History   Problem Relation Age of Onset    Cancer Father         lung    Breast Cancer Maternal Aunt     Hypertension Maternal Grandmother     Hypertension Maternal Grandfather     Kidney Disease Mother     Heart Disease Neg Hx      Prudence  has a past medical history of Acute cystitis with hematuria (2020), Asthma, Back muscle spasm (2021), Back pain, Burning mouth syndrome- working diagnosis (2020), CAD (coronary artery disease), Chickenpox, COVID-19 virus infection (2021), Elevated coronary artery calcium score (2021), GERD (gastroesophageal reflux disease), Heartburn, Hordeolum externum of right lower eyelid (2023), Hyperlipidemia, Hypothyroidism, Insomnia, Lymphadenopathy (2021), Mass of left submandibular region (2021), Mumps, WARD on CPAP (2021), Painful joint, Pedal edema (2021), Rotator cuff arthropathy of left shoulder (2022), Stiffness in joint (2023), Stomatitis (2021), Thyroid disease, Tonsillitis, and Wears glasses.    She has no past medical history of Daytime sleepiness, Encounter for long-term (current) use of other medications, Gasping for breath, Morning headache, or Snoring.   Past Surgical History:   Procedure Laterality Date    ABDOMINAL HYSTERECTOMY TOTAL      CHOLECYSTECTOMY      HYSTERECTOMY LAPAROSCOPY      TONSILLECTOMY         Screening:  In the last six months have you experienced any leakage of  urine? Yes, history of stress incontinence. Reports use of panty liners daily. Denies urinary frequency, urgency, and dysuria.      Depression Screening  Little interest or pleasure in doing things?  1 - several days  Feeling down, depressed , or hopeless? 0 - not at all  Patient Health Questionnaire Score: 0     If depressive symptoms identified deferred to follow up visit unless specifically addressed in assessment and plan.    Interpretation of PHQ-9 Total Score   Score Severity   1-4 No Depression   5-9 Mild Depression   10-14 Moderate Depression   15-19 Moderately Severe Depression   20-27 Severe Depression    Screening for Cognitive Impairment  Do you or any of your friends or family members have any concern about your memory? No  Three Minute Recall (Banana, Sunrise, Chair) 3/3    Inocencio clock face with all 12 numbers and set the hands to show 20 past 8.  Yes 5/5  Cognitive concerns identified deferred for follow up unless specifically addressed in assessment and plan.    Fall Risk Assessment  Has the patient had two or more falls in the last year or any fall with injury in the last year?  No    Safety Assessment  Do you always wear your seatbelt?  Yes  Any changes to home needed to function safely? No  Difficulty hearing.  No  Patient counseled about all safety risks that were identified.    Functional Assessment ADLs  Are there any barriers preventing you from cooking for yourself or meeting nutritional needs?  No.    Are there any barriers preventing you from driving safely or obtaining transportation?  No.    Are there any barriers preventing you from using a telephone or calling for help?  No    Are there any barriers preventing you from shopping?  No.    Are there any barriers preventing you from taking care of your own finances?  No    Are there any barriers preventing you from managing your medications?  No    Are there any barriers preventing you from showering, bathing or dressing yourself? No    Are  there any barriers preventing you from doing housework or laundry? No  Are there any barriers preventing you from using the toilet?No  Are you currently engaging in any exercise or physical activity?  No.      Self-Assessment of Health  What is your perception of your health? Good  Do you sleep more than six hours a night? No  In the past 7 days, how much did pain keep you from doing your normal work? Some  Do you spend quality time with family or friends (virtually or in person)? Yes  Do you usually eat a heart healthy diet that constists of a variety of fruits, vegetables, whole grains and fiber? Yes  Do you eat foods high in fat and/or Fast Food more than three times per week? No    Advance Care Planning  Do you have an Advance Directive, Living Will, Durable Power of , or POLST? Yes                 Health Maintenance Summary            Overdue - COVID-19 Vaccine (6 - 2023-24 season) Overdue since 9/1/2023 03/22/2023  Imm Admin: PFIZER BIVALENT SARS-COV-2 VACCINE (12+)    07/05/2022  Imm Admin: PFIZER COATS CAP SARS-COV-2 VACCINATION (12+)    10/04/2021  Imm Admin: PFIZER PURPLE CAP SARS-COV-2 VACCINATION (12+)    02/12/2021  Imm Admin: PFIZER PURPLE CAP SARS-COV-2 VACCINATION (12+)    01/22/2021  Imm Admin: PFIZER PURPLE CAP SARS-COV-2 VACCINATION (12+)              Ordered - Mammogram (Yearly) Ordered on 1/16/2024 01/31/2023  MA-SCREENING MAMMO BILAT W/TOMOSYNTHESIS W/CAD    01/10/2022  MA-SCREENING MAMMO BILAT W/TOMOSYNTHESIS W/CAD    12/09/2020  MA-SCREENING MAMMO BILAT W/TOMOSYNTHESIS W/CAD    11/06/2019  MA-SCREENING MAMMO BILAT W/TOMOSYNTHESIS W/CAD    09/28/2018  MA-SCREENING MAMMO BILAT W/TOMOSYNTHESIS W/CAD    Only the first 5 history entries have been loaded, but more history exists.              Annual Wellness Visit (Every 366 Days) Next due on 1/16/2025 01/16/2024  Level of Service: CA ANNUAL WELLNESS VISIT-INCLUDES PPPS SUBSEQUE*    11/16/2023  Visit Dx: Medicare annual  wellness visit, subsequent    11/16/2023  Level of Service: ANNUAL WELLNESS VISIT-INCLUDES PPPS SUBSEQUE*    09/20/2022  Level of Service: NE ANNUAL WELLNESS VISIT-INCLUDES PPPS SUBSEQUE*    09/15/2021  Level of Service: NE ANNUAL WELLNESS VISIT-INCLUDES PPPS SUBSEQUE*              Bone Density Scan (Every 2 Years) Next due on 4/10/2025      04/10/2023  DS-BONE DENSITY STUDY (DEXA)    03/24/2021  DS-BONE DENSITY STUDY (DEXA)              IMM DTaP/Tdap/Td Vaccine (3 - Td or Tdap) Next due on 1/4/2028 01/04/2018  Imm Admin: Tdap Vaccine    01/02/2008  Imm Admin: Tdap Vaccine              Colorectal Cancer Screening (Colonoscopy - Every 7 Years) Next due on 10/15/2029      10/15/2022  AMB EXTERNAL COLONOSCOPY RESULTS    10/12/2022  COLONOSCOPY RESULTS    07/03/2022  REFERRAL TO GI FOR COLONOSCOPY    06/23/2022  REFERRAL TO GI FOR COLONOSCOPY    01/13/2012  REFERRAL TO GI FOR COLONOSCOPY              Hepatitis B Vaccine (Hep B) (Series Information) Completed      03/27/2000  Imm Admin: Hepatitis B Vaccine (Adol/Adult)    10/27/1999  Imm Admin: Hepatitis B Vaccine (Adol/Adult)    09/27/1999  Imm Admin: Hepatitis B Vaccine (Adol/Adult)              Pneumococcal Vaccine: 65+ Years (Series Information) Completed      03/05/2020  Imm Admin: Pneumococcal polysaccharide vaccine (PPSV-23)    11/14/2018  Imm Admin: Pneumococcal Conjugate Vaccine (Prevnar/PCV-13)    09/27/2012  Imm Admin: Pneumococcal polysaccharide vaccine (PPSV-23)              Hepatitis C Screening  Tentatively Complete      03/11/2020  Hepatitis C Antibody component of HEP C VIRUS ANTIBODY              Influenza Vaccine (Series Information) Completed      09/13/2023  Imm Admin: Influenza Vaccine, Quadrivalent, Adjuvanted (Pf)    09/20/2022  Imm Admin: Influenza Vaccine Adult HD    10/04/2021  Imm Admin: Influenza, Unspecified - HISTORICAL DATA    10/12/2020  Imm Admin: Influenza Vaccine Adult HD    11/07/2019  Imm Admin: Influenza Vaccine Adult HD     "Only the first 5 history entries have been loaded, but more history exists.              Zoster (Shingles) Vaccines (Series Information) Completed      11/16/2023  Outside Immunization: Zoster Janak (Shingrix)    07/24/2023  Imm Admin: Zoster Vaccine Recombinant (RZV) (SHINGRIX)              Hepatitis A Vaccine (Hep A) (Series Information) Aged Out      No completion history exists for this topic.              HPV Vaccines (Series Information) Aged Out      No completion history exists for this topic.              Polio Vaccine (Inactivated Polio) (Series Information) Aged Out      No completion history exists for this topic.              Meningococcal Immunization (Series Information) Aged Out      No completion history exists for this topic.                    Patient Care Team:  Shawna Recio D.O. as PCP - General (Internal Medicine)  Shawna Recio D.O. as PCP - [facility] Paneled  Harlan García Ass't as [facility]   Sathya Silva O.D. (Optometry)  Jovani Dewey M.D. (Interventional Cardiology)  [facility]  DEON Reveles (Nurse Practitioner Family)      Financial Resource Strain: Low Risk  (1/16/2024)    Overall Financial Resource Strain (CARDIA)     Difficulty of Paying Living Expenses: Not hard at all      Transportation Needs: No Transportation Needs (1/16/2024)    PRAPARE - Transportation     Lack of Transportation (Medical): No     Lack of Transportation (Non-Medical): No      Food Insecurity: No Food Insecurity (1/16/2024)    Hunger Vital Sign     Worried About Running Out of Food in the Last Year: Never true     Ran Out of Food in the Last Year: Never true        Encounter Vitals  Blood Pressure : 124/72  O2 Delivery Device: Nasal Cannula  Weight: 89.4 kg (197 lb)  Height: 165.1 cm (5' 5\")  BMI (Calculated): 32.78  Pain Score: 5=Moderate Pain  Pulmonary Vitals  O2 Flow Rate (L/min): 2  DME  O2 Delivery Device: Nasal Cannula 2L oxygen use at " "night    Alert, oriented in no acute distress.  Eye contact is good, speech goal directed, affect calm.    Assessment and Plan. The following treatment and monitoring plan is recommended:    Mixed hyperlipidemia  Chronic, stable. Currently taking rosuvastatin 10 mg daily. Reports that she is not exercising regularly. Offered [facility] gym resources. Encourage 30 minutes of moderate exercise 3-4 times a week.   Denies chest pain, claudication, and dizziness.    Hypothyroidism due to acquired atrophy of thyroid  Chronic, stable. Currently taking levothyroxine as prescribed. Denies fatigue, palpitations, hair and skin changes, temperature intolerance, and changes in bowel habits.    Moderate persistent asthma without complication  Chronic, stable. Well-managed with Breo Ellipta inhaler and albuterol and duoneb as needed. Denies recent exacerbation, infection, and shortness of breath.    Generalized anxiety disorder  Chronic, stable. Currently taking fluoxetine 30 mg. Reports that mood is good. Denies SI/HI.    Aortic atherosclerosis (HCC)  Chronic, stable. Reviewed CT abdomen/pelvis from January 2021: \"aortic atherosclerosis without aneurysm.\" Denies chest pain, pain with ambulation, and weakness of extremities.    Chronic respiratory failure with hypoxia (HCC)- nocturnal oxygen  Obstructive sleep apnea  Chronic, stable. Reports that she wears 2L of oxygen most nights, but states that it is dry and uncomfortable. No use of CPAP.    Sigmoid diverticulitis  Chronic, stable. Denies recent flare ups. Reports that she has flagyl and ciprofloxacin on hand at all times as needed. Previously followed by GI, now managed by primary care provider.    Stress incontinence of urine  Chronic, ongoing. Reports daily use of panty liners. Denies dysuria, frequency, and flank pain.     Osteopenia of neck of left femur  Chronic, stable. Reviewed DEXA scan from April 2023: \"according to the World Health Organization classification, " "bone mineral density of this patient is osteopenic in the left proximal femur and normal in the lumbar spine.\"     Screening mammogram, encounter for  Mammogram ordered.      Services suggested: No services needed at this time  Health Care Screening: Age-appropriate preventive services recommended by USPTF and ACIP covered by Medicare were discussed today. Services ordered if indicated and agreed upon by the patient.  Referrals offered: Community-based lifestyle interventions to reduce health risks and promote self-management and wellness, fall prevention, nutrition, physical activity, tobacco-use cessation, weight loss, and mental health services as per orders if indicated.    Discussion today about general wellness and lifestyle habits:    Prevent falls and reduce trip hazards; Cautioned about securing or removing rugs.  Have a working fire alarm and carbon monoxide detector.  Engage in regular physical activity and social activities.    Follow-up: Return for appointment with Primary Care Provider as needed.         "

## 2024-01-16 NOTE — ASSESSMENT & PLAN NOTE
Chronic, stable. Reports daily use of panty liners daily. States that she had it fixed when she had a hystectomy in 1994.

## 2024-02-01 ENCOUNTER — HOSPITAL ENCOUNTER (OUTPATIENT)
Dept: RADIOLOGY | Facility: MEDICAL CENTER | Age: 74
End: 2024-02-01
Payer: MEDICARE

## 2024-02-01 DIAGNOSIS — Z12.31 SCREENING MAMMOGRAM, ENCOUNTER FOR: ICD-10-CM

## 2024-02-01 PROCEDURE — 77067 SCR MAMMO BI INCL CAD: CPT

## 2024-02-08 ENCOUNTER — HOSPITAL ENCOUNTER (OUTPATIENT)
Dept: LAB | Facility: MEDICAL CENTER | Age: 74
End: 2024-02-08
Attending: INTERNAL MEDICINE
Payer: MEDICARE

## 2024-02-08 DIAGNOSIS — E78.2 MIXED HYPERLIPIDEMIA: ICD-10-CM

## 2024-02-08 DIAGNOSIS — E03.4 HYPOTHYROIDISM DUE TO ACQUIRED ATROPHY OF THYROID: ICD-10-CM

## 2024-02-08 DIAGNOSIS — R73.01 ELEVATED FASTING BLOOD SUGAR: ICD-10-CM

## 2024-02-08 LAB
25(OH)D3 SERPL-MCNC: 32 NG/ML (ref 30–100)
ALBUMIN SERPL BCP-MCNC: 4.2 G/DL (ref 3.2–4.9)
ALBUMIN/GLOB SERPL: 1.9 G/DL
ALP SERPL-CCNC: 68 U/L (ref 30–99)
ALT SERPL-CCNC: 15 U/L (ref 2–50)
ANION GAP SERPL CALC-SCNC: 9 MMOL/L (ref 7–16)
AST SERPL-CCNC: 16 U/L (ref 12–45)
BASOPHILS # BLD AUTO: 0.5 % (ref 0–1.8)
BASOPHILS # BLD: 0.03 K/UL (ref 0–0.12)
BILIRUB SERPL-MCNC: 0.4 MG/DL (ref 0.1–1.5)
BUN SERPL-MCNC: 11 MG/DL (ref 8–22)
CALCIUM ALBUM COR SERPL-MCNC: 8.7 MG/DL (ref 8.5–10.5)
CALCIUM SERPL-MCNC: 8.9 MG/DL (ref 8.5–10.5)
CHLORIDE SERPL-SCNC: 104 MMOL/L (ref 96–112)
CHOLEST SERPL-MCNC: 190 MG/DL (ref 100–199)
CO2 SERPL-SCNC: 27 MMOL/L (ref 20–33)
CREAT SERPL-MCNC: 0.72 MG/DL (ref 0.5–1.4)
EOSINOPHIL # BLD AUTO: 0.12 K/UL (ref 0–0.51)
EOSINOPHIL NFR BLD: 2.2 % (ref 0–6.9)
ERYTHROCYTE [DISTWIDTH] IN BLOOD BY AUTOMATED COUNT: 41.9 FL (ref 35.9–50)
EST. AVERAGE GLUCOSE BLD GHB EST-MCNC: 108 MG/DL
FASTING STATUS PATIENT QL REPORTED: NORMAL
GFR SERPLBLD CREATININE-BSD FMLA CKD-EPI: 88 ML/MIN/1.73 M 2
GLOBULIN SER CALC-MCNC: 2.2 G/DL (ref 1.9–3.5)
GLUCOSE SERPL-MCNC: 98 MG/DL (ref 65–99)
HBA1C MFR BLD: 5.4 % (ref 4–5.6)
HCT VFR BLD AUTO: 41.9 % (ref 37–47)
HDLC SERPL-MCNC: 87 MG/DL
HGB BLD-MCNC: 13.7 G/DL (ref 12–16)
IMM GRANULOCYTES # BLD AUTO: 0 K/UL (ref 0–0.11)
IMM GRANULOCYTES NFR BLD AUTO: 0 % (ref 0–0.9)
LDLC SERPL CALC-MCNC: 87 MG/DL
LYMPHOCYTES # BLD AUTO: 1.94 K/UL (ref 1–4.8)
LYMPHOCYTES NFR BLD: 35.2 % (ref 22–41)
MCH RBC QN AUTO: 30 PG (ref 27–33)
MCHC RBC AUTO-ENTMCNC: 32.7 G/DL (ref 32.2–35.5)
MCV RBC AUTO: 91.9 FL (ref 81.4–97.8)
MONOCYTES # BLD AUTO: 0.4 K/UL (ref 0–0.85)
MONOCYTES NFR BLD AUTO: 7.3 % (ref 0–13.4)
NEUTROPHILS # BLD AUTO: 3.02 K/UL (ref 1.82–7.42)
NEUTROPHILS NFR BLD: 54.8 % (ref 44–72)
NRBC # BLD AUTO: 0 K/UL
NRBC BLD-RTO: 0 /100 WBC (ref 0–0.2)
PLATELET # BLD AUTO: 215 K/UL (ref 164–446)
PMV BLD AUTO: 11.4 FL (ref 9–12.9)
POTASSIUM SERPL-SCNC: 4.2 MMOL/L (ref 3.6–5.5)
PROT SERPL-MCNC: 6.4 G/DL (ref 6–8.2)
RBC # BLD AUTO: 4.56 M/UL (ref 4.2–5.4)
SODIUM SERPL-SCNC: 140 MMOL/L (ref 135–145)
T4 FREE SERPL-MCNC: 1.02 NG/DL (ref 0.93–1.7)
TRIGL SERPL-MCNC: 79 MG/DL (ref 0–149)
TSH SERPL DL<=0.005 MIU/L-ACNC: 1.14 UIU/ML (ref 0.38–5.33)
VIT B12 SERPL-MCNC: 425 PG/ML (ref 211–911)
WBC # BLD AUTO: 5.5 K/UL (ref 4.8–10.8)

## 2024-02-08 PROCEDURE — 83036 HEMOGLOBIN GLYCOSYLATED A1C: CPT

## 2024-02-08 PROCEDURE — 80053 COMPREHEN METABOLIC PANEL: CPT

## 2024-02-08 PROCEDURE — 84439 ASSAY OF FREE THYROXINE: CPT

## 2024-02-08 PROCEDURE — 85025 COMPLETE CBC W/AUTO DIFF WBC: CPT

## 2024-02-08 PROCEDURE — 84443 ASSAY THYROID STIM HORMONE: CPT

## 2024-02-08 PROCEDURE — 82607 VITAMIN B-12: CPT

## 2024-02-08 PROCEDURE — 82306 VITAMIN D 25 HYDROXY: CPT

## 2024-02-08 PROCEDURE — 80061 LIPID PANEL: CPT

## 2024-02-08 PROCEDURE — 36415 COLL VENOUS BLD VENIPUNCTURE: CPT

## 2024-02-13 ENCOUNTER — OFFICE VISIT (OUTPATIENT)
Dept: MEDICAL GROUP | Facility: PHYSICIAN GROUP | Age: 74
End: 2024-02-13
Payer: MEDICARE

## 2024-02-13 VITALS
OXYGEN SATURATION: 96 % | BODY MASS INDEX: 33.02 KG/M2 | HEIGHT: 65 IN | WEIGHT: 198.2 LBS | SYSTOLIC BLOOD PRESSURE: 130 MMHG | HEART RATE: 77 BPM | RESPIRATION RATE: 16 BRPM | DIASTOLIC BLOOD PRESSURE: 60 MMHG | TEMPERATURE: 97.1 F

## 2024-02-13 DIAGNOSIS — R63.5 WEIGHT GAIN: Chronic | ICD-10-CM

## 2024-02-13 DIAGNOSIS — G89.29 CHRONIC PAIN OF LEFT KNEE: ICD-10-CM

## 2024-02-13 DIAGNOSIS — E03.4 HYPOTHYROIDISM DUE TO ACQUIRED ATROPHY OF THYROID: ICD-10-CM

## 2024-02-13 DIAGNOSIS — M25.562 CHRONIC PAIN OF LEFT KNEE: ICD-10-CM

## 2024-02-13 DIAGNOSIS — F41.1 GENERALIZED ANXIETY DISORDER: ICD-10-CM

## 2024-02-13 PROCEDURE — 3078F DIAST BP <80 MM HG: CPT | Performed by: INTERNAL MEDICINE

## 2024-02-13 PROCEDURE — 99214 OFFICE O/P EST MOD 30 MIN: CPT | Performed by: INTERNAL MEDICINE

## 2024-02-13 PROCEDURE — 3075F SYST BP GE 130 - 139MM HG: CPT | Performed by: INTERNAL MEDICINE

## 2024-02-13 RX ORDER — LEVOTHYROXINE SODIUM 0.1 MG/1
100 TABLET ORAL
Qty: 60 TABLET | Refills: 3 | Status: SHIPPED | OUTPATIENT
Start: 2024-02-13

## 2024-02-13 RX ORDER — LEVOTHYROXINE SODIUM 88 UG/1
88 TABLET ORAL
Qty: 50 TABLET | Refills: 3 | Status: SHIPPED | OUTPATIENT
Start: 2024-02-14

## 2024-02-13 ASSESSMENT — FIBROSIS 4 INDEX: FIB4 SCORE: 1.4

## 2024-02-13 NOTE — ASSESSMENT & PLAN NOTE
Chronic ongoing problem, agreeable to titrating off Prozac and initiating Wellbutrin, start Wellbutrin 75 mg twice daily with fluoxetine 20 mg daily.  In approximately 4 weeks she can reduce fluoxetine to 20 mg every other day and then stop taking it after another 3 to 4 weeks.  Discussed we then will have room to increase bupropion and transition to extended release depending on how it is helping with her anxiety and irritability.  She voiced understanding will keep me updated on MyChart.

## 2024-02-13 NOTE — PROGRESS NOTES
"Subjective:   Chief Complaint/History of Present Illness:  Prudence Chayito Gregory is a 73 y.o. female established patient who presents today to discuss medical problems as listed below. Pru is unaccompanied for today's visit.    Problem   Chronic Pain of Left Knee    She reports insidious development of left knee pain and stiffness as well as instability.  Notices it when she tries to walk down stairs or if she is sitting on the couch with her knees cradled to her abdomen she has significant difficulty extending the left leg straight or putting weight on it and has to often stand for about a minute until the excruciating pain settles down.  She takes meloxicam 15 mg daily as needed but does not want to have to take that every day because of potential toxicities.  Has not had any imaging or evaluation of the knee, had good results with physical therapy in the past and is open to evaluation with PT and physiatry.     Weight Gain    She reports steady weight gain since we have started and increased Prozac. She notes the Prozac has been helpful for her mood but she would like to come up with a plan to help address the weight gain. Also do for recheck of her thyroid to ensure she is not hypothyroidism.     Generalized Anxiety Disorder    PHQ9-18 in April 2023    Longstanding anxiety, she reports she has been \"in denial\" for years. Was trialed on Zoloft in the mid 1990's but does not recall it helping. She can be irritable as well as easy to anger that she feels is out of proportion to the situation. No panic attacks.      Some improvement with Prozac but feels the benefit has tapered off and would like to try a dose adjustment especially because of associated weight gain.    Current regimen: fluoxetine 20 mg daily + Bupropion 75 mg twice daily  Previous regimen: fluoxetine 30 mg daily       Hypothyroidism Due to Acquired Atrophy of Thyroid     Latest Reference Range & Units 02/08/24 09:05   TSH 0.380 - 5.330 uIU/mL 1.140 "   Free T-4 0.93 - 1.70 ng/dL 1.02       Hypothyroidism diagnosed by abnormal labs.  She denies any signs or symptoms of overtreatment or under treatment at this time.  She has remained asymptomatic to her thyroid disease except for fatigue. Previously on a higher dose in the mid 100's that was reduced around 5911-7655.    Current regimen: levothyroxine 100 mcg Tu/Th/Sa/Su alternating with 88 mcg Mo/Wed/Fri            Current Medications:  Current Outpatient Medications Ordered in Epic   Medication Sig Dispense Refill    [START ON 2/14/2024] levothyroxine (SYNTHROID) 88 MCG Tab Take 1 Tablet by mouth every Monday, Wednesday, and Friday. To alternate with 100 mcg dose 50 Tablet 3    levothyroxine (SYNTHROID) 100 MCG Tab Take 1 Tablet by mouth in the evening every Tuesday, Thursdays, Saturday, and Sunday. To alternate with 88 mcg dose 60 Tablet 3    buPROPion (WELLBUTRIN) 75 MG Tab Take 1 Tablet by mouth 2 times a day. 200 Tablet 3    FLUoxetine (PROZAC) 20 MG Cap Take 1 Capsule by mouth every day. In combination with 10 mg to equal 30 mg 100 Capsule 3    rosuvastatin (CRESTOR) 10 MG Tab TAKE 1 TABLET BY MOUTH EVERY DAY IN THE EVENING 100 Tablet 3    Meclizine HCl (ANTIVERT) 25 MG Chew Tab 1 q12hr prn vertigo 10 Tablet 0    BREO ELLIPTA 200-25 MCG/ACT AEROSOL POWDER, BREATH ACTIVATED INHALE 1 PUFF BY MOUTH EVERY DAY 60 Each 3    PREMARIN 0.625 MG/GM Cream APPLY 1.0 GRAM TO AFFECTED AREA 3X/WEEK      dicyclomine (BENTYL) 10 MG Cap Take 1 Capsule by mouth every 6 hours as needed (abdominal cramping). 30 Capsule 1    neomycin-polymixin-dexamethasone (MAXITROL) 3.5-86523-4.1 Ointment ophthalmic ointment       clobetasol (TEMOVATE) 0.05 % Cream       albuterol 108 (90 Base) MCG/ACT Aero Soln inhalation aerosol INHALE 2 PUFFS BY MOUTH EVERY FOUR HOURS AS NEEDED FOR SHORTNESS OF BREATH. 6.7 Each 3    meloxicam (MOBIC) 15 MG tablet TAKE 1 TABLET BY MOUTH 1 TIME A DAY AS NEEDED (PAIN). DO NOT TAKE OTHER NSAIDS 100 Tablet 3     "baclofen (LIORESAL) 10 MG Tab Take 1 Tablet by mouth 3 times a day as needed (muscle spasm/pain). (Patient taking differently: Take 10 mg by mouth 3 times a day as needed (muscle spasm/pain). Have not taken for a month) 90 Tablet 3    triamcinolone acetonide (KENALOG) 0.1 % Ointment TOPICAL APPLY SPARINGLY TO THE AFFECTED AREA 3XWK.      ipratropium-albuterol (DUONEB) 0.5-2.5 (3) MG/3ML nebulizer solution Take 3 mL by nebulization 4 times a day. (Patient taking differently: Take 3 mL by nebulization every four hours as needed.) 3 mL 3    coenzyme Q-10 30 MG capsule Take 60 mg by mouth every day.      Cyanocobalamin (B-12 PO) Take 1 Tablet by mouth every day.       No current [facility]-ordered facility-administered medications on file.          Objective:   Physical Exam:    Vitals: /60 (BP Location: Right arm, Patient Position: Sitting, BP Cuff Size: Adult)   Pulse 77   Temp 36.2 °C (97.1 °F) (Temporal)   Resp 16   Ht 1.651 m (5' 5\")   Wt 89.9 kg (198 lb 3.2 oz)   SpO2 96%    BMI: Body mass index is 32.98 kg/m².  Physical Exam  Constitutional:       General: She is not in acute distress.     Appearance: Normal appearance. She is not ill-appearing.   HENT:      Right Ear: External ear normal.      Left Ear: External ear normal.   Eyes:      General: No scleral icterus.     Conjunctiva/sclera: Conjunctivae normal.   Cardiovascular:      Rate and Rhythm: Normal rate and regular rhythm.      Pulses: Normal pulses.   Pulmonary:      Effort: Pulmonary effort is normal. No respiratory distress.      Breath sounds: No wheezing or rhonchi.   Skin:     General: Skin is warm and dry.      Findings: No rash.   Psychiatric:         Mood and Affect: Mood normal.         Behavior: Behavior normal.         Thought Content: Thought content normal.         Judgment: Judgment normal.          Assessment and Plan:   Nancy is a 73 y.o. female with the following:  Problem List Items Addressed This Visit       Chronic pain of " left knee     New and decompensated issue, she is describing pain, instability, difficulty walking down steps.  Will obtain x-ray of the left knee to have as a baseline and refer her to both physical therapy and physiatry for additional evaluation to discuss treatment options.  Okay to continue meloxicam 15 mg daily in the interim.         Relevant Orders    DX-KNEE 3 VIEWS Atraumatic Pain/Swelling/Deformity    Referral to Physical Therapy    Referral to Pain Clinic    Generalized anxiety disorder     Chronic ongoing problem, agreeable to titrating off Prozac and initiating Wellbutrin, start Wellbutrin 75 mg twice daily with fluoxetine 20 mg daily.  In approximately 4 weeks she can reduce fluoxetine to 20 mg every other day and then stop taking it after another 3 to 4 weeks.  Discussed we then will have room to increase bupropion and transition to extended release depending on how it is helping with her anxiety and irritability.  She voiced understanding will keep me updated on MyChart.         Hypothyroidism due to acquired atrophy of thyroid     Chronic ongoing problem, TSH and free T4 starting to balance out on current regimen, will plan to continue levothyroxine 100 mcg on Tuesday Thursday Saturday Sunday and alternate with 88 mcg on Monday Wednesday Friday.  She voiced understanding will let me know if she starts feeling any symptoms of overtreatment under treatment.         Relevant Medications    levothyroxine (SYNTHROID) 88 MCG Tab (Start on 2/14/2024)    levothyroxine (SYNTHROID) 100 MCG Tab    Weight gain (Chronic)     Chronic ongoing issue, after discussion of options we are going to attempt to titrate her off the Prozac and replace with Wellbutrin which is more weight negative also going to start looking into her knee pain and stiffness more so she can start walking exercising again regularly.               RTC: Return in about 4 months (around 6/13/2024).    I spent a total of 34 minutes with record  review, exam, communication with the patient, communication with other providers, and documentation of this encounter.    PLEASE NOTE: This dictation was created using voice recognition software. I have made every reasonable attempt to correct obvious errors, but I expect that there are errors of grammar and possibly content that I did not discover before finalizing the note.      Shawna Recio, DO  Geriatric and Internal Medicine  [facility]

## 2024-02-13 NOTE — ASSESSMENT & PLAN NOTE
Chronic ongoing issue, after discussion of options we are going to attempt to titrate her off the Prozac and replace with Wellbutrin which is more weight negative also going to start looking into her knee pain and stiffness more so she can start walking exercising again regularly.

## 2024-02-13 NOTE — ASSESSMENT & PLAN NOTE
New and decompensated issue, she is describing pain, instability, difficulty walking down steps.  Will obtain x-ray of the left knee to have as a baseline and refer her to both physical therapy and physiatry for additional evaluation to discuss treatment options.  Okay to continue meloxicam 15 mg daily in the interim.

## 2024-02-14 ENCOUNTER — HOSPITAL ENCOUNTER (OUTPATIENT)
Dept: RADIOLOGY | Facility: MEDICAL CENTER | Age: 74
End: 2024-02-14
Attending: INTERNAL MEDICINE
Payer: MEDICARE

## 2024-02-14 DIAGNOSIS — G89.29 CHRONIC PAIN OF LEFT KNEE: ICD-10-CM

## 2024-02-14 DIAGNOSIS — M25.562 CHRONIC PAIN OF LEFT KNEE: ICD-10-CM

## 2024-02-14 PROCEDURE — 73562 X-RAY EXAM OF KNEE 3: CPT | Mod: LT

## 2024-02-22 ENCOUNTER — APPOINTMENT (OUTPATIENT)
Dept: MEDICAL GROUP | Facility: PHYSICIAN GROUP | Age: 74
End: 2024-02-22
Payer: MEDICARE

## 2024-03-03 NOTE — TELEPHONE ENCOUNTER
Cardiology PA Adult Progress Note    Subjective Assessment:    ROS negative except as noted above.  	  MEDICATIONS:  metoprolol succinate ER 25 milliGRAM(s) Oral daily  cefTRIAXone   IVPB 1000 milliGRAM(s) IV Intermittent every 24 hours  doxycycline monohydrate Capsule 100 milliGRAM(s) Oral every 12 hours  buDESOnide    Inhalation Suspension 0.5 milliGRAM(s) Inhalation every 12 hours PRN  ipratropium    for Nebulization 500 MICROGram(s) Nebulizer every 6 hours PRN  acetaminophen     Tablet .. 650 milliGRAM(s) Oral every 6 hours PRN  OLANZapine 5 milliGRAM(s) Oral at bedtime  OLANZapine Injectable 2.5 milliGRAM(s) IntraMuscular every 4 hours PRN  polyethylene glycol 3350 17 Gram(s) Oral every 12 hours  senna 2 Tablet(s) Oral at bedtime  apixaban 2.5 milliGRAM(s) Oral every 12 hours  ergocalciferol 67520 Unit(s) Oral <User Schedule>  latanoprost 0.005% Ophthalmic Solution 1 Drop(s) Both EYES at bedtime  magnesium oxide 400 milliGRAM(s) Oral once  potassium chloride    Tablet ER 10 milliEquivalent(s) Oral once  thiamine IVPB 500 milliGRAM(s) IV Intermittent daily      	    [PHYSICAL EXAM:  TELEMETRY:  T(C): 36.4 (03-03-24 @ 05:40), Max: 36.7 (03-02-24 @ 15:35)  HR: 108 (03-03-24 @ 05:40) (69 - 117)  BP: 113/83 (03-03-24 @ 05:40) (103/66 - 130/84)  RR: 17 (03-03-24 @ 05:40) (17 - 18)  SpO2: 97% (03-03-24 @ 05:40) (97% - 100%)  Wt(kg): --  I&O's Summary    02 Mar 2024 07:01  -  03 Mar 2024 07:00  --------------------------------------------------------  IN: 1005 mL / OUT: 1200 mL / NET: -195 mL                                              Appearance: Normal	  HEENT:   Normal oral mucosa, PERRLA, EOMI	  Neck: Supple, + JVD/ - JVD; Carotid Bruit   Cardiovascular: Normal S1 S2, No JVD, No murmurs,   Respiratory: Lungs clear to auscultation/Decreased Breath Sounds/No Rales, Rhonchi, Wheezing	  Gastrointestinal:  Soft, Non-tender, + BS	  Skin: No rashes, No ecchymoses, No cyanosis  Extremities: Normal range of motion, No clubbing, cyanosis or edema  Vascular: Peripheral pulses palpable 2+ bilaterally  Neurologic: Non-focal  Psychiatry: A & O x 3, Mood & affect appropriate      	    ECG:  	  RADIOLOGY:   DIAGNOSTIC TESTING:  [ ] Echocardiogram:   [ ]  Catheterization:  [ ] Stress Test:    [ ] MESFIN  OTHER: 	    LABS:	 	  CARDIAC MARKERS:                                  15.2   7.51  )-----------( 235      ( 03 Mar 2024 05:30 )             46.5     03-03    146<H>  |  107  |  28<H>  ----------------------------<  101<H>  3.9   |  25  |  1.28    Ca    9.4      03 Mar 2024 05:30  Mg     1.9     03-03    TPro  5.9<L>  /  Alb  3.1<L>  /  TBili  0.6  /  DBili  x   /  AST  67<H>  /  ALT  34  /  AlkPhos  123<H>  03-03    proBNP:   Lipid Profile:   HgA1c:   TSH:    Kervin called from Dr. Dawson's office reporting that Dr. 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 Dawson's recommendation is that the pt see an oral surgeon for the condition.     Kervin reports the pt was called and given Dr. Dawson's recommendation.   Cardiology PA Adult Progress Note    Subjective Assessment: Patient seen and examined at bedside this morning. No acute events overnight. Patient denies CP, SOB, N/V/D, HA, blurry vision, dysuria, fever, chills, cough.     ROS negative except as noted above.  	  MEDICATIONS:  metoprolol succinate ER 25 milliGRAM(s) Oral daily  cefTRIAXone   IVPB 1000 milliGRAM(s) IV Intermittent every 24 hours  doxycycline monohydrate Capsule 100 milliGRAM(s) Oral every 12 hours  buDESOnide    Inhalation Suspension 0.5 milliGRAM(s) Inhalation every 12 hours PRN  ipratropium    for Nebulization 500 MICROGram(s) Nebulizer every 6 hours PRN  acetaminophen     Tablet .. 650 milliGRAM(s) Oral every 6 hours PRN  OLANZapine 5 milliGRAM(s) Oral at bedtime  OLANZapine Injectable 2.5 milliGRAM(s) IntraMuscular every 4 hours PRN  polyethylene glycol 3350 17 Gram(s) Oral every 12 hours  senna 2 Tablet(s) Oral at bedtime  apixaban 2.5 milliGRAM(s) Oral every 12 hours  ergocalciferol 68692 Unit(s) Oral <User Schedule>  latanoprost 0.005% Ophthalmic Solution 1 Drop(s) Both EYES at bedtime  magnesium oxide 400 milliGRAM(s) Oral once  potassium chloride    Tablet ER 10 milliEquivalent(s) Oral once  thiamine IVPB 500 milliGRAM(s) IV Intermittent daily      [PHYSICAL EXAM:  TELEMETRY:  T(C): 36.4 (03-03-24 @ 05:40), Max: 36.7 (03-02-24 @ 15:35)  HR: 108 (03-03-24 @ 05:40) (69 - 117)  BP: 113/83 (03-03-24 @ 05:40) (103/66 - 130/84)  RR: 17 (03-03-24 @ 05:40) (17 - 18)  SpO2: 97% (03-03-24 @ 05:40) (97% - 100%)  Wt(kg): --  I&O's Summary    02 Mar 2024 07:01  -  03 Mar 2024 07:00  --------------------------------------------------------  IN: 1005 mL / OUT: 1200 mL / NET: -195 mL                                         Appearance: Normal	  HEENT:   Normal oral mucosa, PERRLA, EOMI	  Neck: Supple, + JVD/  Cardiovascular: Normal S1 S2, No JVD, No murmurs,   Respiratory: Lungs clear to auscultation  Gastrointestinal:  Soft, Non-tender, + BS	  Skin: No rashes, No ecchymoses, No cyanosis  Extremities: Normal range of motion, No clubbing, cyanosis or edema  Vascular: Peripheral pulses palpable 2+ bilaterally  Neurologic: Non-focal  Psychiatry: A & O x 3, Mood & affect appropriate      	    ECG:  	  RADIOLOGY:   DIAGNOSTIC TESTING:  [ ] Echocardiogram:   [ ]  Catheterization:  [ ] Stress Test:    [ ] MESFIN  OTHER: 	    LABS:	 	  CARDIAC MARKERS:                                  15.2   7.51  )-----------( 235      ( 03 Mar 2024 05:30 )             46.5     03-03    146<H>  |  107  |  28<H>  ----------------------------<  101<H>  3.9   |  25  |  1.28    Ca    9.4      03 Mar 2024 05:30  Mg     1.9     03-03    TPro  5.9<L>  /  Alb  3.1<L>  /  TBili  0.6  /  DBili  x   /  AST  67<H>  /  ALT  34  /  AlkPhos  123<H>  03-03    proBNP:   Lipid Profile:   HgA1c:   TSH:

## 2024-03-04 ENCOUNTER — OFFICE VISIT (OUTPATIENT)
Dept: PHYSICAL MEDICINE AND REHAB | Facility: MEDICAL CENTER | Age: 74
End: 2024-03-04
Payer: MEDICARE

## 2024-03-04 VITALS
TEMPERATURE: 97.5 F | SYSTOLIC BLOOD PRESSURE: 140 MMHG | BODY MASS INDEX: 32.86 KG/M2 | WEIGHT: 197.2 LBS | HEIGHT: 65 IN | OXYGEN SATURATION: 91 % | HEART RATE: 94 BPM | DIASTOLIC BLOOD PRESSURE: 72 MMHG

## 2024-03-04 DIAGNOSIS — M17.10 ARTHRITIS OF KNEE: ICD-10-CM

## 2024-03-04 DIAGNOSIS — Z71.82 EXERCISE COUNSELING: ICD-10-CM

## 2024-03-04 DIAGNOSIS — M47.816 LUMBAR SPONDYLOSIS: ICD-10-CM

## 2024-03-04 DIAGNOSIS — M51.26 LUMBAR DISC HERNIATION: ICD-10-CM

## 2024-03-04 DIAGNOSIS — E66.9 OBESITY (BMI 30-39.9): ICD-10-CM

## 2024-03-04 DIAGNOSIS — G89.29 CHRONIC PAIN OF LEFT KNEE: ICD-10-CM

## 2024-03-04 DIAGNOSIS — M54.16 LUMBAR RADICULOPATHY: ICD-10-CM

## 2024-03-04 DIAGNOSIS — M25.562 CHRONIC PAIN OF LEFT KNEE: ICD-10-CM

## 2024-03-04 PROCEDURE — 1125F AMNT PAIN NOTED PAIN PRSNT: CPT | Performed by: PHYSICAL MEDICINE & REHABILITATION

## 2024-03-04 PROCEDURE — 99214 OFFICE O/P EST MOD 30 MIN: CPT | Performed by: PHYSICAL MEDICINE & REHABILITATION

## 2024-03-04 PROCEDURE — 3078F DIAST BP <80 MM HG: CPT | Performed by: PHYSICAL MEDICINE & REHABILITATION

## 2024-03-04 PROCEDURE — 3077F SYST BP >= 140 MM HG: CPT | Performed by: PHYSICAL MEDICINE & REHABILITATION

## 2024-03-04 PROCEDURE — 1170F FXNL STATUS ASSESSED: CPT | Performed by: PHYSICAL MEDICINE & REHABILITATION

## 2024-03-04 ASSESSMENT — PAIN SCALES - GENERAL: PAINLEVEL: 1=MINIMAL PAIN

## 2024-03-04 ASSESSMENT — PATIENT HEALTH QUESTIONNAIRE - PHQ9: CLINICAL INTERPRETATION OF PHQ2 SCORE: 0

## 2024-03-04 ASSESSMENT — FIBROSIS 4 INDEX: FIB4 SCORE: 1.4

## 2024-03-04 NOTE — PROGRESS NOTES
Follow up patient note  Interventional spine and sports physiatry, Physical medicine rehabilitation      Chief complaint:   Chief Complaint   Patient presents with    Follow-Up     Neck pain          HISTORY    Please see new patient note by Dr Echols,  for more details.     HPI  Patient identification: Nancy Gregory ,  1950,   With Diagnoses of Chronic pain of left knee, Arthritis of knee, Lumbar disc herniation, Lumbar spondylosis, Lumbar radiculopathy, BMI 30-39.9, and Exercise counseling were pertinent to this visit.     Back pain is overall stable.  Pain is 0 out of 10 in intensity currently.  She does have intermittent 1 out of 10 intensity pain.  She does have left knee pain which is intermittent, 3 out of 10 intensity aching quality nonradiating.  This does improve significantly with meloxicam which she takes approximately once every 3 to 4 days.  She is doing home exercise program with no difficulty.  She has no difficulty with ADLs.    She has been to physical therapy in the past and has home exercise program.  She is no longer requiring the muscle relaxer.    Medications tried in the past include meloxicam, gabapentin, baclofen.         ROS Red Flags :   Fever, Chills, Sweats: Denies  Involuntary Weight Loss: Denies  Bowel/Bladder Incontinence: Denies  Saddle Anesthesia: Denies        PMHx:   Past Medical History:   Diagnosis Date    Acute cystitis with hematuria 2020    Urine dipstick performed in clinic demonstrated trace glucose, 1+ bili, 1+ ketones, specific gravity 1.015, trace intact blood, pH 5.5, 2+ protein, 2.0 urobilinogen, positive nitrite, 3+ leukocyte esterase.  She performed a telemedicine visit yesterday and was placed on cephalosporin for presumptive urinary tract infection.  She was also given Pyridium due to discomfort with dysuria.  She thinks s    Asthma     Back muscle spasm 2021    On the evening of  she reached to the side and immediately  "had pain in her low back. This was excruciating and caused complete immobility. She called [facility] who provided baclofen, toradol shot, and ibuprofen. 1 week later she is feeling about 60% better, she continues to take the baclofen 3-4 times daily and has reduced ibuprofen to 400 mg twice daily. 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  This was done on her right upper gums.  2 weeks following the procedure she had a full liquid diet/soft food diet and this was advanced on Valentine's Day dinner, February 14, 2020.  On the following Monday, February 17, 2020 she notes development of \"bloodshot \"gums causing concern and when she clay    CAD (coronary artery disease)     Chickenpox     COVID-19 virus infection 12/27/2021    She is test positive in our clinic for COVID-19.  She developed symptoms on December 24 including sore throat, headache, loose stools, and dry cough which has since transitioned into productive cough.  No fevers, chills; may have to letter no new myalgias, no chest pain, no breathing difficulty.  She feels fortunate that her symptoms have not been extremely uncomfortable.  She did receive her kenyon    Elevated coronary artery calcium score 04/21/2021    CT coronary calcium score (3/2021):  Coronary calcification:  LMA - 0.0  LCX - 138  LAD - 108  RCA - 0.0  PDA - 0.0     Total Calcium Score: 246     Percentile: Calcium score is worse than the 75th percentile for the patient's age and sex.     Current regimen: rosuvastatin 10 mg daily and aspirin 81 mg daily          GERD (gastroesophageal reflux disease)     Heartburn     Hordeolum externum of right lower eyelid 02/16/2023    Hyperlipidemia     Hypothyroidism     Insomnia     Trouble going to and staying asleep    Lymphadenopathy 09/16/2021    Mass of left submandibular region 05/26/2021    " She reports development of a mass in her chin/neck area.  On exam it appears to be submandibular on the left anterolateral aspect.  It is easily palpable, mobile, and approximately 1 x 1 cm.  It is nontender.  There is some associated inflammation in the region that is visible on inspection.  She has been working with periodontist due to gum disease and wonders if it could be related to her dentit    Mumps     WARD on CPAP 08/24/2021    Painful joint     Pedal edema 12/08/2021    Pedal edema developed in bilateral ankles around December 2021 after she threw out her back and was taking muscle relaxants and less active.  Improvement of left lower extremity with furosemide however right lower extremity continues to have residual edema, approximately 20 to 30% better from where she started.  No prior occurrence of the same.  Injury where she tripped over the door of a dishwash    Rotator cuff arthropathy of left shoulder 01/25/2022    She reports 4 to 6 months of left shoulder pain.  Various maneuvers elicit the pain.  The pain can be over the AC joint superiorly, the deltoid laterally as well as posteriorly.  It became more bothersome as she has been sleeping on her back more since she threw out her back several weeks ago.  No clear injury to the shoulder itself.  Prior history of rotator cuff repair around 2016 by physician w    Stiffness in joint 07/05/2023    She reports fairly diffuse stiffness in the joints.  She will wake up with it often in the morning and can get better but then after sitting for 20 to 30 minutes and can decompensate.  She has in her bilateral hands, bilateral knees, and also her forearms and elbows.  She has associated joint swelling which has been more troublesome.  She tries to limit meloxicam to avoid risk of GI distress or ki    Stomatitis 09/16/2021    Thyroid disease     Tonsillitis     Wears glasses        PSHx:   Past Surgical History:   Procedure Laterality Date    ABDOMINAL  HYSTERECTOMY TOTAL      CHOLECYSTECTOMY      HYSTERECTOMY LAPAROSCOPY      TONSILLECTOMY         Family history   Family History   Problem Relation Age of Onset    Cancer Father         lung    Breast Cancer Maternal Aunt     Hypertension Maternal Grandmother     Hypertension Maternal Grandfather     Kidney Disease Mother     Heart Disease Neg Hx          Medications:   Outpatient Medications Marked as Taking for the 3/4/24 encounter (Office Visit) with Carlos Eduardo Echols M.D.   Medication Sig Dispense Refill    levothyroxine (SYNTHROID) 88 MCG Tab Take 1 Tablet by mouth every Monday, Wednesday, and Friday. To alternate with 100 mcg dose 50 Tablet 3    levothyroxine (SYNTHROID) 100 MCG Tab Take 1 Tablet by mouth in the evening every Tuesday, Thursdays, Saturday, and Sunday. To alternate with 88 mcg dose 60 Tablet 3    buPROPion (WELLBUTRIN) 75 MG Tab Take 1 Tablet by mouth 2 times a day. 200 Tablet 3    FLUoxetine (PROZAC) 20 MG Cap Take 1 Capsule by mouth every day. In combination with 10 mg to equal 30 mg 100 Capsule 3    rosuvastatin (CRESTOR) 10 MG Tab TAKE 1 TABLET BY MOUTH EVERY DAY IN THE EVENING 100 Tablet 3    Meclizine HCl (ANTIVERT) 25 MG Chew Tab 1 q12hr prn vertigo 10 Tablet 0    BREO ELLIPTA 200-25 MCG/ACT AEROSOL POWDER, BREATH ACTIVATED INHALE 1 PUFF BY MOUTH EVERY DAY 60 Each 3    PREMARIN 0.625 MG/GM Cream APPLY 1.0 GRAM TO AFFECTED AREA 3X/WEEK      dicyclomine (BENTYL) 10 MG Cap Take 1 Capsule by mouth every 6 hours as needed (abdominal cramping). 30 Capsule 1    neomycin-polymixin-dexamethasone (MAXITROL) 3.5-49626-0.1 Ointment ophthalmic ointment       clobetasol (TEMOVATE) 0.05 % Cream       albuterol 108 (90 Base) MCG/ACT Aero Soln inhalation aerosol INHALE 2 PUFFS BY MOUTH EVERY FOUR HOURS AS NEEDED FOR SHORTNESS OF BREATH. 6.7 Each 3    meloxicam (MOBIC) 15 MG tablet TAKE 1 TABLET BY MOUTH 1 TIME A DAY AS NEEDED (PAIN). DO NOT TAKE OTHER NSAIDS 100 Tablet 3    baclofen (LIORESAL) 10 MG Tab  Take 1 Tablet by mouth 3 times a day as needed (muscle spasm/pain). (Patient taking differently: Take 10 mg by mouth 3 times a day as needed (muscle spasm/pain). Have not taken for a month) 90 Tablet 3    triamcinolone acetonide (KENALOG) 0.1 % Ointment TOPICAL APPLY SPARINGLY TO THE AFFECTED AREA 3XWK.      ipratropium-albuterol (DUONEB) 0.5-2.5 (3) MG/3ML nebulizer solution Take 3 mL by nebulization 4 times a day. (Patient taking differently: Take 3 mL by nebulization every four hours as needed.) 3 mL 3    coenzyme Q-10 30 MG capsule Take 60 mg by mouth every day.      Cyanocobalamin (B-12 PO) Take 1 Tablet by mouth every day.          Current Outpatient Medications on File Prior to Visit   Medication Sig Dispense Refill    levothyroxine (SYNTHROID) 88 MCG Tab Take 1 Tablet by mouth every Monday, Wednesday, and Friday. To alternate with 100 mcg dose 50 Tablet 3    levothyroxine (SYNTHROID) 100 MCG Tab Take 1 Tablet by mouth in the evening every Tuesday, Thursdays, Saturday, and Sunday. To alternate with 88 mcg dose 60 Tablet 3    buPROPion (WELLBUTRIN) 75 MG Tab Take 1 Tablet by mouth 2 times a day. 200 Tablet 3    FLUoxetine (PROZAC) 20 MG Cap Take 1 Capsule by mouth every day. In combination with 10 mg to equal 30 mg 100 Capsule 3    rosuvastatin (CRESTOR) 10 MG Tab TAKE 1 TABLET BY MOUTH EVERY DAY IN THE EVENING 100 Tablet 3    Meclizine HCl (ANTIVERT) 25 MG Chew Tab 1 q12hr prn vertigo 10 Tablet 0    BREO ELLIPTA 200-25 MCG/ACT AEROSOL POWDER, BREATH ACTIVATED INHALE 1 PUFF BY MOUTH EVERY DAY 60 Each 3    PREMARIN 0.625 MG/GM Cream APPLY 1.0 GRAM TO AFFECTED AREA 3X/WEEK      dicyclomine (BENTYL) 10 MG Cap Take 1 Capsule by mouth every 6 hours as needed (abdominal cramping). 30 Capsule 1    neomycin-polymixin-dexamethasone (MAXITROL) 3.5-44684-3.1 Ointment ophthalmic ointment       clobetasol (TEMOVATE) 0.05 % Cream       albuterol 108 (90 Base) MCG/ACT Aero Soln inhalation aerosol INHALE 2 PUFFS BY MOUTH  EVERY FOUR HOURS AS NEEDED FOR SHORTNESS OF BREATH. 6.7 Each 3    meloxicam (MOBIC) 15 MG tablet TAKE 1 TABLET BY MOUTH 1 TIME A DAY AS NEEDED (PAIN). DO NOT TAKE OTHER NSAIDS 100 Tablet 3    baclofen (LIORESAL) 10 MG Tab Take 1 Tablet by mouth 3 times a day as needed (muscle spasm/pain). (Patient taking differently: Take 10 mg by mouth 3 times a day as needed (muscle spasm/pain). Have not taken for a month) 90 Tablet 3    triamcinolone acetonide (KENALOG) 0.1 % Ointment TOPICAL APPLY SPARINGLY TO THE AFFECTED AREA 3XWK.      ipratropium-albuterol (DUONEB) 0.5-2.5 (3) MG/3ML nebulizer solution Take 3 mL by nebulization 4 times a day. (Patient taking differently: Take 3 mL by nebulization every four hours as needed.) 3 mL 3    coenzyme Q-10 30 MG capsule Take 60 mg by mouth every day.      Cyanocobalamin (B-12 PO) Take 1 Tablet by mouth every day.       No current facility-administered medications on file prior to visit.         Allergies:   Allergies   Allergen Reactions    Codeine        Social Hx:   Social History     Socioeconomic History    Marital status:      Spouse name: Not on file    Number of children: Not on file    Years of education: Not on file    Highest education level: Not on file   Occupational History    Not on file   Tobacco Use    Smoking status: Former     Current packs/day: 0.00     Average packs/day: 1 pack/day for 15.0 years (15.0 ttl pk-yrs)     Types: Cigarettes     Start date: 10/10/1978     Quit date: 1993     Years since quittin.9    Smokeless tobacco: Never    Tobacco comments:     continued abstinence   Vaping Use    Vaping Use: Never used   Substance and Sexual Activity    Alcohol use: Yes     Alcohol/week: 1.8 oz     Types: 3 Glasses of wine per week     Comment: occasionally    Drug use: No    Sexual activity: Yes     Partners: Male     Birth control/protection: Post-Menopausal   Other Topics Concern     Service No    Blood Transfusions No    Caffeine  "Concern No    Occupational Exposure No    Hobby Hazards No    Sleep Concern Yes    Stress Concern Yes    Weight Concern No    Special Diet No    Back Care No    Exercise Yes    Bike Helmet No    Seat Belt Yes    Self-Exams Yes   Social History Narrative    She worked at [employer] at the [employer], recently retired. She is  to Ryan for the past 20 years, they met at a [employer]. She has a son ([facility] [place] and 2 grandchildren.      Social Determinants of Health     Financial Resource Strain: Low Risk  (1/16/2024)    Overall Financial Resource Strain (CARDIA)     Difficulty of Paying Living Expenses: Not hard at all   Food Insecurity: No Food Insecurity (1/16/2024)    Hunger Vital Sign     Worried About Running Out of Food in the Last Year: Never true     Ran Out of Food in the Last Year: Never true   Transportation Needs: No Transportation Needs (1/16/2024)    PRAPARE - Transportation     Lack of Transportation (Medical): No     Lack of Transportation (Non-Medical): No   Physical Activity: Not on file   Stress: Not on file   Social Connections: Not on file   Intimate Partner Violence: Not on file   Housing Stability: Low Risk  (1/16/2024)    Housing Stability Vital Sign     Unable to Pay for Housing in the Last Year: No     Number of Places Lived in the Last Year: 1     Unstable Housing in the Last Year: No         EXAMINATION     Physical Exam:   Vitals: BP (!) 140/72 (BP Location: Right arm, Patient Position: Sitting, BP Cuff Size: Adult)   Pulse 94   Temp 36.4 °C (97.5 °F) (Temporal)   Ht 1.651 m (5' 5\")   Wt 89.4 kg (197 lb 3.2 oz)   SpO2 91%     Constitutional:   Body Habitus: Body mass index is 32.82 kg/m².  Cooperation: Fully cooperates with exam  Appearance: Well-groomed no disheveled    Respiratory-  breathing comfortable on room air, no audible wheezing  Cardiovascular- capillary refills less than 2 seconds. No lower extremity edema is noted.   Psychiatric- alert and " oriented ×3. Normal affect.    MSK and Neuro: -    LEFT  Knee:   Inspection no swelling, rashes or deformities,   Palpation positive mild tenderness palpation of the medial and lateral joint line.  No significant tenderness to palpation throughout the knee including the  quadriceps tendon, patellar tendon, patellar, medial patellar facets, lateral patellar facets, tibial tuberosity, fibular head.  Range of motion normal range of motion in flexion and extension  Special tests:   Varus stress tests: Negative  Valgus stress tests: Negative  Lockman's test: Negative  Anterior drawer: Negative  Posterior drawer: Negative  Vadim's: negative         Thoracic/Lumbar Spine/Sacral Spine/Hips   There are no signs of infection around the injection sites.   full  active range of motion with flexion, lateral flexion, and rotation bilaterally.   There is full  active range of motion with lumbar extension.    There is no  pain with lumbar extension.   There is no pain with facet loading maneuver (extension rotation) with axial low back pain on the BILATERAL side(s)       Palpation:   No tenderness to palpation in midline at T1-T12 levels. No tenderness to palpation in the left and right of the midline T1-L5, NEGATIVE for tenderness to palpation to the para-midline region in the lower lumbar levels.  palpation over SI joint: negative bilaterally    palpation in hip or over the gluteus medius tendon insertion: negative bilaterally      Lumbar spine Special tests  Neuro tension  Straight leg test negative bilaterally    Slump test negative bilaterally      Key points for the international standards for neurological classification of spinal cord injury (ISNCSCI) to light touch.     Dermatome R L                                      L2 2 2   L3 2 2   L4 2 2   L5 2 2   S1 2 2   S2 2 2         Motor Exam Lower Extremities    ? Myotome R L   Hip flexion L2 5 5   Knee extension L3 5 5   Ankle dorsiflexion L4 5 5   Toe extension L5 5 5  "  Ankle plantarflexion S1 5 5             MEDICAL DECISION MAKING    DATA    Labs:   Lab Results   Component Value Date/Time    SODIUM 140 02/08/2024 09:05 AM    POTASSIUM 4.2 02/08/2024 09:05 AM    CHLORIDE 104 02/08/2024 09:05 AM    CO2 27 02/08/2024 09:05 AM    GLUCOSE 98 02/08/2024 09:05 AM    BUN 11 02/08/2024 09:05 AM    CREATININE 0.72 02/08/2024 09:05 AM        No results found for: \"PROTHROMBTM\", \"INR\"     Lab Results   Component Value Date/Time    WBC 5.5 02/08/2024 09:05 AM    RBC 4.56 02/08/2024 09:05 AM    HEMOGLOBIN 13.7 02/08/2024 09:05 AM    HEMATOCRIT 41.9 02/08/2024 09:05 AM    MCV 91.9 02/08/2024 09:05 AM    MCH 30.0 02/08/2024 09:05 AM    MCHC 32.7 02/08/2024 09:05 AM    MPV 11.4 02/08/2024 09:05 AM    NEUTSPOLYS 54.80 02/08/2024 09:05 AM    LYMPHOCYTES 35.20 02/08/2024 09:05 AM    MONOCYTES 7.30 02/08/2024 09:05 AM    EOSINOPHILS 2.20 02/08/2024 09:05 AM    BASOPHILS 0.50 02/08/2024 09:05 AM        Lab Results   Component Value Date/Time    HBA1C 5.4 02/08/2024 09:05 AM          Imaging:     I personally reviewed following images, these are my reads  CT abdomen pelvis 1/26/2021.  I reviewed the study specifically over the patient's spine.  Please see radiologist dictation for remainder of the report.  Degenerative disc disease worst at the L4-5 level.  Also present L3-4.  Facet arthropathy right worse than left at L5-S1 and left worse than right at L4-5.  Difficult to evaluate neuroforaminal and central canal stenosis but there appears to be central canal stenosis at L4-5 and left-sided neuroforaminal stenosis at L4-5.  This would better be evaluated with an MRI lumbar spine.      I reviewed the following radiology reports                                                  MRI lumbar spine 3/27/2007 findings at L3-4 and L4-5 there is mild bilateral neuroforaminal stenosis.  Degenerative changes at L3-4 and L4-5 are mild.                    Results for orders placed during the hospital encounter of " 21    CT-ABDOMEN-PELVIS WITH    Impression  Findings consistent with sigmoid diverticulitis without abscess or free air.    Aortic atherosclerosis without aneurysm.    Hepatic cyst.                       Results for orders placed during the hospital encounter of 18    DX-CHEST-2 VIEWS    Impression  Negative two views of the chest.                                             DIAGNOSIS   Visit Diagnoses     ICD-10-CM   1. Chronic pain of left knee  M25.562    G89.29   2. Arthritis of knee  M17.10   3. Lumbar disc herniation  M51.26   4. Lumbar spondylosis  M47.816   5. Lumbar radiculopathy  M54.16   6. BMI 30-39.9  E66.9   7. Exercise counseling  Z71.82           ASSESSMENT and PLAN:     Prudence Chayito Forthun  1950 female      Pru was seen today for follow-up.    Diagnoses and all orders for this visit:    Chronic pain of left knee  -     Referral to Physical Therapy    Arthritis of knee  -     Referral to Physical Therapy    Lumbar disc herniation  -     Referral to Physical Therapy    Lumbar spondylosis  -     Referral to Physical Therapy    Lumbar radiculopathy  -     Referral to Physical Therapy    BMI 30-39.9  -     Referral to Physical Therapy    Exercise counseling  -     Referral to Physical Therapy      Medications: Continue meloxicam as needed during flares of pain.    Exercise prescribed, physical therapy prescribed.  Consider left knee injection if she fails conservative treatments.        Follow up: 1 year or sooner as needed    Thank you for allowing me to participate in the care of this patient. If you have any questions please not hesitate to contact me.             Please note that this dictation was created using voice recognition software. I have made every reasonable attempt to correct obvious errors but there may be errors of grammar and content that I may have overlooked prior to finalization of this note.      Carlos Eduardo Echols MD  Interventional Spine and Sports  Physiatry  Physical Medicine and Rehabilitation  [facility]

## 2024-03-25 DIAGNOSIS — J45.901 MODERATE ASTHMA WITH EXACERBATION, UNSPECIFIED WHETHER PERSISTENT: ICD-10-CM

## 2024-03-25 RX ORDER — FLUTICASONE FUROATE AND VILANTEROL 200; 25 UG/1; UG/1
1 POWDER RESPIRATORY (INHALATION)
Qty: 180 EACH | Refills: 3 | Status: SHIPPED | OUTPATIENT
Start: 2024-03-25 | End: 2024-03-28

## 2024-03-26 NOTE — TELEPHONE ENCOUNTER
Received request via: Pharmacy    Was the patient seen in the last year in this department? Yes    Does the patient have an active prescription (recently filled or refills available) for medication(s) requested? No    Pharmacy Name: [facility]     Does the patient have skilled nursing Plus and need 100 day supply (blood pressure, diabetes and cholesterol meds only)? Medication is not for cholesterol, blood pressure or diabetes

## 2024-03-28 ENCOUNTER — PATIENT MESSAGE (OUTPATIENT)
Dept: MEDICAL GROUP | Facility: PHYSICIAN GROUP | Age: 74
End: 2024-03-28
Payer: MEDICARE

## 2024-03-28 DIAGNOSIS — J45.40 MODERATE PERSISTENT ASTHMA WITHOUT COMPLICATION: ICD-10-CM

## 2024-03-28 RX ORDER — FLUTICASONE PROPIONATE AND SALMETEROL 250; 50 UG/1; UG/1
1 POWDER RESPIRATORY (INHALATION) EVERY 12 HOURS
COMMUNITY
End: 2024-03-28 | Stop reason: SDUPTHER

## 2024-03-28 RX ORDER — FLUTICASONE PROPIONATE AND SALMETEROL 100; 50 UG/1; UG/1
1 POWDER RESPIRATORY (INHALATION) EVERY 12 HOURS
COMMUNITY
End: 2024-03-28

## 2024-03-28 RX ORDER — FLUTICASONE PROPIONATE AND SALMETEROL 250; 50 UG/1; UG/1
1 POWDER RESPIRATORY (INHALATION) EVERY 12 HOURS
Qty: 60 EACH | Refills: 3 | Status: SHIPPED | OUTPATIENT
Start: 2024-03-28

## 2024-04-05 DIAGNOSIS — J45.40 MODERATE PERSISTENT ASTHMA WITHOUT COMPLICATION: ICD-10-CM

## 2024-04-05 NOTE — TELEPHONE ENCOUNTER
Received request via: Pharmacy    Was the patient seen in the last year in this department? Yes    Does the patient have an active prescription (recently filled or refills available) for medication(s) requested? No    [facility]    Does the patient have assisted Plus and need 100 day supply (blood pressure, diabetes and cholesterol meds only)? Medication is not for cholesterol, blood pressure or diabetes

## 2024-04-07 RX ORDER — ALBUTEROL SULFATE 90 UG/1
2 AEROSOL, METERED RESPIRATORY (INHALATION) EVERY 4 HOURS PRN
Qty: 6.7 EACH | Refills: 3 | Status: SHIPPED | OUTPATIENT
Start: 2024-04-07

## 2024-04-11 DIAGNOSIS — J45.901 MILD ASTHMA WITH EXACERBATION, UNSPECIFIED WHETHER PERSISTENT: ICD-10-CM

## 2024-04-11 RX ORDER — PREDNISONE 20 MG/1
40 TABLET ORAL DAILY
Qty: 10 TABLET | Refills: 0 | Status: SHIPPED | OUTPATIENT
Start: 2024-04-11 | End: 2024-04-16

## 2024-04-17 ENCOUNTER — APPOINTMENT (OUTPATIENT)
Dept: PHYSICAL THERAPY | Facility: REHABILITATION | Age: 74
End: 2024-04-17
Attending: INTERNAL MEDICINE
Payer: MEDICARE

## 2024-04-24 ENCOUNTER — PHYSICAL THERAPY (OUTPATIENT)
Dept: PHYSICAL THERAPY | Facility: REHABILITATION | Age: 74
End: 2024-04-24
Attending: INTERNAL MEDICINE
Payer: MEDICARE

## 2024-04-24 DIAGNOSIS — G89.29 CHRONIC PAIN OF LEFT KNEE: ICD-10-CM

## 2024-04-24 DIAGNOSIS — E66.9 OBESITY (BMI 30-39.9): ICD-10-CM

## 2024-04-24 DIAGNOSIS — M25.562 CHRONIC PAIN OF LEFT KNEE: ICD-10-CM

## 2024-04-24 DIAGNOSIS — M51.26 LUMBAR DISC HERNIATION: ICD-10-CM

## 2024-04-24 DIAGNOSIS — Z71.82 EXERCISE COUNSELING: ICD-10-CM

## 2024-04-24 DIAGNOSIS — M17.10 ARTHRITIS OF KNEE: ICD-10-CM

## 2024-04-24 DIAGNOSIS — M54.16 LUMBAR RADICULOPATHY: ICD-10-CM

## 2024-04-24 DIAGNOSIS — M47.816 LUMBAR SPONDYLOSIS: ICD-10-CM

## 2024-04-24 PROCEDURE — 97110 THERAPEUTIC EXERCISES: CPT

## 2024-04-24 PROCEDURE — 97161 PT EVAL LOW COMPLEX 20 MIN: CPT

## 2024-04-24 SDOH — ECONOMIC STABILITY: GENERAL: QUALITY OF LIFE: FAIR

## 2024-04-24 ASSESSMENT — ENCOUNTER SYMPTOMS
PAIN SCALE: 0
PAIN SCALE AT LOWEST: 0
PAIN SCALE AT HIGHEST: 0

## 2024-04-26 ENCOUNTER — APPOINTMENT (OUTPATIENT)
Dept: PHYSICAL THERAPY | Facility: REHABILITATION | Age: 74
End: 2024-04-26
Attending: INTERNAL MEDICINE
Payer: MEDICARE

## 2024-04-29 ENCOUNTER — OFFICE VISIT (OUTPATIENT)
Dept: MEDICAL GROUP | Facility: PHYSICIAN GROUP | Age: 74
End: 2024-04-29
Payer: MEDICARE

## 2024-04-29 VITALS
TEMPERATURE: 97.2 F | RESPIRATION RATE: 16 BRPM | HEART RATE: 83 BPM | HEIGHT: 65 IN | WEIGHT: 199 LBS | OXYGEN SATURATION: 94 % | BODY MASS INDEX: 33.15 KG/M2 | DIASTOLIC BLOOD PRESSURE: 60 MMHG | SYSTOLIC BLOOD PRESSURE: 130 MMHG

## 2024-04-29 DIAGNOSIS — M54.41 CHRONIC RIGHT-SIDED LOW BACK PAIN WITH RIGHT-SIDED SCIATICA: ICD-10-CM

## 2024-04-29 DIAGNOSIS — E66.09 CLASS 1 OBESITY DUE TO EXCESS CALORIES WITH SERIOUS COMORBIDITY AND BODY MASS INDEX (BMI) OF 33.0 TO 33.9 IN ADULT: ICD-10-CM

## 2024-04-29 DIAGNOSIS — G89.29 CHRONIC RIGHT-SIDED LOW BACK PAIN WITH RIGHT-SIDED SCIATICA: ICD-10-CM

## 2024-04-29 DIAGNOSIS — F41.1 GENERALIZED ANXIETY DISORDER: ICD-10-CM

## 2024-04-29 PROBLEM — E66.811 CLASS 1 OBESITY DUE TO EXCESS CALORIES WITH SERIOUS COMORBIDITY AND BODY MASS INDEX (BMI) OF 33.0 TO 33.9 IN ADULT: Status: RESOLVED | Noted: 2024-04-29 | Resolved: 2024-04-29

## 2024-04-29 PROBLEM — E66.811 CLASS 1 OBESITY DUE TO EXCESS CALORIES WITH SERIOUS COMORBIDITY AND BODY MASS INDEX (BMI) OF 33.0 TO 33.9 IN ADULT: Status: ACTIVE | Noted: 2024-04-29

## 2024-04-29 PROBLEM — E66.811 CLASS 1 OBESITY DUE TO EXCESS CALORIES WITH SERIOUS COMORBIDITY AND BODY MASS INDEX (BMI) OF 33.0 TO 33.9 IN ADULT: Status: ACTIVE | Noted: 2023-11-16

## 2024-04-29 RX ORDER — BUPROPION HYDROCHLORIDE 75 MG/1
150 TABLET ORAL 2 TIMES DAILY
Qty: 400 TABLET | Refills: 3
Start: 2024-04-29

## 2024-04-29 ASSESSMENT — FIBROSIS 4 INDEX: FIB4 SCORE: 1.4

## 2024-04-30 NOTE — ASSESSMENT & PLAN NOTE
Chronic ongoing, she is off the fluoxetine and agreeable to dose increase on bupropion to see if we can get improved coverage for both anxiety and have a weight negative medicine as her weight gain worsened when we increased the Prozac. She will keep me updated on how she is feeling symptom-wise.

## 2024-04-30 NOTE — ASSESSMENT & PLAN NOTE
Chronic ongoing issue, notes steady weight gain over the past 4 years that exacerbated since we started and increased Prozac. Total weight gain since we met in 2020 is about 33 lb with half of that in the past year. We will approach with re-trial of mediterranean diet versus intermittent fasting re-trial since calorie counting has not been effective for her. Will refer to nutritionist and she will look into getting back with a  or I offered her a PT referral to help with getting her comfortable back in the gym with her bulging disc history.

## 2024-04-30 NOTE — ASSESSMENT & PLAN NOTE
Previous problem, interrupted her previous exercise routine as she has been worried to exacerbate her low back issue and last round of PT was more focused on gait and stability training and now muscle and core conditioning. She will reach out to a  at her gym to see about availability and we also discussed options for referral to [facility] to help get her back into the gym with a planned exercise routine. Continue NSAIDs and muscle relaxants as needed for the back in the interim.

## 2024-04-30 NOTE — PROGRESS NOTES
"Subjective:   Chief Complaint/History of Present Illness:  Prudence Chayito Gregory is a 73 y.o. female established patient who presents today to discuss medical problems as listed below. Pru is unaccompanied for today's visit.    Problem   Class 1 Obesity Due to Excess Calories With Serious Comorbidity and Body Mass Index (Bmi) of 33.0 to 33.9 in Adult    She reports steady weight gain in recent years, she has gained approximately 16 lb in the past year since starting on the Prozac for her anxiety and in review of her charts she has increased about 33 lb since 2020. She notes this is multifactorial from a back injury that reduced her exercise as well as less activity during covid. She has some success in the past with intermittent fasting and is hesitant to use any medicines that may cause rebound weight gain or have intolerable side effects.     Generalized Anxiety Disorder    PHQ9-18 in April 2023    Longstanding anxiety, she reports she has been \"in denial\" for years. Was trialed on Zoloft in the mid 1990's but does not recall it helping. She can be irritable as well as easy to anger that she feels is out of proportion to the situation. No panic attacks.      Some improvement with Prozac but feels the benefit has tapered off and would like to try a dose adjustment especially because of associated weight gain. Now on Bupropion and titrating up from 75 mg twice daily to 150 mg twice daily.    Current regimen: Bupropion 150 mg twice daily  Previous regimen: fluoxetine 30 mg daily       Chronic Right-Sided Low Back Pain With Right-Sided Sciatica    She reports remote history of disc bulging on MRI lumbar spine in the past. She does feel like the symptoms have increased slightly and she has seen orthopedic surgery in the past but is not interested in operative repair. She wonders if she should see a chiropractor. It does not appear she is ever met with a physiatrist. She would be interested in the least intervention " necessary to help with pain especially when it flares up. She had decompensated in early 2022, better now after treatment with medicines.             Current Medications:  Current Outpatient Medications Ordered in Epic   Medication Sig Dispense Refill    buPROPion (WELLBUTRIN) 75 MG Tab Take 2 Tablets by mouth 2 times a day. 400 Tablet 3    albuterol 108 (90 Base) MCG/ACT Aero Soln inhalation aerosol INHALE 2 PUFFS BY MOUTH EVERY FOUR HOURS AS NEEDED FOR SHORTNESS OF BREATH. 6.7 Each 3    fluticasone-salmeterol (WIXELA INHUB) 250-50 MCG/ACT AEROSOL POWDER, BREATH ACTIVATED Inhale 1 Puff every 12 hours. 60 Each 3    levothyroxine (SYNTHROID) 88 MCG Tab Take 1 Tablet by mouth every Monday, Wednesday, and Friday. To alternate with 100 mcg dose 50 Tablet 3    levothyroxine (SYNTHROID) 100 MCG Tab Take 1 Tablet by mouth in the evening every Tuesday, Thursdays, Saturday, and Sunday. To alternate with 88 mcg dose 60 Tablet 3    rosuvastatin (CRESTOR) 10 MG Tab TAKE 1 TABLET BY MOUTH EVERY DAY IN THE EVENING 100 Tablet 3    Meclizine HCl (ANTIVERT) 25 MG Chew Tab 1 q12hr prn vertigo 10 Tablet 0    PREMARIN 0.625 MG/GM Cream APPLY 1.0 GRAM TO AFFECTED AREA 3X/WEEK      dicyclomine (BENTYL) 10 MG Cap Take 1 Capsule by mouth every 6 hours as needed (abdominal cramping). 30 Capsule 1    neomycin-polymixin-dexamethasone (MAXITROL) 3.5-03339-3.1 Ointment ophthalmic ointment       clobetasol (TEMOVATE) 0.05 % Cream       meloxicam (MOBIC) 15 MG tablet TAKE 1 TABLET BY MOUTH 1 TIME A DAY AS NEEDED (PAIN). DO NOT TAKE OTHER NSAIDS 100 Tablet 3    baclofen (LIORESAL) 10 MG Tab Take 1 Tablet by mouth 3 times a day as needed (muscle spasm/pain). 90 Tablet 3    triamcinolone acetonide (KENALOG) 0.1 % Ointment TOPICAL APPLY SPARINGLY TO THE AFFECTED AREA 3XWK.      ipratropium-albuterol (DUONEB) 0.5-2.5 (3) MG/3ML nebulizer solution Take 3 mL by nebulization 4 times a day. (Patient taking differently: Take 3 mL by nebulization every four  "hours as needed.) 3 mL 3    coenzyme Q-10 30 MG capsule Take 60 mg by mouth every day.      Cyanocobalamin (B-12 PO) Take 1 Tablet by mouth every day.       No current [facility]-ordered facility-administered medications on file.          Objective:   Physical Exam:    Vitals: /60 (BP Location: Left arm, Patient Position: Sitting, BP Cuff Size: Adult)   Pulse 83   Temp 36.2 °C (97.2 °F) (Temporal)   Resp 16   Ht 1.651 m (5' 5\")   Wt 90.3 kg (199 lb)   SpO2 94%    BMI: Body mass index is 33.12 kg/m².  Physical Exam  Constitutional:       General: She is not in acute distress.     Appearance: Normal appearance. She is not ill-appearing.   HENT:      Right Ear: External ear normal. There is no impacted cerumen.      Left Ear: External ear normal.   Eyes:      General: No scleral icterus.     Conjunctiva/sclera: Conjunctivae normal.   Cardiovascular:      Rate and Rhythm: Normal rate and regular rhythm.      Pulses: Normal pulses.   Pulmonary:      Effort: Pulmonary effort is normal. No respiratory distress.      Breath sounds: No wheezing or rhonchi.   Musculoskeletal:      Right lower leg: No edema.      Left lower leg: No edema.   Skin:     General: Skin is warm and dry.   Psychiatric:         Mood and Affect: Mood normal.         Behavior: Behavior normal.         Thought Content: Thought content normal.         Judgment: Judgment normal.          Assessment and Plan:   Nancy is a 73 y.o. female with the following:  Problem List Items Addressed This Visit       Chronic 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     Previous problem, interrupted her previous exercise routine as she has been worried to exacerbate her low back issue and last round of PT was more focused on gait and stability training and now muscle and core conditioning. She will reach out to a  at her gym to see about availability and we also discussed options for referral to [facility] to help get her back into the gym with a " planned exercise routine. Continue NSAIDs and muscle relaxants as needed for the back in the interim.         Relevant Medications    buPROPion (WELLBUTRIN) 75 MG Tab    Class 1 obesity due to excess calories with serious comorbidity and body mass index (BMI) of 33.0 to 33.9 in adult     Chronic ongoing issue, notes steady weight gain over the past 4 years that exacerbated since we started and increased Prozac. Total weight gain since we met in 2020 is about 33 lb with half of that in the past year. We will approach with re-trial of mediterranean diet versus intermittent fasting re-trial since calorie counting has not been effective for her. Will refer to nutritionist and she will look into getting back with a  or I offered her a PT referral to help with getting her comfortable back in the gym with her bulging disc history.         Relevant Medications    buPROPion (WELLBUTRIN) 75 MG Tab    Other Relevant Orders    Referral to Nutrition Services    Generalized anxiety disorder     Chronic ongoing, she is off the fluoxetine and agreeable to dose increase on bupropion to see if we can get improved coverage for both anxiety and have a weight negative medicine as her weight gain worsened when we increased the Prozac. She will keep me updated on how she is feeling symptom-wise.         Relevant Medications    buPROPion (WELLBUTRIN) 75 MG Tab    Other Relevant Orders    Referral to Nutrition Services          RTC: Return in about 3 months (around 7/29/2024).    I spent a total of 30 minutes with record review, exam, communication with the patient, communication with other providers, and documentation of this encounter.    PLEASE NOTE: This dictation was created using voice recognition software. I have made every reasonable attempt to correct obvious errors, but I expect that there are errors of grammar and possibly content that I did not discover before finalizing the note.      Shawna Recio, DO  Geriatric and  Internal Medicine  [facility]

## 2024-05-01 ENCOUNTER — APPOINTMENT (OUTPATIENT)
Dept: PHYSICAL THERAPY | Facility: REHABILITATION | Age: 74
End: 2024-05-01
Attending: INTERNAL MEDICINE
Payer: MEDICARE

## 2024-05-03 ENCOUNTER — APPOINTMENT (OUTPATIENT)
Dept: PHYSICAL THERAPY | Facility: REHABILITATION | Age: 74
End: 2024-05-03
Attending: INTERNAL MEDICINE
Payer: MEDICARE

## 2024-05-08 ENCOUNTER — APPOINTMENT (OUTPATIENT)
Dept: PHYSICAL THERAPY | Facility: REHABILITATION | Age: 74
End: 2024-05-08
Attending: INTERNAL MEDICINE
Payer: MEDICARE

## 2024-05-10 ENCOUNTER — APPOINTMENT (OUTPATIENT)
Dept: PHYSICAL THERAPY | Facility: REHABILITATION | Age: 74
End: 2024-05-10
Attending: INTERNAL MEDICINE
Payer: MEDICARE

## 2024-05-15 ENCOUNTER — TELEPHONE (OUTPATIENT)
Dept: PHYSICAL THERAPY | Facility: REHABILITATION | Age: 74
End: 2024-05-15
Payer: MEDICARE

## 2024-05-15 ENCOUNTER — APPOINTMENT (OUTPATIENT)
Dept: PHYSICAL THERAPY | Facility: REHABILITATION | Age: 74
End: 2024-05-15
Attending: INTERNAL MEDICINE
Payer: MEDICARE

## 2024-05-15 NOTE — OP THERAPY DISCHARGE SUMMARY
Outpatient Physical Therapy  DISCHARGE SUMMARY NOTE      [facility]  [address], [address]  [place] [address]  Phone:  [phone]  Fax:  [phone]    Date of Visit: 05/15/2024    Patient: Nina Gregory  YOB: 1950  MRN: [id number]     Referring Provider: Shawna Recio D.O.    Referring Diagnosis Pain in left knee [M25.562];Other chronic pain [G89.29]          Your patient is being discharged from Physical Therapy with the following comments:   Patient states she is feeling better    Comments:  Discharge per request, states she is feeling better.    Limitations Remaining:  NA    Recommendations:  Discharge    Kwame Elmore PT, DPT    Date: 5/15/2024

## 2024-05-17 ENCOUNTER — APPOINTMENT (OUTPATIENT)
Dept: PHYSICAL THERAPY | Facility: REHABILITATION | Age: 74
End: 2024-05-17
Attending: INTERNAL MEDICINE
Payer: MEDICARE

## 2024-06-03 ENCOUNTER — APPOINTMENT (OUTPATIENT)
Dept: MEDICAL GROUP | Facility: PHYSICIAN GROUP | Age: 74
End: 2024-06-03
Payer: MEDICARE

## 2024-07-02 ENCOUNTER — OFFICE VISIT (OUTPATIENT)
Dept: MEDICAL GROUP | Facility: PHYSICIAN GROUP | Age: 74
End: 2024-07-02
Payer: MEDICARE

## 2024-07-02 VITALS
HEIGHT: 65 IN | DIASTOLIC BLOOD PRESSURE: 70 MMHG | OXYGEN SATURATION: 97 % | BODY MASS INDEX: 32.14 KG/M2 | SYSTOLIC BLOOD PRESSURE: 114 MMHG | TEMPERATURE: 97.7 F | WEIGHT: 192.9 LBS | HEART RATE: 78 BPM

## 2024-07-02 DIAGNOSIS — F41.1 GENERALIZED ANXIETY DISORDER: ICD-10-CM

## 2024-07-02 DIAGNOSIS — E66.1 CLASS 1 DRUG-INDUCED OBESITY WITH SERIOUS COMORBIDITY AND BODY MASS INDEX (BMI) OF 32.0 TO 32.9 IN ADULT: ICD-10-CM

## 2024-07-02 DIAGNOSIS — E78.2 MIXED HYPERLIPIDEMIA: ICD-10-CM

## 2024-07-02 DIAGNOSIS — G25.81 RESTLESS LEG SYNDROME: ICD-10-CM

## 2024-07-02 PROCEDURE — 3074F SYST BP LT 130 MM HG: CPT | Performed by: INTERNAL MEDICINE

## 2024-07-02 PROCEDURE — 3078F DIAST BP <80 MM HG: CPT | Performed by: INTERNAL MEDICINE

## 2024-07-02 PROCEDURE — RXMED WILLOW AMBULATORY MEDICATION CHARGE: Performed by: INTERNAL MEDICINE

## 2024-07-02 PROCEDURE — 99214 OFFICE O/P EST MOD 30 MIN: CPT | Performed by: INTERNAL MEDICINE

## 2024-07-02 RX ORDER — BUPROPION HYDROCHLORIDE 75 MG/1
75 TABLET ORAL 2 TIMES DAILY
Qty: 28 TABLET | Refills: 0 | Status: SHIPPED | OUTPATIENT
Start: 2024-07-02 | End: 2024-07-23

## 2024-07-02 RX ORDER — DULOXETIN HYDROCHLORIDE 30 MG/1
30 CAPSULE, DELAYED RELEASE ORAL DAILY
Qty: 30 CAPSULE | Refills: 1 | Status: SHIPPED | OUTPATIENT
Start: 2024-07-02

## 2024-07-02 ASSESSMENT — FIBROSIS 4 INDEX: FIB4 SCORE: 1.4

## 2024-07-03 DIAGNOSIS — E03.4 HYPOTHYROIDISM DUE TO ACQUIRED ATROPHY OF THYROID: ICD-10-CM

## 2024-07-03 DIAGNOSIS — E53.8 B12 DEFICIENCY: ICD-10-CM

## 2024-07-05 ENCOUNTER — HOSPITAL ENCOUNTER (OUTPATIENT)
Dept: LAB | Facility: MEDICAL CENTER | Age: 74
End: 2024-07-05
Attending: INTERNAL MEDICINE
Payer: MEDICARE

## 2024-07-05 DIAGNOSIS — E53.8 B12 DEFICIENCY: ICD-10-CM

## 2024-07-05 DIAGNOSIS — E03.4 HYPOTHYROIDISM DUE TO ACQUIRED ATROPHY OF THYROID: ICD-10-CM

## 2024-07-05 LAB
ALBUMIN SERPL BCP-MCNC: 4.7 G/DL (ref 3.2–4.9)
ALBUMIN/GLOB SERPL: 2 G/DL
ALP SERPL-CCNC: 79 U/L (ref 30–99)
ALT SERPL-CCNC: 18 U/L (ref 2–50)
ANION GAP SERPL CALC-SCNC: 9 MMOL/L (ref 7–16)
AST SERPL-CCNC: 18 U/L (ref 12–45)
BASOPHILS # BLD AUTO: 1.1 % (ref 0–1.8)
BASOPHILS # BLD: 0.06 K/UL (ref 0–0.12)
BILIRUB SERPL-MCNC: 0.3 MG/DL (ref 0.1–1.5)
BUN SERPL-MCNC: 19 MG/DL (ref 8–22)
CALCIUM ALBUM COR SERPL-MCNC: 9.7 MG/DL (ref 8.5–10.5)
CALCIUM SERPL-MCNC: 10.3 MG/DL (ref 8.5–10.5)
CHLORIDE SERPL-SCNC: 106 MMOL/L (ref 96–112)
CO2 SERPL-SCNC: 27 MMOL/L (ref 20–33)
CREAT SERPL-MCNC: 0.63 MG/DL (ref 0.5–1.4)
EOSINOPHIL # BLD AUTO: 0.18 K/UL (ref 0–0.51)
EOSINOPHIL NFR BLD: 3.3 % (ref 0–6.9)
ERYTHROCYTE [DISTWIDTH] IN BLOOD BY AUTOMATED COUNT: 43.6 FL (ref 35.9–50)
GFR SERPLBLD CREATININE-BSD FMLA CKD-EPI: 93 ML/MIN/1.73 M 2
GLOBULIN SER CALC-MCNC: 2.3 G/DL (ref 1.9–3.5)
GLUCOSE SERPL-MCNC: 108 MG/DL (ref 65–99)
HCT VFR BLD AUTO: 46.2 % (ref 37–47)
HGB BLD-MCNC: 14.6 G/DL (ref 12–16)
IMM GRANULOCYTES # BLD AUTO: 0.01 K/UL (ref 0–0.11)
IMM GRANULOCYTES NFR BLD AUTO: 0.2 % (ref 0–0.9)
LYMPHOCYTES # BLD AUTO: 2.12 K/UL (ref 1–4.8)
LYMPHOCYTES NFR BLD: 38.3 % (ref 22–41)
MCH RBC QN AUTO: 29.8 PG (ref 27–33)
MCHC RBC AUTO-ENTMCNC: 31.6 G/DL (ref 32.2–35.5)
MCV RBC AUTO: 94.3 FL (ref 81.4–97.8)
MONOCYTES # BLD AUTO: 0.44 K/UL (ref 0–0.85)
MONOCYTES NFR BLD AUTO: 8 % (ref 0–13.4)
NEUTROPHILS # BLD AUTO: 2.72 K/UL (ref 1.82–7.42)
NEUTROPHILS NFR BLD: 49.1 % (ref 44–72)
NRBC # BLD AUTO: 0 K/UL
NRBC BLD-RTO: 0 /100 WBC (ref 0–0.2)
PLATELET # BLD AUTO: 241 K/UL (ref 164–446)
PMV BLD AUTO: 11.5 FL (ref 9–12.9)
POTASSIUM SERPL-SCNC: 5.1 MMOL/L (ref 3.6–5.5)
PROT SERPL-MCNC: 7 G/DL (ref 6–8.2)
RBC # BLD AUTO: 4.9 M/UL (ref 4.2–5.4)
SODIUM SERPL-SCNC: 142 MMOL/L (ref 135–145)
T4 FREE SERPL-MCNC: 1.26 NG/DL (ref 0.93–1.7)
TSH SERPL DL<=0.005 MIU/L-ACNC: 0.98 UIU/ML (ref 0.38–5.33)
VIT B12 SERPL-MCNC: 1864 PG/ML (ref 211–911)
WBC # BLD AUTO: 5.5 K/UL (ref 4.8–10.8)

## 2024-07-05 PROCEDURE — 80053 COMPREHEN METABOLIC PANEL: CPT

## 2024-07-05 PROCEDURE — 85025 COMPLETE CBC W/AUTO DIFF WBC: CPT

## 2024-07-05 PROCEDURE — 82607 VITAMIN B-12: CPT

## 2024-07-05 PROCEDURE — 84439 ASSAY OF FREE THYROXINE: CPT

## 2024-07-05 PROCEDURE — 36415 COLL VENOUS BLD VENIPUNCTURE: CPT

## 2024-07-05 PROCEDURE — 84443 ASSAY THYROID STIM HORMONE: CPT

## 2024-07-09 ENCOUNTER — PHARMACY VISIT (OUTPATIENT)
Dept: PHARMACY | Facility: MEDICAL CENTER | Age: 74
End: 2024-07-09
Payer: COMMERCIAL

## 2024-07-31 ENCOUNTER — APPOINTMENT (OUTPATIENT)
Dept: MEDICAL GROUP | Facility: PHYSICIAN GROUP | Age: 74
End: 2024-07-31
Payer: MEDICARE

## 2024-08-13 PROCEDURE — RXMED WILLOW AMBULATORY MEDICATION CHARGE: Performed by: INTERNAL MEDICINE

## 2024-08-16 ENCOUNTER — PHARMACY VISIT (OUTPATIENT)
Dept: PHARMACY | Facility: MEDICAL CENTER | Age: 74
End: 2024-08-16
Payer: COMMERCIAL

## 2024-09-09 ENCOUNTER — APPOINTMENT (OUTPATIENT)
Dept: MEDICAL GROUP | Facility: PHYSICIAN GROUP | Age: 74
End: 2024-09-09
Payer: MEDICARE

## 2024-09-09 VITALS
RESPIRATION RATE: 16 BRPM | BODY MASS INDEX: 30.84 KG/M2 | DIASTOLIC BLOOD PRESSURE: 70 MMHG | SYSTOLIC BLOOD PRESSURE: 120 MMHG | HEART RATE: 74 BPM | WEIGHT: 185.1 LBS | HEIGHT: 65 IN | TEMPERATURE: 96.3 F | OXYGEN SATURATION: 96 %

## 2024-09-09 DIAGNOSIS — E66.1 CLASS 1 DRUG-INDUCED OBESITY WITH SERIOUS COMORBIDITY AND BODY MASS INDEX (BMI) OF 30.0 TO 30.9 IN ADULT: ICD-10-CM

## 2024-09-09 DIAGNOSIS — N39.3 STRESS INCONTINENCE OF URINE: ICD-10-CM

## 2024-09-09 DIAGNOSIS — F41.1 GENERALIZED ANXIETY DISORDER: ICD-10-CM

## 2024-09-09 PROCEDURE — 3074F SYST BP LT 130 MM HG: CPT | Performed by: INTERNAL MEDICINE

## 2024-09-09 PROCEDURE — 3078F DIAST BP <80 MM HG: CPT | Performed by: INTERNAL MEDICINE

## 2024-09-09 PROCEDURE — 99214 OFFICE O/P EST MOD 30 MIN: CPT | Performed by: INTERNAL MEDICINE

## 2024-09-09 PROCEDURE — RXMED WILLOW AMBULATORY MEDICATION CHARGE: Performed by: INTERNAL MEDICINE

## 2024-09-09 RX ORDER — DULOXETIN HYDROCHLORIDE 30 MG/1
60 CAPSULE, DELAYED RELEASE ORAL DAILY
Qty: 200 CAPSULE | Refills: 3 | Status: SHIPPED | OUTPATIENT
Start: 2024-09-09

## 2024-09-09 ASSESSMENT — FIBROSIS 4 INDEX: FIB4 SCORE: 1.29

## 2024-09-09 NOTE — ASSESSMENT & PLAN NOTE
Chronic improved problem, since working with her training and stopping Prozac she has done an excellent job with weight loss of 15 lb. She is now on Cymbalta and Wellbutrin did not help her anxiety/mood, will try dose increase from 30 mg to 60 mg. She will continue working with her . She has nutrition referral but is holding off as long as she continues to make good improvements.

## 2024-09-09 NOTE — ASSESSMENT & PLAN NOTE
Chronic and improved problem, nearly resolved after starting Cymbalta in Summer 2024. She also had weight loss of about 16 lb in the 6 months leading up to this improvement after stopping Prozac, continue observation at this time.   3000

## 2024-09-09 NOTE — PROGRESS NOTES
"Subjective:   Chief Complaint/History of Present Illness:  Prudence Chayito Gregory is a 73 y.o. female established patient who presents today to discuss medical problems as listed below. Pru is unaccompanied for today's visit.      Problem   Class 1 Drug-Induced Obesity With Serious Comorbidity and Body Mass Index (Bmi) of 30.0 to 30.9 in Adult    She reports steady weight gain in recent years, she has gained approximately 16 lb in the past year since starting on the Prozac for her anxiety and in review of her charts she has increased about 33 lb since 2020. She notes this is multifactorial from a back injury that reduced her exercise as well as less activity during covid. She has some success in the past with intermittent fasting and is hesitant to use any medicines that may cause rebound weight gain or have intolerable side effects.    On follow-up 8 weeks later she has lost 7 to 10 pounds with a  and since stopping Prozac.  However addition of Wellbutrin has not had any positive impact on her anxiety.    On follow up another 9 weeks later she has lost an additional 8 lb and anxiety seems slightly improved.     Generalized Anxiety Disorder      Longstanding anxiety, she reports she has been \"in denial\" for years. Was trialed on Zoloft in the mid 1990's but does not recall it helping. She can be irritable as well as easy to anger that she feels is out of proportion to the situation. No panic attacks.      Some improvement with Prozac but feels the benefit has tapered off and would like to try a dose adjustment especially because of associated weight gain. Tried Bupropion and titrated up from 75 mg twice daily to 150 mg twice daily has had almost no impact on her anxiety so we have agreed to reduce her back to 75 mg twice daily for 2 weeks and stop.  Will challenge with duloxetine 30 mg daily at that time should also be more weight neutral and with some serotonin coverage may help with anxiety since the Prozac " was previously effective. Anxiety improved on cymbalta, still some room for improvement so will plan for dose increase when she returns from her next trip.    Current regimen: duloxetine 60 mg daily  Previous regimen: fluoxetine 30 mg daily, bupropion 150 mg twice daily; dose increase of duloxetine from 30 mg to 60 mg in Sept 2024.       Stress Incontinence of Urine    She reports longstanding urinary incontinence, seem to have worsened in [id number] and then after starting Cymbalta in Summer 2024 notes incontinence is much better. She also had weight loss of about 16 lb in the 6 months leading up to this improvement after stopping Prozac.          Current Medications:  Current Outpatient Medications Ordered in Epic   Medication Sig Dispense Refill    DULoxetine (CYMBALTA) 30 MG Cap DR Particles Take 2 Capsules by mouth every day. 200 Capsule 3    albuterol 108 (90 Base) MCG/ACT Aero Soln inhalation aerosol INHALE 2 PUFFS BY MOUTH EVERY FOUR HOURS AS NEEDED FOR SHORTNESS OF BREATH. 6.7 Each 3    fluticasone-salmeterol (WIXELA INHUB) 250-50 MCG/ACT AEROSOL POWDER, BREATH ACTIVATED Inhale 1 Puff every 12 hours. 60 Each 3    levothyroxine (SYNTHROID) 88 MCG Tab Take 1 Tablet by mouth every Monday, Wednesday, and Friday. To alternate with 100 mcg dose 50 Tablet 3    levothyroxine (SYNTHROID) 100 MCG Tab Take 1 Tablet by mouth in the evening every Tuesday, Thursdays, Saturday, and Sunday. To alternate with 88 mcg dose 60 Tablet 3    rosuvastatin (CRESTOR) 10 MG Tab TAKE 1 TABLET BY MOUTH EVERY DAY IN THE EVENING 100 Tablet 3    Meclizine HCl (ANTIVERT) 25 MG Chew Tab 1 q12hr prn vertigo 10 Tablet 0    PREMARIN 0.625 MG/GM Cream APPLY 1.0 GRAM TO AFFECTED AREA 3X/WEEK      dicyclomine (BENTYL) 10 MG Cap Take 1 Capsule by mouth every 6 hours as needed (abdominal cramping). 30 Capsule 1    neomycin-polymixin-dexamethasone (MAXITROL) 3.5-95728-1.1 Ointment ophthalmic ointment       clobetasol (TEMOVATE) 0.05 % Cream        "meloxicam (MOBIC) 15 MG tablet TAKE 1 TABLET BY MOUTH 1 TIME A DAY AS NEEDED (PAIN). DO NOT TAKE OTHER NSAIDS 100 Tablet 3    baclofen (LIORESAL) 10 MG Tab Take 1 Tablet by mouth 3 times a day as needed (muscle spasm/pain). 90 Tablet 3    triamcinolone acetonide (KENALOG) 0.1 % Ointment TOPICAL APPLY SPARINGLY TO THE AFFECTED AREA 3XWK.      ipratropium-albuterol (DUONEB) 0.5-2.5 (3) MG/3ML nebulizer solution Take 3 mL by nebulization 4 times a day. (Patient taking differently: Take 3 mL by nebulization every four hours as needed.) 3 mL 3    coenzyme Q-10 30 MG capsule Take 60 mg by mouth every day.      Cyanocobalamin (B-12 PO) Take 1 Tablet by mouth every day.       No current [facility]-ordered facility-administered medications on file.          Objective:   Physical Exam:    Vitals: /70 (BP Location: Left arm, Patient Position: Sitting, BP Cuff Size: Adult)   Pulse 74   Temp (!) 35.7 °C (96.3 °F) (Temporal)   Resp 16   Ht 1.651 m (5' 5\")   Wt 84 kg (185 lb 1.6 oz)   SpO2 96%    BMI: Body mass index is 30.8 kg/m².  Physical Exam  Constitutional:       General: She is not in acute distress.     Appearance: Normal appearance. She is not ill-appearing.   HENT:      Right Ear: Ear canal and external ear normal. There is no impacted cerumen.      Left Ear: Ear canal and external ear normal. There is no impacted cerumen.   Eyes:      General: No scleral icterus.     Conjunctiva/sclera: Conjunctivae normal.   Cardiovascular:      Rate and Rhythm: Normal rate and regular rhythm.      Pulses: Normal pulses.   Pulmonary:      Effort: Pulmonary effort is normal. No respiratory distress.      Breath sounds: No wheezing or rhonchi.   Abdominal:      General: Bowel sounds are normal. There is no distension.      Palpations: Abdomen is soft.   Musculoskeletal:      Right lower leg: No edema.      Left lower leg: No edema.   Skin:     General: Skin is warm and dry.      Findings: No rash.   Neurological:      Gait: Gait " normal.   Psychiatric:         Mood and Affect: Mood normal.         Behavior: Behavior normal.         Thought Content: Thought content normal.         Judgment: Judgment normal.          Assessment and Plan:   Nancy is a 73 y.o. female with the following:  Problem List Items Addressed This Visit       Class 1 drug-induced obesity with serious comorbidity and body mass index (BMI) of 30.0 to 30.9 in adult     Chronic improved problem, since working with her training and stopping Prozac she has done an excellent job with weight loss of 15 lb. She is now on Cymbalta and Wellbutrin did not help her anxiety/mood, will try dose increase from 30 mg to 60 mg. She will continue working with her . She has nutrition referral but is holding off as long as she continues to make good improvements.         Generalized anxiety disorder     Chronic and improved issue, no better on bupropion but now with Cymbalta seems to have stabilized some in regards to anxiety. Interested in dose increase, she will go from duloxetine 30 mg up to 60 mg after she returns from vacation and will keep me updated on how she is feeling. Recheck again in 4 months or sooner if needed. Of note, urinary incontinence and sleep issues are both significantly improved since starting the Cymbalta.          Relevant Medications    DULoxetine (CYMBALTA) 30 MG Cap DR Particles    Stress incontinence of urine     Chronic and improved problem, nearly resolved after starting Cymbalta in Summer 2024. She also had weight loss of about 16 lb in the 6 months leading up to this improvement after stopping Prozac, continue observation at this time.               RTC: Return in about 4 months (around 1/9/2025).    I spent a total of 30 minutes with record review, exam, communication with the patient, communication with other providers, and documentation of this encounter.    PLEASE NOTE: This dictation was created using voice recognition software. I have made  every reasonable attempt to correct obvious errors, but I expect that there are errors of grammar and possibly content that I did not discover before finalizing the note.      Shawna Recio, DO  Geriatric and Internal Medicine  [facility]

## 2024-09-09 NOTE — ASSESSMENT & PLAN NOTE
Chronic and improved issue, no better on bupropion but now with Cymbalta seems to have stabilized some in regards to anxiety. Interested in dose increase, she will go from duloxetine 30 mg up to 60 mg after she returns from vacation and will keep me updated on how she is feeling. Recheck again in 4 months or sooner if needed. Of note, urinary incontinence and sleep issues are both significantly improved since starting the Cymbalta.

## 2024-09-15 DIAGNOSIS — J45.40 MODERATE PERSISTENT ASTHMA WITHOUT COMPLICATION: ICD-10-CM

## 2024-09-16 RX ORDER — ALBUTEROL SULFATE 90 UG/1
2 INHALANT RESPIRATORY (INHALATION) EVERY 4 HOURS PRN
Qty: 6.7 EACH | Refills: 3 | Status: SHIPPED | OUTPATIENT
Start: 2024-09-16

## 2024-09-18 ENCOUNTER — PHARMACY VISIT (OUTPATIENT)
Dept: PHARMACY | Facility: MEDICAL CENTER | Age: 74
End: 2024-09-18
Payer: COMMERCIAL

## 2024-11-28 DIAGNOSIS — E78.2 MIXED HYPERLIPIDEMIA: ICD-10-CM

## 2024-12-02 RX ORDER — ROSUVASTATIN CALCIUM 10 MG/1
10 TABLET, COATED ORAL EVERY EVENING
Qty: 100 TABLET | Refills: 3 | Status: SHIPPED | OUTPATIENT
Start: 2024-12-02

## 2024-12-03 ENCOUNTER — OFFICE VISIT (OUTPATIENT)
Dept: MEDICAL GROUP | Facility: PHYSICIAN GROUP | Age: 74
End: 2024-12-03
Payer: MEDICARE

## 2024-12-03 VITALS
RESPIRATION RATE: 16 BRPM | HEART RATE: 83 BPM | TEMPERATURE: 97.2 F | DIASTOLIC BLOOD PRESSURE: 62 MMHG | WEIGHT: 187.5 LBS | BODY MASS INDEX: 31.24 KG/M2 | SYSTOLIC BLOOD PRESSURE: 126 MMHG | OXYGEN SATURATION: 94 % | HEIGHT: 65 IN

## 2024-12-03 DIAGNOSIS — R73.01 ELEVATED FASTING BLOOD SUGAR: ICD-10-CM

## 2024-12-03 DIAGNOSIS — M79.661 RIGHT CALF PAIN: ICD-10-CM

## 2024-12-03 DIAGNOSIS — J45.40 MODERATE PERSISTENT ASTHMA WITHOUT COMPLICATION: ICD-10-CM

## 2024-12-03 DIAGNOSIS — E03.4 HYPOTHYROIDISM DUE TO ACQUIRED ATROPHY OF THYROID: ICD-10-CM

## 2024-12-03 DIAGNOSIS — M54.41 CHRONIC RIGHT-SIDED LOW BACK PAIN WITH RIGHT-SIDED SCIATICA: ICD-10-CM

## 2024-12-03 DIAGNOSIS — F41.1 GENERALIZED ANXIETY DISORDER: ICD-10-CM

## 2024-12-03 DIAGNOSIS — E55.9 VITAMIN D DEFICIENCY: ICD-10-CM

## 2024-12-03 DIAGNOSIS — E78.2 MIXED HYPERLIPIDEMIA: ICD-10-CM

## 2024-12-03 DIAGNOSIS — G89.29 CHRONIC RIGHT-SIDED LOW BACK PAIN WITH RIGHT-SIDED SCIATICA: ICD-10-CM

## 2024-12-03 PROCEDURE — 3074F SYST BP LT 130 MM HG: CPT | Performed by: INTERNAL MEDICINE

## 2024-12-03 PROCEDURE — 3078F DIAST BP <80 MM HG: CPT | Performed by: INTERNAL MEDICINE

## 2024-12-03 PROCEDURE — 99214 OFFICE O/P EST MOD 30 MIN: CPT | Performed by: INTERNAL MEDICINE

## 2024-12-03 RX ORDER — MELOXICAM 15 MG/1
15 TABLET ORAL DAILY
Qty: 100 TABLET | Refills: 3 | Status: SHIPPED | OUTPATIENT
Start: 2024-12-03

## 2024-12-03 ASSESSMENT — FIBROSIS 4 INDEX: FIB4 SCORE: 1.29

## 2024-12-03 NOTE — PROGRESS NOTES
Subjective:   Chief Complaint/History of Present Illness:  Prudence Chayito Gregory is a 73 y.o. female established patient who presents today to discuss medical problems as listed below. Prudence is unaccompanied for today's visit.    History of Present Illness  The patient presents for evaluation of multiple medical concerns.    She reports experiencing pain in her posterior thigh and popliteal fossa and right medial lower leg for the past week. The pain was initially triggered during a gym workout involving hamstring exercises with rubber pulleys resulting in hyperextension. Despite the discomfort, she continued the exercise, leading to soreness the following day. She also noticed a lump under her knee, which worsened over time. She then had severe right groin pain that became so severe that it prevented her from walking. Although the right groin pain subsided after a few days, the right posterior knee pain and limited ability to flex the lower leg has not improved since. She describes the pain as an ache, not a burn, and notes a difference between her two legs with swelling of the right lower leg. She also mentions a squishy feeling in her popliteal fossa in her knee when bending it . She has been taking meloxicam every four days for back pain and arthritis, which she finds effective. She did not take the medication yesterday or today to be able to better describe her pain. She also reports soreness in the back of her calf and a sensation of fluid in the back of her knee. She has previously had a broken pubic bone, which occasionally flares up.    She feels that her Wixela medication is not effective for her asthma. She experiences wheezing at night and sometimes during the day, particularly after walking a lot. She has tried Advair and Symbicort in the past, but found them ineffective. She believes that Breo was more effective than Wixela. Dulera was the best inhaler therapy she has tried but insurance does not  cover it for her.    She has doubled her Cymbalta dosage from 30 mg to 60 mg, but she does not feel any different in regards to anxiety or pain. She has been taking the increased dosage for about a month. She reports that her condition is not as severe as it was before starting the medication. She also mentions that her sleep improved before she increased the dosage. She found Prozac to be the most effective, but it caused weight gain. She does not want to stop taking Cymbalta as she believes it is helping her. She also mentions that she still worries about trivial things and wonders if there is a medication that can help with this. Wellbutrin may have assisted with some weight loss (versus stopping prozac) but was not effective for her anxiety.    Supplemental Information  She had a hysterectomy at 44. She was on estrogen. She is using Premarin cream as needed for lichen sclerosis.       Current Medications:  Current Outpatient Medications Ordered in Epic   Medication Sig Dispense Refill    meloxicam (MOBIC) 15 MG tablet Take 1 Tablet by mouth every day. 100 Tablet 3    rosuvastatin (CRESTOR) 10 MG Tab TAKE 1 TABLET BY MOUTH EVERY DAY IN THE EVENING 100 Tablet 3    albuterol 108 (90 Base) MCG/ACT Aero Soln inhalation aerosol INHALE 2 PUFFS BY MOUTH EVERY FOUR HOURS AS NEEDED FOR SHORTNESS OF BREATH. 6.7 Each 3    DULoxetine (CYMBALTA) 30 MG Cap DR Particles Take 2 Capsules by mouth every day. 200 Capsule 3    fluticasone-salmeterol (WIXELA INHUB) 250-50 MCG/ACT AEROSOL POWDER, BREATH ACTIVATED Inhale 1 Puff every 12 hours. 60 Each 3    levothyroxine (SYNTHROID) 88 MCG Tab Take 1 Tablet by mouth every Monday, Wednesday, and Friday. To alternate with 100 mcg dose 50 Tablet 3    levothyroxine (SYNTHROID) 100 MCG Tab Take 1 Tablet by mouth in the evening every Tuesday, Thursdays, Saturday, and Sunday. To alternate with 88 mcg dose 60 Tablet 3    Meclizine HCl (ANTIVERT) 25 MG Chew Tab 1 q12hr prn vertigo 10 Tablet 0     "PREMARIN 0.625 MG/GM Cream APPLY 1.0 GRAM TO AFFECTED AREA 3X/WEEK      dicyclomine (BENTYL) 10 MG Cap Take 1 Capsule by mouth every 6 hours as needed (abdominal cramping). 30 Capsule 1    neomycin-polymixin-dexamethasone (MAXITROL) 3.5-77003-6.1 Ointment ophthalmic ointment       clobetasol (TEMOVATE) 0.05 % Cream       baclofen (LIORESAL) 10 MG Tab Take 1 Tablet by mouth 3 times a day as needed (muscle spasm/pain). 90 Tablet 3    triamcinolone acetonide (KENALOG) 0.1 % Ointment TOPICAL APPLY SPARINGLY TO THE AFFECTED AREA 3XWK.      ipratropium-albuterol (DUONEB) 0.5-2.5 (3) MG/3ML nebulizer solution Take 3 mL by nebulization 4 times a day. (Patient taking differently: Take 3 mL by nebulization every four hours as needed.) 3 mL 3    coenzyme Q-10 30 MG capsule Take 60 mg by mouth every day.      Cyanocobalamin (B-12 PO) Take 1 Tablet by mouth every day.       No current [facility]-ordered facility-administered medications on file.          Objective:   Physical Exam:    Vitals: /62 (BP Location: Left arm, Patient Position: Sitting, BP Cuff Size: Adult)   Pulse 83   Temp 36.2 °C (97.2 °F) (Temporal)   Resp 16   Ht 1.651 m (5' 5\")   Wt 85 kg (187 lb 8 oz)   SpO2 94%    BMI: Body mass index is 31.2 kg/m².  Physical Exam  Constitutional:       General: She is not in acute distress.     Appearance: Normal appearance. She is not ill-appearing.   HENT:      Right Ear: External ear normal.      Left Ear: External ear normal.   Eyes:      General: No scleral icterus.     Conjunctiva/sclera: Conjunctivae normal.   Cardiovascular:      Rate and Rhythm: Normal rate and regular rhythm.      Pulses: Normal pulses.   Pulmonary:      Effort: Pulmonary effort is normal. No respiratory distress.      Breath sounds: No wheezing or rhonchi.   Musculoskeletal:         General: Swelling and tenderness present.      Right lower leg: Edema present.      Left lower leg: No edema.   Skin:     General: Skin is warm and dry.      " Findings: No bruising, erythema or rash.   Psychiatric:         Mood and Affect: Mood normal.         Behavior: Behavior normal.         Thought Content: Thought content normal.         Judgment: Judgment normal.          Assessment & Plan  1. Right leg pain and swelling, suspect Baker's cyst versus hamstring sprain versus VTE   Her vital signs are within normal limits. The likelihood of a blood clot is low but not zero with recent injury, unilateral swelling, and recent prolonged car travel. The symptoms suggest more likely however a Baker's cyst, which may resolve spontaneously over time.  An ultrasound will be ordered to evaluate for both. She is advised to continue with meloxicam and RICE (Rest, Ice, Compression, Elevation) therapy. A new prescription for meloxicam 15 mg daily will be provided. She should contact Dr. Echols's office to schedule an appointment for assessment of right posterior knee and potential aspiration. She is to keep me updated on the ultrasound appointment. ER precautions provided.    2. Moderate persistent asthma  A switch back to Breo will be considered after 01/01/2025. Wixela, Advair, and Symbicort have been ineffective to control her asthma. Dulera was the best but she has had issues with insurance authorization.    3. Generalized anxiety.  The current plan is to continue with Cymbalta 60 mg daily. Prozac was the most effective in the past but caused weight gain, could consider retrialing along with wellbutrin to help negate the weight potential.     4. Chronic right sided low back pain  Reinitiate meloxicam 15 mg daily to help both with both calf pain and chronic right low back pain.    Follow-up  She will follow up on 01/09/2025.      Assessment and Plan:   Prudence is a 73 y.o. female with the following:  Problem List Items Addressed This Visit       Chronic 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    Relevant Medications    meloxicam (MOBIC) 15 MG tablet    Generalized  anxiety disorder    Hypothyroidism due to acquired atrophy of thyroid    Relevant Orders    FREE THYROXINE    TSH    Comp Metabolic Panel    CBC WITH DIFFERENTIAL    Mixed hyperlipidemia    Relevant Orders    VITAMIN B12    Lipid Profile    Comp Metabolic Panel    CBC WITH DIFFERENTIAL    Moderate persistent asthma without complication    Right calf pain    Relevant Medications    meloxicam (MOBIC) 15 MG tablet    Other Relevant Orders    US-EXTREMITY VENOUS LOWER UNILAT RIGHT    Vitamin D deficiency    Relevant Orders    VITAMIN D,25 HYDROXY (DEFICIENCY)     Other Visit Diagnoses       Elevated fasting blood sugar        Relevant Orders    HEMOGLOBIN A1C               RTC: Return in about 5 weeks (around 1/7/2025).    I spent a total of 34 minutes with record review, exam, communication with the patient, communication with other providers, and documentation of this encounter.    Verbal consent was acquired by the patient to use [employer] listening note generation during this visit Yes       PLEASE NOTE: This dictation was created using voice recognition software. I have made every reasonable attempt to correct obvious errors, but I expect that there are errors of grammar and possibly content that I did not discover before finalizing the note.      Shawna Recio, DO  Geriatric and Internal Medicine  [facility]

## 2024-12-04 ENCOUNTER — HOSPITAL ENCOUNTER (OUTPATIENT)
Dept: RADIOLOGY | Facility: MEDICAL CENTER | Age: 74
End: 2024-12-04
Attending: INTERNAL MEDICINE
Payer: MEDICARE

## 2024-12-04 DIAGNOSIS — M79.661 RIGHT CALF PAIN: ICD-10-CM

## 2024-12-04 PROCEDURE — 93971 EXTREMITY STUDY: CPT | Mod: RT

## 2024-12-09 ENCOUNTER — APPOINTMENT (OUTPATIENT)
Dept: MEDICAL GROUP | Facility: PHYSICIAN GROUP | Age: 74
End: 2024-12-09
Payer: MEDICARE

## 2024-12-10 ENCOUNTER — PATIENT MESSAGE (OUTPATIENT)
Dept: PHYSICAL MEDICINE AND REHAB | Facility: MEDICAL CENTER | Age: 74
End: 2024-12-10
Payer: MEDICARE

## 2024-12-19 ENCOUNTER — OFFICE VISIT (OUTPATIENT)
Dept: PHYSICAL MEDICINE AND REHAB | Facility: MEDICAL CENTER | Age: 74
End: 2024-12-19
Payer: MEDICARE

## 2024-12-19 VITALS
TEMPERATURE: 96.9 F | OXYGEN SATURATION: 94 % | DIASTOLIC BLOOD PRESSURE: 74 MMHG | SYSTOLIC BLOOD PRESSURE: 142 MMHG | HEART RATE: 93 BPM | HEIGHT: 65 IN | WEIGHT: 188 LBS | BODY MASS INDEX: 31.32 KG/M2

## 2024-12-19 DIAGNOSIS — M25.562 BILATERAL CHRONIC KNEE PAIN: ICD-10-CM

## 2024-12-19 DIAGNOSIS — Z71.82 EXERCISE COUNSELING: ICD-10-CM

## 2024-12-19 DIAGNOSIS — M51.26 LUMBAR DISC HERNIATION: ICD-10-CM

## 2024-12-19 DIAGNOSIS — M71.21 SYNOVIAL CYST OF POPLITEAL SPACE (BAKER), RIGHT KNEE: ICD-10-CM

## 2024-12-19 DIAGNOSIS — M25.561 BILATERAL CHRONIC KNEE PAIN: ICD-10-CM

## 2024-12-19 DIAGNOSIS — E66.9 OBESITY (BMI 30-39.9): ICD-10-CM

## 2024-12-19 DIAGNOSIS — M54.16 LUMBAR RADICULOPATHY: ICD-10-CM

## 2024-12-19 DIAGNOSIS — G89.29 BILATERAL CHRONIC KNEE PAIN: ICD-10-CM

## 2024-12-19 DIAGNOSIS — M17.10 ARTHRITIS OF KNEE: ICD-10-CM

## 2024-12-19 DIAGNOSIS — M47.816 LUMBAR SPONDYLOSIS: ICD-10-CM

## 2024-12-19 PROCEDURE — 1170F FXNL STATUS ASSESSED: CPT | Performed by: PHYSICAL MEDICINE & REHABILITATION

## 2024-12-19 PROCEDURE — 76882 US LMTD JT/FCL EVL NVASC XTR: CPT | Performed by: PHYSICAL MEDICINE & REHABILITATION

## 2024-12-19 PROCEDURE — 3078F DIAST BP <80 MM HG: CPT | Performed by: PHYSICAL MEDICINE & REHABILITATION

## 2024-12-19 PROCEDURE — 3077F SYST BP >= 140 MM HG: CPT | Performed by: PHYSICAL MEDICINE & REHABILITATION

## 2024-12-19 PROCEDURE — 1125F AMNT PAIN NOTED PAIN PRSNT: CPT | Performed by: PHYSICAL MEDICINE & REHABILITATION

## 2024-12-19 PROCEDURE — 99214 OFFICE O/P EST MOD 30 MIN: CPT | Mod: 25 | Performed by: PHYSICAL MEDICINE & REHABILITATION

## 2024-12-19 ASSESSMENT — PAIN SCALES - GENERAL: PAINLEVEL_OUTOF10: 6=MODERATE PAIN

## 2024-12-19 ASSESSMENT — PATIENT HEALTH QUESTIONNAIRE - PHQ9: CLINICAL INTERPRETATION OF PHQ2 SCORE: 0

## 2024-12-19 ASSESSMENT — FIBROSIS 4 INDEX: FIB4 SCORE: 1.29

## 2025-01-09 ENCOUNTER — APPOINTMENT (OUTPATIENT)
Dept: MEDICAL GROUP | Facility: PHYSICIAN GROUP | Age: 75
End: 2025-01-09
Payer: MEDICARE

## 2025-01-09 VITALS
RESPIRATION RATE: 14 BRPM | HEIGHT: 65 IN | BODY MASS INDEX: 30.82 KG/M2 | TEMPERATURE: 97.4 F | DIASTOLIC BLOOD PRESSURE: 64 MMHG | SYSTOLIC BLOOD PRESSURE: 130 MMHG | HEART RATE: 81 BPM | OXYGEN SATURATION: 94 % | WEIGHT: 185 LBS

## 2025-01-09 DIAGNOSIS — M25.561 CHRONIC PAIN OF RIGHT KNEE: ICD-10-CM

## 2025-01-09 DIAGNOSIS — J96.11 CHRONIC RESPIRATORY FAILURE WITH HYPOXIA (HCC): ICD-10-CM

## 2025-01-09 DIAGNOSIS — G89.29 CHRONIC PAIN OF RIGHT KNEE: ICD-10-CM

## 2025-01-09 DIAGNOSIS — J45.40 MODERATE PERSISTENT ASTHMA WITHOUT COMPLICATION: ICD-10-CM

## 2025-01-09 DIAGNOSIS — R05.1 ACUTE COUGH: ICD-10-CM

## 2025-01-09 DIAGNOSIS — E78.2 MIXED HYPERLIPIDEMIA: ICD-10-CM

## 2025-01-09 DIAGNOSIS — Z12.31 ENCOUNTER FOR SCREENING MAMMOGRAM FOR MALIGNANT NEOPLASM OF BREAST: ICD-10-CM

## 2025-01-09 DIAGNOSIS — R73.01 ELEVATED FASTING BLOOD SUGAR: ICD-10-CM

## 2025-01-09 DIAGNOSIS — G47.33 OSA (OBSTRUCTIVE SLEEP APNEA): ICD-10-CM

## 2025-01-09 PROCEDURE — 3078F DIAST BP <80 MM HG: CPT | Performed by: INTERNAL MEDICINE

## 2025-01-09 PROCEDURE — RXMED WILLOW AMBULATORY MEDICATION CHARGE: Performed by: INTERNAL MEDICINE

## 2025-01-09 PROCEDURE — 3075F SYST BP GE 130 - 139MM HG: CPT | Performed by: INTERNAL MEDICINE

## 2025-01-09 PROCEDURE — G2211 COMPLEX E/M VISIT ADD ON: HCPCS | Performed by: INTERNAL MEDICINE

## 2025-01-09 PROCEDURE — 99214 OFFICE O/P EST MOD 30 MIN: CPT | Performed by: INTERNAL MEDICINE

## 2025-01-09 RX ORDER — FLUTICASONE FUROATE AND VILANTEROL 200; 25 UG/1; UG/1
1 POWDER RESPIRATORY (INHALATION) DAILY
Qty: 90 EACH | Refills: 3 | Status: SHIPPED | OUTPATIENT
Start: 2025-01-09

## 2025-01-09 ASSESSMENT — PATIENT HEALTH QUESTIONNAIRE - PHQ9: CLINICAL INTERPRETATION OF PHQ2 SCORE: 0

## 2025-01-09 ASSESSMENT — FIBROSIS 4 INDEX: FIB4 SCORE: 1.3

## 2025-01-10 NOTE — PROGRESS NOTES
Subjective:   Chief Complaint/History of Present Illness:  Emilience Chayito Gregory is a 74 y.o. female established patient who presents today to discuss medical problems as listed below. Pru is unaccompanied for today's visit.    History of Present Illness  The patient presents for evaluation of knee pain, cough, and weight management.    She reports that her previous physician advised against draining the right popliteal knee cyst, suggesting it would resolve naturally. She was permitted to continue lower body workouts as tolerated, which she has resumed, excluding walking on the treadmill due to perceived impact. She has incorporated rowing machine exercises into her routine. She recalls using a NuStep machine during physical therapy and found it beneficial. A physical therapy order was placed for her, but she is unable to schedule an appointment until April 2024. She believes her knee issues began when she attempted to stretch her hamstring, resulting in immediate cramping. Subsequent exercises led to soreness in her hamstring and knee, which has since resolved. She is currently taking meloxicam every four days for pain management and is inquiring about the frequency of meloxicam use and potential stroke risk.    She has been experiencing a cough for the past two weeks, initially feeling as though something was lodged in her throat. Recently, she has begun to expectorate slightly. She reports no significant drainage. She has been maintaining hydration and using albuterol. She is nearing the end of her Wixela supply and is considering switching back to Breo, which she found more effective.    She has experienced weight loss since her last visit. She finds dietary changes challenging, despite not consuming large quantities of food. She has been tracking her food intake on [employer] and has been overweight multiple times in the past. She experiences cravings around 3:00 PM. She has not received the COVID-19 or RSV  vaccines. She reports a lack of energy over the past week, which she attributes to the holiday season.    MEDICATIONS  Current: meloxicam, omeprazole, albuterol, Wixela  Past: Breo    IMMUNIZATIONS  She has not had a COVID-19 vaccine or an RSV vaccine.       Current Medications:  Current Outpatient Medications Ordered in Epic   Medication Sig Dispense Refill    fluticasone furoate-vilanterol (BREO ELLIPTA) 200-25 MCG/ACT AEROSOL POWDER, BREATH ACTIVATED Inhale 1 Puff by mouth every day. 90 Each 3    meloxicam (MOBIC) 15 MG tablet Take 1 Tablet by mouth every day. 100 Tablet 3    rosuvastatin (CRESTOR) 10 MG Tab TAKE 1 TABLET BY MOUTH EVERY DAY IN THE EVENING 100 Tablet 3    albuterol 108 (90 Base) MCG/ACT Aero Soln inhalation aerosol INHALE 2 PUFFS BY MOUTH EVERY FOUR HOURS AS NEEDED FOR SHORTNESS OF BREATH. 6.7 Each 3    DULoxetine (CYMBALTA) 30 MG Cap DR Particles Take 2 Capsules by mouth every day. 200 Capsule 3    levothyroxine (SYNTHROID) 88 MCG Tab Take 1 Tablet by mouth every Monday, Wednesday, and Friday. To alternate with 100 mcg dose 50 Tablet 3    levothyroxine (SYNTHROID) 100 MCG Tab Take 1 Tablet by mouth in the evening every Tuesday, Thursdays, Saturday, and Sunday. To alternate with 88 mcg dose 60 Tablet 3    Meclizine HCl (ANTIVERT) 25 MG Chew Tab 1 q12hr prn vertigo 10 Tablet 0    PREMARIN 0.625 MG/GM Cream APPLY 1.0 GRAM TO AFFECTED AREA 3X/WEEK      dicyclomine (BENTYL) 10 MG Cap Take 1 Capsule by mouth every 6 hours as needed (abdominal cramping). 30 Capsule 1    neomycin-polymixin-dexamethasone (MAXITROL) 3.5-62020-5.1 Ointment ophthalmic ointment       clobetasol (TEMOVATE) 0.05 % Cream       baclofen (LIORESAL) 10 MG Tab Take 1 Tablet by mouth 3 times a day as needed (muscle spasm/pain). 90 Tablet 3    triamcinolone acetonide (KENALOG) 0.1 % Ointment TOPICAL APPLY SPARINGLY TO THE AFFECTED AREA 3XWK.      ipratropium-albuterol (DUONEB) 0.5-2.5 (3) MG/3ML nebulizer solution Take 3 mL by  "nebulization 4 times a day. (Patient taking differently: Take 3 mL by nebulization every four hours as needed.) 3 mL 3    coenzyme Q-10 30 MG capsule Take 60 mg by mouth every day.      Cyanocobalamin (B-12 PO) Take 1 Tablet by mouth every day.       No current [facility]-ordered facility-administered medications on file.          Objective:   Physical Exam:    Vitals: /64 (BP Location: Right arm, Patient Position: Sitting, BP Cuff Size: Adult)   Pulse 81   Temp 36.3 °C (97.4 °F)   Resp 14   Ht 1.651 m (5' 5\")   Wt 83.9 kg (185 lb)   SpO2 94%    BMI: Body mass index is 30.79 kg/m².  Physical Exam  Constitutional:       General: She is not in acute distress.     Appearance: Normal appearance. She is not ill-appearing.   HENT:      Right Ear: External ear normal. There is no impacted cerumen.      Left Ear: External ear normal. There is no impacted cerumen.   Eyes:      General: No scleral icterus.     Conjunctiva/sclera: Conjunctivae normal.   Cardiovascular:      Rate and Rhythm: Normal rate and regular rhythm.      Pulses: Normal pulses.      Heart sounds: No murmur heard.  Pulmonary:      Effort: Pulmonary effort is normal. No respiratory distress.      Breath sounds: No wheezing, rhonchi or rales.   Abdominal:      General: Bowel sounds are normal. There is no distension.      Palpations: Abdomen is soft.      Tenderness: There is no abdominal tenderness.   Musculoskeletal:      Right lower leg: No edema.      Left lower leg: No edema.   Lymphadenopathy:      Cervical: No cervical adenopathy.   Skin:     General: Skin is warm and dry.      Findings: No rash.   Neurological:      Gait: Gait normal.   Psychiatric:         Mood and Affect: Mood normal.         Behavior: Behavior normal.         Thought Content: Thought content normal.         Judgment: Judgment normal.          Assessment & Plan  Right knee pain.  She has been advised to avoid walking on hard surfaces such as cement or treadmills and to engage " "in low-impact activities like rowing and using a NuStep machine. She has been using meloxicam 15 mg every 2-3 days for pain management. She is advised to take meloxicam with food to protect the stomach lining and to monitor for any signs of stomach irritation. She can consider reducing the dose to 7.5 mg daily or every other day to minimize exposure while maintaining pain control. If she experiences reflux, waking up with acid in her mouth, or severe stomach pain, she should discontinue the medication and notify the clinic. A referral for physical therapy will be redirected to a more convenient location for her since original PT requested is booked out until April.    2. Cough.  3. Moderate persistent asthma without complication  She has been experiencing a cough for about 2 weeks, with some mucus production starting recently that she has started to expectorate. She is advised to continue using Mucinex and albuterol as needed. A prescription for Breo Ellipta 200 mcg, to be taken as one puff daily, will be provided. If there are any issues with obtaining the Breo, she should notify the clinic. This will replace Wixela. Dulera still not on formulary which was the other inhaler successful for her previously.    4. Weight management, BMI 30  5. Chronic respiratory failure with hypoxia- nocturnal oxygen  6. Obstructive sleep apnea (WARD)  She has lost approximately 15 pounds since her last visit in April 2024, current BMI 30.79. She is encouraged to continue her current exercise regimen and dietary modifications. She is advised to focus on whole foods and good carbohydrates while avoiding sweets and processed foods. She is also participating in \"Dry January\" to reduce alcohol intake. Did not tolerate positive pressure therapy in the past, still continues on supplemental oxygen at night with 2L via [place]. Could consider trial of Mounjaro in the future with recent FDA approval for combination of WARD and obesity.     7. Health " maintenance.  She is due for a mammogram next month, and the order has been placed. She is advised to get the RSV vaccine due to her asthma history and to consider the COVID-19 vaccine, especially before traveling. Lab work will be conducted to monitor kidney function, especially given her use of meloxicam.    8. Mixed hyperlipidemia  9. Elevated fasting blood sugar  Continue treatment with rosuvastatin 10 mg daily, lipids improved but update with next lab draw.. A1c down to 5.4, latest glucose 108, update A1c with next blood work.      Assessment and Plan:   Prudence is a 74 y.o. female with the following:  Problem List Items Addressed This Visit       Chronic respiratory failure with hypoxia (HCC)- nocturnal oxygen    Mixed hyperlipidemia    Relevant Orders    FREE THYROXINE    TSH    VITAMIN B12    VITAMIN D,25 HYDROXY (DEFICIENCY)    Lipid Profile    Comp Metabolic Panel    CBC WITH DIFFERENTIAL    Moderate persistent asthma without complication    Relevant Medications    fluticasone furoate-vilanterol (BREO ELLIPTA) 200-25 MCG/ACT AEROSOL POWDER, BREATH ACTIVATED    WARD (obstructive sleep apnea)     Other Visit Diagnoses       Encounter for screening mammogram for malignant neoplasm of breast        Relevant Orders    MA-SCREENING MAMMO BILAT W/TOMOSYNTHESIS W/CAD    Elevated fasting blood sugar        Relevant Orders    HEMOGLOBIN A1C    Chronic pain of right knee        Acute cough        BMI 30.0-30.9,adult                   RTC: Return in about 6 months (around 7/9/2025).    I spent a total of 32 minutes with record review, exam, communication with the patient, communication with other providers, and documentation of this encounter.    Verbal consent was acquired by the patient to use [employer] ambient listening note generation during this visit Yes     Billing : secondary to the complexity of this patient's illnesses and their interactions.  All problems listed were discussed during the office  visit, medications were evaluated and complexities were discussed as well as plan for the future.     PLEASE NOTE: This dictation was created using voice recognition software. I have made every reasonable attempt to correct obvious errors, but I expect that there are errors of grammar and possibly content that I did not discover before finalizing the note.      Shawna Recio, DO  Geriatric and Internal Medicine  [facility]

## 2025-01-14 ENCOUNTER — PHARMACY VISIT (OUTPATIENT)
Dept: PHARMACY | Facility: MEDICAL CENTER | Age: 75
End: 2025-01-14
Payer: COMMERCIAL

## 2025-01-17 ENCOUNTER — PHYSICAL THERAPY (OUTPATIENT)
Dept: PHYSICAL THERAPY | Facility: REHABILITATION | Age: 75
End: 2025-01-17
Attending: PHYSICAL MEDICINE & REHABILITATION
Payer: MEDICARE

## 2025-01-17 DIAGNOSIS — M54.16 LUMBAR RADICULOPATHY: ICD-10-CM

## 2025-01-17 DIAGNOSIS — G89.29 BILATERAL CHRONIC KNEE PAIN: ICD-10-CM

## 2025-01-17 DIAGNOSIS — M25.561 BILATERAL CHRONIC KNEE PAIN: ICD-10-CM

## 2025-01-17 DIAGNOSIS — E66.9 OBESITY (BMI 30-39.9): ICD-10-CM

## 2025-01-17 DIAGNOSIS — M17.10 ARTHRITIS OF KNEE: ICD-10-CM

## 2025-01-17 DIAGNOSIS — Z71.82 EXERCISE COUNSELING: ICD-10-CM

## 2025-01-17 DIAGNOSIS — M47.816 LUMBAR SPONDYLOSIS: ICD-10-CM

## 2025-01-17 DIAGNOSIS — M71.21 SYNOVIAL CYST OF POPLITEAL SPACE (BAKER), RIGHT KNEE: ICD-10-CM

## 2025-01-17 DIAGNOSIS — M51.26 LUMBAR DISC HERNIATION: ICD-10-CM

## 2025-01-17 DIAGNOSIS — M25.562 BILATERAL CHRONIC KNEE PAIN: ICD-10-CM

## 2025-01-17 PROCEDURE — 97110 THERAPEUTIC EXERCISES: CPT

## 2025-01-17 PROCEDURE — 97162 PT EVAL MOD COMPLEX 30 MIN: CPT

## 2025-01-17 SDOH — ECONOMIC STABILITY: GENERAL: QUALITY OF LIFE: FAIR

## 2025-01-17 ASSESSMENT — ENCOUNTER SYMPTOMS
PAIN SCALE AT LOWEST: 0
PAIN SCALE AT HIGHEST: 5
PAIN SCALE: 0

## 2025-01-17 NOTE — OP THERAPY EVALUATION
Outpatient Physical Therapy  INITIAL EVALUATION    [facility] [place]  [address]  [place] [address]  Phone:  [phone]  Fax:  [phone]    Date of Evaluation: 2025    Patient: Nina Gregory  YOB: 1950  MRN: [id number]     Referring Provider: Carlos Eduardo Echols M.D.  [id number]  [address]  [place] [address]   Referring Diagnosis Knee pain R      Time Calculation  Start time: 0900  Stop time: 0945 Time Calculation (min): 45 minutes         Chief Complaint: Knee Problem    Visit Diagnoses     ICD-10-CM   1. Bilateral chronic knee pain  M25.561    M25.562    G89.29   2. Arthritis of knee  M17.10   3. Synovial cyst of popliteal space (Bergeron), right knee  M71.21   4. Lumbar disc herniation  M51.26   5. Lumbar spondylosis  M47.816   6. Lumbar radiculopathy  M54.16   7. Obesity (BMI 30-39.9)  E66.9   8. Exercise counseling  Z71.82       Date of onset of impairment: 12/15/2024    Subjective:   History of Present Illness:     Date of onset:  12/15/2024  Quality of life:  Fair  Prior level of function:  Ongoing knee pain; acute flare after stretching  Pain:     Current pain ratin    At best pain ratin    At worst pain ratin  Activities of Daily Living:     Patient reported ADL status: Limited in fitness activity participation  Limited with meal preparation due to standing tolerance   Patient Goals:     Patient goals for therapy:  Increased strength, decreased pain and increased motion  Patient is a 74 y.o. female that presents to therapy with R knee pain. States that symptoms were due to injury, occurred after HS stretch. Reports the pain quality to be sharp/dull, intermittent and are primarily about the knee. Reports that symptoms now not changing. States that aggravating factors are prolonged flexion, standing and walking.  States that easng factors are rest. Denies red flags.       Past Medical History:   Diagnosis Date    Acute  "cystitis with hematuria 04/20/2020    Urine dipstick performed in clinic demonstrated trace glucose, 1+ bili, 1+ ketones, specific gravity 1.015, trace intact blood, pH 5.5, 2+ protein, 2.0 urobilinogen, positive nitrite, 3+ leukocyte esterase.  She performed a telemedicine visit yesterday and was placed on cephalosporin for presumptive urinary tract infection.  She was also given Pyridium due to discomfort with dysuria.  She thinks s    Asthma     Back muscle spasm 12/08/2021    On the evening of November 30th she reached to the side and immediately had pain in her low back. This was excruciating and caused complete immobility. She called dispatch [facility] who provided baclofen, toradol shot, and ibuprofen. 1 week later she is feeling about 60% better, she continues to take the baclofen 3-4 times daily and has reduced ibuprofen to 400 mg twice daily. 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  This was done on her right upper gums.  2 weeks following the procedure she had a full liquid diet/soft food diet and this was advanced on Valentine's Day dinner, February 14, 2020.  On the following Monday, February 17, 2020 she notes development of \"bloodshot \"gums causing concern and when she clay    CAD (coronary artery disease)     Chickenpox     COVID-19 virus infection 12/27/2021    She is test positive in our clinic for COVID-19.  She developed symptoms on December 24 including sore throat, headache, loose stools, and dry cough which has since transitioned into productive cough.  No fevers, chills; may have to letter no new myalgias, no chest pain, no breathing difficulty.  She feels fortunate that her symptoms have not been extremely uncomfortable.  She did receive her kenyon    Elevated coronary artery calcium score 04/21/2021    CT coronary calcium score (3/2021):  " Coronary calcification:  LMA - 0.0  LCX - 138  LAD - 108  RCA - 0.0  PDA - 0.0     Total Calcium Score: 246     Percentile: Calcium score is worse than the 75th percentile for the patient's age and sex.     Current regimen: rosuvastatin 10 mg daily and aspirin 81 mg daily          GERD (gastroesophageal reflux disease)     Heartburn     Hordeolum externum of right lower eyelid 02/16/2023    Hyperlipidemia     Hypothyroidism     Insomnia     Trouble going to and staying asleep    Lymphadenopathy 09/16/2021    Mass of left submandibular region 05/26/2021    She reports development of a mass in her chin/neck area.  On exam it appears to be submandibular on the left anterolateral aspect.  It is easily palpable, mobile, and approximately 1 x 1 cm.  It is nontender.  There is some associated inflammation in the region that is visible on inspection.  She has been working with periodontist due to gum disease and wonders if it could be related to her dentit    Mumps     WARD on CPAP 08/24/2021    Painful joint     Pedal edema 12/08/2021    Pedal edema developed in bilateral ankles around December 2021 after she threw out her back and was taking muscle relaxants and less active.  Improvement of left lower extremity with furosemide however right lower extremity continues to have residual edema, approximately 20 to 30% better from where she started.  No prior occurrence of the same.  Injury where she tripped over the door of a dishwash    Rotator cuff arthropathy of left shoulder 01/25/2022    She reports 4 to 6 months of left shoulder pain.  Various maneuvers elicit the pain.  The pain can be over the AC joint superiorly, the deltoid laterally as well as posteriorly.  It became more bothersome as she has been sleeping on her back more since she threw out her back several weeks ago.  No clear injury to the shoulder itself.  Prior history of rotator cuff repair around 2016 by physician vin Calzada in joint 07/05/2023    She  reports fairly diffuse stiffness in the joints.  She will wake up with it often in the morning and can get better but then after sitting for 20 to 30 minutes and can decompensate.  She has in her bilateral hands, bilateral knees, and also her forearms and elbows.  She has associated joint swelling which has been more troublesome.  She tries to limit meloxicam to avoid risk of GI distress or ki    Stomatitis 2021    Thyroid disease     Tonsillitis     Wears glasses      Past Surgical History:   Procedure Laterality Date    ABDOMINAL HYSTERECTOMY TOTAL      CHOLECYSTECTOMY      HYSTERECTOMY LAPAROSCOPY      TONSILLECTOMY       Social History     Tobacco Use    Smoking status: Former     Current packs/day: 0.00     Average packs/day: 1 pack/day for 15.0 years (15.0 ttl pk-yrs)     Types: Cigarettes     Start date: 10/10/1978     Quit date: 1993     Years since quittin.8    Smokeless tobacco: Never    Tobacco comments:     continued abstinence   Substance Use Topics    Alcohol use: Yes     Alcohol/week: 1.8 oz     Types: 3 Glasses of wine per week     Comment: occasionally     Family and Occupational History     Socioeconomic History    Marital status:      Spouse name: Not on file    Number of children: Not on file    Years of education: Not on file    Highest education level: Not on file   Occupational History    Not on file       Objective     Neurological Testing     Reflexes   Left   Patellar (L4): normal (2+)  Achilles (S1): normal (2+)  Ankle clonus reflex: negative  Babinski sign: negative    Right   Patellar (L4): normal (2+)  Achilles (S1): normal (2+)  Ankle clonus reflex: negative  Babinski sign: negative    Myotome testing   Lumbar (left)   L1 (hip flexors): 5  L2 (hip flexors): 5  L3 (knee extensors): 5  L4 (ankle dorsiflexors): 5  L5 (great toe extension): 5  S1 (ankle plantar flexors): 4    Lumbar (right)   L1 (hip flexors): 4+  L2 (hip flexors): 4+  L3 (knee extensors): 4  L4  (ankle dorsiflexors): 5  L5 (great toe extension): 5  S1 (ankle plantar flexors): 4    Dermatome testing   Lumbar (left)   All left lumbar dermatomes intact    Lumbar (right)   All right lumbar dermatomes intact    Active Range of Motion   Left Knee   Flexion: 131 degrees   Extension: 0 degrees     Right Knee   Flexion: 121 degrees   Extension: 0 degrees     Strength:      Left Hip   Planes of Motion   Abduction: 5  Adduction: 5    Right Hip   Planes of Motion   Abduction: 4-  Adduction: 5    Tests     Right Knee   Negative patellar apprehension and patellar compression.         Therapeutic Exercises (CPT 92870):     1. Glut sets, x20    2. HS sets, x20    3. Quad sets with two towels, x20    4. Ankle pumps, x20    5. Toe curls, x20      Time-based treatments/modalities:    Physical Therapy Timed Treatment Charges  Therapeutic exercise minutes (CPT 09929): 10 minutes      Assessment, Response and Plan:   Impairments: abnormal or restricted ROM, activity intolerance, limited mobility and pain with function    Assessment details:  Patient presents with signs and symptoms consistent with posterior knee inflammation. Patient limitations include weakness, decreased ROM, and pain. Patient demonstrated hip weakness and poor quad control. Patient's current deficits are limiting her from returning to her fitness program and walking. Patient will benefit from skilled therapy to improve the aforementioned deficits and decrease further functional decline.   Prognosis: fair    Goals:   Short Term Goals:   1) Patient's hip abd MMT R will improve by a half muscle grade to facilitate improved LE alignment.  2) Patient's symptoms will improve to facilitate walking >30min.   Short term goal time span:  2-4 weeks      Long Term Goals:    1) Patient's symptoms will improve to allow for return to LE strength program at the gym..  2) Patient's LEFS will improve by 10 to demonstrate functional improvement     Plan:   Therapy options:   Physical therapy treatment to continue  Planned therapy interventions:  E Stim Unattended (CPT 64766), Manual Therapy (CPT 22805), Neuromuscular Re-education (CPT 40406), Therapeutic Exercise (CPT 95522) and Hot or Cold Pack Therapy (CPT 90734)  Frequency:  2x week  Duration in weeks:  8  Discussed with:  Patient      Functional Assessment Used  PT Functional Assessment Tool Used: Low back disability index  PT Functional Assessment Score: 12/50     Referring provider co-signature:  I have reviewed this plan of care and my co-signature certifies the need for services.    Certification Period: 01/17/2025 to  03/14/25    Physician Signature: ________________________________ Date: ______________

## 2025-01-21 ENCOUNTER — PHYSICAL THERAPY (OUTPATIENT)
Dept: PHYSICAL THERAPY | Facility: REHABILITATION | Age: 75
End: 2025-01-21
Attending: PHYSICAL MEDICINE & REHABILITATION
Payer: MEDICARE

## 2025-01-21 DIAGNOSIS — M71.21 SYNOVIAL CYST OF POPLITEAL SPACE (BAKER), RIGHT KNEE: ICD-10-CM

## 2025-01-21 DIAGNOSIS — E66.9 OBESITY (BMI 30-39.9): ICD-10-CM

## 2025-01-21 DIAGNOSIS — M25.562 BILATERAL CHRONIC KNEE PAIN: ICD-10-CM

## 2025-01-21 DIAGNOSIS — Z71.82 EXERCISE COUNSELING: ICD-10-CM

## 2025-01-21 DIAGNOSIS — M25.561 BILATERAL CHRONIC KNEE PAIN: ICD-10-CM

## 2025-01-21 DIAGNOSIS — M47.816 LUMBAR SPONDYLOSIS: ICD-10-CM

## 2025-01-21 DIAGNOSIS — G89.29 BILATERAL CHRONIC KNEE PAIN: ICD-10-CM

## 2025-01-21 DIAGNOSIS — M54.16 LUMBAR RADICULOPATHY: ICD-10-CM

## 2025-01-21 DIAGNOSIS — M17.10 ARTHRITIS OF KNEE: ICD-10-CM

## 2025-01-21 DIAGNOSIS — M51.26 LUMBAR DISC HERNIATION: ICD-10-CM

## 2025-01-21 PROCEDURE — 97110 THERAPEUTIC EXERCISES: CPT

## 2025-01-21 NOTE — OP THERAPY DAILY TREATMENT
Outpatient Physical Therapy  DAILY TREATMENT     [facility] [place]  [address], [address]  [facility] [address]  Phone:  [phone]  Fax:  [phone]    Date: 01/21/2025    Patient: Nina Gregory  YOB: 1950  MRN: [id number]     Time Calculation    Start time: 1115  Stop time: 1200 Time Calculation (min): 45 minutes         Chief Complaint: Knee Problem    Visit #: 2    SUBJECTIVE:  Patient reports no real change. States she was sore after her home workout.     OBJECTIVE:  Current objective measures:           Therapeutic Exercises (CPT 40598):     1. Glut sets, x20    2. HS sets, x20    3. Quad sets with two towels, x20    4. Ankle pumps, x20    5. Toe curls, x20    6. Nu step, x12min    7. Shuttle, x5min L4    8. HS curls with ball, x20    Therapeutic Treatments and Modalities:     1. E Stim Unattended (CPT 74331), IFC with CP x15min to R knee 80-150hz    Time-based treatments/modalities:    Physical Therapy Timed Treatment Charges  Therapeutic exercise minutes (CPT 65237): 30 minutes      Pain rating (1-10) before treatment:  3  Pain rating (1-10) after treatment:  0    ASSESSMENT:   Response to treatment: Patient responded fair to therapy with an increase in quad fatigue with no increase in symptoms.     PLAN/RECOMMENDATIONS:   Plan for treatment: therapy treatment to continue next visit.  Planned interventions for next visit: continue with current treatment.

## 2025-01-23 ENCOUNTER — PHYSICAL THERAPY (OUTPATIENT)
Dept: PHYSICAL THERAPY | Facility: REHABILITATION | Age: 75
End: 2025-01-23
Attending: PHYSICAL MEDICINE & REHABILITATION
Payer: MEDICARE

## 2025-01-23 DIAGNOSIS — Z71.82 EXERCISE COUNSELING: ICD-10-CM

## 2025-01-23 DIAGNOSIS — M25.561 BILATERAL CHRONIC KNEE PAIN: ICD-10-CM

## 2025-01-23 DIAGNOSIS — E66.9 OBESITY (BMI 30-39.9): ICD-10-CM

## 2025-01-23 DIAGNOSIS — M47.816 LUMBAR SPONDYLOSIS: ICD-10-CM

## 2025-01-23 DIAGNOSIS — G89.29 CHRONIC PAIN OF LEFT KNEE: ICD-10-CM

## 2025-01-23 DIAGNOSIS — M25.562 BILATERAL CHRONIC KNEE PAIN: ICD-10-CM

## 2025-01-23 DIAGNOSIS — G89.29 BILATERAL CHRONIC KNEE PAIN: ICD-10-CM

## 2025-01-23 DIAGNOSIS — M51.26 LUMBAR DISC HERNIATION: ICD-10-CM

## 2025-01-23 DIAGNOSIS — M71.21 SYNOVIAL CYST OF POPLITEAL SPACE (BAKER), RIGHT KNEE: ICD-10-CM

## 2025-01-23 DIAGNOSIS — M17.10 ARTHRITIS OF KNEE: ICD-10-CM

## 2025-01-23 DIAGNOSIS — M25.562 CHRONIC PAIN OF LEFT KNEE: ICD-10-CM

## 2025-01-23 DIAGNOSIS — M54.16 LUMBAR RADICULOPATHY: ICD-10-CM

## 2025-01-23 PROCEDURE — 97110 THERAPEUTIC EXERCISES: CPT

## 2025-01-23 NOTE — OP THERAPY DAILY TREATMENT
Outpatient Physical Therapy  DAILY TREATMENT     [facility] [place]  [address]  [place] [address]  Phone:  [phone]  Fax:  [phone]    Date: 01/23/2025    Patient: Nina Gregory  YOB: 1950  MRN: [id number]     Time Calculation    Start time: 1022  Stop time: 1102 Time Calculation (min): 40 minutes         Chief Complaint: Knee Problem    Visit #: 3    SUBJECTIVE:  Patient reports that her knee has been feeling great.     OBJECTIVE:  Current objective measures:           Therapeutic Exercises (CPT 56539):     1. Glut sets, x20    2. HS sets, x20    3. Quad sets with two towels, x20    4. Ankle pumps, x20    5. Toe curls, x20    6. Nu step, x12min    7. Shuttle, x5min L4    8. HS curls with ball, x20    Therapeutic Treatments and Modalities:     1. E Stim Unattended (CPT 64493), IFC with CP x15min to R knee 80-150hz    Time-based treatments/modalities:    Physical Therapy Timed Treatment Charges  Therapeutic exercise minutes (CPT 52904): 40 minutes      Pain rating (1-10) before treatment:  1  Pain rating (1-10) after treatment:  0    ASSESSMENT:   Response to treatment: Patient responded well to therapy with decreased pain and increased function.     PLAN/RECOMMENDATIONS:   Plan for treatment: therapy treatment to continue next visit.  Planned interventions for next visit: continue with current treatment.

## 2025-01-28 ENCOUNTER — APPOINTMENT (OUTPATIENT)
Dept: PHYSICAL THERAPY | Facility: REHABILITATION | Age: 75
End: 2025-01-28
Attending: PHYSICAL MEDICINE & REHABILITATION
Payer: MEDICARE

## 2025-01-30 ENCOUNTER — APPOINTMENT (OUTPATIENT)
Dept: PHYSICAL THERAPY | Facility: REHABILITATION | Age: 75
End: 2025-01-30
Attending: PHYSICAL MEDICINE & REHABILITATION
Payer: MEDICARE

## 2025-01-30 ENCOUNTER — PHARMACY VISIT (OUTPATIENT)
Dept: PHARMACY | Facility: MEDICAL CENTER | Age: 75
End: 2025-01-30
Payer: COMMERCIAL

## 2025-01-30 PROCEDURE — RXMED WILLOW AMBULATORY MEDICATION CHARGE: Performed by: INTERNAL MEDICINE

## 2025-02-09 PROCEDURE — RXMED WILLOW AMBULATORY MEDICATION CHARGE: Performed by: INTERNAL MEDICINE

## 2025-02-12 ENCOUNTER — OFFICE VISIT (OUTPATIENT)
Dept: PHYSICAL MEDICINE AND REHAB | Facility: MEDICAL CENTER | Age: 75
End: 2025-02-12
Payer: MEDICARE

## 2025-02-12 ENCOUNTER — PHYSICAL THERAPY (OUTPATIENT)
Dept: PHYSICAL THERAPY | Facility: REHABILITATION | Age: 75
End: 2025-02-12
Attending: PHYSICAL MEDICINE & REHABILITATION
Payer: MEDICARE

## 2025-02-12 VITALS
BODY MASS INDEX: 30.82 KG/M2 | DIASTOLIC BLOOD PRESSURE: 81 MMHG | TEMPERATURE: 96.8 F | WEIGHT: 185 LBS | SYSTOLIC BLOOD PRESSURE: 147 MMHG | OXYGEN SATURATION: 94 % | HEIGHT: 65 IN | HEART RATE: 79 BPM

## 2025-02-12 DIAGNOSIS — M25.561 BILATERAL CHRONIC KNEE PAIN: ICD-10-CM

## 2025-02-12 DIAGNOSIS — G89.29 BILATERAL CHRONIC KNEE PAIN: ICD-10-CM

## 2025-02-12 DIAGNOSIS — E66.9 OBESITY (BMI 30-39.9): ICD-10-CM

## 2025-02-12 DIAGNOSIS — Z71.82 EXERCISE COUNSELING: ICD-10-CM

## 2025-02-12 DIAGNOSIS — M71.21 SYNOVIAL CYST OF POPLITEAL SPACE (BAKER), RIGHT KNEE: ICD-10-CM

## 2025-02-12 DIAGNOSIS — M54.16 LUMBAR RADICULOPATHY: ICD-10-CM

## 2025-02-12 DIAGNOSIS — M25.562 BILATERAL CHRONIC KNEE PAIN: ICD-10-CM

## 2025-02-12 DIAGNOSIS — M51.26 LUMBAR DISC HERNIATION: ICD-10-CM

## 2025-02-12 DIAGNOSIS — M25.562 CHRONIC PAIN OF LEFT KNEE: ICD-10-CM

## 2025-02-12 DIAGNOSIS — M53.3 SACROILIAC JOINT DYSFUNCTION OF LEFT SIDE: ICD-10-CM

## 2025-02-12 DIAGNOSIS — M17.10 ARTHRITIS OF KNEE: ICD-10-CM

## 2025-02-12 DIAGNOSIS — G89.29 CHRONIC PAIN OF LEFT KNEE: ICD-10-CM

## 2025-02-12 DIAGNOSIS — M47.816 LUMBAR SPONDYLOSIS: ICD-10-CM

## 2025-02-12 PROCEDURE — 1170F FXNL STATUS ASSESSED: CPT | Performed by: PHYSICAL MEDICINE & REHABILITATION

## 2025-02-12 PROCEDURE — 97110 THERAPEUTIC EXERCISES: CPT

## 2025-02-12 PROCEDURE — 97014 ELECTRIC STIMULATION THERAPY: CPT

## 2025-02-12 PROCEDURE — 3077F SYST BP >= 140 MM HG: CPT | Performed by: PHYSICAL MEDICINE & REHABILITATION

## 2025-02-12 PROCEDURE — 1125F AMNT PAIN NOTED PAIN PRSNT: CPT | Performed by: PHYSICAL MEDICINE & REHABILITATION

## 2025-02-12 PROCEDURE — 99214 OFFICE O/P EST MOD 30 MIN: CPT | Performed by: PHYSICAL MEDICINE & REHABILITATION

## 2025-02-12 PROCEDURE — 3079F DIAST BP 80-89 MM HG: CPT | Performed by: PHYSICAL MEDICINE & REHABILITATION

## 2025-02-12 RX ORDER — GABAPENTIN 100 MG/1
100-300 CAPSULE ORAL 3 TIMES DAILY PRN
Qty: 270 CAPSULE | Refills: 5 | Status: SHIPPED | OUTPATIENT
Start: 2025-02-12

## 2025-02-12 ASSESSMENT — PAIN SCALES - GENERAL: PAINLEVEL_OUTOF10: 7=MODERATE-SEVERE PAIN

## 2025-02-12 ASSESSMENT — FIBROSIS 4 INDEX: FIB4 SCORE: 1.3

## 2025-02-12 ASSESSMENT — PATIENT HEALTH QUESTIONNAIRE - PHQ9: CLINICAL INTERPRETATION OF PHQ2 SCORE: 0

## 2025-02-12 NOTE — OP THERAPY DAILY TREATMENT
Outpatient Physical Therapy  DAILY TREATMENT     [facility] [place]  [address], [address]  [place] [address]  Phone:  [phone]  Fax:  [phone]    Date: 02/12/2025    Patient: Nina Gregory  YOB: 1950  MRN: [id number]     Time Calculation    Start time: 0815  Stop time: 0900 Time Calculation (min): 45 minutes         Chief Complaint: Back Problem and Knee Problem    Visit #: 4    SUBJECTIVE:  Patient reports that symptoms are worse both for her back and her knee. States that symptoms flared when she was bent over organizing items.     OBJECTIVE:  Current objective measures:           Therapeutic Exercises (CPT 15801):     1. Tra edu, x5min    2. Transfer training, x5min    3. Prone on elbows, x2min    4. Press ups, 2x10    5. Seated with lumbar roll, x20    6. Nu step, x5min    Therapeutic Treatments and Modalities:     1. E Stim Unattended (CPT 47582), IFC with HP x15min to LB knee 80-150hz    Time-based treatments/modalities:    Physical Therapy Timed Treatment Charges  Therapeutic exercise minutes (CPT 70620): 25 minutes      Pain rating (1-10) before treatment:  4  Pain rating (1-10) after treatment:  1    ASSESSMENT:   Response to treatment: Patient responded well to therapy with an overall decrease in pain and symptoms. Her new back injury is presenting with signs and symptoms consistent with a lumbar derangement.     PLAN/RECOMMENDATIONS:   Plan for treatment: therapy treatment to continue next visit.  Planned interventions for next visit: continue with current treatment.

## 2025-02-12 NOTE — PROGRESS NOTES
Follow up patient note  Interventional spine and Pain  Physiatry (physical medicine and  Rehabilitation)       Chief complaint:   Chief Complaint   Patient presents with    Follow-Up     Back pain          HISTORY    Please see new patient note by Dr Echols,  for more details.     HPI  Patient identification: Nancy Gregory ,  1950,   With Diagnoses of Sacroiliac joint dysfunction of left side, Bilateral chronic knee pain, Arthritis of knee, Lumbar disc herniation, Lumbar spondylosis, Lumbar radiculopathy, BMI 30-39.9, and Exercise counseling were pertinent to this visit.       Verbal consent was obtained for Pasquale copilot : Yes      History of Present Illness  The patient is a 74-year-old female who presents for evaluation of left lumbar pain.    She reports an exacerbation of her left lumbar pain, which she describes as dull, burning, and sharp in quality. The pain is associated with left buttock discomfort, rated at 7 out of 10 in intensity, and is constant and chronic, having been present for many years. The severity of the pain has increased over the past several months, particularly during ambulation, standing, and sitting, leading to functional impairments. She recalls a recent incident where she may have bent forward incorrectly, precipitating the pain. She has attended physical therapy sessions for her knee and most recently for her back, which have resulted in significant improvement over the past 2 weeks. Initially, she experienced difficulty ambulating post-therapy, but her condition has since improved. Currently, her pain is exacerbated by movement, such as turning over in bed, and is more pronounced when lying supine compared to her right lateral decubitus position. She has been adhering to the exercises recommended by her physical therapist, Charly, and was given additional exercises during her last visit. She expresses a desire to avoid magnetic resonance imaging (MRI) if possible. Her  therapist suggested that her pain might be disc-related, but a review of her MRI from 18 years ago showed no disc pathology. She also reports experiencing a burning pain at the hip joint after walking for 10 to 15 minutes on a treadmill or outdoors. She has been taking meloxicam daily since her symptoms worsened but has not noticed any significant improvement. She has also tried duloxetine, acetaminophen, and baclofen for the pain.    MEDICATIONS  - Meloxicam  - Duloxetine  - Acetaminophen  - Baclofen           ROS Red Flags :   Fever, Chills, Sweats: Denies  Involuntary Weight Loss: Denies  Bowel/Bladder Incontinence: Denies  Saddle Anesthesia: Denies        PMHx:   Past Medical History:   Diagnosis Date    Acute cystitis with hematuria 04/20/2020    Urine dipstick performed in clinic demonstrated trace glucose, 1+ bili, 1+ ketones, specific gravity 1.015, trace intact blood, pH 5.5, 2+ protein, 2.0 urobilinogen, positive nitrite, 3+ leukocyte esterase.  She performed a telemedicine visit yesterday and was placed on cephalosporin for presumptive urinary tract infection.  She was also given Pyridium due to discomfort with dysuria.  She thinks s    Asthma     Back muscle spasm 12/08/2021    On the evening of November 30th she reached to the side and immediately had pain in her low back. This was excruciating and caused complete immobility. She called [facility] who provided baclofen, toradol shot, and ibuprofen. 1 week later she is feeling about 60% better, she continues to take the baclofen 3-4 times daily and has reduced ibuprofen to 400 mg twice daily. 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  This was done on her right upper gums.  2 weeks following the procedure she had a full liquid diet/soft food diet and this was advanced on Uma[employer] Day dinner,  "February 14, 2020.  On the following Monday, February 17, 2020 she notes development of \"bloodshot \"gums causing concern and when she clay    CAD (coronary artery disease)     Chickenpox     COVID-19 virus infection 12/27/2021    She is test positive in our clinic for COVID-19.  She developed symptoms on December 24 including sore throat, headache, loose stools, and dry cough which has since transitioned into productive cough.  No fevers, chills; may have to letter no new myalgias, no chest pain, no breathing difficulty.  She feels fortunate that her symptoms have not been extremely uncomfortable.  She did receive her kenyon    Elevated coronary artery calcium score 04/21/2021    CT coronary calcium score (3/2021):  Coronary calcification:  LMA - 0.0  LCX - 138  LAD - 108  RCA - 0.0  PDA - 0.0     Total Calcium Score: 246     Percentile: Calcium score is worse than the 75th percentile for the patient's age and sex.     Current regimen: rosuvastatin 10 mg daily and aspirin 81 mg daily          GERD (gastroesophageal reflux disease)     Heartburn     Hordeolum externum of right lower eyelid 02/16/2023    Hyperlipidemia     Hypothyroidism     Insomnia     Trouble going to and staying asleep    Lymphadenopathy 09/16/2021    Mass of left submandibular region 05/26/2021    She reports development of a mass in her chin/neck area.  On exam it appears to be submandibular on the left anterolateral aspect.  It is easily palpable, mobile, and approximately 1 x 1 cm.  It is nontender.  There is some associated inflammation in the region that is visible on inspection.  She has been working with periodontist due to gum disease and wonders if it could be related to her dentit    Mumps     WARD on CPAP 08/24/2021    Painful joint     Pedal edema 12/08/2021    Pedal edema developed in bilateral ankles around December 2021 after she threw out her back and was taking muscle relaxants and less active.  Improvement of left lower extremity " with furosemide however right lower extremity continues to have residual edema, approximately 20 to 30% better from where she started.  No prior occurrence of the same.  Injury where she tripped over the door of a dishwash    Rotator cuff arthropathy of left shoulder 01/25/2022    She reports 4 to 6 months of left shoulder pain.  Various maneuvers elicit the pain.  The pain can be over the AC joint superiorly, the deltoid laterally as well as posteriorly.  It became more bothersome as she has been sleeping on her back more since she threw out her back several weeks ago.  No clear injury to the shoulder itself.  Prior history of rotator cuff repair around 2016 by physician w    Stiffness in joint 07/05/2023    She reports fairly diffuse stiffness in the joints.  She will wake up with it often in the morning and can get better but then after sitting for 20 to 30 minutes and can decompensate.  She has in her bilateral hands, bilateral knees, and also her forearms and elbows.  She has associated joint swelling which has been more troublesome.  She tries to limit meloxicam to avoid risk of GI distress or ki    Stomatitis 09/16/2021    Thyroid disease     Tonsillitis     Wears glasses        PSHx:   Past Surgical History:   Procedure Laterality Date    ABDOMINAL HYSTERECTOMY TOTAL      CHOLECYSTECTOMY      HYSTERECTOMY LAPAROSCOPY      TONSILLECTOMY         Family history   Family History   Problem Relation Age of Onset    Cancer Father         lung    Breast Cancer Maternal Aunt     Hypertension Maternal Grandmother     Hypertension Maternal Grandfather     Kidney Disease Mother     Heart Disease Neg Hx          Medications:   Outpatient Medications Marked as Taking for the 2/12/25 encounter (Office Visit) with Carlos Eduardo Echols M.D.   Medication Sig Dispense Refill    gabapentin (NEURONTIN) 100 MG Cap Take 1-3 Capsules by mouth 3 times a day as needed (pain). 270 Capsule 5    fluticasone furoate-vilanterol (BREO ELLIPTA)  200-25 MCG/ACT AEROSOL POWDER, BREATH ACTIVATED Inhale 1 Puff by mouth every day. 90 Each 3    meloxicam (MOBIC) 15 MG tablet Take 1 Tablet by mouth every day. 100 Tablet 3    rosuvastatin (CRESTOR) 10 MG Tab TAKE 1 TABLET BY MOUTH EVERY DAY IN THE EVENING 100 Tablet 3    albuterol 108 (90 Base) MCG/ACT Aero Soln inhalation aerosol INHALE 2 PUFFS BY MOUTH EVERY FOUR HOURS AS NEEDED FOR SHORTNESS OF BREATH. 6.7 Each 3    DULoxetine (CYMBALTA) 30 MG Cap DR Particles Take 2 Capsules by mouth every day. 200 Capsule 3    levothyroxine (SYNTHROID) 88 MCG Tab Take 1 Tablet by mouth every Monday, Wednesday, and Friday. To alternate with 100 mcg dose 50 Tablet 3    levothyroxine (SYNTHROID) 100 MCG Tab Take 1 Tablet by mouth in the evening every Tuesday, Thursdays, Saturday, and Sunday. To alternate with 88 mcg dose 60 Tablet 3    Meclizine HCl (ANTIVERT) 25 MG Chew Tab 1 q12hr prn vertigo 10 Tablet 0    PREMARIN 0.625 MG/GM Cream APPLY 1.0 GRAM TO AFFECTED AREA 3X/WEEK      dicyclomine (BENTYL) 10 MG Cap Take 1 Capsule by mouth every 6 hours as needed (abdominal cramping). 30 Capsule 1    neomycin-polymixin-dexamethasone (MAXITROL) 3.5-85614-4.1 Ointment ophthalmic ointment       clobetasol (TEMOVATE) 0.05 % Cream       baclofen (LIORESAL) 10 MG Tab Take 1 Tablet by mouth 3 times a day as needed (muscle spasm/pain). 90 Tablet 3    triamcinolone acetonide (KENALOG) 0.1 % Ointment TOPICAL APPLY SPARINGLY TO THE AFFECTED AREA 3XWK.      ipratropium-albuterol (DUONEB) 0.5-2.5 (3) MG/3ML nebulizer solution Take 3 mL by nebulization 4 times a day. (Patient taking differently: Take 3 mL by nebulization every four hours as needed.) 3 mL 3    coenzyme Q-10 30 MG capsule Take 60 mg by mouth every day.      Cyanocobalamin (B-12 PO) Take 1 Tablet by mouth every day.          Current Outpatient Medications on File Prior to Visit   Medication Sig Dispense Refill    fluticasone furoate-vilanterol (BREO ELLIPTA) 200-25 MCG/ACT AEROSOL  POWDER, BREATH ACTIVATED Inhale 1 Puff by mouth every day. 90 Each 3    meloxicam (MOBIC) 15 MG tablet Take 1 Tablet by mouth every day. 100 Tablet 3    rosuvastatin (CRESTOR) 10 MG Tab TAKE 1 TABLET BY MOUTH EVERY DAY IN THE EVENING 100 Tablet 3    albuterol 108 (90 Base) MCG/ACT Aero Soln inhalation aerosol INHALE 2 PUFFS BY MOUTH EVERY FOUR HOURS AS NEEDED FOR SHORTNESS OF BREATH. 6.7 Each 3    DULoxetine (CYMBALTA) 30 MG Cap DR Particles Take 2 Capsules by mouth every day. 200 Capsule 3    levothyroxine (SYNTHROID) 88 MCG Tab Take 1 Tablet by mouth every Monday, Wednesday, and Friday. To alternate with 100 mcg dose 50 Tablet 3    levothyroxine (SYNTHROID) 100 MCG Tab Take 1 Tablet by mouth in the evening every Tuesday, Thursdays, Saturday, and Sunday. To alternate with 88 mcg dose 60 Tablet 3    Meclizine HCl (ANTIVERT) 25 MG Chew Tab 1 q12hr prn vertigo 10 Tablet 0    PREMARIN 0.625 MG/GM Cream APPLY 1.0 GRAM TO AFFECTED AREA 3X/WEEK      dicyclomine (BENTYL) 10 MG Cap Take 1 Capsule by mouth every 6 hours as needed (abdominal cramping). 30 Capsule 1    neomycin-polymixin-dexamethasone (MAXITROL) 3.5-63707-0.1 Ointment ophthalmic ointment       clobetasol (TEMOVATE) 0.05 % Cream       baclofen (LIORESAL) 10 MG Tab Take 1 Tablet by mouth 3 times a day as needed (muscle spasm/pain). 90 Tablet 3    triamcinolone acetonide (KENALOG) 0.1 % Ointment TOPICAL APPLY SPARINGLY TO THE AFFECTED AREA 3XWK.      ipratropium-albuterol (DUONEB) 0.5-2.5 (3) MG/3ML nebulizer solution Take 3 mL by nebulization 4 times a day. (Patient taking differently: Take 3 mL by nebulization every four hours as needed.) 3 mL 3    coenzyme Q-10 30 MG capsule Take 60 mg by mouth every day.      Cyanocobalamin (B-12 PO) Take 1 Tablet by mouth every day.       No current facility-administered medications on file prior to visit.         Allergies:   Allergies   Allergen Reactions    Codeine        Social Hx:   Social History     Socioeconomic  History    Marital status:      Spouse name: Not on file    Number of children: Not on file    Years of education: Not on file    Highest education level: Not on file   Occupational History    Not on file   Tobacco Use    Smoking status: Former     Current packs/day: 0.00     Average packs/day: 1 pack/day for 15.0 years (15.0 ttl pk-yrs)     Types: Cigarettes     Start date: 10/10/1978     Quit date: 1993     Years since quittin.8    Smokeless tobacco: Never    Tobacco comments:     continued abstinence   Vaping Use    Vaping status: Never Used   Substance and Sexual Activity    Alcohol use: Yes     Alcohol/week: 1.8 oz     Types: 3 Glasses of wine per week     Comment: occasionally    Drug use: No    Sexual activity: Yes     Partners: Male     Birth control/protection: Post-Menopausal   Other Topics Concern     Service No    Blood Transfusions No    Caffeine Concern No    Occupational Exposure No    Hobby Hazards No    Sleep Concern Yes    Stress Concern Yes    Weight Concern No    Special Diet No    Back Care No    Exercise Yes    Bike Helmet No    Seat Belt Yes    Self-Exams Yes   Social History Narrative    She worked at [employer] at the [employer], recently retired. She is  to Ryan for the past 20 years, they met at a [employer]. She has a son ([address]) and 2 grandchildren.      Social Drivers of Health     Financial Resource Strain: Low Risk  (2024)    Overall Financial Resource Strain (CARDIA)     Difficulty of Paying Living Expenses: Not hard at all   Food Insecurity: No Food Insecurity (2024)    Hunger Vital Sign     Worried About Running Out of Food in the Last Year: Never true     Ran Out of Food in the Last Year: Never true   Transportation Needs: No Transportation Needs (2024)    PRAPARE - Transportation     Lack of Transportation (Medical): No     Lack of Transportation (Non-Medical): No   Physical Activity: Not on file   Stress:  "Not on file   Social Connections: Not on file   Intimate Partner Violence: Not on file   Housing Stability: Low Risk  (1/16/2024)    Housing Stability Vital Sign     Unable to Pay for Housing in the Last Year: No     Number of Places Lived in the Last Year: 1     Unstable Housing in the Last Year: No         EXAMINATION     Physical Exam:   Vitals: BP (!) 147/81 (BP Location: Right arm, Patient Position: Sitting, BP Cuff Size: Large adult)   Pulse 79   Temp 36 °C (96.8 °F) (Temporal)   Ht 1.651 m (5' 5\")   Wt 83.9 kg (185 lb)   SpO2 94%     Constitutional:   Body Habitus: Body mass index is 30.79 kg/m².  Cooperation: Fully cooperates with exam  Appearance: Well-groomed no disheveled    Respiratory-  breathing comfortable on room air, no audible wheezing  Cardiovascular- capillary refills less than 2 seconds. No lower extremity edema is noted.   Psychiatric- alert and oriented ×3. Normal affect.    MSK and Neuro: -    Physical Exam  There is decreased range of motion in the lumbar spine. The left hip has less range of motion compared to the right hip. Rhonda's maneuver, thigh thrust, and Gaenslen's maneuver are positive on the left side for sacroiliac joint pain, and there is tenderness when palpating the left sacroiliac joint, but not on the right side.         MEDICAL DECISION MAKING    DATA    Labs:   Lab Results   Component Value Date/Time    SODIUM 142 07/05/2024 11:21 AM    POTASSIUM 5.1 07/05/2024 11:21 AM    CHLORIDE 106 07/05/2024 11:21 AM    CO2 27 07/05/2024 11:21 AM    GLUCOSE 108 (H) 07/05/2024 11:21 AM    BUN 19 07/05/2024 11:21 AM    CREATININE 0.63 07/05/2024 11:21 AM        No results found for: \"PROTHROMBTM\", \"INR\"     Lab Results   Component Value Date/Time    WBC 5.5 07/05/2024 11:21 AM    RBC 4.90 07/05/2024 11:21 AM    HEMOGLOBIN 14.6 07/05/2024 11:21 AM    HEMATOCRIT 46.2 07/05/2024 11:21 AM    MCV 94.3 07/05/2024 11:21 AM    MCH 29.8 07/05/2024 11:21 AM    MCHC 31.6 (L) 07/05/2024 11:21 AM "    MPV 11.5 2024 11:21 AM    NEUTSPOLYS 49.10 2024 11:21 AM    LYMPHOCYTES 38.30 2024 11:21 AM    MONOCYTES 8.00 2024 11:21 AM    EOSINOPHILS 3.30 2024 11:21 AM    BASOPHILS 1.10 2024 11:21 AM        Lab Results   Component Value Date/Time    HBA1C 5.4 2024 09:05 AM          Imaging:   I personally reviewed following images          Results         I reviewed the following radiology reports                                                                                                    Results for orders placed during the hospital encounter of 21    CT-ABDOMEN-PELVIS WITH    Impression  Findings consistent with sigmoid diverticulitis without abscess or free air.    Aortic atherosclerosis without aneurysm.    Hepatic cyst.                       Results for orders placed during the hospital encounter of 22    DX-CHEST-2 VIEWS    Impression  1.  No evidence of acute cardiopulmonary process.  2.  Mild hyperinflation consistent with history of asthma.                   Results for orders placed during the hospital encounter of 24    DX-KNEE 3 VIEWS LEFT    Impression  1.  No fracture or dislocation of LEFT knee.  2.  Minimal degenerative changes.                            DIAGNOSIS   Visit Diagnoses     ICD-10-CM   1. Sacroiliac joint dysfunction of left side  M53.3   2. Bilateral chronic knee pain  M25.561    M25.562    G89.29   3. Arthritis of knee  M17.10   4. Lumbar disc herniation  M51.26   5. Lumbar spondylosis  M47.816   6. Lumbar radiculopathy  M54.16   7. BMI 30-39.9  E66.9   8. Exercise counseling  Z71.82         ASSESSMENT and PLAN:     Prudence Chayito Alvaradoun  1950 female      Pru was seen today for follow-up.    Diagnoses and all orders for this visit:    Sacroiliac joint dysfunction of left side  -     Referral to Physical Medicine Rehab  -     gabapentin (NEURONTIN) 100 MG Cap; Take 1-3 Capsules by mouth 3 times a day as needed  (pain).    Bilateral chronic knee pain  -     gabapentin (NEURONTIN) 100 MG Cap; Take 1-3 Capsules by mouth 3 times a day as needed (pain).    Arthritis of knee  -     gabapentin (NEURONTIN) 100 MG Cap; Take 1-3 Capsules by mouth 3 times a day as needed (pain).    Lumbar disc herniation  -     gabapentin (NEURONTIN) 100 MG Cap; Take 1-3 Capsules by mouth 3 times a day as needed (pain).    Lumbar spondylosis  -     gabapentin (NEURONTIN) 100 MG Cap; Take 1-3 Capsules by mouth 3 times a day as needed (pain).    Lumbar radiculopathy  -     gabapentin (NEURONTIN) 100 MG Cap; Take 1-3 Capsules by mouth 3 times a day as needed (pain).    BMI 30-39.9    Exercise counseling        Assessment & Plan    The patient's left low back pain is likely due to sacroiliac joint dysfunction, as evidenced by positive Rhonda's maneuver, thigh thrust, and Gaenslen's maneuver on the left side, and tenderness to palpation of the left sacroiliac joint.     A left sacroiliac joint injection will be administered using fluoroscopic guidance for both diagnostic and therapeutic purposes. The procedure involves the use of lidocaine and a steroid.     The risks benefits and alternatives to this procedure were discussed and the patient wishes to proceed with the procedure. Risks include but are not limited to damage to surrounding structures, infection, bleeding, worsening of pain which can be permanent, weakness which can be permanent. Benefits include pain relief, improved function. Alternatives includes not doing the procedure.        She has been advised to continue with the exercises and stretches recommended by her physical therapist, Charly. The potential outcomes of the injection were discussed, including the possibility of immediate pain relief indicating the sacroiliac joint as the source of pain. If there is no improvement within the first few hours post-injection, other causes such as disc issues will be considered. A prescription for  gabapentin has been provided as an alternative to meloxicam, which she will discontinue due to lack of efficacy.        Follow up: After the above procedure    Thank you for allowing me to participate in the care of this patient. If you have any questions please not hesitate to contact me.             Please note that this dictation was created using voice recognition software. I have made every reasonable attempt to correct obvious errors but there may be errors of grammar and content that I may have overlooked prior to finalization of this note.      Carlos Eduardo Echols MD  Interventional Spine and Sports Physiatry  Physical Medicine and Rehabilitation  [facility]

## 2025-02-13 ENCOUNTER — HOSPITAL ENCOUNTER (OUTPATIENT)
Dept: RADIOLOGY | Facility: MEDICAL CENTER | Age: 75
End: 2025-02-13
Attending: INTERNAL MEDICINE
Payer: MEDICARE

## 2025-02-13 ENCOUNTER — PHARMACY VISIT (OUTPATIENT)
Dept: PHARMACY | Facility: MEDICAL CENTER | Age: 75
End: 2025-02-13
Payer: COMMERCIAL

## 2025-02-13 DIAGNOSIS — Z12.31 ENCOUNTER FOR SCREENING MAMMOGRAM FOR MALIGNANT NEOPLASM OF BREAST: ICD-10-CM

## 2025-02-13 PROCEDURE — 77067 SCR MAMMO BI INCL CAD: CPT

## 2025-02-17 ENCOUNTER — RESULTS FOLLOW-UP (OUTPATIENT)
Dept: MEDICAL GROUP | Facility: PHYSICIAN GROUP | Age: 75
End: 2025-02-17

## 2025-02-18 ENCOUNTER — PHYSICAL THERAPY (OUTPATIENT)
Dept: PHYSICAL THERAPY | Facility: REHABILITATION | Age: 75
End: 2025-02-18
Attending: PHYSICAL MEDICINE & REHABILITATION
Payer: MEDICARE

## 2025-02-18 DIAGNOSIS — M25.562 BILATERAL CHRONIC KNEE PAIN: ICD-10-CM

## 2025-02-18 DIAGNOSIS — M71.21 SYNOVIAL CYST OF POPLITEAL SPACE (BAKER), RIGHT KNEE: ICD-10-CM

## 2025-02-18 DIAGNOSIS — M25.561 BILATERAL CHRONIC KNEE PAIN: ICD-10-CM

## 2025-02-18 DIAGNOSIS — M25.562 CHRONIC PAIN OF LEFT KNEE: ICD-10-CM

## 2025-02-18 DIAGNOSIS — M17.10 ARTHRITIS OF KNEE: ICD-10-CM

## 2025-02-18 DIAGNOSIS — E66.9 OBESITY (BMI 30-39.9): ICD-10-CM

## 2025-02-18 DIAGNOSIS — G89.29 CHRONIC PAIN OF LEFT KNEE: ICD-10-CM

## 2025-02-18 DIAGNOSIS — G89.29 BILATERAL CHRONIC KNEE PAIN: ICD-10-CM

## 2025-02-18 DIAGNOSIS — M47.816 LUMBAR SPONDYLOSIS: ICD-10-CM

## 2025-02-18 DIAGNOSIS — Z71.82 EXERCISE COUNSELING: ICD-10-CM

## 2025-02-18 DIAGNOSIS — M51.26 LUMBAR DISC HERNIATION: ICD-10-CM

## 2025-02-18 PROCEDURE — 97110 THERAPEUTIC EXERCISES: CPT

## 2025-02-18 NOTE — OP THERAPY DAILY TREATMENT
Outpatient Physical Therapy  DAILY TREATMENT     [facility] Outpatient Physical [facility] [place]  [address], [address]  [place] [facility] [place] [address]  Phone:  [phone]  Fax:  [phone]    Date: 02/18/2025    Patient: Nina Gregory  YOB: 1950  MRN: [id number]     Time Calculation    Start time: 1325  Stop time: 1410 Time Calculation (min): 45 minutes         Chief Complaint: Back Problem    Visit #: 5    SUBJECTIVE:  Patient reports that symptoms are slowly getting better. She notes that she is sitting about a 3.     OBJECTIVE:  Current objective measures:           Therapeutic Exercises (CPT 85945):     1. Tra edu, x5min    2. Transfer training, x5min    3. Prone on elbows, x2min    4. Press ups, 2x10    5. Seated with lumbar roll, x20    6. Nu step, x5min    7. supine 90/90, x20    8. sit to stand, x10    Therapeutic Treatments and Modalities:     1. E Stim Unattended (CPT 22253), IFC with HP x15min to LB knee 80-150hz    Time-based treatments/modalities:    Physical Therapy Timed Treatment Charges  Therapeutic exercise minutes (CPT 98190): 40 minutes      Pain rating (1-10) before treatment:  3  Pain rating (1-10) after treatment:  2    ASSESSMENT:   Response to treatment: Patient continues to respond well to extension. Patient notes decreased pain and improved mobility.     PLAN/RECOMMENDATIONS:   Plan for treatment: therapy treatment to continue next visit.  Planned interventions for next visit: continue with current treatment.

## 2025-02-27 ENCOUNTER — PHYSICAL THERAPY (OUTPATIENT)
Dept: PHYSICAL THERAPY | Facility: REHABILITATION | Age: 75
End: 2025-02-27
Attending: PHYSICAL MEDICINE & REHABILITATION
Payer: MEDICARE

## 2025-02-27 DIAGNOSIS — G89.29 BILATERAL CHRONIC KNEE PAIN: ICD-10-CM

## 2025-02-27 DIAGNOSIS — Z71.82 EXERCISE COUNSELING: ICD-10-CM

## 2025-02-27 DIAGNOSIS — M47.816 LUMBAR SPONDYLOSIS: ICD-10-CM

## 2025-02-27 DIAGNOSIS — M25.561 BILATERAL CHRONIC KNEE PAIN: ICD-10-CM

## 2025-02-27 DIAGNOSIS — E66.9 OBESITY (BMI 30-39.9): ICD-10-CM

## 2025-02-27 DIAGNOSIS — M25.562 CHRONIC PAIN OF LEFT KNEE: ICD-10-CM

## 2025-02-27 DIAGNOSIS — M17.10 ARTHRITIS OF KNEE: ICD-10-CM

## 2025-02-27 DIAGNOSIS — M71.21 SYNOVIAL CYST OF POPLITEAL SPACE (BAKER), RIGHT KNEE: ICD-10-CM

## 2025-02-27 DIAGNOSIS — M51.26 LUMBAR DISC HERNIATION: ICD-10-CM

## 2025-02-27 DIAGNOSIS — M25.562 BILATERAL CHRONIC KNEE PAIN: ICD-10-CM

## 2025-02-27 DIAGNOSIS — G89.29 CHRONIC PAIN OF LEFT KNEE: ICD-10-CM

## 2025-02-27 PROCEDURE — 97110 THERAPEUTIC EXERCISES: CPT

## 2025-02-27 PROCEDURE — 97014 ELECTRIC STIMULATION THERAPY: CPT

## 2025-02-27 NOTE — OP THERAPY DAILY TREATMENT
Outpatient Physical Therapy  DAILY TREATMENT     [facility] Out[facility] Physical [facility] [place]  [address], [address]  [place] [address]  Phone:  [phone]  Fax:  [phone]    Date: 02/27/2025    Patient: Nina Gregory  YOB: 1950  MRN: [id number]     Time Calculation    Start time: 1055  Stop time: 1140 Time Calculation (min): 45 minutes         Chief Complaint: Back Problem    Visit #: 6    SUBJECTIVE:  Patient reports that her back pain is increased once again. Patient states that overall symptoms are worse. States she has not done any exercise.     OBJECTIVE:  Current objective measures:   SI belt : no change            Therapeutic Exercises (CPT 97002):     1. Transfer training, x10min    2. Sit to stand training, x2min    3. log roll training, x5min    4. SI Belt trial, x2min NC    Therapeutic Treatments and Modalities:     1. E Stim Unattended (CPT 91205), IFC with HP seated x15min 80-150hz    Time-based treatments/modalities:    Physical Therapy Timed Treatment Charges  Therapeutic exercise minutes (CPT 32592): 25 minutes      Pain rating (1-10) before treatment:  5  Pain rating (1-10) after treatment:  3    ASSESSMENT:   Response to treatment: Patient to trial SI belt at home and await her injection. Plan to reassess post injection.     PLAN/RECOMMENDATIONS:   Plan for treatment: therapy treatment to continue next visit.  Planned interventions for next visit: continue with current treatment.

## 2025-03-04 ENCOUNTER — APPOINTMENT (OUTPATIENT)
Dept: RADIOLOGY | Facility: REHABILITATION | Age: 75
End: 2025-03-04
Attending: PHYSICAL MEDICINE & REHABILITATION
Payer: MEDICARE

## 2025-03-04 ENCOUNTER — HOSPITAL ENCOUNTER (OUTPATIENT)
Facility: REHABILITATION | Age: 75
End: 2025-03-04
Attending: PHYSICAL MEDICINE & REHABILITATION | Admitting: PHYSICAL MEDICINE & REHABILITATION
Payer: MEDICARE

## 2025-03-04 VITALS
BODY MASS INDEX: 30.99 KG/M2 | TEMPERATURE: 96.8 F | OXYGEN SATURATION: 97 % | HEIGHT: 65 IN | HEART RATE: 81 BPM | RESPIRATION RATE: 16 BRPM | WEIGHT: 186 LBS | SYSTOLIC BLOOD PRESSURE: 134 MMHG | DIASTOLIC BLOOD PRESSURE: 80 MMHG

## 2025-03-04 ASSESSMENT — FIBROSIS 4 INDEX: FIB4 SCORE: 1.3

## 2025-03-04 ASSESSMENT — PAIN DESCRIPTION - PAIN TYPE: TYPE: CHRONIC PAIN

## 2025-03-04 NOTE — PROGRESS NOTES
0935 procedure cancelled. Pt not having any pain x2 days. Dr. Echols and pt agree to plan of care.

## 2025-03-12 ENCOUNTER — PHYSICAL THERAPY (OUTPATIENT)
Dept: PHYSICAL THERAPY | Facility: REHABILITATION | Age: 75
End: 2025-03-12
Attending: PHYSICAL MEDICINE & REHABILITATION
Payer: MEDICARE

## 2025-03-12 DIAGNOSIS — M51.26 LUMBAR DISC HERNIATION: ICD-10-CM

## 2025-03-12 DIAGNOSIS — M71.21 SYNOVIAL CYST OF POPLITEAL SPACE (BAKER), RIGHT KNEE: ICD-10-CM

## 2025-03-12 DIAGNOSIS — M25.561 BILATERAL CHRONIC KNEE PAIN: ICD-10-CM

## 2025-03-12 DIAGNOSIS — Z71.82 EXERCISE COUNSELING: ICD-10-CM

## 2025-03-12 DIAGNOSIS — M17.10 ARTHRITIS OF KNEE: ICD-10-CM

## 2025-03-12 DIAGNOSIS — E66.9 OBESITY (BMI 30-39.9): ICD-10-CM

## 2025-03-12 DIAGNOSIS — M25.562 BILATERAL CHRONIC KNEE PAIN: ICD-10-CM

## 2025-03-12 DIAGNOSIS — M25.562 CHRONIC PAIN OF LEFT KNEE: ICD-10-CM

## 2025-03-12 DIAGNOSIS — G89.29 CHRONIC PAIN OF LEFT KNEE: ICD-10-CM

## 2025-03-12 DIAGNOSIS — G89.29 BILATERAL CHRONIC KNEE PAIN: ICD-10-CM

## 2025-03-12 PROCEDURE — 97110 THERAPEUTIC EXERCISES: CPT

## 2025-03-12 NOTE — OP THERAPY DAILY TREATMENT
Outpatient Physical Therapy  DAILY TREATMENT     [facility] Outpatient Physical Therapy [place]  [address], [address]  [facility] [facility] [address]  Phone:  [phone]  Fax:  [phone]    Date: 03/12/2025    Patient: Nina Gregory  YOB: 1950  MRN: [id number]     Time Calculation    Start time: 0937  Stop time: 1015 Time Calculation (min): 38 minutes         Chief Complaint: Back Problem and Knee Problem    Visit #: 7    SUBJECTIVE:  Patient reports her pain is much better. States almost no SI/LB pain  Notes her knee is ok    OBJECTIVE:  Current objective measures:           Therapeutic Exercises (CPT 14942):     1. Nu step, x15min    2. supine lumbar rot, x20    3. seated hip abd, xfatigue    4. SI Belt trial, x4min    5. sit to stand, x5min    6. SI belt edu and use, x5min    7. clams, x10      Time-based treatments/modalities:    Physical Therapy Timed Treatment Charges  Therapeutic exercise minutes (CPT 72036): 38 minutes      Pain rating (1-10) before treatment:  0  Pain rating (1-10) after treatment:  0    ASSESSMENT:   Response to treatment: Patient responded well to therapy with no increase in pain. Patient to continue with a small HEP and continue to add exercise until she has resumed her prior program.     PLAN/RECOMMENDATIONS:   Plan for treatment: therapy treatment to continue next visit.  Planned interventions for next visit: continue with current treatment.

## 2025-03-19 ENCOUNTER — APPOINTMENT (OUTPATIENT)
Dept: MEDICAL GROUP | Facility: PHYSICIAN GROUP | Age: 75
End: 2025-03-19
Payer: MEDICARE

## 2025-03-19 VITALS
OXYGEN SATURATION: 94 % | HEART RATE: 73 BPM | DIASTOLIC BLOOD PRESSURE: 62 MMHG | BODY MASS INDEX: 32.92 KG/M2 | RESPIRATION RATE: 16 BRPM | HEIGHT: 65 IN | SYSTOLIC BLOOD PRESSURE: 138 MMHG | WEIGHT: 197.6 LBS | TEMPERATURE: 96.4 F

## 2025-03-19 DIAGNOSIS — Z00.00 ENCOUNTER FOR SUBSEQUENT ANNUAL WELLNESS VISIT (AWV) IN MEDICARE PATIENT: Primary | ICD-10-CM

## 2025-03-19 DIAGNOSIS — Z78.9 POLST (PHYSICIAN ORDERS FOR LIFE-SUSTAINING TREATMENT): ICD-10-CM

## 2025-03-19 DIAGNOSIS — M85.852 OSTEOPENIA OF NECK OF LEFT FEMUR: ICD-10-CM

## 2025-03-19 DIAGNOSIS — M54.41 CHRONIC RIGHT-SIDED LOW BACK PAIN WITH RIGHT-SIDED SCIATICA: ICD-10-CM

## 2025-03-19 DIAGNOSIS — E53.8 B12 DEFICIENCY: ICD-10-CM

## 2025-03-19 DIAGNOSIS — E55.9 VITAMIN D DEFICIENCY: ICD-10-CM

## 2025-03-19 DIAGNOSIS — E78.2 MIXED HYPERLIPIDEMIA: ICD-10-CM

## 2025-03-19 DIAGNOSIS — G89.29 CHRONIC RIGHT-SIDED LOW BACK PAIN WITH RIGHT-SIDED SCIATICA: ICD-10-CM

## 2025-03-19 DIAGNOSIS — R73.01 ELEVATED FASTING BLOOD SUGAR: ICD-10-CM

## 2025-03-19 PROCEDURE — G0439 PPPS, SUBSEQ VISIT: HCPCS | Performed by: INTERNAL MEDICINE

## 2025-03-19 PROCEDURE — 3078F DIAST BP <80 MM HG: CPT | Performed by: INTERNAL MEDICINE

## 2025-03-19 PROCEDURE — 3075F SYST BP GE 130 - 139MM HG: CPT | Performed by: INTERNAL MEDICINE

## 2025-03-19 ASSESSMENT — FIBROSIS 4 INDEX: FIB4 SCORE: 1.3

## 2025-03-19 ASSESSMENT — PATIENT HEALTH QUESTIONNAIRE - PHQ9: CLINICAL INTERPRETATION OF PHQ2 SCORE: 0

## 2025-03-19 ASSESSMENT — ACTIVITIES OF DAILY LIVING (ADL): BATHING_REQUIRES_ASSISTANCE: 0

## 2025-03-19 ASSESSMENT — ENCOUNTER SYMPTOMS: GENERAL WELL-BEING: GOOD

## 2025-03-19 NOTE — PROGRESS NOTES
Chief Complaint   Patient presents with    Annual Exam     Annual LOV 1/9/25  Labs 7/5/24  Due Bone Density        HPI:  Prudence Chayito Gregory is a 74 y.o. here for Medicare Annual Wellness Visit     History of Present Illness  The patient presents for a health maintenance visit.    She has expressed a desire to update her medical advanced directive, with a preference for full code status in the event of an emergency. However, she has indicated that she does not wish to be placed on life support indefinitely. She has designated her  as her primary decision-maker, with her son as the first alternate. She is also an organ donor.    She recently experienced a back issue, which has since resolved. She was scheduled for a procedure on 03/04/2025, but her condition improved prior to the procedure. She reported a pain level of 0 on a scale of 1 to 10. She has discontinued the use of baclofen due to associated swelling.    She was prescribed dicyclomine for cramping associated with diverticulitis, but she did not require its use as her condition improved. She continues to take Cymbalta 60 mg without any noticeable side effects. She reports significant improvement with Breo. She is currently using Premarin cream, prescribed by her dermatologist. She has discontinued the use of Kenalog and clobetasol. She does not require refills for her nebulizer or inhaler.    She has completed her mammogram and is due for a bone density test in 3 weeks.    MEDICATIONS  Current: Cymbalta, Breo, meloxicam, Premarin cream, Crestor  Discontinued: baclofen, Bentyl, Kenalog, clobetasol        Patient Active Problem List    Diagnosis Date Noted    Elevated fasting blood sugar 03/19/2025    B12 deficiency 03/19/2025    POLST- FULL CODE 03/19/2025    Right calf pain 12/03/2024    Restless leg syndrome 07/02/2024    Chronic pain of left knee 02/13/2024    Class 1 drug-induced obesity with serious comorbidity and body mass index (BMI) of 30.0  to 30.9 in adult 11/16/2023    Sigmoid diverticulitis 04/04/2023    Generalized anxiety disorder 04/04/2023    Gastroesophageal reflux disease without esophagitis 11/02/2021    Stress incontinence of urine 09/16/2021    WARD (obstructive sleep apnea) 08/24/2021    Osteopenia of neck of left femur 05/26/2021    Coronary artery disease involving native coronary artery without angina pectoris 05/12/2021    Aortic atherosclerosis (HCC) 03/22/2021    Chronic 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 03/22/2021    Chronic respiratory failure with hypoxia (HCC)- nocturnal oxygen 12/21/2020    Primary insomnia 10/12/2020    Lichen sclerosus 07/23/2020    Burning mouth syndrome- working diagnosis 04/20/2020    Mixed hyperlipidemia 03/26/2020    Moderate persistent asthma without complication 03/05/2020    Vitamin D deficiency 03/05/2020    Hypothyroidism due to acquired atrophy of thyroid 03/05/2020    Skin lesion 03/05/2020       Current Outpatient Medications   Medication Sig Dispense Refill    gabapentin (NEURONTIN) 100 MG Cap Take 1-3 Capsules by mouth 3 times a day as needed (pain). 270 Capsule 5    fluticasone furoate-vilanterol (BREO ELLIPTA) 200-25 MCG/ACT AEROSOL POWDER, BREATH ACTIVATED Inhale 1 Puff by mouth every day. 90 Each 3    meloxicam (MOBIC) 15 MG tablet Take 1 Tablet by mouth every day. 100 Tablet 3    rosuvastatin (CRESTOR) 10 MG Tab TAKE 1 TABLET BY MOUTH EVERY DAY IN THE EVENING 100 Tablet 3    albuterol 108 (90 Base) MCG/ACT Aero Soln inhalation aerosol INHALE 2 PUFFS BY MOUTH EVERY FOUR HOURS AS NEEDED FOR SHORTNESS OF BREATH. 6.7 Each 3    DULoxetine (CYMBALTA) 30 MG Cap DR Particles Take 2 Capsules by mouth every day. 200 Capsule 3    levothyroxine (SYNTHROID) 88 MCG Tab Take 1 Tablet by mouth every Monday, Wednesday, and Friday. To alternate with 100 mcg dose 50 Tablet 3    levothyroxine (SYNTHROID) 100 MCG Tab Take 1 Tablet by mouth in the evening every Tuesday, Thursdays, Saturday, and  Sunday. To alternate with 88 mcg dose 60 Tablet 3    Meclizine HCl (ANTIVERT) 25 MG Chew Tab 1 q12hr prn vertigo 10 Tablet 0    PREMARIN 0.625 MG/GM Cream APPLY 1.0 GRAM TO AFFECTED AREA 3X/WEEK      dicyclomine (BENTYL) 10 MG Cap Take 1 Capsule by mouth every 6 hours as needed (abdominal cramping). 30 Capsule 1    neomycin-polymixin-dexamethasone (MAXITROL) 3.5-05270-0.1 Ointment ophthalmic ointment       clobetasol (TEMOVATE) 0.05 % Cream       baclofen (LIORESAL) 10 MG Tab Take 1 Tablet by mouth 3 times a day as needed (muscle spasm/pain). 90 Tablet 3    triamcinolone acetonide (KENALOG) 0.1 % Ointment TOPICAL APPLY SPARINGLY TO THE AFFECTED AREA 3XWK.      ipratropium-albuterol (DUONEB) 0.5-2.5 (3) MG/3ML nebulizer solution Take 3 mL by nebulization 4 times a day. (Patient taking differently: Take 3 mL by nebulization every four hours as needed.) 3 mL 3    coenzyme Q-10 30 MG capsule Take 60 mg by mouth every day.      Cyanocobalamin (B-12 PO) Take 1 Tablet by mouth every day.       No current facility-administered medications for this visit.          Current supplements as per medication list.     Allergies: Codeine    Current social contact/activities:      She  reports that she quit smoking about 31 years ago. Her smoking use included cigarettes. She started smoking about 46 years ago. She has a 15 pack-year smoking history. She has never used smokeless tobacco. She reports current alcohol use of about 1.8 oz of alcohol per week. She reports that she does not use drugs.  Counseling given: Not Answered  Tobacco comments: continued abstinence      ROS:    Gait: Uses no assistive device  Ostomy: No  Other tubes: No  Amputations: No  Chronic oxygen use: No  Last eye exam: a month ago    Wears hearing aids: No   : Reports urinary leakage during the last 6 months that has somewhat interfered with their daily activities or sleep.    Screening:    Depression Screening  Little interest or pleasure in doing things?   0 - not at all  Feeling down, depressed , or hopeless? 0 - not at all  Trouble falling or staying asleep, or sleeping too much?     Feeling tired or having little energy?     Poor appetite or overeating?     Feeling bad about yourself - or that you are a failure or have let yourself or your family down?    Trouble concentrating on things, such as reading the newspaper or watching television?    Moving or speaking so slowly that other people could have noticed.  Or the opposite - being so fidgety or restless that you have been moving around a lot more than usual?     Thoughts that you would be better off dead, or of hurting yourself?     Patient Health Questionnaire Score:      If depressive symptoms identified deferred to follow up visit unless specifically addressed in assessment and plan.    Interpretation of PHQ-9 Total Score   Score Severity   1-4 No Depression   5-9 Mild Depression   10-14 Moderate Depression   15-19 Moderately Severe Depression   20-27 Severe Depression    Screening for Cognitive Impairment  Do you or any of your friends or family members have any concern about your memory? No  Three Minute Recall (Village, Kitchen, Baby) 3/3    Inocencio clock face with all 12 numbers and set the hands to show 10 minutes past 11.  Yes    Cognitive concerns identified deferred for follow up unless specifically addressed in assessment and plan.    Fall Risk Assessment  Has the patient had two or more falls in the last year or any fall with injury in the last year?  No    Safety Assessment  Do you always wear your seatbelt?  Yes  Any changes to home needed to function safely? No  Difficulty hearing.  No  Patient counseled about all safety risks that were identified.    Functional Assessment ADLs  Are there any barriers preventing you from cooking for yourself or meeting nutritional needs?  No.    Are there any barriers preventing you from driving safely or obtaining transportation?  No.    Are there any barriers  preventing you from using a telephone or calling for help?  No    Are there any barriers preventing you from shopping?  No.    Are there any barriers preventing you from taking care of your own finances?  No    Are there any barriers preventing you from managing your medications?  No    Are there any barriers preventing you from showering, bathing or dressing yourself? No    Are there any barriers preventing you from doing housework or laundry? No    Are there any barriers preventing you from using the toilet?No    Are you currently engaging in any exercise or physical activity?  No. Back pain    Self-Assessment of Health  What is your perception of your health? Good    Do you sleep more than six hours a night? No    In the past 7 days, how much did pain keep you from doing your normal work? Some    Do you spend quality time with family or friends (virtually or in person)? Yes    Do you usually eat a heart healthy diet that constists of a variety of fruits, vegetables, whole grains and fiber? Yes    Do you eat foods high in fat and/or Fast Food more than three times per week? No    How concerned are you that your medical conditions are not being well managed? Not at all    Are you worried that in the next 2 months, you may not have stable housing that you own, rent, or stay in as part of a household? No        Advance Care Planning  Do you have an Advance Directive, Living Will, Durable Power of , or POLST? Yes  Advance Directive       is not on file - instructed patient to bring in a copy to scan into their chart      Health Maintenance Summary            Current Care Gaps       COVID-19 Vaccine (6 - 2024-25 season) Overdue since 9/1/2024 03/22/2023  Imm Admin: PFIZER BIVALENT SARS-COV-2 VACCINE (12+)    07/05/2022  Imm Admin: PFIZER COATS CAP SARS-COV-2 VACCINATION (12+)    10/04/2021  Imm Admin: PFIZER PURPLE CAP SARS-COV-2 VACCINATION (12+)    02/12/2021  Imm Admin: PFIZER PURPLE CAP SARS-COV-2  VACCINATION (12+)    01/22/2021  Imm Admin: PFIZER PURPLE CAP SARS-COV-2 VACCINATION (12+)     Only the first 5 history entries have been loaded, but more history exists.                    Needs Review       Hepatitis C Screening  Tentatively Complete      03/11/2020  Hepatitis C Antibody component of HEP C VIRUS ANTIBODY                      Awaiting Completion       Bone Density Scan (Every 2 Years) Order placed this encounter      03/19/2025  Order placed for DS-BONE DENSITY STUDY (DEXA) by Shawna Recio D.O.    04/10/2023  DS-BONE DENSITY STUDY (DEXA)    03/24/2021  DS-BONE DENSITY STUDY (DEXA)                      Upcoming       Mammogram (Yearly) Next due on 2/13/2026 02/13/2025  MA-SCREENING MAMMO BILAT W/TOMOSYNTHESIS W/CAD    02/01/2024  MA-SCREENING MAMMO BILAT W/TOMOSYNTHESIS W/CAD    01/31/2023  MA-SCREENING MAMMO BILAT W/TOMOSYNTHESIS W/CAD    01/10/2022  MA-SCREENING MAMMO BILAT W/TOMOSYNTHESIS W/CAD    12/09/2020  MA-SCREENING MAMMO BILAT W/TOMOSYNTHESIS W/CAD      Only the first 5 history entries have been loaded, but more history exists.              Annual Wellness Visit (Yearly) Next due on 3/19/2026      03/19/2025  Level of Service: OK ANNUAL WELLNESS VISIT-INCLUDES PPPS SUBSEQUE*    01/16/2024  Level of Service: OK ANNUAL WELLNESS VISIT-INCLUDES PPPS SUBSEQUE*    11/16/2023  Level of Service: ANNUAL WELLNESS VISIT-INCLUDES PPPS SUBSEQUE*    11/16/2023  Visit Dx: Medicare annual wellness visit, subsequent    09/20/2022  Level of Service: OK ANNUAL WELLNESS VISIT-INCLUDES PPPS SUBSEQUE*      Only the first 5 history entries have been loaded, but more history exists.              IMM DTaP/Tdap/Td Vaccine (3 - Td or Tdap) Next due on 1/4/2028 01/04/2018  Imm Admin: Tdap Vaccine    01/02/2008  Imm Admin: Tdap Vaccine              Colorectal Cancer Screening (Colonoscopy - Every 7 Years) Next due on 10/15/2029      10/15/2022  AMB EXTERNAL COLONOSCOPY RESULTS    10/12/2022   COLONOSCOPY RESULTS    07/03/2022  REFERRAL TO GI FOR COLONOSCOPY    06/23/2022  REFERRAL TO GI FOR COLONOSCOPY    01/13/2012  REFERRAL TO GI FOR COLONOSCOPY      Only the first 5 history entries have been loaded, but more history exists.                      Completed or No Longer Recommended       Hepatitis B Vaccine (Hep B) (Series Information) Completed      03/27/2000  Imm Admin: Hepatitis B Vaccine (Adol/Adult)    10/27/1999  Imm Admin: Hepatitis B Vaccine (Adol/Adult)    09/27/1999  Imm Admin: Hepatitis B Vaccine (Adol/Adult)              Influenza Vaccine (Series Information) Completed      09/16/2024  Imm Admin: Influenza high-dose trivalent (PF)    09/13/2023  Imm Admin: Influenza Vaccine, Quadrivalent, Adjuvanted (Pf)    09/20/2022  Imm Admin: Influenza Vaccine Adult HD    10/04/2021  Imm Admin: Influenza, Unspecified - HISTORICAL DATA    10/12/2020  Imm Admin: Influenza Vaccine Adult HD      Only the first 5 history entries have been loaded, but more history exists.              Zoster (Shingles) Vaccines (Series Information) Completed      11/16/2023  Imm Admin: Zoster Vaccine Recombinant (RZV) (SHINGRIX)    07/24/2023  Imm Admin: Zoster Vaccine Recombinant (RZV) (SHINGRIX)              Pneumococcal Vaccine: 50+ Years (Series Information) Completed      03/05/2020  Imm Admin: Pneumococcal polysaccharide vaccine (PPSV-23)    11/14/2018  Imm Admin: Pneumococcal Conjugate Vaccine (Prevnar/PCV-13)    09/27/2012  Imm Admin: Pneumococcal polysaccharide vaccine (PPSV-23)              Hepatitis A Vaccine (Hep A) (Series Information) Aged Out      No completion history exists for this topic.              HPV Vaccines (Series Information) Aged Out     No completion history exists for this topic.              Polio Vaccine (Inactivated Polio) (Series Information) Aged Out     No completion history exists for this topic.              Meningococcal Immunization (Series Information) Aged Out     No completion history  exists for this topic.                            Patient Care Team:  Shawna Recio D.O. as PCP - General (Internal Medicine)  Shawna Recio D.O. as PCP - [facility] Paneled  Harlan García Ass't as [facility]   Sathya Silva O.D. (Optometry)  Jovani Dewey M.D. (Interventional Cardiology)  [facility]  DEON Reveles (Nurse Practitioner Family)  Kwame Elmore, PT, DPT as Physical Therapist (Physical Therapy)      Social History     Tobacco Use    Smoking status: Former     Current packs/day: 0.00     Average packs/day: 1 pack/day for 15.0 years (15.0 ttl pk-yrs)     Types: Cigarettes     Start date: 10/10/1978     Quit date: 1993     Years since quittin.9    Smokeless tobacco: Never    Tobacco comments:     continued abstinence   Vaping Use    Vaping status: Never Used   Substance Use Topics    Alcohol use: Yes     Alcohol/week: 1.8 oz     Types: 3 Glasses of wine per week     Comment: occasionally    Drug use: No     Family History   Problem Relation Age of Onset    Cancer Father         lung    Breast Cancer Maternal Aunt     Hypertension Maternal Grandmother     Hypertension Maternal Grandfather     Kidney Disease Mother     Heart Disease Neg Hx      She  has a past medical history of Acute cystitis with hematuria (2020), Asthma, Back muscle spasm (2021), Back pain, Burning mouth syndrome- working diagnosis (2020), CAD (coronary artery disease), Chickenpox, COVID-19 virus infection (2021), Elevated coronary artery calcium score (2021), GERD (gastroesophageal reflux disease), Heartburn, Hordeolum externum of right lower eyelid (2023), Hyperlipidemia, Hypothyroidism, Insomnia, Lymphadenopathy (2021), Mass of left submandibular region (2021), Mumps, WARD on CPAP (2021), Painful joint, Pedal edema (2021), Rotator cuff arthropathy of left shoulder (2022), Stiffness in joint  "(07/05/2023), Stomatitis (09/16/2021), Thyroid disease, Tonsillitis, and Wears glasses.    She has no past medical history of Daytime sleepiness, Encounter for long-term (current) use of other medications, Gasping for breath, Morning headache, or Snoring.   Past Surgical History:   Procedure Laterality Date    ABDOMINAL HYSTERECTOMY TOTAL      CHOLECYSTECTOMY      HYSTERECTOMY LAPAROSCOPY      TONSILLECTOMY         Exam:   /62 (BP Location: Left arm, Patient Position: Sitting, BP Cuff Size: Adult)   Pulse 73   Temp (!) 35.8 °C (96.4 °F) (Temporal)   Resp 16   Ht 1.651 m (5' 5\")   Wt 89.6 kg (197 lb 9.6 oz)   SpO2 94%  Body mass index is 32.88 kg/m².    Hearing good.    Dentition good  Alert, oriented in no acute distress.  Eye contact is good, speech goal directed, affect calm    Results  Imaging  Mammogram shows scattered fibroglandular densities.    Assessment & Plan  Osteopenia  Hyperlipidemia  Vitamin D deficiency  B12 deficiency  Elevated fasting blood sugar  Her mammogram results indicate scattered fibroglandular densities, which are within normal limits. A bone density test will be ordered for her. She is advised to schedule this at her earliest convenience, but it can not be conducted before 04/10/2025. She is also advised to carry Paxlovid during her international travel in September 2025, provided her kidney function remains within the normal range. Additionally, she is recommended to have self-testing kits on hand. She does not need refills for Kenalog or clobetasol. No changes are needed for her thyroid medication or Crestor, which was last refilled in December 2024. Update annual lab work to ensure relative stability.    6. Advance care planning, POLST completed.  A Physician Order for Life-Sustaining Treatment (POLST) form has been completed and will be filed. She has designated her  as her primary healthcare agent and her son as the first alternate.    7. Chronic low back pain right " sided, recent exacerbation  She is currently taking Cymbalta 60 mg and reports no noticeable difference. She is advised that the dosage can be increased to 90 mg or 120 mg if needed. She is also taking Breo and reports significant improvement. She has discontinued the use of baclofen due to swelling and has not used Bentyl as her diverticulitis symptoms have resolved. She experienced back pain a few weeks ago but reports that it has resolved. She was scheduled for a procedure on 03/04/2025, but the pain subsided before the procedure. She took baclofen, which caused swelling, and has since discontinued its use.    8. Annual exam  Completed today as noted above        Assessment and Plan. The following treatment and monitoring plan is recommended:      Problem List Items Addressed This Visit       B12 deficiency    Chronic 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    Elevated fasting blood sugar    Mixed hyperlipidemia    Osteopenia of neck of left femur    Relevant Orders    DS-BONE DENSITY STUDY (DEXA)    POLST- FULL CODE    Vitamin D deficiency     Other Visit Diagnoses         Encounter for subsequent annual wellness visit (AWV) in Medicare patient    -  Primary              Services suggested: No services needed at this time  Health Care Screening: Age-appropriate preventive services recommended by USPTF and ACIP covered by Medicare were discussed today. Services ordered if indicated and agreed upon by the patient.  Referrals offered: Community-based lifestyle interventions to reduce health risks and promote self-management and wellness, fall prevention, nutrition, physical activity, tobacco-use cessation, weight loss, and mental health services as per orders if indicated.    Discussion today about general wellness and lifestyle habits:    Prevent falls and reduce trip hazards; Cautioned about securing or removing rugs.  Have a working fire alarm and carbon monoxide detector;   Engage in regular  physical activity and social activities     Follow-up: Return in about 3 months (around 6/19/2025).    I spent a total of 34 minutes with record review, exam, communication with the patient, communication with other providers, and documentation of this encounter.    Shawna Recio, DO  Geriatric and Internal Medicine  [facility]

## 2025-03-21 ENCOUNTER — PHYSICAL THERAPY (OUTPATIENT)
Dept: PHYSICAL THERAPY | Facility: REHABILITATION | Age: 75
End: 2025-03-21
Attending: PHYSICAL MEDICINE & REHABILITATION
Payer: MEDICARE

## 2025-03-21 DIAGNOSIS — M25.562 CHRONIC PAIN OF LEFT KNEE: ICD-10-CM

## 2025-03-21 DIAGNOSIS — E66.9 OBESITY (BMI 30-39.9): ICD-10-CM

## 2025-03-21 DIAGNOSIS — M71.21 SYNOVIAL CYST OF POPLITEAL SPACE (BAKER), RIGHT KNEE: ICD-10-CM

## 2025-03-21 DIAGNOSIS — G89.29 BILATERAL CHRONIC KNEE PAIN: ICD-10-CM

## 2025-03-21 DIAGNOSIS — Z71.82 EXERCISE COUNSELING: ICD-10-CM

## 2025-03-21 DIAGNOSIS — M51.26 LUMBAR DISC HERNIATION: ICD-10-CM

## 2025-03-21 DIAGNOSIS — M17.10 ARTHRITIS OF KNEE: ICD-10-CM

## 2025-03-21 DIAGNOSIS — G89.29 CHRONIC PAIN OF LEFT KNEE: ICD-10-CM

## 2025-03-21 DIAGNOSIS — M25.561 BILATERAL CHRONIC KNEE PAIN: ICD-10-CM

## 2025-03-21 DIAGNOSIS — M25.562 BILATERAL CHRONIC KNEE PAIN: ICD-10-CM

## 2025-03-21 DIAGNOSIS — M47.816 LUMBAR SPONDYLOSIS: ICD-10-CM

## 2025-03-21 PROCEDURE — 97110 THERAPEUTIC EXERCISES: CPT

## 2025-03-21 NOTE — OP THERAPY DAILY TREATMENT
Outpatient Physical Therapy  DAILY TREATMENT     [facility] Outpatient Physical Therapy [place]  [address], [address]  [place] [address]  Phone:  [phone]  Fax:  [phone]    Date: 03/21/2025    Patient: Nina Gregory  YOB: 1950  MRN: [id number]     Time Calculation    Start time: 0855  Stop time: 0935 Time Calculation (min): 40 minutes         Chief Complaint: Back Problem    Visit #: 8    SUBJECTIVE:  Patient reports that she is feeling good but is still afraid of causing her pain. Patient states she feels like she needs to be sooo careful just to move.     OBJECTIVE:  Current objective measures:           Therapeutic Exercises (CPT 38814):     1. Nu step, x13min    2. quad set, x20    3. SLR, x20    4. Side stepping with SI belt, x10    5. sit to stand, x20    6. supine glut sets, x20    7. sit to stand, x15    8. edu on ok movement vs painful movement, x5min      Therapeutic Exercise Summary: Access Code: [email or web address]  URL: [email or web address]  Date: 04/24/2024  Prepared by: Kwaem Estebanova    Exercises  - Clamshell  - 1 x daily - 7 x weekly - 2 sets - 10 reps  - Seated Hip Abduction with Resistance  - 1 x daily - 7 x weekly - 2 sets - 10 reps  - Modified Clement Stretch  - 3 x daily - 7 x weekly - 1 sets - 3 reps - 15sec hold  - Supine Hamstring Stretch  - 3 x daily - 7 x weekly - 1 sets - 3 reps - 15sec hold  - Sit to Stand  - 1 x daily - 7 x weekly - 2 sets - 10 reps  - Small Range Straight Leg Raise  - 1 x daily - 7 x weekly - 2 sets - 10 reps      Time-based treatments/modalities:    Physical Therapy Timed Treatment Charges  Therapeutic exercise minutes (CPT 88092): 40 minutes      Pain rating (1-10) before treatment:  1  Pain rating (1-10) after treatment:  1    ASSESSMENT:   Response to treatment: Patient is still presenting with a fear of movement. Patient will continue to be slowly progressed with therapy.     PLAN/RECOMMENDATIONS:   Plan for treatment: therapy treatment to  continue next visit.  Planned interventions for next visit: continue with current treatment.

## 2025-03-26 ENCOUNTER — PHYSICAL THERAPY (OUTPATIENT)
Dept: PHYSICAL THERAPY | Facility: REHABILITATION | Age: 75
End: 2025-03-26
Attending: PHYSICAL MEDICINE & REHABILITATION
Payer: MEDICARE

## 2025-03-26 DIAGNOSIS — M17.10 ARTHRITIS OF KNEE: ICD-10-CM

## 2025-03-26 DIAGNOSIS — E66.9 OBESITY (BMI 30-39.9): ICD-10-CM

## 2025-03-26 DIAGNOSIS — M25.561 BILATERAL CHRONIC KNEE PAIN: ICD-10-CM

## 2025-03-26 DIAGNOSIS — G89.29 BILATERAL CHRONIC KNEE PAIN: ICD-10-CM

## 2025-03-26 DIAGNOSIS — Z71.82 EXERCISE COUNSELING: ICD-10-CM

## 2025-03-26 DIAGNOSIS — M51.26 LUMBAR DISC HERNIATION: ICD-10-CM

## 2025-03-26 DIAGNOSIS — G89.29 CHRONIC PAIN OF LEFT KNEE: ICD-10-CM

## 2025-03-26 DIAGNOSIS — M25.562 CHRONIC PAIN OF LEFT KNEE: ICD-10-CM

## 2025-03-26 DIAGNOSIS — M71.21 SYNOVIAL CYST OF POPLITEAL SPACE (BAKER), RIGHT KNEE: ICD-10-CM

## 2025-03-26 DIAGNOSIS — M25.562 BILATERAL CHRONIC KNEE PAIN: ICD-10-CM

## 2025-03-26 PROCEDURE — 97110 THERAPEUTIC EXERCISES: CPT

## 2025-03-26 NOTE — OP THERAPY DAILY TREATMENT
Outpatient Physical Therapy  DAILY TREATMENT     [facility] Physical Therapy [place]  [address]  [facility] [address]  Phone:  [phone]  Fax:  [phone]    Date: 03/26/2025    Patient: Nina Gregory  YOB: 1950  MRN: [id number]     Time Calculation    Start time: 0723  Stop time: 0805 Time Calculation (min): 42 minutes         Chief Complaint: Back Problem and Knee Problem    Visit #: 9    SUBJECTIVE:  Patient reports that symptoms have been about the same. Notes she still has problem with fast movements.     OBJECTIVE:  Current objective measures:           Therapeutic Exercises (CPT 29604):     1. Nu step, x13min    2. quad set, x20    3. SLR, x20    4. Side stepping with SI belt, x10    5. sit to stand, x20    6. supine glut sets, x20    7. sit to stand, x15    8. edu on ok movement vs painful movement, x5min      Therapeutic Exercise Summary: Access Code: [email or web address]  URL: [email or web address]  Date: 04/24/2024  Prepared by: Kwame Elmore    Exercises  - Clamshell  - 1 x daily - 7 x weekly - 2 sets - 10 reps  - Seated Hip Abduction with Resistance  - 1 x daily - 7 x weekly - 2 sets - 10 reps  - Modified Clement Stretch  - 3 x daily - 7 x weekly - 1 sets - 3 reps - 15sec hold  - Supine Hamstring Stretch  - 3 x daily - 7 x weekly - 1 sets - 3 reps - 15sec hold  - Sit to Stand  - 1 x daily - 7 x weekly - 2 sets - 10 reps  - Small Range Straight Leg Raise  - 1 x daily - 7 x weekly - 2 sets - 10 reps      Time-based treatments/modalities:    Physical Therapy Timed Treatment Charges  Therapeutic exercise minutes (CPT 95048): 40 minutes      Pain rating (1-10) before treatment:  0  Pain rating (1-10) after treatment:  0    ASSESSMENT:   Response to treatment: Patient continues to demonstrate slight pain with rotation and pelvic tilting. Notes that symptoms are gone for the most part execpt with certain movements.     PLAN/RECOMMENDATIONS:   Plan for treatment: therapy  treatment to continue next visit.  Planned interventions for next visit: continue with current treatment.

## 2025-04-02 ENCOUNTER — PHYSICAL THERAPY (OUTPATIENT)
Dept: PHYSICAL THERAPY | Facility: REHABILITATION | Age: 75
End: 2025-04-02
Attending: PHYSICAL MEDICINE & REHABILITATION
Payer: MEDICARE

## 2025-04-02 DIAGNOSIS — M17.10 ARTHRITIS OF KNEE: ICD-10-CM

## 2025-04-02 DIAGNOSIS — M25.561 BILATERAL CHRONIC KNEE PAIN: ICD-10-CM

## 2025-04-02 DIAGNOSIS — G89.29 BILATERAL CHRONIC KNEE PAIN: ICD-10-CM

## 2025-04-02 DIAGNOSIS — M25.562 BILATERAL CHRONIC KNEE PAIN: ICD-10-CM

## 2025-04-02 DIAGNOSIS — Z71.82 EXERCISE COUNSELING: ICD-10-CM

## 2025-04-02 DIAGNOSIS — M47.816 LUMBAR SPONDYLOSIS: ICD-10-CM

## 2025-04-02 DIAGNOSIS — G89.29 CHRONIC PAIN OF LEFT KNEE: ICD-10-CM

## 2025-04-02 DIAGNOSIS — M51.26 LUMBAR DISC HERNIATION: ICD-10-CM

## 2025-04-02 DIAGNOSIS — E66.9 OBESITY (BMI 30-39.9): ICD-10-CM

## 2025-04-02 DIAGNOSIS — M25.562 CHRONIC PAIN OF LEFT KNEE: ICD-10-CM

## 2025-04-02 DIAGNOSIS — M71.21 SYNOVIAL CYST OF POPLITEAL SPACE (BAKER), RIGHT KNEE: ICD-10-CM

## 2025-04-02 PROCEDURE — 97110 THERAPEUTIC EXERCISES: CPT

## 2025-04-02 NOTE — OP THERAPY DAILY TREATMENT
Outpatient Physical Therapy  DAILY TREATMENT     [facility] [place]  [address]  [place] [address]  Phone:  [phone]  Fax:  [phone]    Date: 04/02/2025    Patient: Nina Gregory  YOB: 1950  MRN: [id number]     Time Calculation    Start time: 1330  Stop time: 1410 Time Calculation (min): 40 minutes         Chief Complaint: Knee Problem and Back Problem    Visit #: 10    SUBJECTIVE:  Patient reports that symptoms are stable. Notes her knee is doing well. Notes her L sided lower back pain is still gone. Notes her R side is painful.     OBJECTIVE:  Current objective measures:           Therapeutic Exercises (CPT 20660):     1. Nu step, x13min    2. quad set, x20    3. SLR, x20    4. Side stepping with SI belt, x10    5. sit to stand, x20    6. supine glut sets, x20    7. sit to stand, x15    8. edu on ok movement vs painful movement, x5min      Therapeutic Exercise Summary: Access Code: [email or web address]  URL: [email or web address]  Date: 04/02/2025  Prepared by: Kwame Elmore    Program Notes  Isometric hand hold hip ups,to the outside, to the [id number] each have  help    Exercises  - Clamshell  - 1 x daily - 7 x weekly - 2 sets - 10 reps  - Seated Hip Abduction with Resistance  - 1 x daily - 7 x weekly - 2 sets - 10 reps  - Modified Clement Stretch  - 3 x daily - 7 x weekly - 1 sets - 3 reps - 15sec hold  - Supine Hamstring Stretch  - 3 x daily - 7 x weekly - 1 sets - 3 reps - 15sec hold  - Sit to Stand  - 1 x daily - 7 x weekly - 2 sets - 10 reps  - Small Range Straight Leg Raise  - 1 x daily - 7 x weekly - 2 sets - 10 reps  - Seated Multifidi Isometric  - 1 x daily - 7 x weekly - 2 sets - 10 reps  - Cat Cow  - 1 x daily - 7 x weekly - 2 sets - 10 reps  - Seated Pelvic Tilts  - 1 x daily - 7 x weekly - 2 sets - 10 reps  - Dead Bug  - 1 x daily - 7 x weekly - 2 sets - 10 reps      Time-based treatments/modalities:    Physical Therapy Timed  Treatment Charges  Therapeutic exercise minutes (CPT 93627): 40 minutes      Pain rating (1-10) before treatment:  3  Pain rating (1-10) after treatment:  1    ASSESSMENT:   Response to treatment: Patient responded well to therapy with an overall decrease in symptoms. Patient to follow up with MD graff: additional work up. Plan to continue with stability exercise as able.     PLAN/RECOMMENDATIONS:   Plan for treatment: therapy treatment to continue next visit.  Planned interventions for next visit: continue with current treatment.

## 2025-04-03 ENCOUNTER — APPOINTMENT (OUTPATIENT)
Dept: PHYSICAL MEDICINE AND REHAB | Facility: MEDICAL CENTER | Age: 75
End: 2025-04-03
Payer: MEDICARE

## 2025-04-03 VITALS
BODY MASS INDEX: 32.92 KG/M2 | HEART RATE: 80 BPM | OXYGEN SATURATION: 97 % | WEIGHT: 197.6 LBS | SYSTOLIC BLOOD PRESSURE: 145 MMHG | DIASTOLIC BLOOD PRESSURE: 93 MMHG | TEMPERATURE: 97 F | HEIGHT: 65 IN

## 2025-04-03 DIAGNOSIS — M25.561 BILATERAL CHRONIC KNEE PAIN: ICD-10-CM

## 2025-04-03 DIAGNOSIS — M54.50 CHRONIC RIGHT-SIDED LOW BACK PAIN, UNSPECIFIED WHETHER SCIATICA PRESENT: ICD-10-CM

## 2025-04-03 DIAGNOSIS — E03.4 HYPOTHYROIDISM DUE TO ACQUIRED ATROPHY OF THYROID: ICD-10-CM

## 2025-04-03 DIAGNOSIS — M17.10 ARTHRITIS OF KNEE: ICD-10-CM

## 2025-04-03 DIAGNOSIS — M54.16 LUMBAR RADICULOPATHY: ICD-10-CM

## 2025-04-03 DIAGNOSIS — M53.3 SACROILIAC JOINT DYSFUNCTION OF LEFT SIDE: ICD-10-CM

## 2025-04-03 DIAGNOSIS — M25.562 BILATERAL CHRONIC KNEE PAIN: ICD-10-CM

## 2025-04-03 DIAGNOSIS — M51.369 DEGENERATION OF INTERVERTEBRAL DISC OF LUMBAR REGION, UNSPECIFIED WHETHER PAIN PRESENT: ICD-10-CM

## 2025-04-03 DIAGNOSIS — G89.29 BILATERAL CHRONIC KNEE PAIN: ICD-10-CM

## 2025-04-03 DIAGNOSIS — M47.816 LUMBAR SPONDYLOSIS: ICD-10-CM

## 2025-04-03 DIAGNOSIS — G89.29 CHRONIC RIGHT-SIDED LOW BACK PAIN, UNSPECIFIED WHETHER SCIATICA PRESENT: ICD-10-CM

## 2025-04-03 DIAGNOSIS — F40.240 CLAUSTROPHOBIA: ICD-10-CM

## 2025-04-03 PROCEDURE — 3080F DIAST BP >= 90 MM HG: CPT | Performed by: PHYSICAL MEDICINE & REHABILITATION

## 2025-04-03 PROCEDURE — 1170F FXNL STATUS ASSESSED: CPT | Performed by: PHYSICAL MEDICINE & REHABILITATION

## 2025-04-03 PROCEDURE — 99214 OFFICE O/P EST MOD 30 MIN: CPT | Performed by: PHYSICAL MEDICINE & REHABILITATION

## 2025-04-03 PROCEDURE — 3077F SYST BP >= 140 MM HG: CPT | Performed by: PHYSICAL MEDICINE & REHABILITATION

## 2025-04-03 PROCEDURE — 1125F AMNT PAIN NOTED PAIN PRSNT: CPT | Performed by: PHYSICAL MEDICINE & REHABILITATION

## 2025-04-03 RX ORDER — LEVOTHYROXINE SODIUM 100 UG/1
100 TABLET ORAL
Qty: 60 TABLET | Refills: 3 | Status: SHIPPED | OUTPATIENT
Start: 2025-04-03

## 2025-04-03 RX ORDER — ALPRAZOLAM 0.5 MG
0.5 TABLET ORAL PRN
Qty: 2 TABLET | Refills: 0 | Status: SHIPPED | OUTPATIENT
Start: 2025-04-03 | End: 2025-04-04

## 2025-04-03 RX ORDER — LEVOTHYROXINE SODIUM 88 UG/1
88 TABLET ORAL
Qty: 50 TABLET | Refills: 3 | Status: SHIPPED | OUTPATIENT
Start: 2025-04-04

## 2025-04-03 ASSESSMENT — PAIN SCALES - GENERAL: PAINLEVEL_OUTOF10: 3=SLIGHT PAIN

## 2025-04-03 ASSESSMENT — PATIENT HEALTH QUESTIONNAIRE - PHQ9: CLINICAL INTERPRETATION OF PHQ2 SCORE: 0

## 2025-04-03 ASSESSMENT — FIBROSIS 4 INDEX: FIB4 SCORE: 1.3

## 2025-04-03 NOTE — PROGRESS NOTES
Follow up patient note  Interventional spine and Pain  Physiatry (physical medicine and  Rehabilitation)       Chief complaint:   Chief Complaint   Patient presents with    Follow-Up     Back pain          HISTORY    Please see new patient note by Dr Echols,  for more details.     HPI  Patient identification: Nancy Gregory ,  1950,   With Diagnoses of Lumbar spondylosis, Lumbar radiculopathy, Degeneration of intervertebral disc of lumbar region, Chronic right-sided low back pain, Claustrophobia, Sacroiliac joint dysfunction of left side, Bilateral chronic knee pain, and Arthritis of knee were pertinent to this visit.       Verbal consent was obtained for Pasquale copilot : Yes      History of Present Illness        The patient, a 74-year-old female, presents for a follow-up visit. She reports chronic, moderate-intensity right-sided lumbar pain, persisting for several years. Conservative management has included pharmacotherapy with meloxicam, duloxetine, acetaminophen, and baclofen, as well as physical therapy and a home exercise regimen over the past 6 months. The pain is typically non-radiating and is exacerbated by lumbar extension and flexion, with additional discomfort in the sacroiliac joint region.    Chronic Right-Sided Lumbar Pain  The patient describes persistent lumbar pain, though not severe, and notes a slight reduction in pain intensity attributed to physical therapy. Post-therapy myalgia is occasionally experienced but resolves by the following day. Despite adherence to physical therapy and home exercises, she expresses frustration with the chronic nature of her condition, noting that certain movements precipitate pain exacerbation. Initially presenting with left-sided pain, the discomfort has since localized to the right side, remaining confined to the same anatomical region. She reports no tenderness upon palpation and rates her pain as 1 to 2 on a scale of 10, a level managed with  ibuprofen for several years. She recalls an episode of severe muscle spasms several years ago, described as excruciating, and mentions undergoing an MRI approximately 15 years ago.  - Onset: Several years ago, initially presenting with left-sided pain.  - Location: Right-sided lumbar region, sacroiliac joint region.  - Duration: Persistent for several years.  - Character: Moderate-intensity, non-radiating pain; occasional post-therapy myalgia.  - Alleviating/Aggravating Factors: Exacerbated by lumbar extension and flexion; managed with meloxicam, duloxetine, acetaminophen, baclofen, physical therapy, home exercise regimen, and ibuprofen.  - Timing: Chronic, with occasional post-therapy myalgia resolving by the following day.  - Severity: Pain rated as 1 to 2 on a scale of 10; severe muscle spasms episode several years ago.    MEDICATIONS  - Meloxicam  - Duloxetine  - Acetaminophen  - Baclofen  - Ibuprofen           ROS Red Flags :   Fever, Chills, Sweats: Denies  Involuntary Weight Loss: Denies  Bowel/Bladder Incontinence: Denies  Saddle Anesthesia: Denies        PMHx:   Past Medical History:   Diagnosis Date    Acute cystitis with hematuria 04/20/2020    Urine dipstick performed in clinic demonstrated trace glucose, 1+ bili, 1+ ketones, specific gravity 1.015, trace intact blood, pH 5.5, 2+ protein, 2.0 urobilinogen, positive nitrite, 3+ leukocyte esterase.  She performed a telemedicine visit yesterday and was placed on cephalosporin for presumptive urinary tract infection.  She was also given Pyridium due to discomfort with dysuria.  She thinks s    Asthma     Back muscle spasm 12/08/2021    On the evening of November 30th she reached to the side and immediately had pain in her low back. This was excruciating and caused complete immobility. She called [facility] who provided baclofen, toradol shot, and ibuprofen. 1 week later she is feeling about 60% better, she continues to take the baclofen 3-4 times daily  "and has reduced ibuprofen to 400 mg twice daily. 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  This was done on her right upper gums.  2 weeks following the procedure she had a full liquid diet/soft food diet and this was advanced on Valentine's Day dinner, February 14, 2020.  On the following Monday, February 17, 2020 she notes development of \"bloodshot \"gums causing concern and when she clay    CAD (coronary artery disease)     Chickenpox     COVID-19 virus infection 12/27/2021    She is test positive in our clinic for COVID-19.  She developed symptoms on December 24 including sore throat, headache, loose stools, and dry cough which has since transitioned into productive cough.  No fevers, chills; may have to letter no new myalgias, no chest pain, no breathing difficulty.  She feels fortunate that her symptoms have not been extremely uncomfortable.  She did receive her kenyon    Elevated coronary artery calcium score 04/21/2021    CT coronary calcium score (3/2021):  Coronary calcification:  LMA - 0.0  LCX - 138  LAD - 108  RCA - 0.0  PDA - 0.0     Total Calcium Score: 246     Percentile: Calcium score is worse than the 75th percentile for the patient's age and sex.     Current regimen: rosuvastatin 10 mg daily and aspirin 81 mg daily          GERD (gastroesophageal reflux disease)     Heartburn     Hordeolum externum of right lower eyelid 02/16/2023    Hyperlipidemia     Hypothyroidism     Insomnia     Trouble going to and staying asleep    Lymphadenopathy 09/16/2021    Mass of left submandibular region 05/26/2021    She reports development of a mass in her chin/neck area.  On exam it appears to be submandibular on the left anterolateral aspect.  It is easily palpable, mobile, and approximately 1 x 1 cm.  It is nontender.  There is some associated inflammation in the " region that is visible on inspection.  She has been working with periodontist due to gum disease and wonders if it could be related to her dentit    Mumps     WARD on CPAP 08/24/2021    Painful joint     Pedal edema 12/08/2021    Pedal edema developed in bilateral ankles around December 2021 after she threw out her back and was taking muscle relaxants and less active.  Improvement of left lower extremity with furosemide however right lower extremity continues to have residual edema, approximately 20 to 30% better from where she started.  No prior occurrence of the same.  Injury where she tripped over the door of a dishwash    Rotator cuff arthropathy of left shoulder 01/25/2022    She reports 4 to 6 months of left shoulder pain.  Various maneuvers elicit the pain.  The pain can be over the AC joint superiorly, the deltoid laterally as well as posteriorly.  It became more bothersome as she has been sleeping on her back more since she threw out her back several weeks ago.  No clear injury to the shoulder itself.  Prior history of rotator cuff repair around 2016 by physician w    Stiffness in joint 07/05/2023    She reports fairly diffuse stiffness in the joints.  She will wake up with it often in the morning and can get better but then after sitting for 20 to 30 minutes and can decompensate.  She has in her bilateral hands, bilateral knees, and also her forearms and elbows.  She has associated joint swelling which has been more troublesome.  She tries to limit meloxicam to avoid risk of GI distress or ki    Stomatitis 09/16/2021    Thyroid disease     Tonsillitis     Wears glasses        PSHx:   Past Surgical History:   Procedure Laterality Date    ABDOMINAL HYSTERECTOMY TOTAL      CHOLECYSTECTOMY      HYSTERECTOMY LAPAROSCOPY      TONSILLECTOMY         Family history   Family History   Problem Relation Age of Onset    Cancer Father         lung    Breast Cancer Maternal Aunt     Hypertension Maternal Grandmother      Hypertension Maternal Grandfather     Kidney Disease Mother     Heart Disease Neg Hx          Medications:   Outpatient Medications Marked as Taking for the 4/3/25 encounter (Office Visit) with Carlos Eduardo Echols M.D.   Medication Sig Dispense Refill    [START ON 4/4/2025] levothyroxine (SYNTHROID) 88 MCG Tab TAKE 1 TABLET BY MOUTH EVERY MONDAY, WEDNESDAY, AND FRIDAY. TO ALTERNATE WITH 100 MCG DOSE 50 Tablet 3    levothyroxine (SYNTHROID) 100 MCG Tab TAKE 1 TABLET BY MOUTH IN THE EVENING EVERY TUESDAY, THURSDAYS, SATURDAY, AND SUNDAY. TO ALTERNATE WITH 88 MCG DOSE 60 Tablet 3    ALPRAZolam (XANAX) 0.5 MG Tab Take 1 Tablet by mouth as needed for Anxiety (take one tab 1 hour prior to MRI. OK to repeat x 1. do not drive on this med) for up to 1 day. 2 Tablet 0    gabapentin (NEURONTIN) 100 MG Cap Take 1-3 Capsules by mouth 3 times a day as needed (pain). 270 Capsule 5    fluticasone furoate-vilanterol (BREO ELLIPTA) 200-25 MCG/ACT AEROSOL POWDER, BREATH ACTIVATED Inhale 1 Puff by mouth every day. 90 Each 3    meloxicam (MOBIC) 15 MG tablet Take 1 Tablet by mouth every day. 100 Tablet 3    rosuvastatin (CRESTOR) 10 MG Tab TAKE 1 TABLET BY MOUTH EVERY DAY IN THE EVENING 100 Tablet 3    albuterol 108 (90 Base) MCG/ACT Aero Soln inhalation aerosol INHALE 2 PUFFS BY MOUTH EVERY FOUR HOURS AS NEEDED FOR SHORTNESS OF BREATH. 6.7 Each 3    DULoxetine (CYMBALTA) 30 MG Cap DR Particles Take 2 Capsules by mouth every day. 200 Capsule 3    Meclizine HCl (ANTIVERT) 25 MG Chew Tab 1 q12hr prn vertigo 10 Tablet 0    PREMARIN 0.625 MG/GM Cream APPLY 1.0 GRAM TO AFFECTED AREA 3X/WEEK      dicyclomine (BENTYL) 10 MG Cap Take 1 Capsule by mouth every 6 hours as needed (abdominal cramping). 30 Capsule 1    neomycin-polymixin-dexamethasone (MAXITROL) 3.5-17898-3.1 Ointment ophthalmic ointment       clobetasol (TEMOVATE) 0.05 % Cream       baclofen (LIORESAL) 10 MG Tab Take 1 Tablet by mouth 3 times a day as needed (muscle spasm/pain). 90  Tablet 3    triamcinolone acetonide (KENALOG) 0.1 % Ointment TOPICAL APPLY SPARINGLY TO THE AFFECTED AREA 3XWK.      ipratropium-albuterol (DUONEB) 0.5-2.5 (3) MG/3ML nebulizer solution Take 3 mL by nebulization 4 times a day. (Patient taking differently: Take 3 mL by nebulization every four hours as needed.) 3 mL 3    coenzyme Q-10 30 MG capsule Take 60 mg by mouth every day.      Cyanocobalamin (B-12 PO) Take 1 Tablet by mouth every day.          Current Outpatient Medications on File Prior to Visit   Medication Sig Dispense Refill    gabapentin (NEURONTIN) 100 MG Cap Take 1-3 Capsules by mouth 3 times a day as needed (pain). 270 Capsule 5    fluticasone furoate-vilanterol (BREO ELLIPTA) 200-25 MCG/ACT AEROSOL POWDER, BREATH ACTIVATED Inhale 1 Puff by mouth every day. 90 Each 3    meloxicam (MOBIC) 15 MG tablet Take 1 Tablet by mouth every day. 100 Tablet 3    rosuvastatin (CRESTOR) 10 MG Tab TAKE 1 TABLET BY MOUTH EVERY DAY IN THE EVENING 100 Tablet 3    albuterol 108 (90 Base) MCG/ACT Aero Soln inhalation aerosol INHALE 2 PUFFS BY MOUTH EVERY FOUR HOURS AS NEEDED FOR SHORTNESS OF BREATH. 6.7 Each 3    DULoxetine (CYMBALTA) 30 MG Cap DR Particles Take 2 Capsules by mouth every day. 200 Capsule 3    Meclizine HCl (ANTIVERT) 25 MG Chew Tab 1 q12hr prn vertigo 10 Tablet 0    PREMARIN 0.625 MG/GM Cream APPLY 1.0 GRAM TO AFFECTED AREA 3X/WEEK      dicyclomine (BENTYL) 10 MG Cap Take 1 Capsule by mouth every 6 hours as needed (abdominal cramping). 30 Capsule 1    neomycin-polymixin-dexamethasone (MAXITROL) 3.5-78240-8.1 Ointment ophthalmic ointment       clobetasol (TEMOVATE) 0.05 % Cream       baclofen (LIORESAL) 10 MG Tab Take 1 Tablet by mouth 3 times a day as needed (muscle spasm/pain). 90 Tablet 3    triamcinolone acetonide (KENALOG) 0.1 % Ointment TOPICAL APPLY SPARINGLY TO THE AFFECTED AREA 3XWK.      ipratropium-albuterol (DUONEB) 0.5-2.5 (3) MG/3ML nebulizer solution Take 3 mL by nebulization 4 times a day.  (Patient taking differently: Take 3 mL by nebulization every four hours as needed.) 3 mL 3    coenzyme Q-10 30 MG capsule Take 60 mg by mouth every day.      Cyanocobalamin (B-12 PO) Take 1 Tablet by mouth every day.       No current facility-administered medications on file prior to visit.         Allergies:   Allergies   Allergen Reactions    Codeine        Social Hx:   Social History     Socioeconomic History    Marital status:      Spouse name: Not on file    Number of children: Not on file    Years of education: Not on file    Highest education level: Not on file   Occupational History    Not on file   Tobacco Use    Smoking status: Former     Current packs/day: 0.00     Average packs/day: 1 pack/day for 15.0 years (15.0 ttl pk-yrs)     Types: Cigarettes     Start date: 10/10/1978     Quit date: 1993     Years since quittin.0    Smokeless tobacco: Never    Tobacco comments:     continued abstinence   Vaping Use    Vaping status: Never Used   Substance and Sexual Activity    Alcohol use: Yes     Alcohol/week: 1.8 oz     Types: 3 Glasses of wine per week     Comment: occasionally    Drug use: No    Sexual activity: Yes     Partners: Male     Birth control/protection: Post-Menopausal   Other Topics Concern     Service No    Blood Transfusions No    Caffeine Concern No    Occupational Exposure No    Hobby Hazards No    Sleep Concern Yes    Stress Concern Yes    Weight Concern No    Special Diet No    Back Care No    Exercise Yes    Bike Helmet No    Seat Belt Yes    Self-Exams Yes   Social History Narrative    She worked at [employer] at the [employer], recently retired. She is  to Ryan for the past 20 years, they met at a [employer]. She has a son ([address]) and 2 grandchildren.      Social Drivers of Health     Financial Resource Strain: Low Risk  (2024)    Overall Financial Resource Strain (CARDIA)     Difficulty of Paying Living Expenses: Not hard at  "all   Food Insecurity: No Food Insecurity (1/16/2024)    Hunger Vital Sign     Worried About Running Out of Food in the Last Year: Never true     Ran Out of Food in the Last Year: Never true   Transportation Needs: No Transportation Needs (1/16/2024)    PRAPARE - Transportation     Lack of Transportation (Medical): No     Lack of Transportation (Non-Medical): No   Physical Activity: Not on file   Stress: Not on file   Social Connections: Not on file   Intimate Partner Violence: Not on file   Housing Stability: Low Risk  (1/16/2024)    Housing Stability Vital Sign     Unable to Pay for Housing in the Last Year: No     Number of Places Lived in the Last Year: 1     Unstable Housing in the Last Year: No         EXAMINATION     Physical Exam:   Vitals: BP (!) 145/93 (BP Location: Right arm, Patient Position: Sitting, BP Cuff Size: Large adult)   Pulse 80   Temp 36.1 °C (97 °F) (Temporal)   Ht 1.651 m (5' 5\")   Wt 89.6 kg (197 lb 9.6 oz)   SpO2 97%     Constitutional:   Body Habitus: Body mass index is 32.88 kg/m².  Cooperation: Fully cooperates with exam  Appearance: Well-groomed no disheveled    Respiratory-  breathing comfortable on room air, no audible wheezing  Cardiovascular- capillary refills less than 2 seconds. No lower extremity edema is noted.   Psychiatric- alert and oriented ×3. Normal affect.    MSK and Neuro: -    Physical Exam  There is decreased range of motion in the lumbar spine. The left hip has less range of motion compared to the right hip. Rhonda's maneuver, thigh thrust, and Gaenslen's maneuver are positive on the left side for sacroiliac joint pain, and there is tenderness when palpating the left sacroiliac joint, but not on the right side.      There is decreased range of motion of the lumbar spine with lumbar flexion and lumbar extension. Extension rotation maneuver and facet loading maneuver are positive on the right at L4-L5 and L5-S1. These are negative on the left. There is no " "tenderness to palpation throughout the lumbar spine. No tenderness to palpation of the sacroiliac joints. Gaenslen's, thigh thrust, Rhonda's maneuver, and Fader's maneuver are negative bilaterally.         MEDICAL DECISION MAKING    DATA    Labs:   Lab Results   Component Value Date/Time    SODIUM 142 07/05/2024 11:21 AM    POTASSIUM 5.1 07/05/2024 11:21 AM    CHLORIDE 106 07/05/2024 11:21 AM    CO2 27 07/05/2024 11:21 AM    GLUCOSE 108 (H) 07/05/2024 11:21 AM    BUN 19 07/05/2024 11:21 AM    CREATININE 0.63 07/05/2024 11:21 AM        No results found for: \"PROTHROMBTM\", \"INR\"     Lab Results   Component Value Date/Time    WBC 5.5 07/05/2024 11:21 AM    RBC 4.90 07/05/2024 11:21 AM    HEMOGLOBIN 14.6 07/05/2024 11:21 AM    HEMATOCRIT 46.2 07/05/2024 11:21 AM    MCV 94.3 07/05/2024 11:21 AM    MCH 29.8 07/05/2024 11:21 AM    MCHC 31.6 (L) 07/05/2024 11:21 AM    MPV 11.5 07/05/2024 11:21 AM    NEUTSPOLYS 49.10 07/05/2024 11:21 AM    LYMPHOCYTES 38.30 07/05/2024 11:21 AM    MONOCYTES 8.00 07/05/2024 11:21 AM    EOSINOPHILS 3.30 07/05/2024 11:21 AM    BASOPHILS 1.10 07/05/2024 11:21 AM        Lab Results   Component Value Date/Time    HBA1C 5.4 02/08/2024 09:05 AM          Imaging:   I personally reviewed following images          Results         I reviewed the following radiology reports                                                                                                    Results for orders placed during the hospital encounter of 01/26/21    CT-ABDOMEN-PELVIS WITH    Impression  Findings consistent with sigmoid diverticulitis without abscess or free air.    Aortic atherosclerosis without aneurysm.    Hepatic cyst.                       Results for orders placed during the hospital encounter of 12/30/22    DX-CHEST-2 VIEWS    Impression  1.  No evidence of acute cardiopulmonary process.  2.  Mild hyperinflation consistent with history of asthma.                   Results for orders placed during the hospital " encounter of 24    DX-KNEE 3 VIEWS LEFT    Impression  1.  No fracture or dislocation of LEFT knee.  2.  Minimal degenerative changes.                            DIAGNOSIS   Visit Diagnoses     ICD-10-CM   1. Lumbar spondylosis  M47.816   2. Lumbar radiculopathy  M54.16   3. Degeneration of intervertebral disc of lumbar region  M51.369   4. Chronic right-sided low back pain  M54.50    G89.29   5. Claustrophobia  F40.240   6. Sacroiliac joint dysfunction of left side  M53.3   7. Bilateral chronic knee pain  M25.561    M25.562    G89.29   8. Arthritis of knee  M17.10         ASSESSMENT and PLAN:     Prudence Chayito Jennyun  1950 female      Pru was seen today for follow-up.    Diagnoses and all orders for this visit:    Lumbar spondylosis  -     MR-LUMBAR SPINE-W/O; Future    Lumbar radiculopathy  -     MR-LUMBAR SPINE-W/O; Future    Degeneration of intervertebral disc of lumbar region  -     MR-LUMBAR SPINE-W/O; Future    Chronic right-sided low back pain  -     MR-LUMBAR SPINE-W/O; Future    Claustrophobia  -     ALPRAZolam (XANAX) 0.5 MG Tab; Take 1 Tablet by mouth as needed for Anxiety (take one tab 1 hour prior to MRI. OK to repeat x 1. do not drive on this med) for up to 1 day.    Sacroiliac joint dysfunction of left side    Bilateral chronic knee pain    Arthritis of knee        Assessment & Plan    Chronic low back pain: Not controlled. The sacroiliac joints appear to be in good condition, likely due to the beneficial effects of physical therapy. The pain seems to originate from the lumbar spine, exacerbated by twisting and movement of the facet joints, suggesting a diagnosis of arthritis. This condition is bilateral but presents more symptoms on the right side. The pain could be attributed to arthritis around the disk space or the facet joints in the back.  - Order MRI for further investigation.  - Prescribe Xanax 0.25 mg for claustrophobia during MRI; take one tablet an hour prior to the MRI and  bring an additional tablet if needed. Arrange for transportation post-MRI. Avoid concurrent use of alcohol or other drugs.  The Xanax prescription was for 2 pills of Xanax as above.  This is to be taken approximately 1 hour before the MRI and the patient will have 1 additional pill to bring to the MRI.  This was for claustrophobia to assist the patient in completing the MRI.  We discussed the risk of the medication.  The patient is not use any other drugs including alcohol when using this.  She is not to drive on this medication or make important decisions.  She is to have a  take her to and from this appointment and monitor the patient.  We discussed the addictive potential which is limited by giving only 2 pills.  There will be no refills.  I reviewed the [place] .  - Advise daily use of meloxicam during upcoming trip for pain management.  - Carry gabapentin and baclofen for potential flare-ups.      Follow-up  - Follow up in 2 months or sooner after MRI completion.        Follow up: After the above procedure    Thank you for allowing me to participate in the care of this patient. If you have any questions please not hesitate to contact me.             Please note that this dictation was created using voice recognition software. I have made every reasonable attempt to correct obvious errors but there may be errors of grammar and content that I may have overlooked prior to finalization of this note.      Carlos Eduardo Echols MD  Interventional Spine and Sports Physiatry  Physical Medicine and Rehabilitation  [facility]

## 2025-04-03 NOTE — TELEPHONE ENCOUNTER
Received request via: Pharmacy    Was the patient seen in the last year in this department? Yes    Does the patient have an active prescription (recently filled or refills available) for medication(s) requested?       Pharmacy Name: Pharmacy:   [id number] - [place], [place] - [address].      Does the patient have halfway Plus and need 100-day supply? (This applies to ALL medications) Yes, quantity updated to 100 days

## 2025-04-14 ENCOUNTER — RESULTS FOLLOW-UP (OUTPATIENT)
Dept: MEDICAL GROUP | Facility: PHYSICIAN GROUP | Age: 75
End: 2025-04-14

## 2025-04-14 ENCOUNTER — HOSPITAL ENCOUNTER (OUTPATIENT)
Dept: RADIOLOGY | Facility: MEDICAL CENTER | Age: 75
End: 2025-04-14
Attending: INTERNAL MEDICINE
Payer: MEDICARE

## 2025-04-14 DIAGNOSIS — M85.852 OSTEOPENIA OF NECK OF LEFT FEMUR: ICD-10-CM

## 2025-04-14 PROCEDURE — 77080 DXA BONE DENSITY AXIAL: CPT

## 2025-04-18 ENCOUNTER — APPOINTMENT (OUTPATIENT)
Dept: PHYSICAL THERAPY | Facility: REHABILITATION | Age: 75
End: 2025-04-18
Attending: PHYSICAL MEDICINE & REHABILITATION
Payer: MEDICARE

## 2025-04-25 ENCOUNTER — PHYSICAL THERAPY (OUTPATIENT)
Dept: PHYSICAL THERAPY | Facility: REHABILITATION | Age: 75
End: 2025-04-25
Attending: PHYSICAL MEDICINE & REHABILITATION
Payer: MEDICARE

## 2025-04-25 DIAGNOSIS — E66.9 OBESITY (BMI 30-39.9): ICD-10-CM

## 2025-04-25 DIAGNOSIS — M17.10 ARTHRITIS OF KNEE: ICD-10-CM

## 2025-04-25 DIAGNOSIS — M25.562 BILATERAL CHRONIC KNEE PAIN: ICD-10-CM

## 2025-04-25 DIAGNOSIS — M47.816 LUMBAR SPONDYLOSIS: ICD-10-CM

## 2025-04-25 DIAGNOSIS — Z71.82 EXERCISE COUNSELING: ICD-10-CM

## 2025-04-25 DIAGNOSIS — M51.26 LUMBAR DISC HERNIATION: ICD-10-CM

## 2025-04-25 DIAGNOSIS — G89.29 BILATERAL CHRONIC KNEE PAIN: ICD-10-CM

## 2025-04-25 DIAGNOSIS — M71.21 SYNOVIAL CYST OF POPLITEAL SPACE (BAKER), RIGHT KNEE: ICD-10-CM

## 2025-04-25 DIAGNOSIS — M54.16 LUMBAR RADICULOPATHY: ICD-10-CM

## 2025-04-25 DIAGNOSIS — M25.561 BILATERAL CHRONIC KNEE PAIN: ICD-10-CM

## 2025-04-25 PROCEDURE — 97110 THERAPEUTIC EXERCISES: CPT

## 2025-04-25 NOTE — OP THERAPY EVALUATION
"  Outpatient Physical Therapy  INITIAL EVALUATION    [facility]  [address]  [facility] [address]  Phone:  [phone]  Fax:  [phone]    Date of Evaluation: 04/25/2025    Patient: Nina Gregory  YOB: 1950  MRN: [id number]     Referring Provider: Carlos Eduardo Echols M.D.  [address] [facility]  [address]  [place] [address]   Referring Diagnosis Pain in right knee [M25.561];Pain in left knee [M25.562];Other chronic pain [G89.29];Unilateral primary osteoarthritis, unspecified knee [M17.10];Synovial cyst of popliteal space (Bergeron), right knee [M71.21];Other intervertebral disc displacement, lumbar region [M51.26];Spondylosis without myelopathy or radiculopathy, lumbar region [M47.816];Radiculopathy, lumbar region [M54.16];Obesity, unspecified [E66.9];Exercise counseling [Z71.82]     Time Calculation                 Chief Complaint: No chief complaint on file.    Visit Diagnoses     ICD-10-CM   1. Bilateral chronic knee pain  M25.561    M25.562    G89.29   2. Arthritis of knee  M17.10   3. Synovial cyst of popliteal space (Bergeron), right knee  M71.21   4. Lumbar disc herniation  M51.26   5. Lumbar spondylosis  M47.816   6. Lumbar radiculopathy  M54.16   7. Obesity (BMI 30-39.9)  E66.9   8. Exercise counseling  Z71.82       Date of onset of impairment: Multiple active episodes found    Subjective:   History of Present Illness:     Mechanism of injury:  Patient is a 74 year old female who has been referred to physical therapy for back pain. Patient has previously been seen at this location between 1/17/25-4/2/25 to address R knee and back pain with good response to use of a SI belt. During last session noted that L sided pain had resolved but continued to have mild R symptoms and was fearful of movement/aggravating symptoms.     Per recent office visit with Dr. Echols on 4/3/25, \"Chronic Right-Sided Lumbar Pain: The patient describes persistent lumbar pain, " "though not severe, and notes a slight reduction in pain intensity attributed to physical therapy. Post-therapy myalgia is occasionally experienced but resolves by the following day. Despite adherence to physical therapy and home exercises, she expresses frustration with the chronic nature of her condition, noting that certain movements precipitate pain exacerbation. Initially presenting with left-sided pain, the discomfort has since localized to the right side, remaining confined to the same anatomical region. She reports no tenderness upon palpation and rates her pain as 1 to 2 on a scale of 10, a level managed with ibuprofen for several years. She recalls an episode of severe muscle spasms several years ago, described as excruciating, and mentions undergoing an MRI approximately 15 years ago. Assessment: The sacroiliac joints appear to be in good condition, likely due to the beneficial effects of physical therapy. The pain seems to originate from the lumbar spine, exacerbated by twisting and movement of the facet joints, suggesting a diagnosis of arthritis. This condition is bilateral but presents more symptoms on the right side. The pain could be attributed to arthritis around the disk space or the facet joints in the back.\"    Patient reports continued R lumbar and SI pain with no specific ADAN***. Symptoms are constant; denies radicular symptoms. Exacerbated by lumbar extension and flexion. Relieved with meloxicam, duloxetine, acetaminophen, baclofen, physical therapy, home exercise regimen, and ibuprofen.          Activities of Daily Living:     Patient reported ADL status: Current back HEP:  ADLs:  Hobbies:  Exercise routine:       Past Medical History:   Diagnosis Date    Acute cystitis with hematuria 04/20/2020    Urine dipstick performed in clinic demonstrated trace glucose, 1+ bili, 1+ ketones, specific gravity 1.015, trace intact blood, pH 5.5, 2+ protein, 2.0 urobilinogen, positive nitrite, 3+ leukocyte " "esterase.  She performed a telemedicine visit yesterday and was placed on cephalosporin for presumptive urinary tract infection.  She was also given Pyridium due to discomfort with dysuria.  She thinks s    Asthma     Back muscle spasm 12/08/2021    On the evening of November 30th she reached to the side and immediately had pain in her low back. This was excruciating and caused complete immobility. She called [facility] who provided baclofen, toradol shot, and ibuprofen. 1 week later she is feeling about 60% better, she continues to take the baclofen 3-4 times daily and has reduced ibuprofen to 400 mg twice daily. 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  This was done on her right upper gums.  2 weeks following the procedure she had a full liquid diet/soft food diet and this was advanced on Valentine's Day dinner, February 14, 2020.  On the following Monday, February 17, 2020 she notes development of \"bloodshot \"gums causing concern and when she clay    CAD (coronary artery disease)     Chickenpox     COVID-19 virus infection 12/27/2021    She is test positive in our clinic for COVID-19.  She developed symptoms on December 24 including sore throat, headache, loose stools, and dry cough which has since transitioned into productive cough.  No fevers, chills; may have to letter no new myalgias, no chest pain, no breathing difficulty.  She feels fortunate that her symptoms have not been extremely uncomfortable.  She did receive her kenyon    Elevated coronary artery calcium score 04/21/2021    CT coronary calcium score (3/2021):  Coronary calcification:  LMA - 0.0  LCX - 138  LAD - 108  RCA - 0.0  PDA - 0.0     Total Calcium Score: 246     Percentile: Calcium score is worse than the 75th percentile for the patient's age and sex.     Current regimen: rosuvastatin 10 " mg daily and aspirin 81 mg daily          GERD (gastroesophageal reflux disease)     Heartburn     Hordeolum externum of right lower eyelid 02/16/2023    Hyperlipidemia     Hypothyroidism     Insomnia     Trouble going to and staying asleep    Lymphadenopathy 09/16/2021    Mass of left submandibular region 05/26/2021    She reports development of a mass in her chin/neck area.  On exam it appears to be submandibular on the left anterolateral aspect.  It is easily palpable, mobile, and approximately 1 x 1 cm.  It is nontender.  There is some associated inflammation in the region that is visible on inspection.  She has been working with periodontist due to gum disease and wonders if it could be related to her dentit    Mumps     WARD on CPAP 08/24/2021    Painful joint     Pedal edema 12/08/2021    Pedal edema developed in bilateral ankles around December 2021 after she threw out her back and was taking muscle relaxants and less active.  Improvement of left lower extremity with furosemide however right lower extremity continues to have residual edema, approximately 20 to 30% better from where she started.  No prior occurrence of the same.  Injury where she tripped over the door of a dishwash    Rotator cuff arthropathy of left shoulder 01/25/2022    She reports 4 to 6 months of left shoulder pain.  Various maneuvers elicit the pain.  The pain can be over the AC joint superiorly, the deltoid laterally as well as posteriorly.  It became more bothersome as she has been sleeping on her back more since she threw out her back several weeks ago.  No clear injury to the shoulder itself.  Prior history of rotator cuff repair around 2016 by physician w    Stiffness in joint 07/05/2023    She reports fairly diffuse stiffness in the joints.  She will wake up with it often in the morning and can get better but then after sitting for 20 to 30 minutes and can decompensate.  She has in her bilateral hands, bilateral knees, and also her  forearms and elbows.  She has associated joint swelling which has been more troublesome.  She tries to limit meloxicam to avoid risk of GI distress or ki    Stomatitis 2021    Thyroid disease     Tonsillitis     Wears glasses      Past Surgical History:   Procedure Laterality Date    ABDOMINAL HYSTERECTOMY TOTAL      CHOLECYSTECTOMY      HYSTERECTOMY LAPAROSCOPY      TONSILLECTOMY       Social History     Tobacco Use    Smoking status: Former     Current packs/day: 0.00     Average packs/day: 1 pack/day for 15.0 years (15.0 ttl pk-yrs)     Types: Cigarettes     Start date: 10/10/1978     Quit date: 1993     Years since quittin.0    Smokeless tobacco: Never    Tobacco comments:     continued abstinence   Substance Use Topics    Alcohol use: Yes     Alcohol/week: 1.8 oz     Types: 3 Glasses of wine per week     Comment: occasionally     Family and Occupational History     Socioeconomic History    Marital status:      Spouse name: Not on file    Number of children: Not on file    Years of education: Not on file    Highest education level: Not on file   Occupational History    Not on file       Objective      Therapeutic Exercises (CPT 78384):     1. Education on exam findings and PT POC      Time-based treatments/modalities:           Assessment, Response and Plan:   Impairments: lacks appropriate home exercise program    Assessment details:  Patient is a pleasant 74 year old female who was referred for right sided LBP without radiculopathy. She presents with the following impairments: ***. Based on these evaluation findings, patient would benefit from skilled physical therapy to address these impairments to allow for improved ***.       Extension bias, lumbar derangement   Goals:   Short Term Goals:   1. Instruct in HEP   Short term goal time span:  2-4 weeks      Long Term Goals:    1. Independent with HEP  2. LBDI to improve by 10 points to indicate functional improvement  3.   Long term goal  time span:  6-8 weeks    Plan:   Therapy options:  Physical therapy treatment to continue  Planned therapy interventions:  E Stim Unattended (CPT 83178), Manual Therapy (CPT 33115), Mechanical Traction (CPT 10046), Neuromuscular Re-education (CPT 71833), Therapeutic Activities (CPT 75372) and Therapeutic Exercise (CPT 35338)  Frequency: 1-2x/week.  Duration in weeks:  8  Discussed with:  Patient      Functional Assessment Used        Referring provider co-signature:  I have reviewed this plan of care and my co-signature certifies the need for services.    Certification Period: 04/25/2025 to  {Future Date:37782}    Physician Signature: ________________________________ Date: ______________

## 2025-04-25 NOTE — OP THERAPY DAILY TREATMENT
"  Outpatient Physical Therapy  DAILY TREATMENT     [facility]  [address], [address]  [facility] [address]  Phone:  [phone]  Fax:  [phone]    Date: 04/25/2025    Patient: Nina Gregory  YOB: 1950  MRN: [id number]     Time Calculation    Start time: 0945  Stop time: 1030 Time Calculation (min): 45 minutes         Chief Complaint: Back Problem    Visit #: 11    SUBJECTIVE:  Patient reports that she met with Dr. Echols on 4/3. Per his note: \"The sacroiliac joints appear to be in good condition, likely due to the beneficial effects of physical therapy. The pain seems to originate from the lumbar spine, exacerbated by twisting and movement of the facet joints, suggesting a diagnosis of arthritis. This condition is bilateral but presents more symptoms on the right side. The pain could be attributed to arthritis around the disk space or the facet joints in the back.  - Order MRI for further investigation.\" MRI scheduled for Monday.     Recently went on vacation (beginning of April) and was walking about 4 miles a day and then drove to [place] to  a new puppy. This change in activity, has resulted R glute pain which has impacted her ability to use her NuStep at home. She has started using 2 ibuprofen and 1 tylenol and pain has greatly improved.     Overall feels that knee and L back have resolved but R back pain continues. Does report that she has a poor understanding/control of TrA exercises; would like to review.     OBJECTIVE:  Current objective measures:   Oswestry Low Back Pain Disability Total Score: 46       Therapeutic Exercises (CPT 75415):     1. Nu step, L3 x 5 minutes    2. Education on spine anatomy, Reviewed Dr. Echols's assessment per patient request and educated patient on spine anatomy including facet joints.    3. Education on TrA, Spent time discussing anatomy of TrA, rationale for engagement, and how to activate due to continued " "confusion about how to engage.    4. TrA+ breathe, 2 x 10 second hold with counting out loud, Good control and activation of TrA    5. TrA + glute set, 2 x 5, 3\" hold, Difficulty with maintaining TrA while glutes fire/relax. Will continue to practice at home.      Therapeutic Exercise Summary: Access Code: [email or web address]  URL: [email or web address]  Date: 04/02/2025  Prepared by: Kwame Elmore    Program Notes  Isometric hand hold hip ups,to the outside, to the [id number] each have  help    Exercises  - Clamshell  - 1 x daily - 7 x weekly - 2 sets - 10 reps  - Seated Hip Abduction with Resistance  - 1 x daily - 7 x weekly - 2 sets - 10 reps  - Modified Clement Stretch  - 3 x daily - 7 x weekly - 1 sets - 3 reps - 15sec hold  - Supine Hamstring Stretch  - 3 x daily - 7 x weekly - 1 sets - 3 reps - 15sec hold  - Sit to Stand  - 1 x daily - 7 x weekly - 2 sets - 10 reps  - Small Range Straight Leg Raise  - 1 x daily - 7 x weekly - 2 sets - 10 reps  - Seated Multifidi Isometric  - 1 x daily - 7 x weekly - 2 sets - 10 reps  - Cat Cow  - 1 x daily - 7 x weekly - 2 sets - 10 reps  - Seated Pelvic Tilts  - 1 x daily - 7 x weekly - 2 sets - 10 reps  - Dead Bug  - 1 x daily - 7 x weekly - 2 sets - 10 reps    Time-based treatments/modalities:    Physical Therapy Timed Treatment Charges  Therapeutic exercise minutes (CPT 17079): 45 minutes      ASSESSMENT:   Response to treatment: Majority of session spent reviewing TrA activation. Patient is able to isolate contraction but has difficulty with co-activation. Will continue to progress trunk stabilization as tolerated.     PLAN/RECOMMENDATIONS:   Plan for treatment: therapy treatment to continue next visit.  Planned interventions for next visit: continue with current treatment.         "

## 2025-04-28 ENCOUNTER — HOSPITAL ENCOUNTER (OUTPATIENT)
Dept: RADIOLOGY | Facility: MEDICAL CENTER | Age: 75
End: 2025-04-28
Attending: PHYSICAL MEDICINE & REHABILITATION
Payer: MEDICARE

## 2025-04-28 ENCOUNTER — RESULTS FOLLOW-UP (OUTPATIENT)
Dept: PHYSICAL MEDICINE AND REHAB | Facility: MEDICAL CENTER | Age: 75
End: 2025-04-28

## 2025-04-28 DIAGNOSIS — G89.29 CHRONIC RIGHT-SIDED LOW BACK PAIN, UNSPECIFIED WHETHER SCIATICA PRESENT: ICD-10-CM

## 2025-04-28 DIAGNOSIS — M51.369 DEGENERATION OF INTERVERTEBRAL DISC OF LUMBAR REGION, UNSPECIFIED WHETHER PAIN PRESENT: ICD-10-CM

## 2025-04-28 DIAGNOSIS — M54.50 CHRONIC RIGHT-SIDED LOW BACK PAIN, UNSPECIFIED WHETHER SCIATICA PRESENT: ICD-10-CM

## 2025-04-28 DIAGNOSIS — M47.816 LUMBAR SPONDYLOSIS: ICD-10-CM

## 2025-04-28 DIAGNOSIS — M54.16 LUMBAR RADICULOPATHY: ICD-10-CM

## 2025-04-28 PROCEDURE — 72148 MRI LUMBAR SPINE W/O DYE: CPT

## 2025-04-28 NOTE — RESULT ENCOUNTER NOTE
The MRI lumbar spine was completed.  Please offer follow-up with me within the next 1-2 weeks    Dr Echols

## 2025-04-30 ENCOUNTER — APPOINTMENT (OUTPATIENT)
Dept: PHYSICAL THERAPY | Facility: REHABILITATION | Age: 75
End: 2025-04-30
Attending: PHYSICAL MEDICINE & REHABILITATION
Payer: MEDICARE

## 2025-05-01 ENCOUNTER — APPOINTMENT (OUTPATIENT)
Dept: PHYSICAL MEDICINE AND REHAB | Facility: MEDICAL CENTER | Age: 75
End: 2025-05-01
Payer: MEDICARE

## 2025-05-01 ENCOUNTER — PHYSICAL THERAPY (OUTPATIENT)
Dept: PHYSICAL THERAPY | Facility: REHABILITATION | Age: 75
End: 2025-05-01
Attending: PHYSICAL MEDICINE & REHABILITATION
Payer: MEDICARE

## 2025-05-01 VITALS
BODY MASS INDEX: 32.82 KG/M2 | DIASTOLIC BLOOD PRESSURE: 84 MMHG | HEIGHT: 65 IN | OXYGEN SATURATION: 93 % | SYSTOLIC BLOOD PRESSURE: 145 MMHG | WEIGHT: 197 LBS | TEMPERATURE: 97.2 F | HEART RATE: 90 BPM

## 2025-05-01 DIAGNOSIS — M54.50 CHRONIC RIGHT-SIDED LOW BACK PAIN, UNSPECIFIED WHETHER SCIATICA PRESENT: ICD-10-CM

## 2025-05-01 DIAGNOSIS — M47.816 LUMBAR SPONDYLOSIS: ICD-10-CM

## 2025-05-01 DIAGNOSIS — M17.10 ARTHRITIS OF KNEE: ICD-10-CM

## 2025-05-01 DIAGNOSIS — M54.51 VERTEBROGENIC LOW BACK PAIN: ICD-10-CM

## 2025-05-01 DIAGNOSIS — G89.29 CHRONIC RIGHT-SIDED LOW BACK PAIN, UNSPECIFIED WHETHER SCIATICA PRESENT: ICD-10-CM

## 2025-05-01 DIAGNOSIS — M51.369 DEGENERATION OF INTERVERTEBRAL DISC OF LUMBAR REGION, UNSPECIFIED WHETHER PAIN PRESENT: ICD-10-CM

## 2025-05-01 DIAGNOSIS — G89.29 BILATERAL CHRONIC KNEE PAIN: ICD-10-CM

## 2025-05-01 DIAGNOSIS — M25.561 BILATERAL CHRONIC KNEE PAIN: ICD-10-CM

## 2025-05-01 DIAGNOSIS — M51.26 LUMBAR DISC HERNIATION: ICD-10-CM

## 2025-05-01 DIAGNOSIS — M54.16 LUMBAR RADICULOPATHY: ICD-10-CM

## 2025-05-01 DIAGNOSIS — M53.3 SACROILIAC JOINT DYSFUNCTION OF LEFT SIDE: ICD-10-CM

## 2025-05-01 DIAGNOSIS — M25.562 BILATERAL CHRONIC KNEE PAIN: ICD-10-CM

## 2025-05-01 DIAGNOSIS — M71.21 SYNOVIAL CYST OF POPLITEAL SPACE (BAKER), RIGHT KNEE: ICD-10-CM

## 2025-05-01 PROCEDURE — 97110 THERAPEUTIC EXERCISES: CPT

## 2025-05-01 PROCEDURE — 3079F DIAST BP 80-89 MM HG: CPT | Performed by: PHYSICAL MEDICINE & REHABILITATION

## 2025-05-01 PROCEDURE — 99214 OFFICE O/P EST MOD 30 MIN: CPT | Performed by: PHYSICAL MEDICINE & REHABILITATION

## 2025-05-01 PROCEDURE — 3077F SYST BP >= 140 MM HG: CPT | Performed by: PHYSICAL MEDICINE & REHABILITATION

## 2025-05-01 PROCEDURE — 1125F AMNT PAIN NOTED PAIN PRSNT: CPT | Performed by: PHYSICAL MEDICINE & REHABILITATION

## 2025-05-01 ASSESSMENT — PAIN SCALES - GENERAL: PAINLEVEL_OUTOF10: 2=MINIMAL-SLIGHT

## 2025-05-01 ASSESSMENT — FIBROSIS 4 INDEX: FIB4 SCORE: 1.3

## 2025-05-01 ASSESSMENT — PATIENT HEALTH QUESTIONNAIRE - PHQ9: CLINICAL INTERPRETATION OF PHQ2 SCORE: 0

## 2025-05-01 NOTE — PROGRESS NOTES
Follow up patient note  Interventional spine and Pain  Physiatry (physical medicine and  Rehabilitation)       Chief complaint:   Chief Complaint   Patient presents with    Follow-Up     Back pain          HISTORY    Please see new patient note by Dr Echols,  for more details.     HPI  Patient identification: Nancy Gregory ,  1950,   With Diagnoses of Lumbar spondylosis, Lumbar radiculopathy, Degeneration of intervertebral disc of lumbar region, Chronic right-sided low back pain, Sacroiliac joint dysfunction of left side, Lumbar disc herniation, and Vertebrogenic low back pain were pertinent to this visit.       Verbal consent was obtained for Pasquale copilot : Yes      History of Present Illness  The patient is a 74-year-old female with chronic right-sided lumbar pain radiating to the right gluteal region, described as aching, burning, and sharp in nature.    Lumbar and Gluteal Pain  The pain severity today is rated at 2/10, with an average intensity of 2-3/10. She reports severe gluteal pain following extensive ambulation, which is alleviated with rest, ibuprofen, and acetaminophen. The patient engages in daily walking, covering approximately 4 miles, and experiences persistent pain that generally does not necessitate medication. Symptomatic relief is achieved with the administration of two ibuprofen and one acetaminophen at 1000 hours, with the effect lasting until the following day. She expresses concern regarding sudden, excruciating spasm-like pain precipitated by specific movements, particularly backward bending, which is painful but occasionally provides relief. The pain location is variable, sometimes extending across the waistline or other regions during backward bending. The patient continues physical therapy with [facility], performs home exercises, and utilizes a recumbent elliptical machine. She has not resumed gym workouts and seeks guidance on this matter. Additionally, she inquires about the  advisability of continuing her walking regimen.  - Onset: Chronic pain.  - Location: Right-sided lumbar region radiating to the right gluteal region; variable location extending across the waistline or other regions during backward bending.  - Character: Aching, burning, and sharp in nature; sudden, excruciating spasm-like pain with specific movements.  - Alleviating/Aggravating Factors: Alleviated with rest, ibuprofen, and acetaminophen; exacerbated by extensive ambulation and specific movements, particularly backward bending.  - Radiation: Radiates to the right gluteal region.  - Timing: Persistent pain; symptomatic relief with medication lasting until the following day.  - Severity: Rated at 2/10 today, with an average intensity of 2-3/10; severe gluteal pain following extensive ambulation.    MEDICATIONS  - Current medications:    - Ibuprofen    - Tylenol    - Gabapentin    - Baclofen    - Meloxicam           ROS Red Flags :   Fever, Chills, Sweats: Denies  Involuntary Weight Loss: Denies  Bowel/Bladder Incontinence: Denies  Saddle Anesthesia: Denies        PMHx:   Past Medical History:   Diagnosis Date    Acute cystitis with hematuria 04/20/2020    Urine dipstick performed in clinic demonstrated trace glucose, 1+ bili, 1+ ketones, specific gravity 1.015, trace intact blood, pH 5.5, 2+ protein, 2.0 urobilinogen, positive nitrite, 3+ leukocyte esterase.  She performed a telemedicine visit yesterday and was placed on cephalosporin for presumptive urinary tract infection.  She was also given Pyridium due to discomfort with dysuria.  She thinks s    Asthma     Back muscle spasm 12/08/2021    On the evening of November 30th she reached to the side and immediately had pain in her low back. This was excruciating and caused complete immobility. [facility], toradol shot, and ibuprofen. 1 week later she is feeling about 60% better, she continues to take the baclofen 3-4 times daily  "and has reduced ibuprofen to 400 mg twice daily. 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  This was done on her right upper gums.  2 weeks following the procedure she had a full liquid diet/soft food diet and this was advanced on Valentine's Day dinner, February 14, 2020.  On the following Monday, February 17, 2020 she notes development of \"bloodshot \"gums causing concern and when she clay    CAD (coronary artery disease)     Chickenpox     COVID-19 virus infection 12/27/2021    She is test positive in our clinic for COVID-19.  She developed symptoms on December 24 including sore throat, headache, loose stools, and dry cough which has since transitioned into productive cough.  No fevers, chills; may have to letter no new myalgias, no chest pain, no breathing difficulty.  She feels fortunate that her symptoms have not been extremely uncomfortable.  She did receive her kenyon    Elevated coronary artery calcium score 04/21/2021    CT coronary calcium score (3/2021):  Coronary calcification:  LMA - 0.0  LCX - 138  LAD - 108  RCA - 0.0  PDA - 0.0     Total Calcium Score: 246     Percentile: Calcium score is worse than the 75th percentile for the patient's age and sex.     Current regimen: rosuvastatin 10 mg daily and aspirin 81 mg daily          GERD (gastroesophageal reflux disease)     Heartburn     Hordeolum externum of right lower eyelid 02/16/2023    Hyperlipidemia     Hypothyroidism     Insomnia     Trouble going to and staying asleep    Lymphadenopathy 09/16/2021    Mass of left submandibular region 05/26/2021    She reports development of a mass in her chin/neck area.  On exam it appears to be submandibular on the left anterolateral aspect.  It is easily palpable, mobile, and approximately 1 x 1 cm.  It is nontender.  There is some associated inflammation in the " region that is visible on inspection.  She has been working with periodontist due to gum disease and wonders if it could be related to her dentit    Mumps     WARD on CPAP 08/24/2021    Painful joint     Pedal edema 12/08/2021    Pedal edema developed in bilateral ankles around December 2021 after she threw out her back and was taking muscle relaxants and less active.  Improvement of left lower extremity with furosemide however right lower extremity continues to have residual edema, approximately 20 to 30% better from where she started.  No prior occurrence of the same.  Injury where she tripped over the door of a dishwash    Rotator cuff arthropathy of left shoulder 01/25/2022    She reports 4 to 6 months of left shoulder pain.  Various maneuvers elicit the pain.  The pain can be over the AC joint superiorly, the deltoid laterally as well as posteriorly.  It became more bothersome as she has been sleeping on her back more since she threw out her back several weeks ago.  No clear injury to the shoulder itself.  Prior history of rotator cuff repair around 2016 by physician w    Stiffness in joint 07/05/2023    She reports fairly diffuse stiffness in the joints.  She will wake up with it often in the morning and can get better but then after sitting for 20 to 30 minutes and can decompensate.  She has in her bilateral hands, bilateral knees, and also her forearms and elbows.  She has associated joint swelling which has been more troublesome.  She tries to limit meloxicam to avoid risk of GI distress or ki    Stomatitis 09/16/2021    Thyroid disease     Tonsillitis     Wears glasses        PSHx:   Past Surgical History:   Procedure Laterality Date    ABDOMINAL HYSTERECTOMY TOTAL      CHOLECYSTECTOMY      HYSTERECTOMY LAPAROSCOPY      TONSILLECTOMY         Family history   Family History   Problem Relation Age of Onset    Cancer Father         lung    Breast Cancer Maternal Aunt     Hypertension Maternal Grandmother      Hypertension Maternal Grandfather     Kidney Disease Mother     Heart Disease Neg Hx          Medications:   Outpatient Medications Marked as Taking for the 5/1/25 encounter (Office Visit) with Carlos Eduardo Echols M.D.   Medication Sig Dispense Refill    levothyroxine (SYNTHROID) 88 MCG Tab TAKE 1 TABLET BY MOUTH EVERY MONDAY, WEDNESDAY, AND FRIDAY. TO ALTERNATE WITH 100 MCG DOSE 50 Tablet 3    levothyroxine (SYNTHROID) 100 MCG Tab TAKE 1 TABLET BY MOUTH IN THE EVENING EVERY TUESDAY, THURSDAYS, SATURDAY, AND SUNDAY. TO ALTERNATE WITH 88 MCG DOSE 60 Tablet 3    gabapentin (NEURONTIN) 100 MG Cap Take 1-3 Capsules by mouth 3 times a day as needed (pain). 270 Capsule 5    fluticasone furoate-vilanterol (BREO ELLIPTA) 200-25 MCG/ACT AEROSOL POWDER, BREATH ACTIVATED Inhale 1 Puff by mouth every day. 90 Each 3    meloxicam (MOBIC) 15 MG tablet Take 1 Tablet by mouth every day. 100 Tablet 3    rosuvastatin (CRESTOR) 10 MG Tab TAKE 1 TABLET BY MOUTH EVERY DAY IN THE EVENING 100 Tablet 3    albuterol 108 (90 Base) MCG/ACT Aero Soln inhalation aerosol INHALE 2 PUFFS BY MOUTH EVERY FOUR HOURS AS NEEDED FOR SHORTNESS OF BREATH. 6.7 Each 3    DULoxetine (CYMBALTA) 30 MG Cap DR Particles Take 2 Capsules by mouth every day. 200 Capsule 3    Meclizine HCl (ANTIVERT) 25 MG Chew Tab 1 q12hr prn vertigo 10 Tablet 0    PREMARIN 0.625 MG/GM Cream APPLY 1.0 GRAM TO AFFECTED AREA 3X/WEEK      dicyclomine (BENTYL) 10 MG Cap Take 1 Capsule by mouth every 6 hours as needed (abdominal cramping). 30 Capsule 1    neomycin-polymixin-dexamethasone (MAXITROL) 3.5-73299-9.1 Ointment ophthalmic ointment       clobetasol (TEMOVATE) 0.05 % Cream       baclofen (LIORESAL) 10 MG Tab Take 1 Tablet by mouth 3 times a day as needed (muscle spasm/pain). 90 Tablet 3    triamcinolone acetonide (KENALOG) 0.1 % Ointment TOPICAL APPLY SPARINGLY TO THE AFFECTED AREA 3XWK.      ipratropium-albuterol (DUONEB) 0.5-2.5 (3) MG/3ML nebulizer solution Take 3 mL by  nebulization 4 times a day. (Patient taking differently: Take 3 mL by nebulization every four hours as needed.) 3 mL 3    coenzyme Q-10 30 MG capsule Take 60 mg by mouth every day.      Cyanocobalamin (B-12 PO) Take 1 Tablet by mouth every day.          Current Outpatient Medications on File Prior to Visit   Medication Sig Dispense Refill    levothyroxine (SYNTHROID) 88 MCG Tab TAKE 1 TABLET BY MOUTH EVERY MONDAY, WEDNESDAY, AND FRIDAY. TO ALTERNATE WITH 100 MCG DOSE 50 Tablet 3    levothyroxine (SYNTHROID) 100 MCG Tab TAKE 1 TABLET BY MOUTH IN THE EVENING EVERY TUESDAY, THURSDAYS, SATURDAY, AND SUNDAY. TO ALTERNATE WITH 88 MCG DOSE 60 Tablet 3    gabapentin (NEURONTIN) 100 MG Cap Take 1-3 Capsules by mouth 3 times a day as needed (pain). 270 Capsule 5    fluticasone furoate-vilanterol (BREO ELLIPTA) 200-25 MCG/ACT AEROSOL POWDER, BREATH ACTIVATED Inhale 1 Puff by mouth every day. 90 Each 3    meloxicam (MOBIC) 15 MG tablet Take 1 Tablet by mouth every day. 100 Tablet 3    rosuvastatin (CRESTOR) 10 MG Tab TAKE 1 TABLET BY MOUTH EVERY DAY IN THE EVENING 100 Tablet 3    albuterol 108 (90 Base) MCG/ACT Aero Soln inhalation aerosol INHALE 2 PUFFS BY MOUTH EVERY FOUR HOURS AS NEEDED FOR SHORTNESS OF BREATH. 6.7 Each 3    DULoxetine (CYMBALTA) 30 MG Cap DR Particles Take 2 Capsules by mouth every day. 200 Capsule 3    Meclizine HCl (ANTIVERT) 25 MG Chew Tab 1 q12hr prn vertigo 10 Tablet 0    PREMARIN 0.625 MG/GM Cream APPLY 1.0 GRAM TO AFFECTED AREA 3X/WEEK      dicyclomine (BENTYL) 10 MG Cap Take 1 Capsule by mouth every 6 hours as needed (abdominal cramping). 30 Capsule 1    neomycin-polymixin-dexamethasone (MAXITROL) 3.5-11204-9.1 Ointment ophthalmic ointment       clobetasol (TEMOVATE) 0.05 % Cream       baclofen (LIORESAL) 10 MG Tab Take 1 Tablet by mouth 3 times a day as needed (muscle spasm/pain). 90 Tablet 3    triamcinolone acetonide (KENALOG) 0.1 % Ointment TOPICAL APPLY SPARINGLY TO THE AFFECTED AREA 3XWK.       ipratropium-albuterol (DUONEB) 0.5-2.5 (3) MG/3ML nebulizer solution Take 3 mL by nebulization 4 times a day. (Patient taking differently: Take 3 mL by nebulization every four hours as needed.) 3 mL 3    coenzyme Q-10 30 MG capsule Take 60 mg by mouth every day.      Cyanocobalamin (B-12 PO) Take 1 Tablet by mouth every day.       No current facility-administered medications on file prior to visit.         Allergies:   Allergies   Allergen Reactions    Codeine        Social Hx:   Social History     Socioeconomic History    Marital status:      Spouse name: Not on file    Number of children: Not on file    Years of education: Not on file    Highest education level: Not on file   Occupational History    Not on file   Tobacco Use    Smoking status: Former     Current packs/day: 0.00     Average packs/day: 1 pack/day for 15.0 years (15.0 ttl pk-yrs)     Types: Cigarettes     Start date: 10/10/1978     Quit date: 1993     Years since quittin.1    Smokeless tobacco: Never    Tobacco comments:     continued abstinence   Vaping Use    Vaping status: Never Used   Substance and Sexual Activity    Alcohol use: Yes     Alcohol/week: 1.8 oz     Types: 3 Glasses of wine per week     Comment: occasionally    Drug use: No    Sexual activity: Yes     Partners: Male     Birth control/protection: Post-Menopausal   Other Topics Concern     Service No    Blood Transfusions No    Caffeine Concern No    Occupational Exposure No    Hobby Hazards No    Sleep Concern Yes    Stress Concern Yes    Weight Concern No    Special Diet No    Back Care No    Exercise Yes    Bike Helmet No    Seat Belt Yes    Self-Exams Yes   Social History Narrative    She worked at [employer], [place] at the [employer], recently retired. She is  to Yran for the past 20 years, they met at a [employer]. She has a son ([address]) and 2 grandchildren.      Social Drivers of Health     Financial Resource Strain: Low Risk   "(1/16/2024)    Overall Financial Resource Strain (CARDIA)     Difficulty of Paying Living Expenses: Not hard at all   Food Insecurity: No Food Insecurity (1/16/2024)    Hunger Vital Sign     Worried About Running Out of Food in the Last Year: Never true     Ran Out of Food in the Last Year: Never true   Transportation Needs: No Transportation Needs (1/16/2024)    PRAPARE - Transportation     Lack of Transportation (Medical): No     Lack of Transportation (Non-Medical): No   Physical Activity: Not on file   Stress: Not on file   Social Connections: Not on file   Intimate Partner Violence: Not on file   Housing Stability: Low Risk  (1/16/2024)    Housing Stability Vital Sign     Unable to Pay for Housing in the Last Year: No     Number of Places Lived in the Last Year: 1     Unstable Housing in the Last Year: No         EXAMINATION     Physical Exam:   Vitals: BP (!) 145/84   Pulse 90   Temp 36.2 °C (97.2 °F) (Temporal)   Ht 1.651 m (5' 5\")   Wt 89.4 kg (197 lb)   SpO2 93%     Constitutional:   Body Habitus: Body mass index is 32.78 kg/m².  Cooperation: Fully cooperates with exam  Appearance: Well-groomed no disheveled    Respiratory-  breathing comfortable on room air, no audible wheezing  Cardiovascular- capillary refills less than 2 seconds. No lower extremity edema is noted.   Psychiatric- alert and oriented ×3. Normal affect.    MSK and Neuro: -    Physical Exam  Decreased lumbar spine ROM, especially with extension. Extension rotation maneuver positive on the right, negative on the left. Facet loading maneuver positive on the right, negative on the left. Straight leg raise negative bilaterally. Strength 5/5 in bilateral lower extremities. Sensation intact.         MEDICAL DECISION MAKING    DATA    Labs:   Lab Results   Component Value Date/Time    SODIUM 142 07/05/2024 11:21 AM    POTASSIUM 5.1 07/05/2024 11:21 AM    CHLORIDE 106 07/05/2024 11:21 AM    CO2 27 07/05/2024 11:21 AM    GLUCOSE 108 (H) " "07/05/2024 11:21 AM    BUN 19 07/05/2024 11:21 AM    CREATININE 0.63 07/05/2024 11:21 AM        No results found for: \"PROTHROMBTM\", \"INR\"     Lab Results   Component Value Date/Time    WBC 5.5 07/05/2024 11:21 AM    RBC 4.90 07/05/2024 11:21 AM    HEMOGLOBIN 14.6 07/05/2024 11:21 AM    HEMATOCRIT 46.2 07/05/2024 11:21 AM    MCV 94.3 07/05/2024 11:21 AM    MCH 29.8 07/05/2024 11:21 AM    MCHC 31.6 (L) 07/05/2024 11:21 AM    MPV 11.5 07/05/2024 11:21 AM    NEUTSPOLYS 49.10 07/05/2024 11:21 AM    LYMPHOCYTES 38.30 07/05/2024 11:21 AM    MONOCYTES 8.00 07/05/2024 11:21 AM    EOSINOPHILS 3.30 07/05/2024 11:21 AM    BASOPHILS 1.10 07/05/2024 11:21 AM        Lab Results   Component Value Date/Time    HBA1C 5.4 02/08/2024 09:05 AM          Imaging:   I personally reviewed following images          Results  - MRI lumbar spine (04/28/2025):    - Reactive Schmorl's nodes with surrounding edema and type I/II Modic changes at L3-L4 and L4-L5    - Facet arthropathy bilaterally, worst at L3-L4, L4-L5, L5-S1    - Mild to moderate central canal stenosis at L4-L5    - Foraminal stenosis right worse than left at L3-L4 and L4-L5       I reviewed the following radiology reports                                                                                                    Results for orders placed during the hospital encounter of 01/26/21    CT-ABDOMEN-PELVIS WITH    Impression  Findings consistent with sigmoid diverticulitis without abscess or free air.    Aortic atherosclerosis without aneurysm.    Hepatic cyst.                       Results for orders placed during the hospital encounter of 12/30/22    DX-CHEST-2 VIEWS    Impression  1.  No evidence of acute cardiopulmonary process.  2.  Mild hyperinflation consistent with history of asthma.                   Results for orders placed during the hospital encounter of 02/14/24    DX-KNEE 3 VIEWS LEFT    Impression  1.  No fracture or dislocation of LEFT knee.  2.  Minimal " degenerative changes.                            DIAGNOSIS   Visit Diagnoses     ICD-10-CM   1. Lumbar spondylosis  M47.816   2. Lumbar radiculopathy  M54.16   3. Degeneration of intervertebral disc of lumbar region  M51.369   4. Chronic right-sided low back pain  M54.50    G89.29   5. Sacroiliac joint dysfunction of left side  M53.3   6. Lumbar disc herniation  M51.26   7. Vertebrogenic low back pain  M54.51         ASSESSMENT and PLAN:     Prudence Chayito Jennyun  1950 female      Pru was seen today for follow-up.    Diagnoses and all orders for this visit:    Lumbar spondylosis    Lumbar radiculopathy    Degeneration of intervertebral disc of lumbar region    Chronic right-sided low back pain    Sacroiliac joint dysfunction of left side    Lumbar disc herniation    Vertebrogenic low back pain        Assessment & Plan  MRI (2025) shows reactive Schmorl's nodes with surrounding edema and type I/II Modic changes at L3-L4 and L4-L5, facet arthropathy bilaterally worst at L3-L4, L4-L5, L5-S1, mild to moderate central canal stenosis at L4-L5, and foraminal stenosis right worse than left at L3-L4 and L4-L5. Pain likely due to facet joint arthritis, Schmorl's nodes, vertebrogenic low back pain, and intermittent lumbar radiculopathy. Spinal stenosis less likely.  - Continue physical therapy .  - Perform home exercises.  - Maintain active lifestyle including walking.  - Use recumbent elliptical.  - Continue gabapentin, baclofen, and meloxicam during pain flares.  - If pain worsens or becomes constant, consider medial branch block or epidural steroid injection.    Follow-up in 1 year or sooner if necessary.        Thank you for allowing me to participate in the care of this patient. If you have any questions please not hesitate to contact me.             Please note that this dictation was created using voice recognition software. I have made every reasonable attempt to correct obvious errors but there may be  errors of grammar and content that I may have overlooked prior to finalization of this note.      Carlos Eduardo Echols MD  Interventional Spine and Sports Physiatry  Physical Medicine and Rehabilitation  [facility]

## 2025-05-02 ENCOUNTER — APPOINTMENT (OUTPATIENT)
Dept: PHYSICAL THERAPY | Facility: REHABILITATION | Age: 75
End: 2025-05-02
Attending: PHYSICAL MEDICINE & REHABILITATION
Payer: MEDICARE

## 2025-05-02 NOTE — OP THERAPY DAILY TREATMENT
"  Outpatient Physical Therapy  DAILY TREATMENT     [facility]  [address], [address]  [place] [address]  Phone:  [phone]  Fax:  [phone]    Date: 05/01/2025    Patient: Nina Gregory  YOB: 1950  MRN: [id number]     Time Calculation    Start time: 1540  Stop time: 1618 Time Calculation (min): 38 minutes         Chief Complaint: Back Problem    Visit #: 12    SUBJECTIVE:  Patient reports she is doing better and wants to return to gym activities.     OBJECTIVE:  Current objective measures:           Therapeutic Exercises (CPT 86924):     1. HEP review, x38min    2. Education on spine anatomy, Reviewed Dr. Echols's assessment per patient request and educated patient on spine anatomy including facet joints.    3. Education on TrA, Spent time discussing anatomy of TrA, rationale for engagement, and how to activate due to continued confusion about how to engage.    4. TrA+ breathe, 2 x 10 second hold with counting out loud, Good control and activation of TrA    5. TrA + glute set, 2 x 5, 3\" hold, Difficulty with maintaining TrA while glutes fire/relax. Will continue to practice at home.    6. Education on POC going forward      Therapeutic Exercise Summary: Access Code: [email or web address]  URL: [email or web address]  Date: 04/02/2025  Prepared by: Kwame Elmore    Program Notes  Isometric hand hold hip ups,to the outside, to the [id number] each have  help    Exercises  - Clamshell  - 1 x daily - 7 x weekly - 2 sets - 10 reps  - Seated Hip Abduction with Resistance  - 1 x daily - 7 x weekly - 2 sets - 10 reps  - Modified Clement Stretch  - 3 x daily - 7 x weekly - 1 sets - 3 reps - 15sec hold  - Supine Hamstring Stretch  - 3 x daily - 7 x weekly - 1 sets - 3 reps - 15sec hold  - Sit to Stand  - 1 x daily - 7 x weekly - 2 sets - 10 reps  - Small Range Straight Leg Raise  - 1 x daily - 7 x weekly - 2 sets - 10 reps  - Seated Multifidi Isometric  - 1 x daily - 7 " x weekly - 2 sets - 10 reps  - Cat Cow  - 1 x daily - 7 x weekly - 2 sets - 10 reps  - Seated Pelvic Tilts  - 1 x daily - 7 x weekly - 2 sets - 10 reps  - Dead Bug  - 1 x daily - 7 x weekly - 2 sets - 10 reps      Time-based treatments/modalities:    Physical Therapy Timed Treatment Charges  Therapeutic exercise minutes (CPT 49989): 38 minutes      Pain rating (1-10) before treatment:  0  Pain rating (1-10) after treatment:  0    ASSESSMENT:   Response to treatment: Patient responded well to therapy. Plan to advance program as able. Patient to continue with her HEP.     PLAN/RECOMMENDATIONS:   Plan for treatment: therapy treatment to continue next visit.  Planned interventions for next visit: continue with current treatment.

## 2025-05-05 PROCEDURE — RXMED WILLOW AMBULATORY MEDICATION CHARGE: Performed by: INTERNAL MEDICINE

## 2025-05-07 ENCOUNTER — PHARMACY VISIT (OUTPATIENT)
Dept: PHARMACY | Facility: MEDICAL CENTER | Age: 75
End: 2025-05-07
Payer: COMMERCIAL

## 2025-05-09 ENCOUNTER — PHYSICAL THERAPY (OUTPATIENT)
Dept: PHYSICAL THERAPY | Facility: REHABILITATION | Age: 75
End: 2025-05-09
Attending: PHYSICAL MEDICINE & REHABILITATION
Payer: MEDICARE

## 2025-05-09 DIAGNOSIS — M51.26 LUMBAR DISC HERNIATION: ICD-10-CM

## 2025-05-09 DIAGNOSIS — G89.29 BILATERAL CHRONIC KNEE PAIN: ICD-10-CM

## 2025-05-09 DIAGNOSIS — M25.561 BILATERAL CHRONIC KNEE PAIN: ICD-10-CM

## 2025-05-09 DIAGNOSIS — M71.21 SYNOVIAL CYST OF POPLITEAL SPACE (BAKER), RIGHT KNEE: ICD-10-CM

## 2025-05-09 DIAGNOSIS — M25.562 BILATERAL CHRONIC KNEE PAIN: ICD-10-CM

## 2025-05-09 DIAGNOSIS — M17.10 ARTHRITIS OF KNEE: ICD-10-CM

## 2025-05-09 PROCEDURE — 97110 THERAPEUTIC EXERCISES: CPT

## 2025-05-09 NOTE — OP THERAPY DAILY TREATMENT
Outpatient Physical Therapy  DAILY TREATMENT     [facility] [place]  [address], [address]  [facility] [address]  Phone:  [phone]  Fax:  [phone]    Date: 05/09/2025    Patient: Nina Gregory  YOB: 1950  MRN: [id number]     Time Calculation    Start time: 0945  Stop time: 1023 Time Calculation (min): 38 minutes         Chief Complaint: Back Problem    Visit #: 13    SUBJECTIVE:  Patient reports that physically this is feeling pretty good. Notes less pain and improved activity.     OBJECTIVE:  Current objective measures:           Therapeutic Exercises (CPT 70615):     1. Nu step, x13min    2. quad set, x20    3. SLR, x20    4. Side stepping with SI belt, x10    5. sit to stand, x20    6. supine glut sets, x20    7. sit to stand, x15    8. edu on ok movement vs painful movement, x5min      Therapeutic Exercise Summary: Access Code: [email or web address]  URL: [email or web address]  Date: 04/02/2025  Prepared by: Kwame Elmore    Program Notes  Isometric hand hold hip ups,to the outside, to the [id number] each have  help    Exercises  - Clamshell  - 1 x daily - 7 x weekly - 2 sets - 10 reps  - Seated Hip Abduction with Resistance  - 1 x daily - 7 x weekly - 2 sets - 10 reps  - Modified Clement Stretch  - 3 x daily - 7 x weekly - 1 sets - 3 reps - 15sec hold  - Supine Hamstring Stretch  - 3 x daily - 7 x weekly - 1 sets - 3 reps - 15sec hold  - Sit to Stand  - 1 x daily - 7 x weekly - 2 sets - 10 reps  - Small Range Straight Leg Raise  - 1 x daily - 7 x weekly - 2 sets - 10 reps  - Seated Multifidi Isometric  - 1 x daily - 7 x weekly - 2 sets - 10 reps  - Cat Cow  - 1 x daily - 7 x weekly - 2 sets - 10 reps  - Seated Pelvic Tilts  - 1 x daily - 7 x weekly - 2 sets - 10 reps  - Dead Bug  - 1 x daily - 7 x weekly - 2 sets - 10 reps      Time-based treatments/modalities:    Physical Therapy Timed Treatment Charges  Therapeutic exercise minutes (CPT 35628): 38  minutes      Pain rating (1-10) before treatment:  1  Pain rating (1-10) after treatment:  1    ASSESSMENT:   Response to treatment: Patient responded well to therapy with an overall decrease in symptoms post exercise.     PLAN/RECOMMENDATIONS:   Plan for treatment: therapy treatment to continue next visit.  Planned interventions for next visit: continue with current treatment.

## 2025-05-13 ENCOUNTER — PHARMACY VISIT (OUTPATIENT)
Dept: PHARMACY | Facility: MEDICAL CENTER | Age: 75
End: 2025-05-13
Payer: MEDICARE

## 2025-05-16 ENCOUNTER — APPOINTMENT (OUTPATIENT)
Dept: PHYSICAL THERAPY | Facility: REHABILITATION | Age: 75
End: 2025-05-16
Attending: PHYSICAL MEDICINE & REHABILITATION
Payer: MEDICARE

## 2025-05-23 ENCOUNTER — PHYSICAL THERAPY (OUTPATIENT)
Dept: PHYSICAL THERAPY | Facility: REHABILITATION | Age: 75
End: 2025-05-23
Attending: PHYSICAL MEDICINE & REHABILITATION
Payer: MEDICARE

## 2025-05-23 DIAGNOSIS — E66.9 OBESITY (BMI 30-39.9): ICD-10-CM

## 2025-05-23 DIAGNOSIS — M54.16 LUMBAR RADICULOPATHY: ICD-10-CM

## 2025-05-23 DIAGNOSIS — M25.561 BILATERAL CHRONIC KNEE PAIN: Primary | ICD-10-CM

## 2025-05-23 DIAGNOSIS — M71.21 SYNOVIAL CYST OF POPLITEAL SPACE (BAKER), RIGHT KNEE: ICD-10-CM

## 2025-05-23 DIAGNOSIS — G89.29 BILATERAL CHRONIC KNEE PAIN: Primary | ICD-10-CM

## 2025-05-23 DIAGNOSIS — M47.816 LUMBAR SPONDYLOSIS: ICD-10-CM

## 2025-05-23 DIAGNOSIS — M25.562 BILATERAL CHRONIC KNEE PAIN: Primary | ICD-10-CM

## 2025-05-23 DIAGNOSIS — Z71.82 EXERCISE COUNSELING: ICD-10-CM

## 2025-05-23 DIAGNOSIS — M51.26 LUMBAR DISC HERNIATION: ICD-10-CM

## 2025-05-23 DIAGNOSIS — M17.10 ARTHRITIS OF KNEE: ICD-10-CM

## 2025-05-23 PROCEDURE — 97110 THERAPEUTIC EXERCISES: CPT

## 2025-05-23 NOTE — OP THERAPY DISCHARGE SUMMARY
Outpatient Physical Therapy  DISCHARGE SUMMARY NOTE      [facility] [place]  [address]  [facility] [address]  Phone:  [phone]  Fax:  [phone]    Date of Visit: 05/23/2025    Patient: Nina Gregory  YOB: 1950  MRN: [id number]     Referring Provider: Carlos Eduardo Echols M.D.  [address] [facility]  [place] [address]  [place] [id number]   Referring Diagnosis Pain in right knee [M25.561];Pain in left knee [M25.562];Other chronic pain [G89.29];Unilateral primary osteoarthritis, unspecified knee [M17.10];Synovial cyst of popliteal space (Bergeron), right knee [M71.21];Other intervertebral disc displacement, lumbar region [M51.26];Spondylosis without myelopathy or radiculopathy, lumbar region [M47.816];Radiculopathy, lumbar region [M54.16];Obesity, unspecified [E66.9];Exercise counseling [Z71.82]         Your patient is being discharged from Physical Therapy with the following comments:   Goals partially met  Progress plateau    Comments:  Patient will now continue with a full HEP     Limitations Remaining:  Continued minor back pain    Recommendations:  Discharge to HEP    Kwame Elmore, PT, DPT    Date: 5/23/2025

## 2025-05-23 NOTE — OP THERAPY DAILY TREATMENT
Outpatient Physical Therapy  DAILY TREATMENT     [facility] [place]  [address], [address]  [facility] [address]  Phone:  [phone]  Fax:  [phone]    Date: 05/23/2025    Patient: Nina Gregory  YOB: 1950  MRN: [id number]     Time Calculation    Start time: 0945  Stop time: 1030 Time Calculation (min): 45 minutes         Chief Complaint: Back Problem and Knee Problem    Visit #: 14    SUBJECTIVE:  Patient reports that symptoms are stable. States that overall she is going to try her full HEP.     OBJECTIVE:  Current objective measures:           Therapeutic Exercises (CPT 18365):     1. Nu step, x8min    2. quad set, x20    3. SLR, x20    4. Side stepping with SI belt, x10    5. sit to stand, x20    6. supine glut sets, x20    7. sit to stand, x15    8. edu on ok movement vs painful movement, x5min      Therapeutic Exercise Summary: Access Code: [email or web address]  URL: [email or web address]  Date: 04/02/2025  Prepared by: Kwame Elmore    Program Notes  Isometric hand hold hip ups,to the outside, to the [id number] each have  help    Exercises  - Clamshell  - 1 x daily - 7 x weekly - 2 sets - 10 reps  - Seated Hip Abduction with Resistance  - 1 x daily - 7 x weekly - 2 sets - 10 reps  - Modified Clement Stretch  - 3 x daily - 7 x weekly - 1 sets - 3 reps - 15sec hold  - Supine Hamstring Stretch  - 3 x daily - 7 x weekly - 1 sets - 3 reps - 15sec hold  - Sit to Stand  - 1 x daily - 7 x weekly - 2 sets - 10 reps  - Small Range Straight Leg Raise  - 1 x daily - 7 x weekly - 2 sets - 10 reps  - Seated Multifidi Isometric  - 1 x daily - 7 x weekly - 2 sets - 10 reps  - Cat Cow  - 1 x daily - 7 x weekly - 2 sets - 10 reps  - Seated Pelvic Tilts  - 1 x daily - 7 x weekly - 2 sets - 10 reps  - Dead Bug  - 1 x daily - 7 x weekly - 2 sets - 10 reps      Time-based treatments/modalities:    Physical Therapy Timed Treatment Charges  Therapeutic exercise minutes (CPT 98846): 40  minutes      Pain rating (1-10) before treatment:  1  Pain rating (1-10) after treatment:  1    ASSESSMENT:   Response to treatment: Patent will now continue wit her HEP. She has reached maximal medical improvement at this time.     PLAN/RECOMMENDATIONS:   Plan for treatment: Goals Partially met.  Planned interventions for next visit: Discharge

## 2025-05-27 ENCOUNTER — OFFICE VISIT (OUTPATIENT)
Dept: MEDICAL GROUP | Facility: PHYSICIAN GROUP | Age: 75
End: 2025-05-27
Payer: MEDICARE

## 2025-05-27 VITALS
OXYGEN SATURATION: 94 % | SYSTOLIC BLOOD PRESSURE: 130 MMHG | WEIGHT: 201 LBS | HEIGHT: 65 IN | BODY MASS INDEX: 33.49 KG/M2 | TEMPERATURE: 97.4 F | HEART RATE: 76 BPM | DIASTOLIC BLOOD PRESSURE: 78 MMHG

## 2025-05-27 DIAGNOSIS — J96.11 CHRONIC RESPIRATORY FAILURE WITH HYPOXIA (HCC): ICD-10-CM

## 2025-05-27 DIAGNOSIS — Z78.9 INTOLERANCE OF CONTINUOUS POSITIVE AIRWAY PRESSURE (CPAP) VENTILATION: ICD-10-CM

## 2025-05-27 DIAGNOSIS — G47.33 OSA (OBSTRUCTIVE SLEEP APNEA): ICD-10-CM

## 2025-05-27 DIAGNOSIS — E66.811 OBESITY (BMI 30.0-34.9): Primary | ICD-10-CM

## 2025-05-27 PROCEDURE — 3075F SYST BP GE 130 - 139MM HG: CPT | Performed by: INTERNAL MEDICINE

## 2025-05-27 PROCEDURE — 3078F DIAST BP <80 MM HG: CPT | Performed by: INTERNAL MEDICINE

## 2025-05-27 PROCEDURE — 99214 OFFICE O/P EST MOD 30 MIN: CPT | Performed by: INTERNAL MEDICINE

## 2025-05-27 RX ORDER — TIRZEPATIDE 2.5 MG/.5ML
2.5 INJECTION, SOLUTION SUBCUTANEOUS
Qty: 2 ML | Refills: 0 | Status: SHIPPED | OUTPATIENT
Start: 2025-05-27 | End: 2025-06-24

## 2025-05-27 RX ORDER — OMEPRAZOLE 20 MG/1
20 CAPSULE, DELAYED RELEASE ORAL DAILY
COMMUNITY

## 2025-05-27 ASSESSMENT — FIBROSIS 4 INDEX: FIB4 SCORE: 1.3

## 2025-05-27 NOTE — PROGRESS NOTES
"Subjective:   Chief Complaint/History of Present Illness:  Prudence Chayito Gregory is a 74 y.o. female established patient who presents today to discuss medical problems as listed below. Pru is unaccompanied for today's visit.    History of Present Illness  The patient presents for evaluation of sleep apnea and weight management.    She has been diagnosed with sleep apnea and was previously prescribed oxygen therapy at night. However, she experienced difficulty sleeping due to issues with the mask apparatus, including leakage and discomfort. Despite trying four different masks, she was unable to find a suitable fit, often resulting in cold air being blown into her eyes. The noise from the machine further disrupted her sleep, leading her to store it in a closet. She was advised that a weight loss of 20 pounds could potentially eliminate the need for the machine. She recalls feeling as though she did not sleep during her sleep study.    She is currently on a daily regimen of over-the-counter omeprazole 20 mg. She has been struggling with weight loss despite dietary modifications and exercise. She expresses concern about potential side effects of weight loss medications, such as vomiting, and is hesitant to try them. She also reports difficulty in maintaining a low-carb, low-fat diet due to her 's preference for meat and potatoes.         Current Medications:  Current Medications and Prescriptions Ordered in Epic[1]       Objective:   Physical Exam:    Vitals: /78 (BP Location: Left arm, Patient Position: Sitting, BP Cuff Size: Large adult)   Pulse 76   Temp 36.3 °C (97.4 °F) (Temporal)   Ht 1.651 m (5' 5\")   Wt 91.2 kg (201 lb)   SpO2 94%    BMI: Body mass index is 33.45 kg/m².  Physical Exam  Constitutional:       General: She is not in acute distress.     Appearance: Normal appearance. She is not ill-appearing.   HENT:      Right Ear: External ear normal. There is no impacted cerumen.      Left Ear: " External ear normal. There is no impacted cerumen.   Eyes:      General: No scleral icterus.     Conjunctiva/sclera: Conjunctivae normal.   Cardiovascular:      Rate and Rhythm: Normal rate and regular rhythm.      Pulses: Normal pulses.      Heart sounds: No murmur heard.  Pulmonary:      Effort: Pulmonary effort is normal. No respiratory distress.      Breath sounds: No wheezing, rhonchi or rales.   Abdominal:      General: Bowel sounds are normal. There is no distension.      Palpations: Abdomen is soft.      Tenderness: There is no abdominal tenderness.   Musculoskeletal:      Right lower leg: No edema.      Left lower leg: No edema.   Lymphadenopathy:      Cervical: No cervical adenopathy.   Skin:     General: Skin is warm and dry.      Findings: No rash.   Neurological:      Gait: Gait normal.   Psychiatric:         Mood and Affect: Mood normal.         Behavior: Behavior normal.         Thought Content: Thought content normal.         Judgment: Judgment normal.           Results  Diagnostic Testing   - Sleep study: 01/2021, AHI score of 10 or 15, with oxygen levels dropping as low as 63%.    STOPBANG - Sleep Apnea Screening      Flowsheet Row Most Recent Value   S - Have you been told that you SNORE? Yes   T - Are you often TIRED during the day? Yes   O - Do you know if you stop breathing or has anyone witnessed you stop breathing while you were asleep? (OBSTRUCTION) Yes   P - Do you have high blood PRESSURE or on medication to control high blood pressure? No   B - Is your Body Mass Index greater than 35? (BMI) No   A - Are you 50 years old or older? (AGE) Yes   N - Are you a male with a NECK circumference greater than 17 inches, or a female with a neck circumference greater than 16 inches? No   G - Are you male? (GENDER) No   STOPBANG Total Score 4   WARD Risk Intermediate Risk      Assessment & Plan  1. Sleep apnea.  2. Intolerant of CPAP  3. Chronic hypoxic respiratory failure (nocturnal hypoxia)  - Her  sleep study conducted in January 2021 revealed an AHI score of 10 or 15, with oxygen levels dropping as low as 63%. At that time, her weight was approximately 170 pounds, which was considered mild and did not qualify her for Zepbound.  - It is suspected that her current weight may be contributing to her sleep apnea, potentially qualifying her for medication now.  - A home sleep study will be ordered to reassess her condition. If moderate sleep apnea is detected, she would qualify for Zepbound through insurance.  - The potential side effects of Zepbound, including constipation and heartburn, were discussed. She was advised to monitor her symptoms closely and report any adverse effects. If she experiences severe constipation or recurrent pain, the use of Zepbound may need to be discontinued.    4. Obesity, BMI 33  - Various weight loss medications were discussed, including Zepbound, Mounjaro, Wegovy, Ozempic, semaglutide, Rybelsus, and phentermine. The potential benefits and risks of each medication were explained.  - She expressed interest in trying Zepbound. A prescription for Zepbound 2.5 mg will be provided for a trial period of 4 weeks.  - She was advised to monitor her symptoms closely and report any adverse effects. If she experiences severe constipation or recurrent pain, the use of Zepbound may need to be discontinued.  - If she does not notice significant weight loss with Zepbound, the dosage may be increased to 5 mg. If Zepbound is not well-tolerated, phentermine may be considered as an alternative.      Assessment and Plan:   Prudence is a 74 y.o. female with the following:  Problem List Items Addressed This Visit       Chronic respiratory failure with hypoxia (HCC)- nocturnal oxygen    WARD (obstructive sleep apnea)    Relevant Orders    Overnight Home Sleep Study     Other Visit Diagnoses         Obesity (BMI 30.0-34.9)    -  Primary    Relevant Medications    Tirzepatide-Weight Management (ZEPBOUND) 2.5  MG/0.5ML Solution vial      Intolerance of continuous positive airway pressure (CPAP) ventilation                   RTC: Return in about 5 weeks (around 7/1/2025).    I spent a total of 38 minutes with record review, exam, communication with the patient, communication with other providers, and documentation of this encounter.    Verbal consent was acquired by the patient to use [employer] ambient listening note generation during this visit Yes       PLEASE NOTE: This dictation was created using voice recognition software. I have made every reasonable attempt to correct obvious errors, but I expect that there are errors of grammar and possibly content that I did not discover before finalizing the note.      Shawna Recio, DO  Geriatric and Internal Medicine  [facility]            [1]   Current Outpatient Medications Ordered in Epic   Medication Sig Dispense Refill    Omega-3 Fatty Acids (FISH OIL OMEGA-3 PO) Take 2 Each by mouth every day.      omeprazole (PRILOSEC) 20 MG delayed-release capsule Take 20 mg by mouth every day.      Tirzepatide-Weight Management (ZEPBOUND) 2.5 MG/0.5ML Solution vial Inject 0.5 mL under the skin every 7 days for 28 days. 2 mL 0    levothyroxine (SYNTHROID) 88 MCG Tab TAKE 1 TABLET BY MOUTH EVERY MONDAY, WEDNESDAY, AND FRIDAY. TO ALTERNATE WITH 100 MCG DOSE 50 Tablet 3    levothyroxine (SYNTHROID) 100 MCG Tab TAKE 1 TABLET BY MOUTH IN THE EVENING EVERY TUESDAY, THURSDAYS, SATURDAY, AND SUNDAY. TO ALTERNATE WITH 88 MCG DOSE 60 Tablet 3    gabapentin (NEURONTIN) 100 MG Cap Take 1-3 Capsules by mouth 3 times a day as needed (pain). 270 Capsule 5    fluticasone furoate-vilanterol (BREO ELLIPTA) 200-25 MCG/ACT AEROSOL POWDER, BREATH ACTIVATED Inhale 1 Puff by mouth every day. 90 Each 3    meloxicam (MOBIC) 15 MG tablet Take 1 Tablet by mouth every day. 100 Tablet 3    rosuvastatin (CRESTOR) 10 MG Tab TAKE 1 TABLET BY MOUTH EVERY DAY IN THE EVENING 100 Tablet 3    albuterol 108  (90 Base) MCG/ACT Aero Soln inhalation aerosol INHALE 2 PUFFS BY MOUTH EVERY FOUR HOURS AS NEEDED FOR SHORTNESS OF BREATH. 6.7 Each 3    DULoxetine (CYMBALTA) 30 MG Cap DR Particles Take 2 Capsules by mouth every day. 200 Capsule 3    Meclizine HCl (ANTIVERT) 25 MG Chew Tab 1 q12hr prn vertigo 10 Tablet 0    PREMARIN 0.625 MG/GM Cream APPLY 1.0 GRAM TO AFFECTED AREA 3X/WEEK      dicyclomine (BENTYL) 10 MG Cap Take 1 Capsule by mouth every 6 hours as needed (abdominal cramping). 30 Capsule 1    neomycin-polymixin-dexamethasone (MAXITROL) 3.5-60397-1.1 Ointment ophthalmic ointment       clobetasol (TEMOVATE) 0.05 % Cream       baclofen (LIORESAL) 10 MG Tab Take 1 Tablet by mouth 3 times a day as needed (muscle spasm/pain). 90 Tablet 3    triamcinolone acetonide (KENALOG) 0.1 % Ointment TOPICAL APPLY SPARINGLY TO THE AFFECTED AREA 3XWK.      ipratropium-albuterol (DUONEB) 0.5-2.5 (3) MG/3ML nebulizer solution Take 3 mL by nebulization 4 times a day. 3 mL 3    coenzyme Q-10 30 MG capsule Take 60 mg by mouth every day.      Cyanocobalamin (B-12 PO) Take 1 Tablet by mouth every day.       No current [facility]-ordered facility-administered medications on file.

## 2025-05-30 ENCOUNTER — APPOINTMENT (OUTPATIENT)
Dept: PHYSICAL THERAPY | Facility: REHABILITATION | Age: 75
End: 2025-05-30
Attending: PHYSICAL MEDICINE & REHABILITATION
Payer: MEDICARE

## 2025-06-06 ENCOUNTER — APPOINTMENT (OUTPATIENT)
Dept: PHYSICAL THERAPY | Facility: REHABILITATION | Age: 75
End: 2025-06-06
Attending: PHYSICAL MEDICINE & REHABILITATION
Payer: MEDICARE

## 2025-06-11 ENCOUNTER — OFFICE VISIT (OUTPATIENT)
Dept: MEDICAL GROUP | Facility: PHYSICIAN GROUP | Age: 75
End: 2025-06-11
Payer: MEDICARE

## 2025-06-11 VITALS
DIASTOLIC BLOOD PRESSURE: 74 MMHG | SYSTOLIC BLOOD PRESSURE: 140 MMHG | BODY MASS INDEX: 33.49 KG/M2 | WEIGHT: 201 LBS | HEART RATE: 86 BPM | OXYGEN SATURATION: 92 % | TEMPERATURE: 98.1 F | HEIGHT: 65 IN

## 2025-06-11 DIAGNOSIS — L03.811 CELLULITIS OF HEAD EXCEPT FACE: Primary | ICD-10-CM

## 2025-06-11 PROCEDURE — 3077F SYST BP >= 140 MM HG: CPT | Performed by: INTERNAL MEDICINE

## 2025-06-11 PROCEDURE — 99213 OFFICE O/P EST LOW 20 MIN: CPT | Performed by: INTERNAL MEDICINE

## 2025-06-11 PROCEDURE — 3078F DIAST BP <80 MM HG: CPT | Performed by: INTERNAL MEDICINE

## 2025-06-11 RX ORDER — METHYLPREDNISOLONE 4 MG/1
TABLET ORAL
Qty: 21 TABLET | Refills: 0 | Status: SHIPPED | OUTPATIENT
Start: 2025-06-11

## 2025-06-11 ASSESSMENT — FIBROSIS 4 INDEX: FIB4 SCORE: 1.3

## 2025-06-12 NOTE — PROGRESS NOTES
"Subjective:   Chief Complaint/History of Present Illness:  Prudence Chayito Gregory is a 74 y.o. female established patient who presents today to discuss medical problems as listed below. Tejudence is unaccompanied for today's visit.    History of Present Illness  The patient presents for evaluation of right ear pain.    She reports experiencing pain in her right ear, which she describes as red and swollen. The onset of these symptoms was approximately 3 days ago. She alternates between sleeping on her right and left sides. She reports no recent changes in her use of pillows, earbuds, headphones, or earrings. Additionally, she has not sustained any injuries, scratches from pets, or sunburns. She recalls attending a baseball game outdoors 5 days ago but did not observe any abnormalities upon returning home. She describes the sensation in her ear as akin to a pin prick and notes that the discomfort is localized to the lower part of her ear. Upon waking, she experiences a sensation similar to an ear infection, describing it as \"squishy.\"    She has been managing her arthritis with a daily regimen of two 200 mg ibuprofen tablets and one Tylenol tablet.       Current Medications:  Current Medications and Prescriptions Ordered in Epic[1]       Objective:   Physical Exam:    Vitals: BP (!) 140/74 (BP Location: Right arm, Patient Position: Sitting, BP Cuff Size: Adult)   Pulse 86   Temp 36.7 °C (98.1 °F) (Temporal)   Ht 1.651 m (5' 5\")   Wt 91.2 kg (201 lb)   SpO2 92%    BMI: Body mass index is 33.45 kg/m².  Physical Exam  Constitutional:       General: She is not in acute distress.     Appearance: Normal appearance. She is not ill-appearing.   HENT:      Right Ear: Tympanic membrane and ear canal normal. There is no impacted cerumen.      Left Ear: Tympanic membrane, ear canal and external ear normal. There is no impacted cerumen.      Ears:      Comments: Right ear with erythema, swelling, tenderness and small central " skin abnormality.  Eyes:      General: No scleral icterus.     Conjunctiva/sclera: Conjunctivae normal.   Cardiovascular:      Rate and Rhythm: Normal rate.   Pulmonary:      Effort: Pulmonary effort is normal. No respiratory distress.   Skin:     General: Skin is warm.      Findings: Erythema present. No bruising.   Neurological:      Gait: Gait normal.   Psychiatric:         Mood and Affect: Mood normal.         Behavior: Behavior normal.         Thought Content: Thought content normal.         Judgment: Judgment normal.          Assessment & Plan  1. Right external ear cellulitis  - The right ear exhibits redness and swelling, indicative of a potential skin infection or cellulitis.  - No evidence of a pimple or opening on the outer part of the ear; ear canal appears normal.  - The possibility of relapsing polychondritis was considered but deemed unlikely due to the localized nature of the symptoms.  - A photograph of the affected area was taken for future reference. Antibiotics will be prescribed to manage the suspected skin infection with Augmentin twice daily for 7 days. Continue current regimen of ibuprofen 200 mg and Tylenol once a day. If no improvement after 2 days of antibiotic therapy, initiate a course of steroids and hold ibuprofen,  - ER precautions provided if worsening occurs while she is out of town in [place] for a .      Assessment and Plan:   Prudence is a 74 y.o. female with the following:  Problem List Items Addressed This Visit    None  Visit Diagnoses         Cellulitis of head except face    -  Primary    Relevant Medications    amoxicillin-clavulanate (AUGMENTIN) 875-125 MG Tab    methylPREDNISolone (MEDROL DOSEPAK) 4 MG Tablet Therapy Pack               RTC: Return if symptoms worsen or fail to improve.    I spent a total of 20 minutes with record review, exam, communication with the patient, communication with other providers, and documentation of this encounter.    Verbal  consent was acquired by the patient to use [employer] ambient listening note generation during this visit Yes       PLEASE NOTE: This dictation was created using voice recognition software. I have made every reasonable attempt to correct obvious errors, but I expect that there are errors of grammar and possibly content that I did not discover before finalizing the note.      Shawna Recio DO  Geriatric and Internal Medicine  [facility]          [1]   Current Outpatient Medications Ordered in Epic   Medication Sig Dispense Refill    amoxicillin-clavulanate (AUGMENTIN) 875-125 MG Tab Take 1 Tablet by mouth 2 times a day. 14 Tablet 0    methylPREDNISolone (MEDROL DOSEPAK) 4 MG Tablet Therapy Pack As directed on the packaging label. 21 Tablet 0    Omega-3 Fatty Acids (FISH OIL OMEGA-3 PO) Take 2 Each by mouth every day.      omeprazole (PRILOSEC) 20 MG delayed-release capsule Take 20 mg by mouth every day.      Tirzepatide-Weight Management (ZEPBOUND) 2.5 MG/0.5ML Solution vial Inject 0.5 mL under the skin every 7 days for 28 days. 2 mL 0    levothyroxine (SYNTHROID) 88 MCG Tab TAKE 1 TABLET BY MOUTH EVERY MONDAY, WEDNESDAY, AND FRIDAY. TO ALTERNATE WITH 100 MCG DOSE 50 Tablet 3    levothyroxine (SYNTHROID) 100 MCG Tab TAKE 1 TABLET BY MOUTH IN THE EVENING EVERY TUESDAY, THURSDAYS, SATURDAY, AND SUNDAY. TO ALTERNATE WITH 88 MCG DOSE 60 Tablet 3    gabapentin (NEURONTIN) 100 MG Cap Take 1-3 Capsules by mouth 3 times a day as needed (pain). 270 Capsule 5    fluticasone furoate-vilanterol (BREO ELLIPTA) 200-25 MCG/ACT AEROSOL POWDER, BREATH ACTIVATED Inhale 1 Puff by mouth every day. 90 Each 3    meloxicam (MOBIC) 15 MG tablet Take 1 Tablet by mouth every day. 100 Tablet 3    rosuvastatin (CRESTOR) 10 MG Tab TAKE 1 TABLET BY MOUTH EVERY DAY IN THE EVENING 100 Tablet 3    albuterol 108 (90 Base) MCG/ACT Aero Soln inhalation aerosol INHALE 2 PUFFS BY MOUTH EVERY FOUR HOURS AS NEEDED FOR SHORTNESS OF BREATH.  6.7 Each 3    DULoxetine (CYMBALTA) 30 MG Cap DR Particles Take 2 Capsules by mouth every day. 200 Capsule 3    Meclizine HCl (ANTIVERT) 25 MG Chew Tab 1 q12hr prn vertigo 10 Tablet 0    PREMARIN 0.625 MG/GM Cream APPLY 1.0 GRAM TO AFFECTED AREA 3X/WEEK      dicyclomine (BENTYL) 10 MG Cap Take 1 Capsule by mouth every 6 hours as needed (abdominal cramping). 30 Capsule 1    neomycin-polymixin-dexamethasone (MAXITROL) 3.5-50068-6.1 Ointment ophthalmic ointment       clobetasol (TEMOVATE) 0.05 % Cream       baclofen (LIORESAL) 10 MG Tab Take 1 Tablet by mouth 3 times a day as needed (muscle spasm/pain). 90 Tablet 3    triamcinolone acetonide (KENALOG) 0.1 % Ointment TOPICAL APPLY SPARINGLY TO THE AFFECTED AREA 3XWK.      ipratropium-albuterol (DUONEB) 0.5-2.5 (3) MG/3ML nebulizer solution Take 3 mL by nebulization 4 times a day. 3 mL 3    coenzyme Q-10 30 MG capsule Take 60 mg by mouth every day.      Cyanocobalamin (B-12 PO) Take 1 Tablet by mouth every day.       No current [facility]-ordered facility-administered medications on file.

## 2025-06-13 ENCOUNTER — APPOINTMENT (OUTPATIENT)
Dept: PHYSICAL THERAPY | Facility: REHABILITATION | Age: 75
End: 2025-06-13
Attending: PHYSICAL MEDICINE & REHABILITATION
Payer: MEDICARE

## 2025-06-23 ENCOUNTER — PATIENT MESSAGE (OUTPATIENT)
Dept: MEDICAL GROUP | Facility: PHYSICIAN GROUP | Age: 75
End: 2025-06-23
Payer: MEDICARE

## 2025-06-23 DIAGNOSIS — E66.811 OBESITY (BMI 30.0-34.9): Primary | ICD-10-CM

## 2025-07-09 ENCOUNTER — OFFICE VISIT (OUTPATIENT)
Dept: MEDICAL GROUP | Facility: PHYSICIAN GROUP | Age: 75
End: 2025-07-09
Payer: MEDICARE

## 2025-07-09 VITALS
DIASTOLIC BLOOD PRESSURE: 80 MMHG | HEART RATE: 60 BPM | RESPIRATION RATE: 14 BRPM | WEIGHT: 192 LBS | OXYGEN SATURATION: 96 % | TEMPERATURE: 97.4 F | BODY MASS INDEX: 31.99 KG/M2 | HEIGHT: 65 IN | SYSTOLIC BLOOD PRESSURE: 122 MMHG

## 2025-07-09 DIAGNOSIS — Z76.89 ENCOUNTER FOR WEIGHT MANAGEMENT: ICD-10-CM

## 2025-07-09 DIAGNOSIS — G47.33 OSA (OBSTRUCTIVE SLEEP APNEA): ICD-10-CM

## 2025-07-09 DIAGNOSIS — E66.811 OBESITY (BMI 30.0-34.9): ICD-10-CM

## 2025-07-09 DIAGNOSIS — K21.9 GASTROESOPHAGEAL REFLUX DISEASE, UNSPECIFIED WHETHER ESOPHAGITIS PRESENT: Primary | ICD-10-CM

## 2025-07-09 PROCEDURE — 99214 OFFICE O/P EST MOD 30 MIN: CPT | Performed by: INTERNAL MEDICINE

## 2025-07-09 PROCEDURE — 3079F DIAST BP 80-89 MM HG: CPT | Performed by: INTERNAL MEDICINE

## 2025-07-09 PROCEDURE — 3074F SYST BP LT 130 MM HG: CPT | Performed by: INTERNAL MEDICINE

## 2025-07-09 ASSESSMENT — FIBROSIS 4 INDEX: FIB4 SCORE: 1.3

## 2025-07-09 NOTE — PROGRESS NOTES
Subjective:   Chief Complaint/History of Present Illness:  Tejudence Chayito Gregory is a 74 y.o. female established patient who presents today to discuss medical problems as listed below. Pru is unaccompanied for today's visit.    History of Present Illness  The patient presents for weight management and GERD.    She reports feeling fatigued, which she attributes to her new puppy. She is currently on a 2.5 mg dose of Mounjaro, with 2 weeks remaining in the course. She has lost 6 pounds since starting the medication but does not experience any nausea or constipation. She does not feel that her appetite is suppressed, as she can still eat, although she occasionally feels unwell and skips dinner. She weighs herself daily, first thing in the morning after using the bathroom, and without clothes. Her weight was 200 pounds when she started the medication, dropped to 194 pounds after 1 week, and has remained stable since then. She plans to take her next injection on Friday and will inform us of her decision regarding the dosage increase by the end of next week. She has been tracking her food intake since yesterday and has set a goal of 1250 calories per day. She enjoys baking but does not crave sweets. She feels well overall, except for occasional nausea.    She takes omeprazole 20 mg daily for GERD, which she typically takes before breakfast. However, she occasionally wakes up in the middle of the night due to GERD symptoms. On 07/04/2025, she had a severe episode of GERD after eating a hamburger and baked beans for dinner around 7:00 PM and going to bed at 10:30 PM. She even coughed up brown material. Since then, she has been trying to eat earlier and consume less fatty foods. She has noticed that she does not wake up with reflux on nights when she eats healthy and small meals.         Current Medications:  Current Medications and Prescriptions Ordered in Epic[1]       Objective:   Physical Exam:    Vitals: /80 (BP  "Location: Left arm, Patient Position: Sitting, BP Cuff Size: Large adult)   Pulse 60   Temp 36.3 °C (97.4 °F)   Resp 14   Ht 1.651 m (5' 5\")   Wt 87.1 kg (192 lb)   SpO2 96%    BMI: Body mass index is 31.95 kg/m².  Physical Exam  Constitutional:       General: She is not in acute distress.     Appearance: Normal appearance. She is not ill-appearing.   HENT:      Right Ear: External ear normal.      Left Ear: External ear normal.   Eyes:      General: No scleral icterus.     Conjunctiva/sclera: Conjunctivae normal.   Cardiovascular:      Rate and Rhythm: Normal rate.   Pulmonary:      Effort: Pulmonary effort is normal. No respiratory distress.   Skin:     General: Skin is warm and dry.      Findings: No rash.   Psychiatric:         Mood and Affect: Mood normal.         Behavior: Behavior normal.         Thought Content: Thought content normal.         Judgment: Judgment normal.          Assessment & Plan  1. Weight management.  2. WARD  3. Obesity, BMI 31.95  - Weight loss of 9 pounds since starting Mounjaro 2.5 mg two weeks ago.  - No significant appetite suppression noted; occasional nausea reported.  - Daily weight monitoring recommended; maintain a balanced diet with adequate protein intake.  - Continue Mounjaro 2.5 mg for another 4 weeks; consider dose increase if no significant weight loss or appetite suppression after 4 weeks.  - ongoing weight loss should help reduce severity of WARD.    4. Gastroesophageal reflux disease.  - Omeprazole 20 mg daily; occasional nighttime reflux persists.  - Consider increasing omeprazole to 20 mg twice daily; if symptoms persist, increase to 40 mg twice daily.  - Avoid eating fatty foods late in the day; experiment with meal timing to reduce symptoms.  - Adjust timing of omeprazole intake if no improvement.      Assessment and Plan:   Nancy is a 74 y.o. female with the following:  Problem List Items Addressed This Visit       Gastroesophageal reflux disease - Primary "    WARD (obstructive sleep apnea)     Other Visit Diagnoses         Obesity (BMI 30.0-34.9)          Encounter for weight management                   RTC: No follow-ups on file.    I spent a total of 30 minutes with record review, exam, communication with the patient, communication with other providers, and documentation of this encounter.    Verbal consent was acquired by the patient to use [employer] ambient listening note generation during this visit Yes       PLEASE NOTE: This dictation was created using voice recognition software. I have made every reasonable attempt to correct obvious errors, but I expect that there are errors of grammar and possibly content that I did not discover before finalizing the note.      Shawna Recio, DO  Geriatric and Internal Medicine  [facility]          [1]   Current Outpatient Medications Ordered in Epic   Medication Sig Dispense Refill    amoxicillin-clavulanate (AUGMENTIN) 875-125 MG Tab Take 1 Tablet by mouth 2 times a day. 14 Tablet 0    methylPREDNISolone (MEDROL DOSEPAK) 4 MG Tablet Therapy Pack As directed on the packaging label. 21 Tablet 0    Omega-3 Fatty Acids (FISH OIL OMEGA-3 PO) Take 2 Each by mouth every day.      omeprazole (PRILOSEC) 20 MG delayed-release capsule Take 20 mg by mouth every day.      levothyroxine (SYNTHROID) 88 MCG Tab TAKE 1 TABLET BY MOUTH EVERY MONDAY, WEDNESDAY, AND FRIDAY. TO ALTERNATE WITH 100 MCG DOSE 50 Tablet 3    levothyroxine (SYNTHROID) 100 MCG Tab TAKE 1 TABLET BY MOUTH IN THE EVENING EVERY TUESDAY, THURSDAYS, SATURDAY, AND SUNDAY. TO ALTERNATE WITH 88 MCG DOSE 60 Tablet 3    gabapentin (NEURONTIN) 100 MG Cap Take 1-3 Capsules by mouth 3 times a day as needed (pain). 270 Capsule 5    fluticasone furoate-vilanterol (BREO ELLIPTA) 200-25 MCG/ACT AEROSOL POWDER, BREATH ACTIVATED Inhale 1 Puff by mouth every day. 90 Each 3    meloxicam (MOBIC) 15 MG tablet Take 1 Tablet by mouth every day. 100 Tablet 3    rosuvastatin  (CRESTOR) 10 MG Tab TAKE 1 TABLET BY MOUTH EVERY DAY IN THE EVENING 100 Tablet 3    albuterol 108 (90 Base) MCG/ACT Aero Soln inhalation aerosol INHALE 2 PUFFS BY MOUTH EVERY FOUR HOURS AS NEEDED FOR SHORTNESS OF BREATH. 6.7 Each 3    DULoxetine (CYMBALTA) 30 MG Cap DR Particles Take 2 Capsules by mouth every day. 200 Capsule 3    Meclizine HCl (ANTIVERT) 25 MG Chew Tab 1 q12hr prn vertigo 10 Tablet 0    PREMARIN 0.625 MG/GM Cream APPLY 1.0 GRAM TO AFFECTED AREA 3X/WEEK      dicyclomine (BENTYL) 10 MG Cap Take 1 Capsule by mouth every 6 hours as needed (abdominal cramping). 30 Capsule 1    neomycin-polymixin-dexamethasone (MAXITROL) 3.5-14389-0.1 Ointment ophthalmic ointment       clobetasol (TEMOVATE) 0.05 % Cream       baclofen (LIORESAL) 10 MG Tab Take 1 Tablet by mouth 3 times a day as needed (muscle spasm/pain). 90 Tablet 3    triamcinolone acetonide (KENALOG) 0.1 % Ointment TOPICAL APPLY SPARINGLY TO THE AFFECTED AREA 3XWK.      ipratropium-albuterol (DUONEB) 0.5-2.5 (3) MG/3ML nebulizer solution Take 3 mL by nebulization 4 times a day. 3 mL 3    coenzyme Q-10 30 MG capsule Take 60 mg by mouth every day.      Cyanocobalamin (B-12 PO) Take 1 Tablet by mouth every day.       No current [facility]-ordered facility-administered medications on file.

## 2025-07-10 ENCOUNTER — APPOINTMENT (OUTPATIENT)
Dept: SLEEP MEDICINE | Facility: MEDICAL CENTER | Age: 75
End: 2025-07-10
Attending: INTERNAL MEDICINE
Payer: MEDICARE

## 2025-07-17 RX ORDER — TIRZEPATIDE 5 MG/.5ML
5 INJECTION, SOLUTION SUBCUTANEOUS
Qty: 2 ML | Refills: 0 | Status: SHIPPED | OUTPATIENT
Start: 2025-07-17

## 2025-07-17 RX ORDER — TIRZEPATIDE 5 MG/.5ML
5 INJECTION, SOLUTION SUBCUTANEOUS
COMMUNITY
End: 2025-07-17 | Stop reason: SDUPTHER

## 2025-07-17 NOTE — PATIENT COMMUNICATION
Received request via: Patient    Was the patient seen in the last year in this department? Yes    Does the patient have an active prescription (recently filled or refills available) for medication(s) requested? No    Pharmacy Name: [facility]    Does the patient have long term Plus and need 100-day supply? (This applies to ALL medications) Yes, quantity updated to 100 days

## 2025-08-04 DIAGNOSIS — E66.811 OBESITY (BMI 30.0-34.9): ICD-10-CM

## 2025-08-04 RX ORDER — TIRZEPATIDE 5 MG/.5ML
5 INJECTION, SOLUTION SUBCUTANEOUS
Qty: 2 ML | Refills: 0 | Status: SHIPPED | OUTPATIENT
Start: 2025-08-04 | End: 2025-08-28

## 2025-08-05 ENCOUNTER — APPOINTMENT (OUTPATIENT)
Dept: LAB | Facility: MEDICAL CENTER | Age: 75
End: 2025-08-05
Payer: MEDICARE

## 2025-08-14 PROCEDURE — RXMED WILLOW AMBULATORY MEDICATION CHARGE: Performed by: INTERNAL MEDICINE

## 2025-08-18 ENCOUNTER — PHARMACY VISIT (OUTPATIENT)
Dept: PHARMACY | Facility: MEDICAL CENTER | Age: 75
End: 2025-08-18
Payer: COMMERCIAL

## 2025-08-19 ENCOUNTER — PATIENT MESSAGE (OUTPATIENT)
Dept: SLEEP MEDICINE | Facility: MEDICAL CENTER | Age: 75
End: 2025-08-19
Payer: MEDICARE

## 2025-08-20 ENCOUNTER — HOME STUDY (OUTPATIENT)
Dept: SLEEP MEDICINE | Facility: MEDICAL CENTER | Age: 75
End: 2025-08-20
Attending: INTERNAL MEDICINE
Payer: MEDICARE

## 2025-08-20 DIAGNOSIS — G47.33 OSA (OBSTRUCTIVE SLEEP APNEA): ICD-10-CM

## 2025-08-20 DIAGNOSIS — G47.34 NOCTURNAL HYPOXEMIA: Primary | ICD-10-CM

## 2025-08-20 PROCEDURE — 95800 SLP STDY UNATTENDED: CPT | Mod: 26 | Performed by: STUDENT IN AN ORGANIZED HEALTH CARE EDUCATION/TRAINING PROGRAM

## 2025-08-27 DIAGNOSIS — G47.33 OSA (OBSTRUCTIVE SLEEP APNEA): ICD-10-CM

## 2025-08-27 DIAGNOSIS — G47.34 NOCTURNAL HYPOXIA: Primary | ICD-10-CM

## 2025-08-28 DIAGNOSIS — E66.1 CLASS 1 DRUG-INDUCED OBESITY WITH SERIOUS COMORBIDITY AND BODY MASS INDEX (BMI) OF 30.0 TO 30.9 IN ADULT: ICD-10-CM

## 2025-08-28 DIAGNOSIS — G47.33 OSA (OBSTRUCTIVE SLEEP APNEA): Primary | ICD-10-CM

## 2025-08-28 DIAGNOSIS — E66.811 CLASS 1 DRUG-INDUCED OBESITY WITH SERIOUS COMORBIDITY AND BODY MASS INDEX (BMI) OF 30.0 TO 30.9 IN ADULT: ICD-10-CM

## 2025-08-28 RX ORDER — TIRZEPATIDE 7.5 MG/.5ML
7.5 INJECTION, SOLUTION SUBCUTANEOUS
Qty: 2 ML | Refills: 0 | Status: SHIPPED | OUTPATIENT
Start: 2025-08-28 | End: 2025-09-25